# Patient Record
Sex: FEMALE | Race: WHITE | NOT HISPANIC OR LATINO | Employment: OTHER | URBAN - METROPOLITAN AREA
[De-identification: names, ages, dates, MRNs, and addresses within clinical notes are randomized per-mention and may not be internally consistent; named-entity substitution may affect disease eponyms.]

---

## 2017-10-25 ENCOUNTER — APPOINTMENT (OUTPATIENT)
Dept: RADIOLOGY | Facility: CLINIC | Age: 62
End: 2017-10-25
Payer: MEDICARE

## 2017-10-25 ENCOUNTER — OFFICE VISIT (OUTPATIENT)
Dept: URGENT CARE | Facility: CLINIC | Age: 62
End: 2017-10-25
Payer: MEDICARE

## 2017-10-25 DIAGNOSIS — J44.9 CHRONIC OBSTRUCTIVE PULMONARY DISEASE (HCC): ICD-10-CM

## 2017-10-25 PROCEDURE — 71020 HB CHEST X-RAY 2VW FRONTAL&LATL: CPT

## 2017-10-25 PROCEDURE — 99203 OFFICE O/P NEW LOW 30 MIN: CPT

## 2017-10-25 PROCEDURE — G0463 HOSPITAL OUTPT CLINIC VISIT: HCPCS

## 2017-10-25 PROCEDURE — 94640 AIRWAY INHALATION TREATMENT: CPT

## 2017-10-28 NOTE — PROGRESS NOTES
Assessment  1  COPD (chronic obstructive pulmonary disease) (496) (J44 9)   2  Active smoker (305 1) (F17 200)   3  COPD exacerbation (491 21) (J44 1)    Plan  COPD (chronic obstructive pulmonary disease)    · * XR CHEST PA & LATERAL; Status:Active; Requested for:25Oct2017;   COPD exacerbation    · Albuterol Sulfate (2 5 MG/3ML) 0 083% Inhalation Nebulization Solution; INHALE 1  0 083% Every 4 hours PRN cough or wheezing   · Doxycycline Hyclate 100 MG Oral Tablet; TAKE 1 TABLET EVERY 12 HOURS DAILY   · PredniSONE 20 MG Oral Tablet; 3 tabs once daily x 3days; 2 tabs once daily x 2  days; 1 tabs once daily x 2 days  Cough    · Albuterol Sulfate (2 5 MG/3ML) 0 083% Inhalation Nebulization Solution   · Ipratropium Bromide 0 02 % Inhalation Solution   · MINI NEBULIZER- POC; Status:Complete;   Done: [id number] 03:35PM    Discussion/Summary  Discussion Summary:   Follow up with PCP in 2 days if no improvement  If sx worsen go to ER  Medication Side Effects Reviewed: Possible side effects of new medications were reviewed with the patient/guardian today  Understands and agrees with treatment plan: The treatment plan was reviewed with the patient/guardian  The patient/guardian understands and agrees with the treatment plan   Counseling Documentation With Imm: The was counseled regarding instructions for management  Chief Complaint  1  Cough  Chief Complaint Free Text Note Form: C/O harsh cough with thick white expectorant, posterior neck pain which radiates into her posterior head, post nasal drip, SOB and pain in chest with cough x 2 weeks  O2 Sat is 93% and temp is 100  2  now  Pt has h/o COPD and Chronic Bronchitis  Pt has inhalers and nebulizer at home but did not use them  History of Present Illness  HPI: Started with cold sx 2 weeks ago  Now with temp to 101, productive cough with slight green color  Takes Advair and Albuterol  Also has nebulizer treatments, however, has not used them     Hospital Based Practices Required Assessment:   Pain Assessment   the patient states they have pain  The pain is located in the posterior neck and head  The patient describes the pain as aching  Abuse And Domestic Violence Screen    Yes, the patient is safe at home  -- The patient states no one is hurting them  Depression And Suicide Screen  No, the patient has not had thoughts of hurting themself  No, the patient has not felt depressed in the past 7 days  Cough: Lesly Harris presents with complaints of cough  Associated symptoms include dyspnea,-- fever,-- pleuritic chest pain-- and-- mouth breathing, but-- no wheezing,-- no chills,-- no sore throat,-- no myalgias,-- no vomiting,-- no heartburn,-- no postnasal drainage,-- no hoarseness-- and-- no painful swallowing  Review of Systems  Focused-Female:   Constitutional: fever-- and-- feeling poorly  ENT: no hearing loss-- and-- no hoarseness  Cardiovascular: no palpitations  Gastrointestinal: no abdominal pain,-- no nausea-- and-- no vomiting  Musculoskeletal: no arthralgias-- and-- no myalgias  Integumentary: no rashes  Neurological: no headache-- and-- no dizziness  ROS Reviewed:   ROS reviewed  Past Medical History  1  History of hiatal hernia (V12 79) (Z87 19)   2  History of inguinal hernia (V12 79) (Z87 19)   3  History of rotator cuff tear (V13 59) (Z87 39)   4  History of sleep apnea (V13 89) (Z86 69)  Active Problems And Past Medical History Reviewed: The active problems and past medical history were reviewed and updated today  Social History   · Active smoker (305 1) (F17 200)  Social History Reviewed: The social history was reviewed and updated today  Surgical History  1  History of Appendectomy   2  History of Cholecystectomy   3  History of Esophagogastric Fundoplasty Fundoplication   4  History of Inguinal Hernia Repair   5  History of Rotator Cuff Repair   6  History of Tubal Ligation   7  History of Uvulectomy   8  History of Uvuloplasty  Surgical History Reviewed: The surgical history was reviewed and updated today  Current Meds   1  Advair Diskus AEPB; Therapy: (Recorded:25Oct2017) to Recorded   2  Albuterol AERS; Therapy: (Recorded:25Oct2017) to Recorded   3  Bentyl CAPS; Therapy: (XKNKDSBL:10KJO7800) to Recorded   4  Colestid 1 GM Oral Tablet; Therapy: (EDACFIID:03PDJ0703) to Recorded   5  Cymbalta CPEP; Therapy: (HYNLNJRW:50RWQ6401) to Recorded   6  Flexeril TABS; Therapy: (OLHRAPNF:40VAM2891) to Recorded   7  Neurontin CAPS; Therapy: (OSQCQNSL:06YLA6878) to Recorded   8  NexIUM PACK; Therapy: (HNXBCZQZ:88QFQ3049) to Recorded   9  SEROquel TABS; Therapy: (JHQQSCFW:20CKO0086) to Recorded  Medication List Reviewed: The medication list was reviewed and updated today  Allergies  1  Erythromycin TABS   2  Morphine Sulfate TABS    Vitals  Signs   Recorded: [id number] 02:28PM   Temperature: 100 2 F  Heart Rate: 106  Respiration: 20  Systolic: 127  Diastolic: 74  Height: 5 ft   Weight: 150 lb   BMI Calculated: 29 29  BSA Calculated: 1 64  O2 Saturation: 93  Pain Scale: 0    Physical Exam    Constitutional   General appearance: No acute distress, well appearing and well nourished  Eyes   Conjunctiva and lids: No swelling, erythema or discharge  Ears, Nose, Mouth, and Throat   External inspection of ears and nose: Normal     Otoscopic examination: Tympanic membranes translucent with normal light reflex  Canals patent without erythema  Nasal mucosa, septum, and turbinates: Normal without edema or erythema  Oropharynx: Normal with no erythema, edema, exudate or lesions  Pulmonary   Respiratory effort: Abnormal   Assessment of respiratory effort revealed pursed lip breathing  Auscultation of lungs: Abnormal   Auscultation of the lungs revealed decreased breath sounds diffusely  Cardiovascular   Auscultation of heart: Normal rate and rhythm, normal S1 and S2, without murmurs  Lymphatic   Palpation of lymph nodes in neck: No lymphadenopathy  Musculoskeletal   Gait and station: Normal     Skin   Skin and subcutaneous tissue: Normal without rashes or lesions  Psychiatric   Mood and affect: Normal        Results/Data  Diagnostic Studies Reviewed: I personally reviewed the films/images/results in the office today  My interpretation follows  X-ray Review CXR - no infiltrate, fluid in lingula        Signatures   Electronically signed by : Roseann Cranker, PA; Oct 25 2017  3:42PM EST                       (Author)    Electronically signed by : Roseann Cranker, PA; Oct 25 2017  3:56PM EST                       (Author)    Electronically signed by : NICOLETTE Vagras ; Oct 27 2017  2:19PM EST                       (Co-author)

## 2018-01-18 NOTE — RESULT NOTES
Verified Results  * XR CHEST PA & LATERAL [id number] 03:17PM Deirdre Jiménez Order Number: [id number]     Test Name Result Flag Reference   XR CHEST PA & LATERAL (Report)     CHEST      INDICATION: J44 9: Chronic obstructive pulmonary disease, unspecified  History taken directly from the electronic ordering system  Cough with wheezing for 4 days  COMPARISON: None     VIEWS: Frontal and lateral projections     IMAGES: 2     FINDINGS:        Cardiomediastinal silhouette appears unremarkable  No infiltrates  Curvilinear opacities in the right middle lobe with linear atelectasis or scarring  No pneumothorax or pleural effusion  Visualized osseous structures appear within normal limits for the patient's age  IMPRESSION:     No acute pulmonary disease         Workstation performed: [id number]     Signed by:   Tejinder Estrada MD   10/25/17       Plan  COPD (chronic obstructive pulmonary disease)    · * XR CHEST PA & LATERAL; Status:Complete;   Done: [id number] 03:17PM  COPD exacerbation    · Albuterol Sulfate (2 5 MG/3ML) 0 083% Inhalation Nebulization Solution; INHALE 1  0 083% Every 4 hours PRN cough or wheezing   · Doxycycline Hyclate 100 MG Oral Tablet; TAKE 1 TABLET EVERY 12 HOURS  DAILY   · PredniSONE 20 MG Oral Tablet; 3 tabs once daily x 3days; 2 tabs once daily x 2  days; 1 tabs once daily x 2 days  Cough    · Albuterol Sulfate (2 5 MG/3ML) 0 083% Inhalation Nebulization Solution   · Ipratropium Bromide 0 02 % Inhalation Solution   · MINI NEBULIZER- POC; Status:Complete;   Done: [id number] 03:35PM

## 2018-06-06 LAB
ANION GAP SERPL CALCULATED.3IONS-SCNC: 10.3 MM/L
APTT PPP: 28.5 SEC (ref 24.4–37.6)
BASOPHILS # BLD AUTO: 0.1 X3/UL (ref 0–0.3)
BASOPHILS # BLD AUTO: 1 % (ref 0–2)
BUN SERPL-MCNC: 13 MG/DL (ref 7–25)
CALCIUM SERPL-MCNC: 9.5 MG/DL (ref 8.6–10.5)
CHLORIDE SERPL-SCNC: 101 MM/L (ref 98–107)
CO2 SERPL-SCNC: 29 MM/L (ref 21–31)
CREAT SERPL-MCNC: 0.46 MG/DL (ref 0.6–1.2)
DEPRECATED RDW RBC AUTO: 14.2 % (ref 11.5–14.5)
EGFR (HISTORICAL): > 60 GFR
EGFR AFRICAN AMERICAN (HISTORICAL): > 60 GFR
EOSINOPHIL # BLD AUTO: 0.2 X3/UL (ref 0–0.5)
EOSINOPHIL NFR BLD AUTO: 2 % (ref 0–5)
GLUCOSE (HISTORICAL): 86 MG/DL (ref 65–99)
HCT VFR BLD AUTO: 39.7 % (ref 37–47)
HGB BLD-MCNC: 13.7 G/DL (ref 12–16)
INR PPP: 1 (ref 0.9–1.5)
LYMPHOCYTES # BLD AUTO: 2.9 X3/UL (ref 1.2–4.2)
LYMPHOCYTES NFR BLD AUTO: 35.2 % (ref 20.5–51.1)
MCH RBC QN AUTO: 32.3 PG (ref 26–34)
MCHC RBC AUTO-ENTMCNC: 34.5 G/DL (ref 31–36)
MCV RBC AUTO: 93.8 FL (ref 81–99)
MONOCYTES # BLD AUTO: 0.5 X3/UL (ref 0–1)
MONOCYTES NFR BLD AUTO: 6 % (ref 1.7–12)
NEUTROPHILS # BLD AUTO: 4.5 X3/UL (ref 1.4–6.5)
NEUTS SEG NFR BLD AUTO: 55.8 % (ref 42.2–75.2)
OSMOLALITY, SERUM (HISTORICAL): 271 MOSM (ref 262–291)
PLATELET # BLD AUTO: 286 X3/UL (ref 130–400)
PMV BLD AUTO: 9.5 FL (ref 8.6–11.7)
POTASSIUM SERPL-SCNC: 4.3 MM/L (ref 3.5–5.5)
PROTHROMBIN TIME (HISTORICAL): 11.6 SEC (ref 10.1–12.9)
RBC # BLD AUTO: 4.23 X6/UL (ref 3.9–5.2)
SODIUM SERPL-SCNC: 136 MM/L (ref 134–143)
WBC # BLD AUTO: 8.1 X3/UL (ref 4.8–10.8)

## 2018-07-09 ENCOUNTER — HOSPITAL ENCOUNTER (OUTPATIENT)
Dept: RADIOLOGY | Facility: HOSPITAL | Age: 63
Discharge: HOME | End: 2018-07-09

## 2018-07-09 ENCOUNTER — TRANSCRIBE ORDERS (OUTPATIENT)
Dept: ADMINISTRATIVE | Facility: HOSPITAL | Age: 63
End: 2018-07-09

## 2018-07-09 DIAGNOSIS — M54.5 LOW BACK PAIN, UNSPECIFIED BACK PAIN LATERALITY, UNSPECIFIED CHRONICITY, WITH SCIATICA PRESENCE UNSPECIFIED: ICD-10-CM

## 2018-07-09 DIAGNOSIS — M54.50 LUMBAR PAIN WITH RADIATION DOWN LEG: ICD-10-CM

## 2018-07-09 DIAGNOSIS — M79.606 LUMBAR PAIN WITH RADIATION DOWN LEG: ICD-10-CM

## 2018-07-09 DIAGNOSIS — M47.28: ICD-10-CM

## 2018-07-09 DIAGNOSIS — M47.28: Primary | ICD-10-CM

## 2018-08-09 ENCOUNTER — TRANSCRIBE ORDERS (OUTPATIENT)
Dept: URGENT CARE | Facility: CLINIC | Age: 63
End: 2018-08-09

## 2018-08-09 ENCOUNTER — APPOINTMENT (OUTPATIENT)
Dept: RADIOLOGY | Facility: CLINIC | Age: 63
End: 2018-08-09
Payer: MEDICARE

## 2018-08-09 DIAGNOSIS — M47.28: ICD-10-CM

## 2018-08-09 DIAGNOSIS — M54.5 LOW BACK PAIN, UNSPECIFIED BACK PAIN LATERALITY, UNSPECIFIED CHRONICITY, WITH SCIATICA PRESENCE UNSPECIFIED: ICD-10-CM

## 2018-08-09 DIAGNOSIS — M47.28: Primary | ICD-10-CM

## 2018-08-09 PROCEDURE — 72202 X-RAY EXAM SI JOINTS 3/> VWS: CPT

## 2018-08-09 PROCEDURE — 72100 X-RAY EXAM L-S SPINE 2/3 VWS: CPT

## 2018-11-09 ENCOUNTER — HOSPITAL ENCOUNTER (EMERGENCY)
Facility: HOSPITAL | Age: 63
Discharge: HOME | End: 2018-11-09
Attending: FAMILY MEDICINE
Payer: MEDICARE

## 2018-11-09 ENCOUNTER — APPOINTMENT (EMERGENCY)
Dept: RADIOLOGY | Facility: HOSPITAL | Age: 63
End: 2018-11-09
Payer: MEDICARE

## 2018-11-09 VITALS
WEIGHT: 155 LBS | BODY MASS INDEX: 30.43 KG/M2 | HEART RATE: 100 BPM | OXYGEN SATURATION: 95 % | RESPIRATION RATE: 20 BRPM | SYSTOLIC BLOOD PRESSURE: 115 MMHG | HEIGHT: 60 IN | DIASTOLIC BLOOD PRESSURE: 86 MMHG | TEMPERATURE: 98.6 F

## 2018-11-09 DIAGNOSIS — M62.830 BACK MUSCLE SPASM: Primary | ICD-10-CM

## 2018-11-09 PROCEDURE — 72100 X-RAY EXAM L-S SPINE 2/3 VWS: CPT

## 2018-11-09 PROCEDURE — 96372 THER/PROPH/DIAG INJ SC/IM: CPT

## 2018-11-09 PROCEDURE — 99283 EMERGENCY DEPT VISIT LOW MDM: CPT

## 2018-11-09 RX ORDER — ALBUTEROL SULFATE 2.5 MG/3ML
1 SOLUTION RESPIRATORY (INHALATION) 2 TIMES DAILY PRN
COMMUNITY
Start: 2017-10-25 | End: 2020-02-06 | Stop reason: SDUPTHER

## 2018-11-09 RX ORDER — GABAPENTIN 400 MG/1
1 CAPSULE ORAL 2 TIMES DAILY
COMMUNITY
End: 2020-02-06 | Stop reason: SDUPTHER

## 2018-11-09 RX ORDER — METHYLPREDNISOLONE 4 MG/1
TABLET ORAL
Qty: 21 TABLET | Refills: 0 | Status: SHIPPED | OUTPATIENT
Start: 2018-11-09 | End: 2020-02-06 | Stop reason: ALTCHOICE

## 2018-11-09 RX ORDER — QUETIAPINE FUMARATE 50 MG/1
50 TABLET, FILM COATED ORAL
COMMUNITY
End: 2019-10-19 | Stop reason: SDUPTHER

## 2018-11-09 RX ORDER — KETOROLAC TROMETHAMINE 30 MG/ML
60 INJECTION, SOLUTION INTRAMUSCULAR; INTRAVENOUS ONCE
Status: COMPLETED | OUTPATIENT
Start: 2018-11-09 | End: 2018-11-09

## 2018-11-09 RX ORDER — ALBUTEROL SULFATE 90 UG/1
2 AEROSOL, METERED RESPIRATORY (INHALATION) EVERY 6 HOURS PRN
COMMUNITY
End: 2020-02-06 | Stop reason: SDUPTHER

## 2018-11-09 RX ORDER — MEPERIDINE HYDROCHLORIDE 50 MG/ML
50 INJECTION INTRAMUSCULAR; INTRAVENOUS; SUBCUTANEOUS ONCE
Status: COMPLETED | OUTPATIENT
Start: 2018-11-09 | End: 2018-11-09

## 2018-11-09 RX ORDER — MELOXICAM 15 MG/1
15 TABLET ORAL DAILY
Qty: 10 TABLET | Refills: 0 | Status: SHIPPED | OUTPATIENT
Start: 2018-11-09 | End: 2020-02-06 | Stop reason: SDUPTHER

## 2018-11-09 RX ORDER — MONTELUKAST SODIUM 4 MG/1
1 TABLET, CHEWABLE ORAL 2 TIMES DAILY
COMMUNITY
End: 2020-01-02 | Stop reason: SDUPTHER

## 2018-11-09 RX ORDER — CYCLOBENZAPRINE HCL 10 MG
10 TABLET ORAL 3 TIMES DAILY PRN
Qty: 15 TABLET | Refills: 0 | Status: SHIPPED | OUTPATIENT
Start: 2018-11-09 | End: 2020-02-06 | Stop reason: SDUPTHER

## 2018-11-09 RX ORDER — CYCLOBENZAPRINE HCL 10 MG
1 TABLET ORAL DAILY
COMMUNITY
End: 2019-09-10 | Stop reason: SDUPTHER

## 2018-11-09 RX ORDER — DULOXETIN HYDROCHLORIDE 30 MG/1
90 CAPSULE, DELAYED RELEASE ORAL DAILY
COMMUNITY
End: 2019-11-13 | Stop reason: SDUPTHER

## 2018-11-09 RX ADMIN — MEPERIDINE HYDROCHLORIDE 50 MG: 50 INJECTION INTRAMUSCULAR; INTRAVENOUS; SUBCUTANEOUS at 16:22

## 2018-11-09 RX ADMIN — DEXAMETHASONE SODIUM PHOSPHATE 10 MG: 10 INJECTION INTRAMUSCULAR; INTRAVENOUS at 15:31

## 2018-11-09 RX ADMIN — KETOROLAC TROMETHAMINE 60 MG: 30 INJECTION, SOLUTION INTRAMUSCULAR at 15:33

## 2018-11-09 NOTE — DISCHARGE INSTRUCTIONS
Muscle Spasm   Dat T: [place] made simple  Interventions for leg cramps in pregnancy  Pract Midwife 2007; 10(9):40-40  Mena DS: Muscle Spasms  In: Francisco J Sandschild AT, Araceli R, et al, eds  Palliative Medicine, Dasia Grover, [address], [place], 2009  [address] [facility] M: The athlete with muscular cramps: clinical approach  J Am Acad Orthop Surg 2007; 15(7):425-431  Elías BH, Heather GE, Destini P, et al: Diagnosis and treatment of low-back pain because of paraspinous muscle spasm: a physician roundtable  Pain Med 2011; 12 Suppl 4:O207-K990  Catarina Heck PM & Catarina Heck CA: Hyperventilation as a simple cure for severe exercise-associated muscle cramping  Pain Med 2011; 12(6):987-987  Cayden Cisneros et al: Muscular cramps: proposals for a new classification  Acta Neurol [place] 2003; 107(3):176-186  Padmini Bowling Oms: Physical Treatments  In: Jesse's Nursing Procedures, 5th ed  [address] [facility],[address] [place] [address], [address], [place], 2009  Padmini Beck JA: Muscle Spasms (Muscle Cramps)  In: Professional Guide to Signs and Symptoms, [address] [facility],[address] [place] [address], [address], [place], 2007  Freddy ELIZABETH & Dinh A: Muscle Pain and Cramps  In: Hermelinda RB, Washingtonjonathan Robert, Piyush J, et al, eds  Koby's Neurology in [facility], 6th ed  Dasia Grover, [address], [place], 2012 © 2017 [facility] Information is for End User's use only and may not be sold, redistributed or otherwise used for commercial purposes  All illustrations and images included in CareNotes® are the copyrighted property of A D A M , Inc  or Garry Geller  The above information is an  only  It is not intended as medical advice for individual conditions or treatments  Talk to your doctor, nurse or pharmacist before following any medical regimen to see if it is safe and effective for you        Acute Low Back Pain   AMBULATORY CARE:   Acute low back pain  is sudden discomfort in your lower back area that lasts for up to 6 weeks  The discomfort makes it difficult to tolerate activity  Common symptoms include the following:   · Back stiffness or spasms    · Pain down the back or side of one leg    · Holding yourself in an unusual position or posture to decrease your back pain    · Not being able to find a sitting position that is comfortable    · Slow increase in your pain for 24 to 48 hours after you stress your back    · Tenderness on your lower back or severe pain when you move your back  Seek immediate care for the following symptoms:   · Severe pain    · Sudden stiffness and heaviness in both buttocks down to both legs    · Numbness or weakness in one leg, or pain in both legs    · Numbness in your genital area or across your lower back    · Unable to control your urine or bowel movements  Contact your healthcare provider if:   · You have a fever  · You have pain at night or when you rest     · Your pain does not get better with treatment  · You have pain that worsens when you cough or sneeze  · You suddenly feel something pop or snap in your back  · You have questions or concerns about your condition or care  The goal of treatment for acute low back pain  is to relieve your pain and help you tolerate activity  Most people with acute lower back pain get better within 4 to 6 weeks  You may need any of the following:  · Acetaminophen  decreases pain  It is available without a doctor's order  Ask how much to take and how often to take it  Follow directions  Acetaminophen can cause liver damage if not taken correctly  · NSAIDs  help decrease swelling and pain  This medicine is available with or without a doctor's order  NSAIDs can cause stomach bleeding or kidney problems in certain people  If you take blood thinner medicine, always ask your healthcare provider if NSAIDs are safe for you   Always read the medicine label and follow directions  · Prescription pain medicine  may be given  Ask your healthcare provider how to take this medicine safely  · Muscle relaxers  decrease pain by relaxing the muscles in your lower spine  Manage your symptoms:   · Stay active  as much as you can without causing more pain  Bed rest could make your back pain worse  Start with some light exercises such as walking  Avoid heavy lifting until your pain is gone  Ask for more information about the activities or exercises that are right for you  · Ice  helps decrease swelling, pain, and muscle spams  Put crushed ice in a plastic bag  Cover it with a towel  Place it on your lower back for 20 to 30 minutes every 2 hours  Do this for about 2 to 3 days after your pain starts, or as directed  · Heat  helps decrease pain and muscle spasms  Start to use heat after treatment with ice has stopped  Use a small towel dampened with warm water or a heating pad, or sit in a warm bath  Apply heat on the area for 20 to 30 minutes every 2 hours for as many days as directed  Alternate heat and ice  Prevent acute low back pain:   · Use proper body mechanics  ¨ Bend at the hips and knees when you  objects  Do not bend from the waist  Use your leg muscles as you lift the load  Do not use your back  Keep the object close to your chest as you lift it  Try not to twist or lift anything above your waist     ¨ Change your position often when you stand for long periods of time  Rest one foot on a small box or footrest, and then switch to the other foot often  ¨ Try not to sit for long periods of time  When you do, sit in a straight-backed chair with your feet flat on the floor  Never reach, pull, or push while you are sitting  · Do exercises that strengthen your back muscles  Warm up before you exercise  Ask your healthcare provider the best exercises for you  · Maintain a healthy weight  Ask your healthcare provider how much you should weigh   Ask him to help you create a weight loss plan if you are overweight  Follow up with your healthcare provider as directed:  Return for a follow-up visit if you still have pain after 1 to 3 weeks of treatment  You may need to visit an orthopedist if your back pain lasts more than 12 weeks  Write down your questions so you remember to ask them during your visits  © 2017 [address] [employer]  Information is for End User's use only and may not be sold, redistributed or otherwise used for commercial purposes  All illustrations and images included in CareNotes® are the copyrighted property of A D A [employer] , [employer]  or Garry Geller  The above information is an  only  It is not intended as medical advice for individual conditions or treatments  Talk to your doctor, nurse or pharmacist before following any medical regimen to see if it is safe and effective for you

## 2018-11-09 NOTE — ED PROVIDER NOTES
History  Chief Complaint   Patient presents with    Back Pain     lower back around to lower abdomen and down both legs       History provided by:  Patient   used: No    Back Pain   Location:  Lumbar spine  Quality:  Shooting  Radiates to:  R thigh  Pain severity:  Severe  Pain is:  Same all the time  Onset quality:  Gradual  Duration:  2 days  Timing:  Constant  Progression:  Worsening  Chronicity:  New  Context: not falling, not jumping from heights, not recent illness and not recent injury    Relieved by:  Nothing  Worsened by: Movement  Ineffective treatments:  NSAIDs  Associated symptoms: no headaches, no leg pain, no numbness, no paresthesias, no pelvic pain, no perianal numbness, no tingling, no weakness and no weight loss    Risk factors: obesity        Prior to Admission Medications   Prescriptions Last Dose Informant Patient Reported? Taking?    DULoxetine (CYMBALTA) 30 mg delayed release capsule 11/9/2018 at Unknown time  Yes Yes   Sig: Take 90 mg by mouth daily   QUEtiapine (SEROquel) 50 mg tablet 11/8/2018 at Unknown time  Yes Yes   Sig: Take 50 mg by mouth daily with breakfast   QUEtiapine Fumarate (SEROQUEL PO) Unknown at Unknown time  Yes No   Sig: Take 150 mg by mouth daily at bedtime     albuterol (2 5 mg/3 mL) 0 083 % nebulizer solution 11/8/2018 at Unknown time  Yes Yes   Sig: Inhale 1 vial 2 (two) times a day   albuterol (PROVENTIL HFA,VENTOLIN HFA) 90 mcg/act inhaler Past Week at Unknown time  Yes Yes   Sig: Inhale 2 puffs every 6 (six) hours as needed   colestipol (COLESTID) 1 g tablet 11/9/2018 at Unknown time  Yes Yes   Sig: Take 1 tablet by mouth 2 (two) times a day   cyclobenzaprine (FLEXERIL) 10 mg tablet 11/9/2018 at Unknown time  Yes Yes   Sig: Take 1 tablet by mouth daily   fluticasone-salmeterol (ADVAIR DISKUS) 500-50 mcg/dose inhaler 11/9/2018 at Unknown time  Yes Yes   Sig: Inhale 1 puff 2 (two) times a day   gabapentin (NEURONTIN) 400 mg capsule 11/9/2018 at Unknown time  Yes Yes   Sig: Take 1 tablet by mouth 2 (two) times a day      Facility-Administered Medications: None       Past Medical History:   Diagnosis Date    Anxiety     [facility]     Depression     Diverticulitis     GERD (gastroesophageal reflux disease)     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[address] )     Sleep apnea        Past Surgical History:   Procedure Laterality Date    APPENDECTOMY      CHOLECYSTECTOMY      HIATAL HERNIA REPAIR      INGUINAL HERNIA REPAIR Right     LAPAROSCOPIC TUBAL LIGATION Bilateral     ROTATOR CUFF REPAIR Right     UVULOPALATOPHARYNGOPLASTY         History reviewed  No pertinent family history  I have reviewed and agree with the history as documented  Social History   Substance Use Topics    Smoking status: Current Every Day Smoker     Packs/day: 1 00     Types: Cigarettes    Smokeless tobacco: Never Used    Alcohol use Yes      Comment: occasionally        Review of Systems   Constitutional: Negative  Negative for weight loss  HENT: Negative  Respiratory: Negative  Cardiovascular: Negative  Gastrointestinal: Negative  Genitourinary: Negative for pelvic pain  Musculoskeletal: Positive for back pain  Neurological: Negative for tingling, weakness, numbness, headaches and paresthesias  Psychiatric/Behavioral: Negative  Physical Exam  Physical Exam   Constitutional: She appears well-developed and well-nourished  HENT:   Head: Normocephalic and atraumatic  Cardiovascular: Normal rate, regular rhythm and normal heart sounds  Pulmonary/Chest: Effort normal and breath sounds normal    Abdominal: Soft  Bowel sounds are normal  She exhibits no distension  There is no tenderness  Musculoskeletal:   Lower lumbar tenderness on palpation   Nursing note and vitals reviewed        Vital Signs  ED Triage Vitals [11/09/18 1458]   Temperature Pulse Respirations Blood Pressure SpO2   98 6 °F (37 °C) 100 20 115/86 95 %      Temp Source Heart Rate Source Patient Position - Orthostatic VS BP Location FiO2 (%)   Temporal Monitor Sitting Left arm --      Pain Score       5           Vitals:    11/09/18 1458   BP: 115/86   Pulse: 100   Patient Position - Orthostatic VS: Sitting       Visual Acuity      ED Medications  Medications   ketorolac (TORADOL) injection 60 mg (60 mg Intramuscular Given 11/9/18 1533)   dexamethasone (DECADRON) injection 10 mg (10 mg Intramuscular Given 11/9/18 1531)   meperidine (DEMEROL) injection 50 mg (50 mg Intramuscular Given 11/9/18 1622)       Diagnostic Studies  Results Reviewed     None                 XR spine lumbar 2 or 3 views injury   Final Result by Shantel Dodd (11/09 1635)   No acute fracture or subluxation  Stable grade 1 spondylolisthesis of L4   on L5  Diffuse degenerative disc disease and osteoarthritis  Signed by Maik Simmons MD                 Procedures  Procedures       Phone Contacts  ED Phone Contact    ED Course  ED Course as of Nov 09 1658 Fri Nov 09, 2018   1557 Patient states her pain is not better after the pain medication  1658 Patient statesHer pain is much better  Advised to continue with medication may need physical therapy in future if the pain persisted eyes return to the ED if symptoms are worse  MDM  CritCare Time    Disposition  Final diagnoses:   Back muscle spasm     Time reflects when diagnosis was documented in both MDM as applicable and the Disposition within this note     Time User Action Codes Description Comment    11/9/2018  4:40 PM Francine Bronx Add [P54 456] Back muscle spasm       ED Disposition     ED Disposition Condition Comment    Discharge  Jose Manuel Fernandez discharge to home/self care      Condition at discharge: Stable        Follow-up Information     Follow up With Specialties Details Why [address] [place], DO Nephrology In 2 days If symptoms worsen Marcio [address] [place] [id number]  [phone]            Patient's Medications   Discharge Prescriptions    CYCLOBENZAPRINE (FLEXERIL) 10 MG TABLET    Take 1 tablet (10 mg total) by mouth 3 (three) times a day as needed for muscle spasms       Start Date: 11/9/2018 End Date: --       Order Dose: 10 mg       Quantity: 15 tablet    Refills: 0    MELOXICAM (MOBIC) 15 MG TABLET    Take 1 tablet (15 mg total) by mouth daily       Start Date: 11/9/2018 End Date: --       Order Dose: 15 mg       Quantity: 10 tablet    Refills: 0    METHYLPREDNISOLONE 4 MG TBPK    Use as directed on package       Start Date: 11/9/2018 End Date: --       Order Dose: --       Quantity: 21 tablet    Refills: 0     No discharge procedures on file      ED Provider  Electronically Signed by           Gianni Parrish MD  11/09/18 7169

## 2018-11-29 ENCOUNTER — TRANSCRIBE ORDERS (OUTPATIENT)
Dept: ADMINISTRATIVE | Facility: HOSPITAL | Age: 63
End: 2018-11-29

## 2018-11-29 DIAGNOSIS — R10.84 ABDOMINAL PAIN, GENERALIZED: Primary | ICD-10-CM

## 2018-12-03 ENCOUNTER — HOSPITAL ENCOUNTER (OUTPATIENT)
Dept: ULTRASOUND IMAGING | Facility: HOSPITAL | Age: 63
Discharge: HOME | End: 2018-12-03
Attending: INTERNAL MEDICINE
Payer: MEDICARE

## 2018-12-03 DIAGNOSIS — R10.84 ABDOMINAL PAIN, GENERALIZED: ICD-10-CM

## 2018-12-03 PROCEDURE — 76700 US EXAM ABDOM COMPLETE: CPT

## 2019-02-24 ENCOUNTER — APPOINTMENT (EMERGENCY)
Dept: CT IMAGING | Facility: HOSPITAL | Age: 64
End: 2019-02-24
Payer: MEDICARE

## 2019-02-24 ENCOUNTER — HOSPITAL ENCOUNTER (EMERGENCY)
Facility: HOSPITAL | Age: 64
Discharge: HOME | End: 2019-02-24
Attending: EMERGENCY MEDICINE | Admitting: EMERGENCY MEDICINE
Payer: MEDICARE

## 2019-02-24 VITALS
TEMPERATURE: 98.9 F | HEIGHT: 60 IN | WEIGHT: 152 LBS | BODY MASS INDEX: 29.84 KG/M2 | RESPIRATION RATE: 18 BRPM | OXYGEN SATURATION: 95 % | DIASTOLIC BLOOD PRESSURE: 77 MMHG | SYSTOLIC BLOOD PRESSURE: 89 MMHG | HEART RATE: 91 BPM

## 2019-02-24 DIAGNOSIS — R29.6 FREQUENT FALLS: Primary | ICD-10-CM

## 2019-02-24 LAB
ALBUMIN SERPL BCP-MCNC: 4.7 G/DL (ref 3.5–5.7)
ALP SERPL-CCNC: 85 U/L (ref 55–165)
ALT SERPL W P-5'-P-CCNC: 18 U/L (ref 7–52)
ANION GAP SERPL CALCULATED.3IONS-SCNC: 9 MMOL/L (ref 4–13)
APTT PPP: 23 SECONDS (ref 26–38)
AST SERPL W P-5'-P-CCNC: 24 U/L (ref 13–39)
BASOPHILS # BLD AUTO: 0 THOUSANDS/ΜL (ref 0–0.1)
BASOPHILS NFR BLD AUTO: 1 % (ref 0–2)
BILIRUB SERPL-MCNC: 0.3 MG/DL (ref 0.2–1)
BUN SERPL-MCNC: 11 MG/DL (ref 7–25)
CALCIUM SERPL-MCNC: 9.7 MG/DL (ref 8.6–10.5)
CHLORIDE SERPL-SCNC: 97 MMOL/L (ref 98–107)
CO2 SERPL-SCNC: 27 MMOL/L (ref 21–31)
CREAT SERPL-MCNC: 0.51 MG/DL (ref 0.6–1.2)
EOSINOPHIL # BLD AUTO: 0.1 THOUSAND/ΜL (ref 0–0.61)
EOSINOPHIL NFR BLD AUTO: 1 % (ref 0–5)
ERYTHROCYTE [DISTWIDTH] IN BLOOD BY AUTOMATED COUNT: 14.2 % (ref 11.5–14.5)
ETHANOL SERPL-MCNC: 160.6 MG/DL
GFR SERPL CREATININE-BSD FRML MDRD: 103 ML/MIN/1.73SQ M
GLUCOSE SERPL-MCNC: 114 MG/DL (ref 65–99)
HCT VFR BLD AUTO: 41.3 % (ref 42–47)
HGB BLD-MCNC: 13.8 G/DL (ref 12–16)
INR PPP: 0.97 (ref 0.9–1.5)
LYMPHOCYTES # BLD AUTO: 1.6 THOUSANDS/ΜL (ref 0.6–4.47)
LYMPHOCYTES NFR BLD AUTO: 21 % (ref 21–51)
MCH RBC QN AUTO: 31.3 PG (ref 26–34)
MCHC RBC AUTO-ENTMCNC: 33.4 G/DL (ref 31–37)
MCV RBC AUTO: 94 FL (ref 81–99)
MONOCYTES # BLD AUTO: 0.3 THOUSAND/ΜL (ref 0.17–1.22)
MONOCYTES NFR BLD AUTO: 4 % (ref 2–12)
NEUTROPHILS # BLD AUTO: 5.6 THOUSANDS/ΜL (ref 1.4–6.5)
NEUTS SEG NFR BLD AUTO: 74 % (ref 42–75)
NRBC BLD AUTO-RTO: 0 /100 WBCS
PLATELET # BLD AUTO: 312 THOUSANDS/UL (ref 149–390)
PMV BLD AUTO: 8.6 FL (ref 8.6–11.7)
POTASSIUM SERPL-SCNC: 4.3 MMOL/L (ref 3.5–5.5)
PROT SERPL-MCNC: 8 G/DL (ref 6.4–8.9)
PROTHROMBIN TIME: 11.3 SECONDS (ref 10.2–13)
RBC # BLD AUTO: 4.42 MILLION/UL (ref 3.9–5.2)
SODIUM SERPL-SCNC: 133 MMOL/L (ref 134–143)
TROPONIN I SERPL-MCNC: <0.03 NG/ML
WBC # BLD AUTO: 7.6 THOUSAND/UL (ref 4.8–10.8)

## 2019-02-24 PROCEDURE — 36415 COLL VENOUS BLD VENIPUNCTURE: CPT | Performed by: EMERGENCY MEDICINE

## 2019-02-24 PROCEDURE — 93005 ELECTROCARDIOGRAM TRACING: CPT

## 2019-02-24 PROCEDURE — 99285 EMERGENCY DEPT VISIT HI MDM: CPT

## 2019-02-24 PROCEDURE — 85730 THROMBOPLASTIN TIME PARTIAL: CPT | Performed by: EMERGENCY MEDICINE

## 2019-02-24 PROCEDURE — 80053 COMPREHEN METABOLIC PANEL: CPT | Performed by: EMERGENCY MEDICINE

## 2019-02-24 PROCEDURE — 70450 CT HEAD/BRAIN W/O DYE: CPT

## 2019-02-24 PROCEDURE — 84484 ASSAY OF TROPONIN QUANT: CPT | Performed by: EMERGENCY MEDICINE

## 2019-02-24 PROCEDURE — 85610 PROTHROMBIN TIME: CPT | Performed by: EMERGENCY MEDICINE

## 2019-02-24 PROCEDURE — 85025 COMPLETE CBC W/AUTO DIFF WBC: CPT | Performed by: EMERGENCY MEDICINE

## 2019-02-24 PROCEDURE — 80320 DRUG SCREEN QUANTALCOHOLS: CPT | Performed by: EMERGENCY MEDICINE

## 2019-02-25 LAB
ATRIAL RATE: 93 BPM
P AXIS: 66 DEGREES
PR INTERVAL: 120 MS
QRS AXIS: 35 DEGREES
QRSD INTERVAL: 82 MS
QT INTERVAL: 362 MS
QTC INTERVAL: 450 MS
T WAVE AXIS: 53 DEGREES
VENTRICULAR RATE: 93 BPM

## 2019-02-25 PROCEDURE — 93010 ELECTROCARDIOGRAM REPORT: CPT | Performed by: INTERNAL MEDICINE

## 2019-02-25 NOTE — ED PROVIDER NOTES
History  Chief Complaint   Patient presents with   [address] Hypertension     Patient is 59year-old female with a history of alcohol abuse who has fallen 3 times today  Patient admits to drinking  Patient states 1 time she did hit her head  Patient reports a mild headache  She denies any photophobia or enies any neck pain or numbness or weakness  Patient's fall was witnessed by family who called EMS  Patient states she feels fine          Prior to Admission Medications   Prescriptions Last Dose Informant Patient Reported? Taking?    DULoxetine (CYMBALTA) 30 mg delayed release capsule   Yes No   Sig: Take 90 mg by mouth daily   Methylprednisolone 4 MG TBPK   No No   Sig: Use as directed on package   QUEtiapine (SEROquel) 50 mg tablet   Yes No   Sig: Take 50 mg by mouth daily with breakfast   QUEtiapine Fumarate (SEROQUEL PO)   Yes No   Sig: Take 150 mg by mouth daily at bedtime     albuterol (2 5 mg/3 mL) 0 083 % nebulizer solution   Yes No   Sig: Inhale 1 vial 2 (two) times a day   albuterol (PROVENTIL HFA,VENTOLIN HFA) 90 mcg/act inhaler   Yes No   Sig: Inhale 2 puffs every 6 (six) hours as needed   colestipol (COLESTID) 1 g tablet   Yes No   Sig: Take 1 tablet by mouth 2 (two) times a day   cyclobenzaprine (FLEXERIL) 10 mg tablet   Yes No   Sig: Take 1 tablet by mouth daily   cyclobenzaprine (FLEXERIL) 10 mg tablet   No No   Sig: Take 1 tablet (10 mg total) by mouth 3 (three) times a day as needed for muscle spasms   fluticasone-salmeterol (ADVAIR DISKUS) 500-50 mcg/dose inhaler   Yes No   Sig: Inhale 1 puff 2 (two) times a day   gabapentin (NEURONTIN) 400 mg capsule   Yes No   Sig: Take 1 tablet by mouth 2 (two) times a day   meloxicam (MOBIC) 15 mg tablet   No No   Sig: Take 1 tablet (15 mg total) by mouth daily      Facility-Administered Medications: None       Past Medical History:   Diagnosis Date    Anxiety     COPD (chronic obstructive pulmonary disease) (HCC)     Depression     Diverticulitis  GERD (gastroesophageal reflux disease)     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[facility] )     Sleep apnea        Past Surgical History:   Procedure Laterality Date    APPENDECTOMY      CHOLECYSTECTOMY      HIATAL HERNIA REPAIR      INGUINAL HERNIA REPAIR Right     LAPAROSCOPIC TUBAL LIGATION Bilateral     ROTATOR CUFF REPAIR Right     UVULOPALATOPHARYNGOPLASTY         History reviewed  No pertinent family history  I have reviewed and agree with the history as documented  Social History     Tobacco Use    Smoking status: Current Every Day Smoker     Packs/day: 1 00     Types: Cigarettes    Smokeless tobacco: Never Used   Substance Use Topics    Alcohol use: Yes     Comment: occasionally    Drug use: Yes     Types: Marijuana     Comment: daily in e-pen form        Review of Systems   Constitutional: Negative  HENT: Negative  Eyes: Negative  Respiratory: Negative  Cardiovascular: Negative  Gastrointestinal: Negative  Musculoskeletal: Positive for gait problem  Negative for joint swelling and neck pain  Skin: Negative  Neurological: Negative for weakness, numbness and headaches  Gait dysfunction   Psychiatric/Behavioral:        Alcohol abuse   All other systems reviewed and are negative  Physical Exam  Physical Exam   Constitutional: She is oriented to person, place, and time  She appears well-developed and well-nourished  HENT:   Head: Normocephalic and atraumatic  Eyes: Pupils are equal, round, and reactive to light  EOM are normal    Neck: Normal range of motion  Neck supple  Nontender to palpation or with any motion   Cardiovascular: Normal rate and regular rhythm  Pulmonary/Chest: Effort normal    Decreased breath sounds bilaterally   Abdominal: Soft  There is no tenderness  Musculoskeletal: Normal range of motion  She exhibits no tenderness  Neurological: She is alert and oriented to person, place, and time  Skin: Skin is warm and dry   Capillary refill takes less than 2 seconds  Psychiatric: She has a normal mood and affect  Nursing note and vitals reviewed  Vital Signs  ED Triage Vitals [02/24/19 1919]   Temperature Pulse Respirations Blood Pressure SpO2   98 1 °F (36 7 °C) 95 16 145/89 95 %      Temp Source Heart Rate Source Patient Position - Orthostatic VS BP Location FiO2 (%)   Temporal Monitor -- Right arm --      Pain Score       No Pain           Vitals:    02/24/19 1919   BP: 145/89   Pulse: 95       Visual Acuity  Visual Acuity      Most Recent Value   L Pupil Size (mm)  3   R Pupil Size (mm)  3          ED Medications  Medications - No data to display    Diagnostic Studies  Results Reviewed     Procedure Component Value Units Date/Time    Troponin I [[id number]]  (Normal) Collected:  02/24/19 2008    Lab Status:  Final result Specimen:  Blood from Arm, Left Updated:  02/24/19 2039     Troponin I <0 03 ng/mL     Ethanol [[id number]]  (Abnormal) Collected:  02/24/19 2008    Lab Status:  Final result Specimen:  Blood from Arm, Left Updated:  02/24/19 2036     Ethanol Lvl 160 6 mg/dL     Comprehensive metabolic panel [[id number]]  (Abnormal) Collected:  02/24/19 2008    Lab Status:  Final result Specimen:  Blood from Arm, Left Updated:  02/24/19 2035     Sodium 133 mmol/L      Potassium 4 3 mmol/L      Chloride 97 mmol/L      CO2 27 mmol/L      ANION GAP 9 mmol/L      BUN 11 mg/dL      Creatinine 0 51 mg/dL      Glucose 114 mg/dL      Calcium 9 7 mg/dL      AST 24 U/L      ALT 18 U/L      Alkaline Phosphatase 85 U/L      Total Protein 8 0 g/dL      Albumin 4 7 g/dL      Total Bilirubin 0 30 mg/dL      eGFR 103 ml/min/1 73sq m     Narrative:       National Kidney Disease Education Program recommendations are as follows:  GFR calculation is accurate only with a steady state creatinine  Chronic Kidney disease less than 60 ml/min/1 73 sq  meters  Kidney failure less than 15 ml/min/1 73 sq  meters      Protime-INR [[id number]]  (Normal) Collected:  02/24/19 2008 Lab Status:  Final result Specimen:  Blood from Arm, Left Updated:  02/24/19 2032     Protime 11 3 seconds      INR 0 97    APTT [[id number]]  (Abnormal) Collected:  02/24/19 2008    Lab Status:  Final result Specimen:  Blood from Arm, Left Updated:  02/24/19 2032     PTT 23 seconds     CBC and differential [[id number]]  (Abnormal) Collected:  02/24/19 2013    Lab Status:  Final result Specimen:  Blood from Arm, Left Updated:  02/24/19 2021     WBC 7 60 Thousand/uL      RBC 4 42 Million/uL      Hemoglobin 13 8 g/dL      Hematocrit 41 3 %      MCV 94 fL      MCH 31 3 pg      MCHC 33 4 g/dL      RDW 14 2 %      MPV 8 6 fL      Platelets 717 Thousands/uL      nRBC 0 /100 WBCs      Neutrophils Relative 74 %      Lymphocytes Relative 21 %      Monocytes Relative 4 %      Eosinophils Relative 1 %      Basophils Relative 1 %      Neutrophils Absolute 5 60 Thousands/µL      Lymphocytes Absolute 1 60 Thousands/µL      Monocytes Absolute 0 30 Thousand/µL      Eosinophils Absolute 0 10 Thousand/µL      Basophils Absolute 0 00 Thousands/µL                  CT head without contrast   Final Result by Emerson Hallman MD (02/24 2101)      No acute intracranial abnormality  Workstation performed: [id number]                    Procedures  Procedures       Phone Contacts  ED Phone Contact    ED Course                               MDM  Number of Diagnoses or Management Options  Diagnosis management comments: Patient with mildly elevated alcohol  Patient is stable emergency department  Patient has family member willing to take her home    Patient will be discharged       Amount and/or Complexity of Data Reviewed  Clinical lab tests: reviewed  Tests in the radiology section of CPT®: reviewed    Risk of Complications, Morbidity, and/or Mortality  Presenting problems: moderate  Diagnostic procedures: moderate  Management options: low    Patient Progress  Patient progress: stable      Disposition  Final diagnoses:   None ED Disposition     None      Follow-up Information    None         Patient's Medications   Discharge Prescriptions    No medications on file     No discharge procedures on file      ED Provider  Electronically Signed by           Efren Mc MD  02/24/19 2549

## 2019-07-15 ENCOUNTER — APPOINTMENT (OUTPATIENT)
Dept: LAB | Facility: CLINIC | Age: 64
End: 2019-07-15
Payer: COMMERCIAL

## 2019-07-15 ENCOUNTER — TRANSCRIBE ORDERS (OUTPATIENT)
Dept: LAB | Facility: CLINIC | Age: 64
End: 2019-07-15

## 2019-07-15 ENCOUNTER — TRANSCRIBE ORDERS (OUTPATIENT)
Dept: ADMINISTRATIVE | Facility: HOSPITAL | Age: 64
End: 2019-07-15

## 2019-07-15 DIAGNOSIS — I10 ESSENTIAL HYPERTENSION, MALIGNANT: Primary | ICD-10-CM

## 2019-07-15 DIAGNOSIS — R10.30 INGUINAL PAIN, UNSPECIFIED LATERALITY: Primary | ICD-10-CM

## 2019-07-15 DIAGNOSIS — E78.5 HYPERLIPIDEMIA, UNSPECIFIED HYPERLIPIDEMIA TYPE: ICD-10-CM

## 2019-07-15 DIAGNOSIS — K57.30 DIVERTICULOSIS OF LARGE INTESTINE WITHOUT DIVERTICULITIS: ICD-10-CM

## 2019-07-15 DIAGNOSIS — A37.00: ICD-10-CM

## 2019-07-15 LAB
ALBUMIN SERPL BCP-MCNC: 3.8 G/DL (ref 3.5–5)
ALP SERPL-CCNC: 94 U/L (ref 46–116)
ALT SERPL W P-5'-P-CCNC: 19 U/L (ref 12–78)
ANION GAP SERPL CALCULATED.3IONS-SCNC: 5 MMOL/L (ref 4–13)
AST SERPL W P-5'-P-CCNC: 13 U/L (ref 5–45)
BACTERIA UR QL AUTO: NORMAL /HPF
BASOPHILS # BLD AUTO: 0.07 THOUSANDS/ΜL (ref 0–0.1)
BASOPHILS NFR BLD AUTO: 1 % (ref 0–1)
BILIRUB SERPL-MCNC: 0.37 MG/DL (ref 0.2–1)
BILIRUB UR QL STRIP: NEGATIVE
BUN SERPL-MCNC: 13 MG/DL (ref 5–25)
CALCIUM SERPL-MCNC: 9.3 MG/DL (ref 8.3–10.1)
CHLORIDE SERPL-SCNC: 107 MMOL/L (ref 100–108)
CHOLEST SERPL-MCNC: 240 MG/DL (ref 50–200)
CLARITY UR: CLEAR
CO2 SERPL-SCNC: 29 MMOL/L (ref 21–32)
COLOR UR: YELLOW
CREAT SERPL-MCNC: 0.51 MG/DL (ref 0.6–1.3)
CREAT UR-MCNC: 70.5 MG/DL
EOSINOPHIL # BLD AUTO: 0.19 THOUSAND/ΜL (ref 0–0.61)
EOSINOPHIL NFR BLD AUTO: 3 % (ref 0–6)
ERYTHROCYTE [DISTWIDTH] IN BLOOD BY AUTOMATED COUNT: 14.9 % (ref 11.6–15.1)
GFR SERPL CREATININE-BSD FRML MDRD: 103 ML/MIN/1.73SQ M
GLUCOSE P FAST SERPL-MCNC: 93 MG/DL (ref 65–99)
GLUCOSE UR STRIP-MCNC: NEGATIVE MG/DL
HCT VFR BLD AUTO: 43.8 % (ref 34.8–46.1)
HDLC SERPL-MCNC: 62 MG/DL (ref 40–60)
HGB BLD-MCNC: 14 G/DL (ref 11.5–15.4)
HGB UR QL STRIP.AUTO: NEGATIVE
HYALINE CASTS #/AREA URNS LPF: NORMAL /LPF
IMM GRANULOCYTES # BLD AUTO: 0.02 THOUSAND/UL (ref 0–0.2)
IMM GRANULOCYTES NFR BLD AUTO: 0 % (ref 0–2)
KETONES UR STRIP-MCNC: NEGATIVE MG/DL
LDLC SERPL CALC-MCNC: 153 MG/DL (ref 0–100)
LEUKOCYTE ESTERASE UR QL STRIP: NEGATIVE
LYMPHOCYTES # BLD AUTO: 2.1 THOUSANDS/ΜL (ref 0.6–4.47)
LYMPHOCYTES NFR BLD AUTO: 34 % (ref 14–44)
MCH RBC QN AUTO: 31.6 PG (ref 26.8–34.3)
MCHC RBC AUTO-ENTMCNC: 32 G/DL (ref 31.4–37.4)
MCV RBC AUTO: 99 FL (ref 82–98)
MICROALBUMIN UR-MCNC: 7.2 MG/L (ref 0–20)
MICROALBUMIN/CREAT 24H UR: 10 MG/G CREATININE (ref 0–30)
MONOCYTES # BLD AUTO: 0.43 THOUSAND/ΜL (ref 0.17–1.22)
MONOCYTES NFR BLD AUTO: 7 % (ref 4–12)
NEUTROPHILS # BLD AUTO: 3.45 THOUSANDS/ΜL (ref 1.85–7.62)
NEUTS SEG NFR BLD AUTO: 55 % (ref 43–75)
NITRITE UR QL STRIP: NEGATIVE
NON-SQ EPI CELLS URNS QL MICRO: NORMAL /HPF
NONHDLC SERPL-MCNC: 178 MG/DL
NRBC BLD AUTO-RTO: 0 /100 WBCS
PH UR STRIP.AUTO: 7 [PH]
PLATELET # BLD AUTO: 307 THOUSANDS/UL (ref 149–390)
PMV BLD AUTO: 11.5 FL (ref 8.9–12.7)
POTASSIUM SERPL-SCNC: 3.9 MMOL/L (ref 3.5–5.3)
PROT SERPL-MCNC: 7.3 G/DL (ref 6.4–8.2)
PROT UR STRIP-MCNC: NEGATIVE MG/DL
RBC # BLD AUTO: 4.43 MILLION/UL (ref 3.81–5.12)
RBC #/AREA URNS AUTO: NORMAL /HPF
SODIUM SERPL-SCNC: 141 MMOL/L (ref 136–145)
SP GR UR STRIP.AUTO: 1.02 (ref 1–1.03)
T4 FREE SERPL-MCNC: 0.63 NG/DL (ref 0.76–1.46)
TRIGL SERPL-MCNC: 126 MG/DL
TSH SERPL DL<=0.05 MIU/L-ACNC: 1.43 UIU/ML (ref 0.36–3.74)
UROBILINOGEN UR QL STRIP.AUTO: 1 E.U./DL
WBC # BLD AUTO: 6.26 THOUSAND/UL (ref 4.31–10.16)
WBC #/AREA URNS AUTO: NORMAL /HPF

## 2019-07-15 PROCEDURE — 81001 URINALYSIS AUTO W/SCOPE: CPT

## 2019-07-15 PROCEDURE — 84443 ASSAY THYROID STIM HORMONE: CPT

## 2019-07-15 PROCEDURE — 80053 COMPREHEN METABOLIC PANEL: CPT

## 2019-07-15 PROCEDURE — 87801 DETECT AGNT MULT DNA AMPLI: CPT

## 2019-07-15 PROCEDURE — 85025 COMPLETE CBC W/AUTO DIFF WBC: CPT

## 2019-07-15 PROCEDURE — 82570 ASSAY OF URINE CREATININE: CPT

## 2019-07-15 PROCEDURE — 36415 COLL VENOUS BLD VENIPUNCTURE: CPT

## 2019-07-15 PROCEDURE — 80061 LIPID PANEL: CPT

## 2019-07-15 PROCEDURE — 86615 BORDETELLA ANTIBODY: CPT

## 2019-07-15 PROCEDURE — 84439 ASSAY OF FREE THYROXINE: CPT

## 2019-07-15 PROCEDURE — 82043 UR ALBUMIN QUANTITATIVE: CPT

## 2019-07-17 LAB
B PARAPERT DNA SPEC QL NAA+PROBE: NOT DETECTED
B PERT DNA SPEC QL NAA+PROBE: NOT DETECTED
B PERT IGA SER QL IA: <1 INDEX (ref 0–0.9)
B PERT IGG SER-ACNC: 1.12 INDEX (ref 0–0.94)
B PERT IGM SER QL IA: <1 INDEX (ref 0–0.9)

## 2019-07-19 ENCOUNTER — HOSPITAL ENCOUNTER (OUTPATIENT)
Dept: CT IMAGING | Facility: HOSPITAL | Age: 64
Discharge: HOME | End: 2019-07-19
Attending: INTERNAL MEDICINE
Payer: COMMERCIAL

## 2019-07-19 DIAGNOSIS — K57.30 DIVERTICULOSIS OF LARGE INTESTINE WITHOUT DIVERTICULITIS: ICD-10-CM

## 2019-07-19 DIAGNOSIS — R10.30 INGUINAL PAIN, UNSPECIFIED LATERALITY: ICD-10-CM

## 2019-07-19 PROCEDURE — 74176 CT ABD & PELVIS W/O CONTRAST: CPT

## 2019-09-10 DIAGNOSIS — M62.838 MUSCLE SPASM: Primary | ICD-10-CM

## 2019-09-10 RX ORDER — CYCLOBENZAPRINE HCL 10 MG
10 TABLET ORAL DAILY
Qty: 60 TABLET | Refills: 2 | Status: SHIPPED | OUTPATIENT
Start: 2019-09-10 | End: 2020-02-06 | Stop reason: SDUPTHER

## 2019-09-12 ENCOUNTER — TRANSCRIBE ORDERS (OUTPATIENT)
Dept: LAB | Facility: CLINIC | Age: 64
End: 2019-09-12

## 2019-09-12 ENCOUNTER — APPOINTMENT (OUTPATIENT)
Dept: LAB | Facility: CLINIC | Age: 64
End: 2019-09-12
Payer: COMMERCIAL

## 2019-09-12 DIAGNOSIS — E03.9 HYPOTHYROIDISM, UNSPECIFIED TYPE: Primary | ICD-10-CM

## 2019-09-12 DIAGNOSIS — E78.5 HYPERLIPIDEMIA, UNSPECIFIED HYPERLIPIDEMIA TYPE: ICD-10-CM

## 2019-09-12 DIAGNOSIS — E07.9 THYROID DISORDER: Primary | ICD-10-CM

## 2019-09-12 LAB
ALBUMIN SERPL BCP-MCNC: 4.2 G/DL (ref 3.5–5)
ALP SERPL-CCNC: 114 U/L (ref 46–116)
ALT SERPL W P-5'-P-CCNC: 28 U/L (ref 12–78)
AST SERPL W P-5'-P-CCNC: 19 U/L (ref 5–45)
BILIRUB DIRECT SERPL-MCNC: 0.1 MG/DL (ref 0–0.2)
BILIRUB SERPL-MCNC: 0.37 MG/DL (ref 0.2–1)
PROT SERPL-MCNC: 7.7 G/DL (ref 6.4–8.2)
T4 FREE SERPL-MCNC: 0.66 NG/DL (ref 0.76–1.46)
TSH SERPL DL<=0.05 MIU/L-ACNC: 3.02 UIU/ML (ref 0.36–3.74)

## 2019-09-12 PROCEDURE — 84439 ASSAY OF FREE THYROXINE: CPT

## 2019-09-12 PROCEDURE — 36415 COLL VENOUS BLD VENIPUNCTURE: CPT

## 2019-09-12 PROCEDURE — 84443 ASSAY THYROID STIM HORMONE: CPT

## 2019-09-12 PROCEDURE — 80076 HEPATIC FUNCTION PANEL: CPT

## 2019-10-19 DIAGNOSIS — G47.00 INSOMNIA, UNSPECIFIED TYPE: Primary | ICD-10-CM

## 2019-10-21 RX ORDER — QUETIAPINE FUMARATE 50 MG/1
50 TABLET, FILM COATED ORAL
Qty: 30 TABLET | Refills: 5 | Status: SHIPPED | OUTPATIENT
Start: 2019-10-21 | End: 2019-10-31 | Stop reason: SDUPTHER

## 2019-10-31 DIAGNOSIS — G47.00 INSOMNIA, UNSPECIFIED TYPE: ICD-10-CM

## 2019-10-31 RX ORDER — QUETIAPINE FUMARATE 50 MG/1
TABLET, FILM COATED ORAL
Qty: 90 TABLET | Refills: 5 | Status: ON HOLD | OUTPATIENT
Start: 2019-10-31 | End: 2020-03-03 | Stop reason: SDUPTHER

## 2019-10-31 NOTE — TELEPHONE ENCOUNTER
Patient called and left voicemail that she is taking 1 pill in the morning and 2 pills in the evening

## 2019-11-13 DIAGNOSIS — G89.29 OTHER CHRONIC PAIN: Primary | ICD-10-CM

## 2019-11-13 RX ORDER — DULOXETIN HYDROCHLORIDE 30 MG/1
90 CAPSULE, DELAYED RELEASE ORAL DAILY
Qty: 120 CAPSULE | Refills: 5 | Status: SHIPPED | OUTPATIENT
Start: 2019-11-13 | End: 2019-12-11 | Stop reason: SDUPTHER

## 2019-12-11 DIAGNOSIS — E78.2 MIXED HYPERLIPIDEMIA: Primary | ICD-10-CM

## 2019-12-11 DIAGNOSIS — R10.9 ABDOMINAL CRAMPING: ICD-10-CM

## 2019-12-11 DIAGNOSIS — G89.29 OTHER CHRONIC PAIN: ICD-10-CM

## 2019-12-11 RX ORDER — DICYCLOMINE HCL 20 MG
20 TABLET ORAL 3 TIMES DAILY PRN
COMMUNITY
Start: 2019-09-10 | End: 2019-12-11 | Stop reason: SDUPTHER

## 2019-12-11 RX ORDER — ATORVASTATIN CALCIUM 20 MG/1
20 TABLET, FILM COATED ORAL DAILY
Qty: 30 TABLET | Refills: 5 | Status: SHIPPED | OUTPATIENT
Start: 2019-12-11 | End: 2020-02-06 | Stop reason: SDUPTHER

## 2019-12-11 RX ORDER — DICYCLOMINE HCL 20 MG
20 TABLET ORAL 3 TIMES DAILY PRN
Qty: 90 TABLET | Refills: 3 | Status: SHIPPED | OUTPATIENT
Start: 2019-12-11 | End: 2020-02-06 | Stop reason: SDUPTHER

## 2019-12-11 RX ORDER — ATORVASTATIN CALCIUM 20 MG/1
20 TABLET, FILM COATED ORAL DAILY
COMMUNITY
Start: 2019-09-10 | End: 2019-12-11 | Stop reason: SDUPTHER

## 2019-12-11 RX ORDER — DULOXETIN HYDROCHLORIDE 30 MG/1
30 CAPSULE, DELAYED RELEASE ORAL DAILY
Qty: 30 CAPSULE | Refills: 3 | Status: SHIPPED | OUTPATIENT
Start: 2019-12-11 | End: 2020-02-06 | Stop reason: SDUPTHER

## 2020-01-02 DIAGNOSIS — R10.9 ABDOMINAL CRAMPING: Primary | ICD-10-CM

## 2020-01-02 RX ORDER — MONTELUKAST SODIUM 4 MG/1
1 TABLET, CHEWABLE ORAL 2 TIMES DAILY
Qty: 180 TABLET | Refills: 3 | Status: SHIPPED | OUTPATIENT
Start: 2020-01-02 | End: 2020-05-11 | Stop reason: SDUPTHER

## 2020-02-06 ENCOUNTER — OFFICE VISIT (OUTPATIENT)
Dept: FAMILY MEDICINE CLINIC | Facility: HOME HEALTHCARE | Age: 65
End: 2020-02-06

## 2020-02-06 VITALS
SYSTOLIC BLOOD PRESSURE: 130 MMHG | BODY MASS INDEX: 34.47 KG/M2 | TEMPERATURE: 98.4 F | OXYGEN SATURATION: 94 % | HEART RATE: 88 BPM | DIASTOLIC BLOOD PRESSURE: 90 MMHG | WEIGHT: 175.6 LBS | RESPIRATION RATE: 18 BRPM | HEIGHT: 60 IN

## 2020-02-06 DIAGNOSIS — R10.9 ABDOMINAL CRAMPING: ICD-10-CM

## 2020-02-06 DIAGNOSIS — Z12.11 SCREENING FOR COLORECTAL CANCER: ICD-10-CM

## 2020-02-06 DIAGNOSIS — G47.30 SLEEP APNEA, UNSPECIFIED TYPE: ICD-10-CM

## 2020-02-06 DIAGNOSIS — Z12.31 ENCOUNTER FOR SCREENING MAMMOGRAM FOR MALIGNANT NEOPLASM OF BREAST: ICD-10-CM

## 2020-02-06 DIAGNOSIS — Z13.29 SCREENING FOR ENDOCRINE/METABOLIC/IMMUNITY DISORDERS: ICD-10-CM

## 2020-02-06 DIAGNOSIS — Z11.4 SCREENING FOR HIV WITHOUT PRESENCE OF RISK FACTORS: ICD-10-CM

## 2020-02-06 DIAGNOSIS — Z13.228 SCREENING FOR ENDOCRINE/METABOLIC/IMMUNITY DISORDERS: ICD-10-CM

## 2020-02-06 DIAGNOSIS — E78.2 MIXED HYPERLIPIDEMIA: ICD-10-CM

## 2020-02-06 DIAGNOSIS — F32.A DEPRESSION, UNSPECIFIED DEPRESSION TYPE: ICD-10-CM

## 2020-02-06 DIAGNOSIS — Z13.0 SCREENING FOR ENDOCRINE/METABOLIC/IMMUNITY DISORDERS: ICD-10-CM

## 2020-02-06 DIAGNOSIS — G89.29 OTHER CHRONIC PAIN: ICD-10-CM

## 2020-02-06 DIAGNOSIS — Z87.19 HISTORY OF DIVERTICULITIS: ICD-10-CM

## 2020-02-06 DIAGNOSIS — E87.6 DIURETIC-INDUCED HYPOKALEMIA: ICD-10-CM

## 2020-02-06 DIAGNOSIS — Z11.59 ENCOUNTER FOR HCV SCREENING TEST FOR LOW RISK PATIENT: Primary | ICD-10-CM

## 2020-02-06 DIAGNOSIS — K21.9 GASTROESOPHAGEAL REFLUX DISEASE, ESOPHAGITIS PRESENCE NOT SPECIFIED: ICD-10-CM

## 2020-02-06 DIAGNOSIS — M62.838 MUSCLE SPASM: ICD-10-CM

## 2020-02-06 DIAGNOSIS — J44.9 CHRONIC OBSTRUCTIVE PULMONARY DISEASE, UNSPECIFIED COPD TYPE (HCC): ICD-10-CM

## 2020-02-06 DIAGNOSIS — R60.9 EDEMA, UNSPECIFIED TYPE: ICD-10-CM

## 2020-02-06 DIAGNOSIS — Z12.12 SCREENING FOR COLORECTAL CANCER: ICD-10-CM

## 2020-02-06 DIAGNOSIS — T50.2X5A DIURETIC-INDUCED HYPOKALEMIA: ICD-10-CM

## 2020-02-06 RX ORDER — DICYCLOMINE HCL 20 MG
20 TABLET ORAL 3 TIMES DAILY PRN
Qty: 270 TABLET | Refills: 0 | Status: SHIPPED | OUTPATIENT
Start: 2020-02-06 | End: 2020-07-28 | Stop reason: SDUPTHER

## 2020-02-06 RX ORDER — DULOXETIN HYDROCHLORIDE 60 MG/1
60 CAPSULE, DELAYED RELEASE ORAL DAILY
COMMUNITY
Start: 2019-11-13 | End: 2020-02-06 | Stop reason: SDUPTHER

## 2020-02-06 RX ORDER — FUROSEMIDE 40 MG/1
40 TABLET ORAL DAILY PRN
COMMUNITY
Start: 2020-01-13 | End: 2020-02-06 | Stop reason: SDUPTHER

## 2020-02-06 RX ORDER — CYCLOBENZAPRINE HCL 10 MG
10 TABLET ORAL DAILY
Qty: 90 TABLET | Refills: 0 | Status: SHIPPED | OUTPATIENT
Start: 2020-02-06 | End: 2020-05-11 | Stop reason: SDUPTHER

## 2020-02-06 RX ORDER — DULOXETIN HYDROCHLORIDE 60 MG/1
60 CAPSULE, DELAYED RELEASE ORAL DAILY
Qty: 90 CAPSULE | Refills: 0 | Status: SHIPPED | OUTPATIENT
Start: 2020-02-06 | End: 2020-04-08

## 2020-02-06 RX ORDER — ALBUTEROL SULFATE 2.5 MG/3ML
2.5 SOLUTION RESPIRATORY (INHALATION) EVERY 6 HOURS PRN
Qty: 120 VIAL | Refills: 1 | Status: SHIPPED | OUTPATIENT
Start: 2020-02-06 | End: 2020-05-06

## 2020-02-06 RX ORDER — NAPROXEN SODIUM 220 MG
220 TABLET ORAL EVERY 12 HOURS PRN
Qty: 120 TABLET | Refills: 0 | Status: SHIPPED | OUTPATIENT
Start: 2020-02-06 | End: 2021-02-17

## 2020-02-06 RX ORDER — POTASSIUM CHLORIDE 750 MG/1
10 TABLET, FILM COATED, EXTENDED RELEASE ORAL DAILY PRN
Qty: 30 TABLET | Refills: 0 | Status: SHIPPED | OUTPATIENT
Start: 2020-02-06 | End: 2020-04-20 | Stop reason: SDUPTHER

## 2020-02-06 RX ORDER — ESOMEPRAZOLE MAGNESIUM 40 MG/1
40 CAPSULE, DELAYED RELEASE ORAL DAILY
COMMUNITY
Start: 2019-12-11 | End: 2020-02-06 | Stop reason: SDUPTHER

## 2020-02-06 RX ORDER — ESOMEPRAZOLE MAGNESIUM 40 MG/1
40 CAPSULE, DELAYED RELEASE ORAL DAILY
Qty: 90 CAPSULE | Refills: 0 | Status: SHIPPED | OUTPATIENT
Start: 2020-02-06 | End: 2020-04-08

## 2020-02-06 RX ORDER — DULOXETIN HYDROCHLORIDE 30 MG/1
30 CAPSULE, DELAYED RELEASE ORAL DAILY
Qty: 90 CAPSULE | Refills: 0 | Status: SHIPPED | OUTPATIENT
Start: 2020-02-06 | End: 2020-03-03 | Stop reason: HOSPADM

## 2020-02-06 RX ORDER — NAPROXEN SODIUM 220 MG
220 TABLET ORAL EVERY 12 HOURS PRN
COMMUNITY
End: 2020-02-06 | Stop reason: SDUPTHER

## 2020-02-06 RX ORDER — FUROSEMIDE 40 MG/1
40 TABLET ORAL DAILY
Qty: 90 TABLET | Refills: 0 | Status: SHIPPED | OUTPATIENT
Start: 2020-02-06 | End: 2020-04-08

## 2020-02-06 RX ORDER — ALBUTEROL SULFATE 90 UG/1
2 AEROSOL, METERED RESPIRATORY (INHALATION) EVERY 6 HOURS PRN
Qty: 1 INHALER | Refills: 1 | Status: SHIPPED | OUTPATIENT
Start: 2020-02-06 | End: 2020-03-07

## 2020-02-06 RX ORDER — POTASSIUM CHLORIDE 750 MG/1
10 TABLET, FILM COATED, EXTENDED RELEASE ORAL DAILY PRN
COMMUNITY
Start: 2019-12-03 | End: 2020-02-06 | Stop reason: SDUPTHER

## 2020-02-06 RX ORDER — GABAPENTIN 600 MG/1
600 TABLET ORAL 2 TIMES DAILY
COMMUNITY
Start: 2019-12-11 | End: 2020-02-06 | Stop reason: SDUPTHER

## 2020-02-06 RX ORDER — GABAPENTIN 600 MG/1
600 TABLET ORAL 2 TIMES DAILY
Qty: 180 TABLET | Refills: 0 | Status: SHIPPED | OUTPATIENT
Start: 2020-02-06 | End: 2020-03-03 | Stop reason: HOSPADM

## 2020-02-06 RX ORDER — ATORVASTATIN CALCIUM 20 MG/1
20 TABLET, FILM COATED ORAL DAILY
Qty: 90 TABLET | Refills: 0 | Status: SHIPPED | OUTPATIENT
Start: 2020-02-06 | End: 2020-04-08

## 2020-02-06 NOTE — PROGRESS NOTES
[address]       NAME: Cassandra Fox is a 62 y o  female  : 1955    MRN: [id number]  DATE: 2020  TIME: 11:37 AM    Assessment and Plan   Diagnoses and all orders for this visit:    Encounter for HCV screening test for low risk patient  -     Hepatitis C antibody; Future    Encounter for screening mammogram for malignant neoplasm of breast  -     Mammo screening bilateral w 3d & cad; Future    Screening for HIV without presence of risk factors  -     HIV 1/2 AG-AB combo; Future    Screening for colorectal cancer  -     Ambulatory referral to Gastroenterology; Future    Screening for endocrine/metabolic/immunity disorders  -     CBC and differential; Future  -     Comprehensive metabolic panel; Future  -     TSH, 3rd generation with Free T4 reflex; Future  -     HEMOGLOBIN A1C W/ EAG ESTIMATION; Future  -     Vitamin D 25 hydroxy; Future  -     Lipid Panel with Direct LDL reflex; Future    History of diverticulitis  -     Ambulatory referral to Gastroenterology; Future    Diuretic-induced hypokalemia  -     potassium chloride (K-DUR) 10 mEq tablet; Take 1 tablet (10 mEq total) by mouth daily as needed (when pt takes lasix)    Sleep apnea, unspecified type  -     Diagnostic Sleep Study; Future    Chronic obstructive pulmonary disease, unspecified [facility] ([facility] )  -     Ambulatory referral to Pulmonology; Future  -     Home Oxygen Portability Evaluation Only  -     albuterol (2 5 mg/3 mL) 0 083 % nebulizer solution; Take 1 vial (2 5 mg total) by nebulization every 6 (six) hours as needed for wheezing or shortness of breath  -     albuterol (PROVENTIL HFA,VENTOLIN HFA) 90 mcg/act inhaler; Inhale 2 puffs every 6 (six) hours as needed for wheezing  -     fluticasone-salmeterol (ADVAIR DISKUS) 500-50 mcg/dose inhaler; Inhale 1 puff 2 (two) times a day    Mixed hyperlipidemia  -     atorvastatin (LIPITOR) 20 mg tablet;  Take 1 tablet (20 mg total) by mouth daily    Muscle spasm  - cyclobenzaprine (FLEXERIL) 10 mg tablet; Take 1 tablet (10 mg total) by mouth daily    Abdominal cramping  -     dicyclomine (BENTYL) 20 mg tablet; Take 1 tablet (20 mg total) by mouth 3 (three) times a day as needed (as needed)    Other chronic pain  -     DULoxetine (CYMBALTA) 30 mg delayed release capsule; Take 1 capsule (30 mg total) by mouth daily  -     gabapentin (NEURONTIN) 600 MG tablet; Take 1 tablet (600 mg total) by mouth 2 (two) times a day  -     naproxen sodium (ALEVE) 220 MG tablet; Take 1 tablet (220 mg total) by mouth every 12 (twelve) hours as needed for mild pain    Depression, unspecified depression type  -     DULoxetine (CYMBALTA) 60 mg delayed release capsule; Take 1 capsule (60 mg total) by mouth daily    Gastroesophageal reflux disease, esophagitis presence not specified  -     esomeprazole (NexIUM) 40 MG capsule; Take 1 capsule (40 mg total) by mouth daily    Edema, unspecified type  -     furosemide (LASIX) 40 mg tablet; Take 1 tablet (40 mg total) by mouth daily    Other orders  -     Discontinue: DULoxetine (CYMBALTA) 60 mg delayed release capsule; Take 60 mg by mouth daily   -     Discontinue: esomeprazole (NexIUM) 40 MG capsule; Take 40 mg by mouth daily   -     Discontinue: potassium chloride (K-DUR) 10 mEq tablet; Take 10 mEq by mouth daily as needed (when pt takes lasix)   -     Discontinue: gabapentin (NEURONTIN) 600 MG tablet; Take 600 mg by mouth 2 (two) times a day   -     Discontinue: furosemide (LASIX) 40 mg tablet; Take 40 mg by mouth daily as needed (swelling)   -     Discontinue: naproxen sodium (ALEVE) 220 MG tablet; Take 220 mg by mouth every 12 (twelve) hours as needed for mild pain        No problem-specific Assessment & Plan notes found for this encounter  Patient Instructions           Chief Complaint     Chief Complaint   Patient presents with   [address] [place] [address] seen any doctor since august  Supposed to use oxygen at night   Pt d/c use because she states it was not helping her   GI Problem     "intestines are grabbing" per pt  Has had diverticulitis and stomach operations in the past per pt  Pain for about a month now, sometimes to the point of being unable to stand for long periods of time   Edema     b/l leg swelling  Has been taking lasix but was out of potassium    Thyroid Problem     pt states got letter from [address] [place] [address] stating thyroid was abnormal but never got call from any doctor about this  History of Present Illness       Omar is here to establish Care (hasnt seen any doctor since august)  Medical history is a bit convoluted  She reports that she is supposed to wear oxygen throughout the night, but admits that she has not been compliant with this because she does not feel it helps  She reports a history of Chronic Obstructive Pulmonary Disease and diverticulitis  Has had multiple stomach surgeries in the past   Has not seen GI for many years  Does report intermittent abdominal pain over the last month which comes and goes  At sometimes it radiates down her legs and makes it difficult to stand  Denies any current abdominal pain  No nausea vomiting or diarrhea  No fevers  No urinary symptoms  Patient does report history of chronic pain, reports that approximately 10 years ago she was dependent on opioids, went to rehab, no longer taking any of these meds  Reports that she has an unknown thyroid problem for which she received a letter stating that the thyroid was abnormal but never had any kind of follow-up  Does have history of chronic lower extremity edema which is managed with p r n  Lasix, has not taken for a few days because she ran out of potassium  Denies any shortness of breath  Also reports a history of sleep apnea, has not worn CPAP for many years because it was uncomfortable  Is agreeable to establishing for another sleep study  Review of Systems   Review of Systems   Constitutional: Negative  HENT: Negative  Respiratory: Positive for cough  Cardiovascular: Positive for leg swelling  Negative for chest pain and palpitations  Mild ankle swelling due to not taking Lasix for a few days  Gastrointestinal: Positive for abdominal distention  Has had intermittent abdominal pain over the last month  No change in bowel movements  No current pain  Genitourinary: Negative  Musculoskeletal: Positive for arthralgias and back pain  Neurological: Negative  Psychiatric/Behavioral: Negative            Current Medications       Current Outpatient Medications:     albuterol (2 5 mg/3 mL) 0 083 % nebulizer solution, Take 1 vial (2 5 mg total) by nebulization every 6 (six) hours as needed for wheezing or shortness of breath, Disp: 120 vial, Rfl: 1    albuterol (PROVENTIL HFA,VENTOLIN HFA) 90 mcg/act inhaler, Inhale 2 puffs every 6 (six) hours as needed for wheezing, Disp: 1 Inhaler, Rfl: 1    atorvastatin (LIPITOR) 20 mg tablet, Take 1 tablet (20 mg total) by mouth daily, Disp: 90 tablet, Rfl: 0    colestipol (COLESTID) 1 g tablet, Take 1 tablet (1 g total) by mouth 2 (two) times a day, Disp: 180 tablet, Rfl: 3    cyclobenzaprine (FLEXERIL) 10 mg tablet, Take 1 tablet (10 mg total) by mouth daily, Disp: 90 tablet, Rfl: 0    dicyclomine (BENTYL) 20 mg tablet, Take 1 tablet (20 mg total) by mouth 3 (three) times a day as needed (as needed), Disp: 270 tablet, Rfl: 0    DULoxetine (CYMBALTA) 30 mg delayed release capsule, Take 1 capsule (30 mg total) by mouth daily, Disp: 90 capsule, Rfl: 0    DULoxetine (CYMBALTA) 60 mg delayed release capsule, Take 1 capsule (60 mg total) by mouth daily, Disp: 90 capsule, Rfl: 0    esomeprazole (NexIUM) 40 MG capsule, Take 1 capsule (40 mg total) by mouth daily, Disp: 90 capsule, Rfl: 0    fluticasone-salmeterol (ADVAIR DISKUS) 500-50 mcg/dose inhaler, Inhale 1 puff 2 (two) times a day, Disp: 1 Inhaler, Rfl: 2    furosemide (LASIX) 40 mg tablet, Take 1 tablet (40 mg total) by mouth daily, Disp: 90 tablet, Rfl: 0    gabapentin (NEURONTIN) 600 MG tablet, Take 1 tablet (600 mg total) by mouth 2 (two) times a day, Disp: 180 tablet, Rfl: 0    naproxen sodium (ALEVE) 220 MG tablet, Take 1 tablet (220 mg total) by mouth every 12 (twelve) hours as needed for mild pain, Disp: 120 tablet, Rfl: 0    potassium chloride (K-DUR) 10 mEq tablet, Take 1 tablet (10 mEq total) by mouth daily as needed (when pt takes lasix), Disp: 30 tablet, Rfl: 0    QUEtiapine (SEROquel) 50 mg tablet, 1 tab PO daily 2 tabs PO qHS, Disp: 90 tablet, Rfl: 5    Current Allergies     Allergies as of 02/06/2020 - Reviewed 02/06/2020   Allergen Reaction Noted    Erythromycin Throat Swelling 10/25/2017    Morphine Delirium             The following portions of the patient's history were reviewed and updated as appropriate: allergies, current medications, past family history, past medical history, past social history, past surgical history and problem list      Past Medical History:   Diagnosis Date    Anxiety     COPD [facility]chronic obstructive pulmonary disease) ([facility] [address] )     Depression     Diverticulitis     GERD (gastroesophageal reflux disease)     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[facility] )     Sleep apnea        Past Surgical History:   Procedure Laterality Date    APPENDECTOMY      CHOLECYSTECTOMY      partial x2 per pt    HIATAL HERNIA REPAIR      INGUINAL HERNIA REPAIR Right     LAPAROSCOPIC TUBAL LIGATION Bilateral     ROTATOR CUFF REPAIR Right     UVULOPALATOPHARYNGOPLASTY         History reviewed  No pertinent family history  Medications have been verified          Objective   /90 (BP Location: Right arm, Patient Position: Sitting, Cuff Size: Adult)   Pulse 88   Temp 98 4 °F (36 9 °C) (Temporal)   Resp 18   Ht 5' (1 524 m)   Wt 79 7 kg (175 lb 9 6 oz)   SpO2 94% Comment: room air at rest  BMI 34 29 kg/m²        Physical Exam     Physical Exam   Constitutional: She is oriented to person, place, and time  She appears well-developed and well-nourished  HENT:   Mouth/Throat: Oropharynx is clear and moist    Eyes: Pupils are equal, round, and reactive to light  Conjunctivae and EOM are normal    Neck: Normal range of motion  Neck supple  Cardiovascular: Normal rate, regular rhythm, normal heart sounds and intact distal pulses  Pulmonary/Chest: Effort normal and breath sounds normal    Abdominal: Soft  There is no tenderness  There is no rebound and no guarding  Musculoskeletal: Normal range of motion  Neurological: She is alert and oriented to person, place, and time  Skin: Skin is warm and dry  Capillary refill takes less than 2 seconds  Psychiatric: She has a normal mood and affect  Her behavior is normal  Judgment and thought content normal    Nursing note and vitals reviewed

## 2020-02-10 DIAGNOSIS — J45.909 ASTHMA, UNSPECIFIED ASTHMA SEVERITY, UNSPECIFIED WHETHER COMPLICATED, UNSPECIFIED WHETHER PERSISTENT: Primary | ICD-10-CM

## 2020-02-10 RX ORDER — ALBUTEROL SULFATE 90 UG/1
2 AEROSOL, METERED RESPIRATORY (INHALATION) EVERY 6 HOURS PRN
Qty: 3 INHALER | Refills: 0 | Status: SHIPPED | OUTPATIENT
Start: 2020-02-10 | End: 2020-03-05

## 2020-02-10 NOTE — TELEPHONE ENCOUNTER
Fax from pharmacy stating ventolin HFA inhaler not covered by insurance  Covered alternative is proair HFA

## 2020-02-11 ENCOUNTER — APPOINTMENT (OUTPATIENT)
Dept: LAB | Facility: HOSPITAL | Age: 65
End: 2020-02-11
Payer: COMMERCIAL

## 2020-02-11 ENCOUNTER — HOSPITAL ENCOUNTER (OUTPATIENT)
Dept: MAMMOGRAPHY | Facility: HOSPITAL | Age: 65
Discharge: HOME | End: 2020-02-11
Payer: COMMERCIAL

## 2020-02-11 VITALS — HEIGHT: 60 IN | WEIGHT: 175 LBS | BODY MASS INDEX: 34.36 KG/M2

## 2020-02-11 DIAGNOSIS — Z11.4 SCREENING FOR HIV WITHOUT PRESENCE OF RISK FACTORS: ICD-10-CM

## 2020-02-11 DIAGNOSIS — Z13.0 SCREENING FOR ENDOCRINE/METABOLIC/IMMUNITY DISORDERS: ICD-10-CM

## 2020-02-11 DIAGNOSIS — Z11.59 ENCOUNTER FOR HCV SCREENING TEST FOR LOW RISK PATIENT: ICD-10-CM

## 2020-02-11 DIAGNOSIS — Z12.31 ENCOUNTER FOR SCREENING MAMMOGRAM FOR MALIGNANT NEOPLASM OF BREAST: ICD-10-CM

## 2020-02-11 DIAGNOSIS — Z13.228 SCREENING FOR ENDOCRINE/METABOLIC/IMMUNITY DISORDERS: ICD-10-CM

## 2020-02-11 DIAGNOSIS — Z13.29 SCREENING FOR ENDOCRINE/METABOLIC/IMMUNITY DISORDERS: ICD-10-CM

## 2020-02-11 LAB
25(OH)D3 SERPL-MCNC: 12.6 NG/ML (ref 30–100)
ALBUMIN SERPL BCP-MCNC: 4.8 G/DL (ref 3.5–5.7)
ALP SERPL-CCNC: 78 U/L (ref 55–165)
ALT SERPL W P-5'-P-CCNC: 24 U/L (ref 7–52)
ANION GAP SERPL CALCULATED.3IONS-SCNC: 9 MMOL/L (ref 4–13)
AST SERPL W P-5'-P-CCNC: 24 U/L (ref 13–39)
BASOPHILS # BLD AUTO: 0.1 THOUSANDS/ΜL (ref 0–0.1)
BASOPHILS NFR BLD AUTO: 1 % (ref 0–2)
BILIRUB SERPL-MCNC: 0.8 MG/DL (ref 0.2–1)
BUN SERPL-MCNC: 14 MG/DL (ref 7–25)
CALCIUM ALBUM COR SERPL-MCNC: 9.6 MG/DL (ref 8.3–10.1)
CALCIUM SERPL-MCNC: 10.2 MG/DL (ref 8.6–10.5)
CHLORIDE SERPL-SCNC: 101 MMOL/L (ref 98–107)
CHOLEST SERPL-MCNC: 200 MG/DL (ref 0–200)
CO2 SERPL-SCNC: 29 MMOL/L (ref 21–31)
CREAT SERPL-MCNC: 0.63 MG/DL (ref 0.6–1.2)
EOSINOPHIL # BLD AUTO: 0.2 THOUSAND/ΜL (ref 0–0.61)
EOSINOPHIL NFR BLD AUTO: 2 % (ref 0–5)
ERYTHROCYTE [DISTWIDTH] IN BLOOD BY AUTOMATED COUNT: 14.4 % (ref 11.5–14.5)
EST. AVERAGE GLUCOSE BLD GHB EST-MCNC: 108 MG/DL
GFR SERPL CREATININE-BSD FRML MDRD: 96 ML/MIN/1.73SQ M
GLUCOSE P FAST SERPL-MCNC: 102 MG/DL (ref 65–99)
HBA1C MFR BLD: 5.4 %
HCT VFR BLD AUTO: 43.9 % (ref 42–47)
HCV AB SER QL: NORMAL
HDLC SERPL-MCNC: 63 MG/DL
HGB BLD-MCNC: 14.3 G/DL (ref 12–16)
LDLC SERPL CALC-MCNC: 112 MG/DL (ref 0–100)
LYMPHOCYTES # BLD AUTO: 2 THOUSANDS/ΜL (ref 0.6–4.47)
LYMPHOCYTES NFR BLD AUTO: 29 % (ref 21–51)
MCH RBC QN AUTO: 31 PG (ref 26–34)
MCHC RBC AUTO-ENTMCNC: 32.6 G/DL (ref 31–37)
MCV RBC AUTO: 95 FL (ref 81–99)
MONOCYTES # BLD AUTO: 0.5 THOUSAND/ΜL (ref 0.17–1.22)
MONOCYTES NFR BLD AUTO: 8 % (ref 2–12)
NEUTROPHILS # BLD AUTO: 4.1 THOUSANDS/ΜL (ref 1.4–6.5)
NEUTS SEG NFR BLD AUTO: 60 % (ref 42–75)
PLATELET # BLD AUTO: 321 THOUSANDS/UL (ref 149–390)
PMV BLD AUTO: 8.6 FL (ref 8.6–11.7)
POTASSIUM SERPL-SCNC: 4 MMOL/L (ref 3.5–5.5)
PROT SERPL-MCNC: 7.8 G/DL (ref 6.4–8.9)
RBC # BLD AUTO: 4.62 MILLION/UL (ref 3.9–5.2)
SODIUM SERPL-SCNC: 139 MMOL/L (ref 134–143)
TRIGL SERPL-MCNC: 127 MG/DL (ref 44–166)
TSH SERPL DL<=0.05 MIU/L-ACNC: 2.68 UIU/ML (ref 0.45–5.33)
WBC # BLD AUTO: 6.9 THOUSAND/UL (ref 4.8–10.8)

## 2020-02-11 PROCEDURE — 84443 ASSAY THYROID STIM HORMONE: CPT

## 2020-02-11 PROCEDURE — 86803 HEPATITIS C AB TEST: CPT

## 2020-02-11 PROCEDURE — 77063 BREAST TOMOSYNTHESIS BI: CPT

## 2020-02-11 PROCEDURE — 80053 COMPREHEN METABOLIC PANEL: CPT

## 2020-02-11 PROCEDURE — 82306 VITAMIN D 25 HYDROXY: CPT

## 2020-02-11 PROCEDURE — 83036 HEMOGLOBIN GLYCOSYLATED A1C: CPT

## 2020-02-11 PROCEDURE — 80061 LIPID PANEL: CPT

## 2020-02-11 PROCEDURE — 36415 COLL VENOUS BLD VENIPUNCTURE: CPT

## 2020-02-11 PROCEDURE — 85025 COMPLETE CBC W/AUTO DIFF WBC: CPT

## 2020-02-11 PROCEDURE — 77067 SCR MAMMO BI INCL CAD: CPT

## 2020-02-11 PROCEDURE — 87389 HIV-1 AG W/HIV-1&-2 AB AG IA: CPT

## 2020-02-12 ENCOUNTER — TELEPHONE (OUTPATIENT)
Dept: FAMILY MEDICINE CLINIC | Facility: HOME HEALTHCARE | Age: 65
End: 2020-02-12

## 2020-02-12 DIAGNOSIS — E55.9 VITAMIN D DEFICIENCY: Primary | ICD-10-CM

## 2020-02-12 LAB — HIV 1+2 AB+HIV1 P24 AG SERPL QL IA: NORMAL

## 2020-02-12 RX ORDER — ERGOCALCIFEROL 1.25 MG/1
50000 CAPSULE ORAL WEEKLY
Qty: 4 CAPSULE | Refills: 2 | Status: SHIPPED | OUTPATIENT
Start: 2020-02-12 | End: 2020-07-28 | Stop reason: SDUPTHER

## 2020-02-19 DIAGNOSIS — Z12.39 BREAST CANCER SCREENING: Primary | ICD-10-CM

## 2020-02-28 ENCOUNTER — APPOINTMENT (EMERGENCY)
Dept: RADIOLOGY | Facility: HOSPITAL | Age: 65
DRG: 871 | End: 2020-02-28
Payer: COMMERCIAL

## 2020-02-28 ENCOUNTER — APPOINTMENT (EMERGENCY)
Dept: CT IMAGING | Facility: HOSPITAL | Age: 65
DRG: 871 | End: 2020-02-28
Payer: COMMERCIAL

## 2020-02-28 ENCOUNTER — HOSPITAL ENCOUNTER (INPATIENT)
Facility: HOSPITAL | Age: 65
LOS: 4 days | Discharge: HOME | DRG: 871 | End: 2020-03-03
Attending: EMERGENCY MEDICINE | Admitting: INTERNAL MEDICINE
Payer: COMMERCIAL

## 2020-02-28 DIAGNOSIS — J96.21 ACUTE ON CHRONIC RESPIRATORY FAILURE WITH HYPOXIA (HCC): ICD-10-CM

## 2020-02-28 DIAGNOSIS — J18.9 PNEUMONIA: Primary | ICD-10-CM

## 2020-02-28 DIAGNOSIS — G47.00 INSOMNIA, UNSPECIFIED TYPE: ICD-10-CM

## 2020-02-28 DIAGNOSIS — J10.1 INFLUENZA A: ICD-10-CM

## 2020-02-28 DIAGNOSIS — J11.00 PNEUMONIA OF RIGHT LOWER LOBE DUE TO INFLUENZA A VIRUS: ICD-10-CM

## 2020-02-28 DIAGNOSIS — G89.29 OTHER CHRONIC PAIN: ICD-10-CM

## 2020-02-28 PROBLEM — M51.369 DDD (DEGENERATIVE DISC DISEASE), LUMBAR: Status: ACTIVE | Noted: 2017-10-25

## 2020-02-28 PROBLEM — K21.9 GERD (GASTROESOPHAGEAL REFLUX DISEASE): Status: ACTIVE | Noted: 2017-10-25

## 2020-02-28 PROBLEM — F41.9 ANXIETY: Status: ACTIVE | Noted: 2017-10-25

## 2020-02-28 PROBLEM — J44.1 COPD EXACERBATION (HCC): Status: ACTIVE | Noted: 2017-10-25

## 2020-02-28 PROBLEM — K52.9 CHRONIC DIARRHEA: Status: ACTIVE | Noted: 2017-10-25

## 2020-02-28 PROBLEM — J42 CHRONIC BRONCHITIS (HCC): Status: ACTIVE | Noted: 2017-10-25

## 2020-02-28 PROBLEM — M54.9 CHRONIC BACK PAIN: Status: ACTIVE | Noted: 2017-10-25

## 2020-02-28 PROBLEM — K25.9 GASTRIC ULCER: Status: ACTIVE | Noted: 2017-10-25

## 2020-02-28 PROBLEM — M51.36 DDD (DEGENERATIVE DISC DISEASE), LUMBAR: Status: ACTIVE | Noted: 2017-10-25

## 2020-02-28 PROBLEM — F32.A DEPRESSION: Status: ACTIVE | Noted: 2017-10-25

## 2020-02-28 PROBLEM — J44.9 COPD (CHRONIC OBSTRUCTIVE PULMONARY DISEASE) (HCC): Status: ACTIVE | Noted: 2017-10-25

## 2020-02-28 PROBLEM — M51.26 HERNIATION OF LUMBAR INTERVERTEBRAL DISC: Status: ACTIVE | Noted: 2017-10-25

## 2020-02-28 LAB
ALBUMIN SERPL BCP-MCNC: 3.8 G/DL (ref 3.5–5.7)
ALP SERPL-CCNC: 59 U/L (ref 55–165)
ALT SERPL W P-5'-P-CCNC: 16 U/L (ref 7–52)
ANION GAP SERPL CALCULATED.3IONS-SCNC: 10 MMOL/L (ref 4–13)
APTT PPP: 30 SECONDS (ref 23–37)
AST SERPL W P-5'-P-CCNC: 39 U/L (ref 13–39)
BASOPHILS # BLD AUTO: 0 THOUSANDS/ΜL (ref 0–0.1)
BASOPHILS NFR BLD AUTO: 0 % (ref 0–2)
BILIRUB SERPL-MCNC: 0.4 MG/DL (ref 0.2–1)
BUN SERPL-MCNC: 13 MG/DL (ref 7–25)
CALCIUM SERPL-MCNC: 8.1 MG/DL (ref 8.6–10.5)
CHLORIDE SERPL-SCNC: 97 MMOL/L (ref 98–107)
CO2 SERPL-SCNC: 27 MMOL/L (ref 21–31)
CREAT SERPL-MCNC: 0.55 MG/DL (ref 0.6–1.2)
EOSINOPHIL # BLD AUTO: 0 THOUSAND/ΜL (ref 0–0.61)
EOSINOPHIL NFR BLD AUTO: 0 % (ref 0–5)
ERYTHROCYTE [DISTWIDTH] IN BLOOD BY AUTOMATED COUNT: 13.7 % (ref 11.5–14.5)
FLUAV RNA NPH QL NAA+PROBE: DETECTED
FLUBV RNA NPH QL NAA+PROBE: ABNORMAL
GFR SERPL CREATININE-BSD FRML MDRD: 100 ML/MIN/1.73SQ M
GLUCOSE SERPL-MCNC: 114 MG/DL (ref 65–99)
HCT VFR BLD AUTO: 39.4 % (ref 42–47)
HGB BLD-MCNC: 13.4 G/DL (ref 12–16)
INR PPP: 1.16 (ref 0.9–1.5)
LACTATE SERPL-SCNC: 1.5 MMOL/L (ref 0.5–2)
LYMPHOCYTES # BLD AUTO: 0.9 THOUSANDS/ΜL (ref 0.6–4.47)
LYMPHOCYTES NFR BLD AUTO: 17 % (ref 21–51)
MAGNESIUM SERPL-MCNC: 2 MG/DL (ref 1.9–2.7)
MCH RBC QN AUTO: 31.1 PG (ref 26–34)
MCHC RBC AUTO-ENTMCNC: 34.1 G/DL (ref 31–37)
MCV RBC AUTO: 91 FL (ref 81–99)
MONOCYTES # BLD AUTO: 0.3 THOUSAND/ΜL (ref 0.17–1.22)
MONOCYTES NFR BLD AUTO: 6 % (ref 2–12)
NEUTROPHILS # BLD AUTO: 4 THOUSANDS/ΜL (ref 1.4–6.5)
NEUTS SEG NFR BLD AUTO: 77 % (ref 42–75)
PLATELET # BLD AUTO: 153 THOUSANDS/UL (ref 149–390)
PMV BLD AUTO: 9.4 FL (ref 8.6–11.7)
POTASSIUM SERPL-SCNC: 3.1 MMOL/L (ref 3.5–5.5)
PROT SERPL-MCNC: 6.4 G/DL (ref 6.4–8.9)
PROTHROMBIN TIME: 13.5 SECONDS (ref 10.2–13)
RBC # BLD AUTO: 4.31 MILLION/UL (ref 3.9–5.2)
RSV RNA NPH QL NAA+PROBE: ABNORMAL
SODIUM SERPL-SCNC: 134 MMOL/L (ref 134–143)
TROPONIN I SERPL-MCNC: <0.03 NG/ML
WBC # BLD AUTO: 5.2 THOUSAND/UL (ref 4.8–10.8)

## 2020-02-28 PROCEDURE — 85025 COMPLETE CBC W/AUTO DIFF WBC: CPT | Performed by: PHYSICIAN ASSISTANT

## 2020-02-28 PROCEDURE — 99285 EMERGENCY DEPT VISIT HI MDM: CPT | Performed by: PHYSICIAN ASSISTANT

## 2020-02-28 PROCEDURE — 96375 TX/PRO/DX INJ NEW DRUG ADDON: CPT

## 2020-02-28 PROCEDURE — 71046 X-RAY EXAM CHEST 2 VIEWS: CPT

## 2020-02-28 PROCEDURE — 74177 CT ABD & PELVIS W/CONTRAST: CPT

## 2020-02-28 PROCEDURE — 99285 EMERGENCY DEPT VISIT HI MDM: CPT

## 2020-02-28 PROCEDURE — 94640 AIRWAY INHALATION TREATMENT: CPT

## 2020-02-28 PROCEDURE — 85730 THROMBOPLASTIN TIME PARTIAL: CPT | Performed by: PHYSICIAN ASSISTANT

## 2020-02-28 PROCEDURE — 83735 ASSAY OF MAGNESIUM: CPT | Performed by: PHYSICIAN ASSISTANT

## 2020-02-28 PROCEDURE — 84145 PROCALCITONIN (PCT): CPT | Performed by: PHYSICIAN ASSISTANT

## 2020-02-28 PROCEDURE — 36415 COLL VENOUS BLD VENIPUNCTURE: CPT | Performed by: PHYSICIAN ASSISTANT

## 2020-02-28 PROCEDURE — 83605 ASSAY OF LACTIC ACID: CPT | Performed by: PHYSICIAN ASSISTANT

## 2020-02-28 PROCEDURE — 87040 BLOOD CULTURE FOR BACTERIA: CPT | Performed by: PHYSICIAN ASSISTANT

## 2020-02-28 PROCEDURE — 94640 AIRWAY INHALATION TREATMENT: CPT | Performed by: PHYSICIAN ASSISTANT

## 2020-02-28 PROCEDURE — 80053 COMPREHEN METABOLIC PANEL: CPT | Performed by: PHYSICIAN ASSISTANT

## 2020-02-28 PROCEDURE — 87631 RESP VIRUS 3-5 TARGETS: CPT | Performed by: PHYSICIAN ASSISTANT

## 2020-02-28 PROCEDURE — 85610 PROTHROMBIN TIME: CPT | Performed by: PHYSICIAN ASSISTANT

## 2020-02-28 PROCEDURE — 84484 ASSAY OF TROPONIN QUANT: CPT | Performed by: PHYSICIAN ASSISTANT

## 2020-02-28 PROCEDURE — 96365 THER/PROPH/DIAG IV INF INIT: CPT

## 2020-02-28 PROCEDURE — 93005 ELECTROCARDIOGRAM TRACING: CPT

## 2020-02-28 RX ORDER — ONDANSETRON 2 MG/ML
4 INJECTION INTRAMUSCULAR; INTRAVENOUS ONCE
Status: COMPLETED | OUTPATIENT
Start: 2020-02-28 | End: 2020-02-28

## 2020-02-28 RX ORDER — CEFTRIAXONE 1 G/50ML
1000 INJECTION, SOLUTION INTRAVENOUS EVERY 24 HOURS
Status: DISCONTINUED | OUTPATIENT
Start: 2020-02-28 | End: 2020-02-29

## 2020-02-28 RX ORDER — ONDANSETRON 2 MG/ML
2 INJECTION INTRAMUSCULAR; INTRAVENOUS ONCE
Status: COMPLETED | OUTPATIENT
Start: 2020-02-28 | End: 2020-02-28

## 2020-02-28 RX ORDER — IPRATROPIUM BROMIDE AND ALBUTEROL SULFATE 2.5; .5 MG/3ML; MG/3ML
3 SOLUTION RESPIRATORY (INHALATION)
Status: DISCONTINUED | OUTPATIENT
Start: 2020-02-29 | End: 2020-03-01

## 2020-02-28 RX ORDER — GABAPENTIN 600 MG/1
600 TABLET ORAL 2 TIMES DAILY
Status: DISCONTINUED | OUTPATIENT
Start: 2020-02-29 | End: 2020-02-29

## 2020-02-28 RX ORDER — QUETIAPINE FUMARATE 50 MG/1
50 TABLET, FILM COATED ORAL 3 TIMES DAILY
Status: DISCONTINUED | OUTPATIENT
Start: 2020-02-28 | End: 2020-03-03 | Stop reason: HOSPADM

## 2020-02-28 RX ORDER — CYCLOBENZAPRINE HCL 10 MG
10 TABLET ORAL DAILY
Status: DISCONTINUED | OUTPATIENT
Start: 2020-02-29 | End: 2020-03-03 | Stop reason: HOSPADM

## 2020-02-28 RX ORDER — POTASSIUM CHLORIDE 20 MEQ/1
40 TABLET, EXTENDED RELEASE ORAL ONCE
Status: COMPLETED | OUTPATIENT
Start: 2020-02-28 | End: 2020-02-28

## 2020-02-28 RX ORDER — ERGOCALCIFEROL 1.25 MG/1
50000 CAPSULE ORAL WEEKLY
Status: DISCONTINUED | OUTPATIENT
Start: 2020-02-29 | End: 2020-03-03 | Stop reason: HOSPADM

## 2020-02-28 RX ORDER — PANTOPRAZOLE SODIUM 40 MG/1
40 TABLET, DELAYED RELEASE ORAL
Status: DISCONTINUED | OUTPATIENT
Start: 2020-02-29 | End: 2020-03-03 | Stop reason: HOSPADM

## 2020-02-28 RX ORDER — POTASSIUM CHLORIDE 750 MG/1
10 TABLET, EXTENDED RELEASE ORAL DAILY PRN
Status: DISCONTINUED | OUTPATIENT
Start: 2020-02-28 | End: 2020-03-03 | Stop reason: HOSPADM

## 2020-02-28 RX ORDER — DULOXETIN HYDROCHLORIDE 30 MG/1
60 CAPSULE, DELAYED RELEASE ORAL DAILY
Status: DISCONTINUED | OUTPATIENT
Start: 2020-02-29 | End: 2020-03-03 | Stop reason: HOSPADM

## 2020-02-28 RX ORDER — OSELTAMIVIR PHOSPHATE 75 MG/1
75 CAPSULE ORAL ONCE
Status: COMPLETED | OUTPATIENT
Start: 2020-02-28 | End: 2020-02-28

## 2020-02-28 RX ORDER — CEFEPIME HYDROCHLORIDE 2 G/50ML
2000 INJECTION, SOLUTION INTRAVENOUS ONCE
Status: COMPLETED | OUTPATIENT
Start: 2020-02-28 | End: 2020-02-28

## 2020-02-28 RX ORDER — HEPARIN SODIUM 5000 [USP'U]/ML
5000 INJECTION, SOLUTION INTRAVENOUS; SUBCUTANEOUS EVERY 8 HOURS SCHEDULED
Status: DISCONTINUED | OUTPATIENT
Start: 2020-02-28 | End: 2020-03-03 | Stop reason: HOSPADM

## 2020-02-28 RX ORDER — SODIUM CHLORIDE 9 MG/ML
125 INJECTION, SOLUTION INTRAVENOUS CONTINUOUS
Status: DISCONTINUED | OUTPATIENT
Start: 2020-02-28 | End: 2020-03-01

## 2020-02-28 RX ORDER — DICYCLOMINE HCL 20 MG
20 TABLET ORAL 3 TIMES DAILY PRN
Status: DISCONTINUED | OUTPATIENT
Start: 2020-02-28 | End: 2020-03-03 | Stop reason: HOSPADM

## 2020-02-28 RX ORDER — LEVOFLOXACIN 5 MG/ML
750 INJECTION, SOLUTION INTRAVENOUS ONCE
Status: COMPLETED | OUTPATIENT
Start: 2020-02-28 | End: 2020-02-28

## 2020-02-28 RX ORDER — ALBUTEROL SULFATE 2.5 MG/3ML
2.5 SOLUTION RESPIRATORY (INHALATION) EVERY 2 HOUR PRN
Status: DISCONTINUED | OUTPATIENT
Start: 2020-02-28 | End: 2020-03-01

## 2020-02-28 RX ORDER — ATORVASTATIN CALCIUM 20 MG/1
20 TABLET, FILM COATED ORAL DAILY
Status: DISCONTINUED | OUTPATIENT
Start: 2020-02-29 | End: 2020-03-03 | Stop reason: HOSPADM

## 2020-02-28 RX ORDER — ONDANSETRON 2 MG/ML
4 INJECTION INTRAMUSCULAR; INTRAVENOUS EVERY 6 HOURS PRN
Status: DISCONTINUED | OUTPATIENT
Start: 2020-02-28 | End: 2020-03-03 | Stop reason: HOSPADM

## 2020-02-28 RX ORDER — GUAIFENESIN 600 MG/1
600 TABLET, EXTENDED RELEASE ORAL 2 TIMES DAILY
Status: DISCONTINUED | OUTPATIENT
Start: 2020-02-29 | End: 2020-03-03 | Stop reason: HOSPADM

## 2020-02-28 RX ORDER — FUROSEMIDE 40 MG/1
40 TABLET ORAL DAILY
Status: DISCONTINUED | OUTPATIENT
Start: 2020-02-29 | End: 2020-03-03 | Stop reason: HOSPADM

## 2020-02-28 RX ORDER — METHYLPREDNISOLONE SODIUM SUCCINATE 125 MG/2ML
80 INJECTION, POWDER, LYOPHILIZED, FOR SOLUTION INTRAMUSCULAR; INTRAVENOUS ONCE
Status: COMPLETED | OUTPATIENT
Start: 2020-02-28 | End: 2020-02-28

## 2020-02-28 RX ORDER — METHYLPREDNISOLONE SODIUM SUCCINATE 40 MG/ML
40 INJECTION, POWDER, LYOPHILIZED, FOR SOLUTION INTRAMUSCULAR; INTRAVENOUS EVERY 8 HOURS SCHEDULED
Status: DISCONTINUED | OUTPATIENT
Start: 2020-02-29 | End: 2020-03-01

## 2020-02-28 RX ORDER — ACETAMINOPHEN 325 MG/1
650 TABLET ORAL EVERY 6 HOURS PRN
Status: DISCONTINUED | OUTPATIENT
Start: 2020-02-28 | End: 2020-03-03 | Stop reason: HOSPADM

## 2020-02-28 RX ORDER — MONTELUKAST SODIUM 4 MG/1
1 TABLET, CHEWABLE ORAL 2 TIMES DAILY
Status: DISCONTINUED | OUTPATIENT
Start: 2020-02-29 | End: 2020-03-03 | Stop reason: HOSPADM

## 2020-02-28 RX ORDER — ACETAMINOPHEN 325 MG/1
975 TABLET ORAL ONCE
Status: COMPLETED | OUTPATIENT
Start: 2020-02-28 | End: 2020-02-28

## 2020-02-28 RX ORDER — OSELTAMIVIR PHOSPHATE 75 MG/1
75 CAPSULE ORAL EVERY 12 HOURS SCHEDULED
Status: DISCONTINUED | OUTPATIENT
Start: 2020-02-28 | End: 2020-03-03 | Stop reason: HOSPADM

## 2020-02-28 RX ADMIN — IPRATROPIUM BROMIDE 0.5 MG: 0.5 SOLUTION RESPIRATORY (INHALATION) at 18:54

## 2020-02-28 RX ADMIN — ALBUTEROL SULFATE 5 MG: 2.5 SOLUTION RESPIRATORY (INHALATION) at 18:52

## 2020-02-28 RX ADMIN — IPRATROPIUM BROMIDE 0.5 MG: 0.5 SOLUTION RESPIRATORY (INHALATION) at 21:06

## 2020-02-28 RX ADMIN — POTASSIUM CHLORIDE 40 MEQ: 1500 TABLET, EXTENDED RELEASE ORAL at 19:49

## 2020-02-28 RX ADMIN — ACETAMINOPHEN 975 MG: 325 TABLET ORAL at 19:48

## 2020-02-28 RX ADMIN — OSELTAMIVIR PHOSPHATE 75 MG: 75 CAPSULE ORAL at 20:49

## 2020-02-28 RX ADMIN — SODIUM CHLORIDE 1000 ML: 0.9 INJECTION, SOLUTION INTRAVENOUS at 21:03

## 2020-02-28 RX ADMIN — LEVOFLOXACIN 750 MG: 5 INJECTION, SOLUTION INTRAVENOUS at 21:24

## 2020-02-28 RX ADMIN — METHYLPREDNISOLONE SODIUM SUCCINATE 80 MG: 125 INJECTION, POWDER, FOR SOLUTION INTRAMUSCULAR; INTRAVENOUS at 20:47

## 2020-02-28 RX ADMIN — ALBUTEROL SULFATE 5 MG: 2.5 SOLUTION RESPIRATORY (INHALATION) at 21:06

## 2020-02-28 RX ADMIN — ONDANSETRON 4 MG: 2 INJECTION INTRAMUSCULAR; INTRAVENOUS at 19:43

## 2020-02-28 RX ADMIN — CEFEPIME HYDROCHLORIDE 2000 MG: 2 INJECTION, SOLUTION INTRAVENOUS at 20:41

## 2020-02-28 RX ADMIN — IOHEXOL 100 ML: 350 INJECTION, SOLUTION INTRAVENOUS at 20:11

## 2020-02-29 PROBLEM — J10.1 INFLUENZA A: Status: ACTIVE | Noted: 2020-02-29

## 2020-02-29 PROBLEM — J18.9 PNEUMONIA DUE TO INFECTIOUS ORGANISM: Status: ACTIVE | Noted: 2020-02-29

## 2020-02-29 PROBLEM — A41.9 SEPSIS (HCC): Status: ACTIVE | Noted: 2020-02-29

## 2020-02-29 LAB
ANION GAP SERPL CALCULATED.3IONS-SCNC: 9 MMOL/L (ref 4–13)
BACTERIA UR QL AUTO: ABNORMAL /HPF
BASE EXCESS BLDA CALC-SCNC: -0.5 MMOL/L (ref -2–3)
BASOPHILS # BLD AUTO: 0 THOUSANDS/ΜL (ref 0–0.1)
BASOPHILS NFR BLD AUTO: 0 % (ref 0–2)
BILIRUB UR QL STRIP: NEGATIVE
BUN SERPL-MCNC: 10 MG/DL (ref 7–25)
CALCIUM SERPL-MCNC: 7.9 MG/DL (ref 8.6–10.5)
CHLORIDE SERPL-SCNC: 105 MMOL/L (ref 98–107)
CLARITY UR: CLEAR
CO2 SERPL-SCNC: 25 MMOL/L (ref 21–31)
COLOR UR: YELLOW
CREAT SERPL-MCNC: 0.57 MG/DL (ref 0.6–1.2)
EOSINOPHIL # BLD AUTO: 0 THOUSAND/ΜL (ref 0–0.61)
EOSINOPHIL NFR BLD AUTO: 0 % (ref 0–5)
ERYTHROCYTE [DISTWIDTH] IN BLOOD BY AUTOMATED COUNT: 13.9 % (ref 11.5–14.5)
GFR SERPL CREATININE-BSD FRML MDRD: 99 ML/MIN/1.73SQ M
GLUCOSE SERPL-MCNC: 192 MG/DL (ref 65–99)
GLUCOSE UR STRIP-MCNC: NEGATIVE MG/DL
HCO3 BLDA-SCNC: 23.3 MMOL/L (ref 22–28)
HCT VFR BLD AUTO: 36.8 % (ref 42–47)
HGB BLD-MCNC: 12.3 G/DL (ref 12–16)
HGB UR QL STRIP.AUTO: NEGATIVE
KETONES UR STRIP-MCNC: NEGATIVE MG/DL
L PNEUMO1 AG UR QL IA.RAPID: NEGATIVE
LEUKOCYTE ESTERASE UR QL STRIP: NEGATIVE
LYMPHOCYTES # BLD AUTO: 0.4 THOUSANDS/ΜL (ref 0.6–4.47)
LYMPHOCYTES NFR BLD AUTO: 8 % (ref 21–51)
MCH RBC QN AUTO: 31.4 PG (ref 26–34)
MCHC RBC AUTO-ENTMCNC: 33.3 G/DL (ref 31–37)
MCV RBC AUTO: 94 FL (ref 81–99)
MONOCYTES # BLD AUTO: 0.2 THOUSAND/ΜL (ref 0.17–1.22)
MONOCYTES NFR BLD AUTO: 4 % (ref 2–12)
NASAL CANNULA: 6
NEUTROPHILS # BLD AUTO: 4.5 THOUSANDS/ΜL (ref 1.4–6.5)
NEUTS SEG NFR BLD AUTO: 89 % (ref 42–75)
NITRITE UR QL STRIP: POSITIVE
NON-SQ EPI CELLS URNS QL MICRO: ABNORMAL /HPF
O2 CT BLDA-SCNC: 15.4 ML/DL
OXYHGB MFR BLDA: 93.5 % (ref 94–100)
PCO2 BLDA: 36.3 MM HG (ref 35–45)
PH BLDA: 7.42 [PH] (ref 7.35–7.45)
PH UR STRIP.AUTO: 6 [PH]
PLATELET # BLD AUTO: 144 THOUSANDS/UL (ref 149–390)
PMV BLD AUTO: 9.8 FL (ref 8.6–11.7)
PO2 BLDA: 72 MM HG (ref 80–100)
POTASSIUM SERPL-SCNC: 3.4 MMOL/L (ref 3.5–5.5)
PROCALCITONIN SERPL-MCNC: 0.06 NG/ML
PROCALCITONIN SERPL-MCNC: <0.05 NG/ML
PROCALCITONIN SERPL-MCNC: <0.05 NG/ML
PROT UR STRIP-MCNC: NEGATIVE MG/DL
RBC # BLD AUTO: 3.91 MILLION/UL (ref 3.9–5.2)
RBC #/AREA URNS AUTO: ABNORMAL /HPF
S PNEUM AG UR QL: NEGATIVE
SODIUM SERPL-SCNC: 139 MMOL/L (ref 134–143)
SP GR UR STRIP.AUTO: 1.01 (ref 1–1.03)
SPECIMEN SOURCE: ABNORMAL
UROBILINOGEN UR QL STRIP.AUTO: 0.2 E.U./DL
WBC # BLD AUTO: 5.1 THOUSAND/UL (ref 4.8–10.8)
WBC #/AREA URNS AUTO: ABNORMAL /HPF

## 2020-02-29 PROCEDURE — 81001 URINALYSIS AUTO W/SCOPE: CPT | Performed by: PHYSICIAN ASSISTANT

## 2020-02-29 PROCEDURE — 84145 PROCALCITONIN (PCT): CPT | Performed by: PHYSICIAN ASSISTANT

## 2020-02-29 PROCEDURE — 80048 BASIC METABOLIC PNL TOTAL CA: CPT | Performed by: PHYSICIAN ASSISTANT

## 2020-02-29 PROCEDURE — 94664 DEMO&/EVAL PT USE INHALER: CPT

## 2020-02-29 PROCEDURE — 82805 BLOOD GASES W/O2 SATURATION: CPT | Performed by: PHYSICIAN ASSISTANT

## 2020-02-29 PROCEDURE — 94760 N-INVAS EAR/PLS OXIMETRY 1: CPT

## 2020-02-29 PROCEDURE — 99223 1ST HOSP IP/OBS HIGH 75: CPT | Performed by: INTERNAL MEDICINE

## 2020-02-29 PROCEDURE — 87449 NOS EACH ORGANISM AG IA: CPT | Performed by: PHYSICIAN ASSISTANT

## 2020-02-29 PROCEDURE — 94640 AIRWAY INHALATION TREATMENT: CPT

## 2020-02-29 PROCEDURE — 85025 COMPLETE CBC W/AUTO DIFF WBC: CPT | Performed by: PHYSICIAN ASSISTANT

## 2020-02-29 RX ORDER — GABAPENTIN 300 MG/1
300 CAPSULE ORAL 2 TIMES DAILY
Status: DISCONTINUED | OUTPATIENT
Start: 2020-02-29 | End: 2020-03-03 | Stop reason: HOSPADM

## 2020-02-29 RX ADMIN — OSELTAMIVIR PHOSPHATE 75 MG: 75 CAPSULE ORAL at 09:53

## 2020-02-29 RX ADMIN — CEFTRIAXONE 1000 MG: 1 INJECTION, SOLUTION INTRAVENOUS at 00:58

## 2020-02-29 RX ADMIN — IPRATROPIUM BROMIDE AND ALBUTEROL SULFATE 3 ML: 2.5; .5 SOLUTION RESPIRATORY (INHALATION) at 07:45

## 2020-02-29 RX ADMIN — HEPARIN SODIUM 5000 UNITS: 5000 INJECTION INTRAVENOUS; SUBCUTANEOUS at 21:24

## 2020-02-29 RX ADMIN — IPRATROPIUM BROMIDE AND ALBUTEROL SULFATE 3 ML: 2.5; .5 SOLUTION RESPIRATORY (INHALATION) at 20:58

## 2020-02-29 RX ADMIN — QUETIAPINE FUMARATE 50 MG: 50 TABLET ORAL at 00:59

## 2020-02-29 RX ADMIN — IPRATROPIUM BROMIDE AND ALBUTEROL SULFATE 3 ML: 2.5; .5 SOLUTION RESPIRATORY (INHALATION) at 03:44

## 2020-02-29 RX ADMIN — FLUTICASONE FUROATE AND VILANTEROL TRIFENATATE 1 PUFF: 200; 25 POWDER RESPIRATORY (INHALATION) at 09:50

## 2020-02-29 RX ADMIN — ATORVASTATIN CALCIUM 20 MG: 20 TABLET, FILM COATED ORAL at 09:52

## 2020-02-29 RX ADMIN — CYCLOBENZAPRINE HYDROCHLORIDE 10 MG: 10 TABLET, FILM COATED ORAL at 09:52

## 2020-02-29 RX ADMIN — Medication 100 MG: at 02:07

## 2020-02-29 RX ADMIN — HEPARIN SODIUM 5000 UNITS: 5000 INJECTION INTRAVENOUS; SUBCUTANEOUS at 05:17

## 2020-02-29 RX ADMIN — GUAIFENESIN 600 MG: 600 TABLET, EXTENDED RELEASE ORAL at 17:28

## 2020-02-29 RX ADMIN — GABAPENTIN 300 MG: 300 CAPSULE ORAL at 17:28

## 2020-02-29 RX ADMIN — QUETIAPINE FUMARATE 50 MG: 50 TABLET ORAL at 21:23

## 2020-02-29 RX ADMIN — SODIUM CHLORIDE 125 ML/HR: 0.9 INJECTION, SOLUTION INTRAVENOUS at 17:53

## 2020-02-29 RX ADMIN — SODIUM CHLORIDE 125 ML/HR: 0.9 INJECTION, SOLUTION INTRAVENOUS at 00:58

## 2020-02-29 RX ADMIN — METHYLPREDNISOLONE SODIUM SUCCINATE 40 MG: 40 INJECTION, POWDER, FOR SOLUTION INTRAMUSCULAR; INTRAVENOUS at 14:45

## 2020-02-29 RX ADMIN — SODIUM CHLORIDE 125 ML/HR: 0.9 INJECTION, SOLUTION INTRAVENOUS at 10:12

## 2020-02-29 RX ADMIN — GUAIFENESIN 600 MG: 600 TABLET, EXTENDED RELEASE ORAL at 09:52

## 2020-02-29 RX ADMIN — HEPARIN SODIUM 5000 UNITS: 5000 INJECTION INTRAVENOUS; SUBCUTANEOUS at 00:58

## 2020-02-29 RX ADMIN — DULOXETINE HYDROCHLORIDE 60 MG: 30 CAPSULE, DELAYED RELEASE ORAL at 09:51

## 2020-02-29 RX ADMIN — QUETIAPINE FUMARATE 50 MG: 50 TABLET ORAL at 09:52

## 2020-02-29 RX ADMIN — FUROSEMIDE 40 MG: 40 TABLET ORAL at 09:52

## 2020-02-29 RX ADMIN — PANTOPRAZOLE SODIUM 40 MG: 40 TABLET, DELAYED RELEASE ORAL at 05:17

## 2020-02-29 RX ADMIN — IPRATROPIUM BROMIDE AND ALBUTEROL SULFATE 3 ML: 2.5; .5 SOLUTION RESPIRATORY (INHALATION) at 13:47

## 2020-02-29 RX ADMIN — ALBUTEROL SULFATE 2.5 MG: 2.5 SOLUTION RESPIRATORY (INHALATION) at 00:32

## 2020-02-29 RX ADMIN — METHYLPREDNISOLONE SODIUM SUCCINATE 40 MG: 40 INJECTION, POWDER, FOR SOLUTION INTRAMUSCULAR; INTRAVENOUS at 05:17

## 2020-02-29 RX ADMIN — METHYLPREDNISOLONE SODIUM SUCCINATE 40 MG: 40 INJECTION, POWDER, FOR SOLUTION INTRAMUSCULAR; INTRAVENOUS at 21:24

## 2020-02-29 RX ADMIN — OSELTAMIVIR PHOSPHATE 75 MG: 75 CAPSULE ORAL at 21:24

## 2020-02-29 RX ADMIN — HEPARIN SODIUM 5000 UNITS: 5000 INJECTION INTRAVENOUS; SUBCUTANEOUS at 14:45

## 2020-02-29 RX ADMIN — QUETIAPINE FUMARATE 50 MG: 50 TABLET ORAL at 17:28

## 2020-02-29 RX ADMIN — GABAPENTIN 300 MG: 300 CAPSULE ORAL at 09:51

## 2020-02-29 NOTE — NURSING NOTE
Dyspneic on slight exertion, o2 @ 6lpm, chest diminished breath sounds with rales in l base  Po 94% on o2 @ 6lpm  IV out and restarted in l ac with #22, IV infusing as ordered

## 2020-02-29 NOTE — UTILIZATION REVIEW
Notification of Inpatient Admission/Inpatient Authorization Request   This is a Notification of Inpatient Admission for [facility]  Be advised that this patient was admitted to our facility under Inpatient Status  Contact Cassie Fagan at [phone] for additional admission information  [address] [place] [address] DEPT  DEDICATED -[phone]  Patient Name:   Mariann Mar   YOB: 1955       [address]   [address]:   [place] [address]  Tax ID: [id number]  NPI: [id number] Attending Provider/NPI: Bernard Stephenson Md [[id number]]      Place of Service Code: 24     Place of Service Name:  [address] [place] [address]   Start Date: 2/28/20 2059     Discharge Date & Time: No discharge date for patient encounter  Type of Admission: Inpatient Status Discharge Disposition (if discharged): Home/Self Care   Patient Diagnoses: Pneumonia [J18 9]  Influenza A [J10 1]  Flu-like symptoms [R68 89]     Orders: Admission Orders (From admission, onward)     Ordered        02/28/20 2059  Inpatient Admission  Once                    Assigned Utilization Review Contact: Cassie Fagan  Utilization   Network Utilization Review Department  Phone: [phone]; Fax [phone]  Email: [email or web address]   ATTENTION PAYERS: Please call the assigned Utilization  directly with any questions or concerns ALL voicemails in the department are confidential  Send all requests for admission clinical reviews, approved or denied determinations and any other requests to dedicated fax number belonging to the campus where the patient is receiving treatment

## 2020-02-29 NOTE — UTILIZATION REVIEW
Initial Clinical Review    Admission: Date/Time/Statement: Admission Orders (From admission, onward)     Ordered        02/28/20 2059  Inpatient Admission  Once                   Orders Placed This Encounter   Procedures    Inpatient Admission     Standing Status:   Standing     Number of Occurrences:   1     Order Specific Question:   Admitting Physician     Answer:   Pavel Ortiz [[id number]]     Order Specific Question:   Level of Care     Answer:   Med Surg [16]     Order Specific Question:   Estimated length of stay     Answer:   More than 2 Midnights     Order Specific Question:   Certification     Answer:   I certify that inpatient services are medically necessary for this patient for a duration of greater than two midnights  See H&P and MD Progress Notes for additional information about the patient's course of treatment  ED Arrival Information     Expected Arrival Acuity Means of Arrival Escorted By Service Admission Type    - 2/28/2020 18:10 Urgent Ambulance [address] [facility] General Medicine Urgent    Arrival Complaint    flu symptoms        Chief Complaint   Patient presents with    Cough     productive green sputum x6 days    Weakness - Generalized     with vomiting, diarrhea, and chills x6 days     Assessment/Plan:   Mrs Jerry Wright is a 63 yo female who presents to the ED via EMS from home with progressive SOB and dyspnea, wheezing, subjective fever, productive cough for greenish sputum and generalized weakness with vomiting, diarrhea and chills x 6 days  Diarrhea has subsided  + multiple sick contacts with the flu  Pt did not have flu shot  PMH: COPD, home oxygen use at night  Pt is admitted to INPATIENT status with Sepsis and Pneumonia - blood and urine cultures, IV antibiotics, Oxygen, duonebs q 6 hr and PRN  Influenza A - Tamiflu x 5 days  COPD - IV steroids, Breo  GERD - Protonix PO  Depression - home meds  2/29 pt is dyspneic on slight exertion    Is on Oxygen at 6L with diminshed breath sounds  Pulse ox is 94% on 6L  ED Triage Vitals   Temperature Pulse Respirations Blood Pressure SpO2   02/28/20 1816 02/28/20 1816 02/28/20 1816 02/28/20 1816 02/28/20 1816   (!) 100 8 °F (38 2 °C) 93 18 105/70 (!) 88 %      Temp Source Heart Rate Source Patient Position - Orthostatic VS BP Location FiO2 (%)   02/28/20 1816 02/28/20 1845 02/29/20 0656 02/28/20 2146 --   Temporal Monitor Lying Left arm       Pain Score       02/28/20 1816       7        Wt Readings from Last 1 Encounters:   02/28/20 78 2 kg (172 lb 6 4 oz)     Additional Vital Signs:   02/29/20 1150  99 °F (37 2 °C)  87  20  107/57    92 %  Nasal cannula   02/29/20 0745            93 %  Nasal cannula   02/29/20 0656  96 9 °F (36 1 °C)Abnormal   84  21  109/60    94 %  Nasal cannula   02/29/20 0346            94 %     02/29/20 0045    78  22      95 %     02/29/20 0032            94 %     02/28/20 2146  99 1 °F (37 3 °C)  95  22  122/61    92 %  Nasal cannula   02/28/20 2129    90  20      97 %  Nasal cannula   02/28/20 2115    86  22      97 %  Nasal cannula   02/28/20 2100    86  17  109/64  80  95 %  Nasal cannula   02/28/20 2052    89  18      97 %  Nasal cannula   02/28/20 2045    90  22      94 %  Nasal cannula   02/28/20 2030    93  20  103/63  78  96 %  Nasal cannula   02/28/20 1945    95    105/53  76  92 %  Nasal cannula   02/28/20 1845    89        95 %  Nasal cannula   02/28/20 1820            94 %  Nasal cannula     Pertinent Labs/Diagnostic Test Results:     2/28 CT abd, pelvis -  Peribronchial and subpleural groundglass and reticular interstitial opacities predominantly affecting the right lung although small focal areas also seen within the left lower lobe  Additionally, a focal consolidation is seen within the dependent portion of the right lower lobe    This is new since 7/19/2019 and most consistent with pneumonia (favor atypical), although pneumonitis should be considered in the appropriate clinical setting  No acute abnormality within the abdomen or pelvis  2/28 ECG - NSR with no evidence of cardiac ischemia       Results from last 7 days   Lab Units 02/29/20  0514 02/28/20  1836   WBC Thousand/uL 5 10 5 20   HEMOGLOBIN g/dL 12 3 13 4   HEMATOCRIT % 36 8* 39 4*   PLATELETS Thousands/uL 144* 153   NEUTROS ABS Thousands/µL 4 50 4 00     Results from last 7 days   Lab Units 02/29/20  0514 02/28/20  1836   SODIUM mmol/L 139 134   POTASSIUM mmol/L 3 4* 3 1*   CHLORIDE mmol/L 105 97*   CO2 mmol/L 25 27   ANION GAP mmol/L 9 10   BUN mg/dL 10 13   CREATININE mg/dL 0 57* 0 55*   EGFR ml/min/1 73sq m 99 100   CALCIUM mg/dL 7 9* 8 1*   MAGNESIUM mg/dL  --  2 0     Results from last 7 days   Lab Units 02/28/20  1836   AST U/L 39   ALT U/L 16   ALK PHOS U/L 59   TOTAL PROTEIN g/dL 6 4   ALBUMIN g/dL 3 8   TOTAL BILIRUBIN mg/dL 0 40     Results from last 7 days   Lab Units 02/29/20  0514 02/28/20  1836   GLUCOSE RANDOM mg/dL 192* 114*     Results from last 7 days   Lab Units 02/29/20  0029   PH ART  7 420   PCO2 ART mm Hg 36 3   PO2 ART mm Hg 72 0*   HCO3 ART mmol/L 23 3   BASE EXC ART mmol/L -0 5   O2 CONTENT ART mL/dL 15 4   O2 HGB, ARTERIAL % 93 5*   ABG SOURCE  Radial, Left     Results from last 7 days   Lab Units 02/28/20  1836   TROPONIN I ng/mL <0 03     Results from last 7 days   Lab Units 02/28/20  1836   PROTIME seconds 13 5*   INR  1 16   PTT seconds 30     Results from last 7 days   Lab Units 02/28/20  1836   PROCALCITONIN ng/ml <0 05     Results from last 7 days   Lab Units 02/28/20  1836   LACTIC ACID mmol/L 1 5     Results from last 7 days   Lab Units 02/29/20  0821   CLARITY UA  Clear   COLOR UA  Yellow   SPEC GRAV UA  1 010   PH UA  6 0   GLUCOSE UA mg/dl Negative   KETONES UA mg/dl Negative   BLOOD UA  Negative   PROTEIN UA mg/dl Negative   NITRITE UA  Positive*   BILIRUBIN UA  Negative   UROBILINOGEN UA E U /dl 0 2   LEUKOCYTES UA  Negative   WBC UA /hpf 2-4* RBC UA /hpf None Seen   BACTERIA UA /hpf Occasional   EPITHELIAL CELLS WET PREP /hpf Occasional     Results from last 7 days   Lab Units 02/28/20  1844   INFLUENZA A PCR  Detected*   INFLUENZA B PCR  None Detected   RSV PCR  None Detected     Results from last 7 days   Lab Units 02/28/20  1836   BLOOD CULTURE  Received in Microbiology Lab  Culture in Progress       ED Treatment:   Medication Administration from 02/28/2020 1810 to 02/28/2020 2143    Date/Time Order Dose Route Action   02/28/2020 1852 albuterol inhalation solution 5 mg 5 mg Nebulization Given   02/28/2020 1854 ipratropium (ATROVENT) 0 02 % inhalation solution 0 5 mg 0 5 mg Nebulization Given   02/28/2020 2041 cefepime (MAXIPIME) IVPB (premix) 2,000 mg 2,000 mg Intravenous New Bag   02/28/2020 1949 potassium chloride (K-DUR,KLOR-CON) CR tablet 40 mEq 40 mEq Oral Given   02/28/2020 1948 acetaminophen (TYLENOL) tablet 975 mg 975 mg Oral Given   02/28/2020 1943 ondansetron (ZOFRAN) injection 4 mg 4 mg Intravenous Given   02/28/2020 2047 methylPREDNISolone sodium succinate (Solu-MEDROL) injection 80 mg 80 mg Intravenous Given   02/28/2020 2011 iohexol (OMNIPAQUE) 350 MG/ML injection (MULTI-DOSE) 100 mL 100 mL Intravenous Given   02/28/2020 2049 oseltamivir (TAMIFLU) capsule 75 mg 75 mg Oral Given   02/28/2020 2106 albuterol inhalation solution 5 mg 5 mg Nebulization Given   02/28/2020 2106 ipratropium (ATROVENT) 0 02 % inhalation solution 0 5 mg 0 5 mg Nebulization Given   02/28/2020 2103 sodium chloride 0 9 % bolus 1,000 mL 1,000 mL Intravenous New Bag   02/28/2020 2124 levofloxacin (LEVAQUIN) IVPB (premix) 750 mg 750 mg Intravenous New Bag        Past Medical History:   Diagnosis Date    Anxiety     COPD (chronic obstructive pulmonary disease) (HCC)     Depression     Diverticulitis     GERD (gastroesophageal reflux disease)     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[address] )     Psychiatric disorder     Sleep apnea      Present on Admission:   Sepsis ([facility] [address] )   Pneumonia due to infectious organism   Influenza A   COPD (chronic obstructive pulmonary disease) (HCC)   Depression   GERD (gastroesophageal reflux disease)    Admitting Diagnosis: Pneumonia [J18 9]  Influenza A [J10 1]  Flu-like symptoms [R68 89]     Age/Sex: 62 y o  female     Admission Orders:  Scheduled Medications:    Medications:  atorvastatin 20 mg Oral Daily   colestipol 1 g Oral BID   cyclobenzaprine 10 mg Oral Daily   DULoxetine 60 mg Oral Daily   ergocalciferol 50,000 Units Oral Weekly   fluticasone-vilanterol 1 puff Inhalation Daily   furosemide 40 mg Oral Daily   gabapentin 300 mg Oral BID   guaiFENesin 600 mg Oral BID   heparin (porcine) 5,000 Units Subcutaneous Q8H [address]   influenza vaccine 0 5 mL Intramuscular Once   ipratropium-albuterol 3 mL Nebulization Q6H   methylPREDNISolone sodium succinate 40 mg Intravenous Q8H [address]   oseltamivir 75 mg Oral Q12H LLOYD   pantoprazole 40 mg Oral Early Morning   QUEtiapine 50 mg Oral TID     Continuous IV Infusions:    sodium chloride 125 mL/hr Intravenous Continuous     PRN Meds:    acetaminophen 650 mg Oral Q6H PRN    albuterol 2 5 mg Nebulization Q2H PRN x1 2/29   dicyclomine 20 mg Oral TID PRN    ondansetron 4 mg Intravenous Q6H PRN    potassium chloride 10 mEq Oral Daily PRN      SCDs  OOB as angela   Oral care  Aspiration prec  HOURLY INCENTIVE SPIROMETRY   procalcitonin  Sputum culture  Urine strep and legionella  Blood cultures  Regular diet   IP CONSULT TO CASE MANAGEMENT    Network Utilization Review Department  [email or web address]  ATTENTION: Please call with any questions or concerns to [phone] and carefully listen to the prompts so that you are directed to the right person  All voicemails are confidential   [facility] all requests for admission clinical reviews, approved or denied determinations and any other requests to dedicated fax number below belonging to the campus where the patient is receiving treatment   List of dedicated fax numbers for the Facilities:  [address] DENIALS (Administrative/Medical Necessity) [phone]   [address] (Maternity/NICU/Pediatrics) [phone]   [facility] [phone]   [address] [phone]   [address] [place] [address] [phone]   [facility] [address] [facility] [phone]   [facility] [phone]   [address] [facility]  [address] [facility] [address] [facility] [phone]

## 2020-02-29 NOTE — PLAN OF CARE
Problem: Potential for Falls  Goal: Patient will remain free of falls  Description  INTERVENTIONS:  - Assess patient frequently for physical needs  -  Identify cognitive and physical deficits and behaviors that affect risk of falls  -  [place] fall precautions as indicated by assessment   - Educate patient/family on patient safety including physical limitations  - Instruct patient to call for assistance with activity based on assessment  - Modify environment to reduce risk of injury  - Consider OT/PT consult to assist with strengthening/mobility  Outcome: Progressing     Problem: Nutrition/Hydration-ADULT  Goal: Nutrient/Hydration intake appropriate for improving, restoring or maintaining nutritional needs  Description  Monitor and assess patient's nutrition/hydration status for malnutrition  Collaborate with interdisciplinary team and initiate plan and interventions as ordered  Monitor patient's weight and dietary intake as ordered or per policy  Utilize nutrition screening tool and intervene as necessary  Determine patient's food preferences and provide high-protein, high-caloric foods as appropriate       INTERVENTIONS:  - Monitor oral intake, urinary output, labs, and treatment plans  - Assess nutrition and hydration status and recommend course of action  - Evaluate amount of meals eaten  - Assist patient with eating if necessary   - Allow adequate time for meals  - Recommend/ encourage appropriate diets, oral nutritional supplements, and vitamin/mineral supplements  - Order, calculate, and assess calorie counts as needed  - Recommend, monitor, and adjust tube feedings and TPN/PPN based on assessed needs  - Assess need for intravenous fluids  - Provide specific nutrition/hydration education as appropriate  - Include patient/family/caregiver in decisions related to nutrition  Outcome: Progressing     Problem: PAIN - ADULT  Goal: Verbalizes/displays adequate comfort level or baseline comfort level  Description  Interventions:  - Encourage patient to monitor pain and request assistance  - Assess pain using appropriate pain scale  - Administer analgesics based on type and severity of pain and evaluate response  - Implement non-pharmacological measures as appropriate and evaluate response  - Consider cultural and social influences on pain and pain management  - Notify physician/advanced practitioner if interventions unsuccessful or patient reports new pain  Outcome: Progressing     Problem: INFECTION - ADULT  Goal: Absence or prevention of progression during hospitalization  Description  INTERVENTIONS:  - Assess and monitor for signs and symptoms of infection  - Monitor lab/diagnostic results  - Monitor all insertion sites, i e  indwelling lines, tubes, and drains  - Monitor endotracheal if appropriate and nasal secretions for changes in amount and color  - [place] appropriate cooling/warming therapies per order  - Administer medications as ordered  - Instruct and encourage patient and family to use good hand hygiene technique  - Identify and instruct in appropriate isolation precautions for identified infection/condition  Outcome: Progressing  Goal: Absence of fever/infection during neutropenic period  Description  INTERVENTIONS:  - Monitor WBC    Outcome: Progressing     Problem: SAFETY ADULT  Goal: Patient will remain free of falls  Description  INTERVENTIONS:  - Assess patient frequently for physical needs  -  Identify cognitive and physical deficits and behaviors that affect risk of falls    -  [place] fall precautions as indicated by assessment   - Educate patient/family on patient safety including physical limitations  - Instruct patient to call for assistance with activity based on assessment  - Modify environment to reduce risk of injury  - Consider OT/PT consult to assist with strengthening/mobility  Outcome: Progressing  Goal: Maintain or return to baseline ADL function  Description  INTERVENTIONS:  -  Assess patient's ability to carry out ADLs; assess patient's baseline for ADL function and identify physical deficits which impact ability to perform ADLs (bathing, care of mouth/teeth, toileting, grooming, dressing, etc )  - Assess/evaluate cause of self-care deficits   - Assess range of motion  - Assess patient's mobility; develop plan if impaired  - Assess patient's need for assistive devices and provide as appropriate  - Encourage maximum independence but intervene and supervise when necessary  - Involve family in performance of ADLs  - Assess for home care needs following discharge   - Consider OT consult to assist with ADL evaluation and planning for discharge  - Provide patient education as appropriate  Outcome: Progressing  Goal: Maintain or return mobility status to optimal level  Description  INTERVENTIONS:  - Assess patient's baseline mobility status (ambulation, transfers, stairs, etc )    - Identify cognitive and physical deficits and behaviors that affect mobility  - Identify mobility aids required to assist with transfers and/or ambulation (gait belt, sit-to-stand, lift, walker, cane, etc )  - [place] fall precautions as indicated by assessment  - Record patient progress and toleration of activity level on Mobility SBAR; progress patient to next Phase/Stage  - Instruct patient to call for assistance with activity based on assessment  - Consider rehabilitation consult to assist with strengthening/weightbearing, etc   Outcome: Progressing     Problem: DISCHARGE PLANNING  Goal: Discharge to home or other facility with appropriate resources  Description  INTERVENTIONS:  - Identify barriers to discharge w/patient and caregiver  - Arrange for needed discharge resources and transportation as appropriate  - Identify discharge learning needs (meds, wound care, etc )  - Arrange for interpretive services to assist at discharge as needed  - Refer to Case Management Department for coordinating discharge planning if the patient needs post-hospital services based on physician/advanced practitioner order or complex needs related to functional status, cognitive ability, or social support system  Outcome: Progressing     Problem: Knowledge Deficit  Goal: Patient/family/caregiver demonstrates understanding of disease process, treatment plan, medications, and discharge instructions  Description  Complete learning assessment and assess knowledge base    Interventions:  - Provide teaching at level of understanding  - Provide teaching via preferred learning methods  Outcome: Progressing

## 2020-02-29 NOTE — SOCIAL WORK
CM met with pt at bedside and explained role  Pt reports she lives alone in a 3rd floor apartment; elevator inside; 0 [address]  Pt guerra snot use an assistive device to ambulate  She is on 2L cont  Home oxygen through [address]  She has portable tanks for transport  Pt reports she is completely independent with ADLs in the home  Her Bother Froilan Lopez provides transportation  Pt PCP is Elisabet Pruett  She uses [employer] for medications and denies any barriers to obtaining them  Pt denies any history of HHC, STR, MH and D&A treatment  Pt indicates her brother will transport her home on discharge  She denies any current discharge needs  CM will continue to follow to assess for same  Patient/caregiver received discharge checklist   Content reviewed  Patient/caregiver encouraged to participate in discharge plan of care prior to discharge home  CM reviewed d/c planning process including the following: identifying help at home, patient preference for d/c planning needs, availability of treatment team to discuss questions or concerns patient and/or family may have regarding understanding medications and recognizing signs and symptoms once discharged  CM also encouraged patient to follow up with all recommended appointments after discharge  Patient advised of importance for patient and family to participate in managing patients medical well being

## 2020-02-29 NOTE — SEPSIS NOTE
Sepsis Note   Sanket López 62 y o  female MRN: [id number]  Unit/Bed#: -01 Encounter: [id number]      qSOFA     Row Name 02/29/20 0045 02/28/20 2257 02/28/20 2146 02/28/20 2129 02/28/20 2115    Altered mental status GCS < 15    0    0      Respiratory Rate > / =22  1    1  0  1    Systolic BP < / =403      0        Q Sofa Score  1  1  1  0  1    Row Name 02/28/20 2100 02/28/20 2052 02/28/20 2045 02/28/20 2030 02/28/20 1945    Altered mental status GCS < 15              Respiratory Rate > / =22  0  0  1  0      Systolic BP < / =853  0      0  0    Q Sofa Score  0  0  1  0  0    Row Name 02/28/20 1816                Altered mental status GCS < 15          Respiratory Rate > / =80  0        Systolic BP < / =997  0        Q Sofa Score  0                     Default Flowsheet Data (last 720 hours)      Sepsis Reassess     Row Name 02/29/20 0310                   Repeat Volume Status and Tissue Perfusion Assessment Performed    Repeat Volume Status and Tissue Perfusion Assessment Performed             Volume Status and Tissue Perfusion Post Fluid Resuscitation * Must Document All *    Vital Signs Reviewed (HR, RR, BP, T)  Yes  -ES        Shock Index Reviewed  Yes  -ES        Arterial Oxygen Saturation Reviewed (POx, SaO2 or SpO2)  Yes (comment %) 95  -ES        Cardio  Normal S1/S2; Regular rate and rhythm  -ES        Pulmonary  (!) Normal effort;Rhonchi  -ES        Capillary Refill  Brisk  -ES        Peripheral Pulses  Radial;Dorsalis Pedis  -ES        Peripheral Pulse  +2  -ES        Dorsalis Pedis  +2  -ES        Skin  Warm;Dry  -ES        Urine output assessed  Adequate  -ES           *OR*   Intensive Monitoring- Must Document One of the Following Four *:    Vital Signs Reviewed          * Central Venous Pressure (CVP or RAP)          * Central Venous Oxygen (SVO2, ScvO2 or Oxygen saturation via central catheter)          * Bedside Cardiovascular US in IVC diameter and % collapse   * Passive Leg Raise OR Crystalloid Challenge            User Key  (r) = Recorded By, (t) = Taken By, (c) = Cosigned By    [address] Name Provider Type    MELYSSA Carbajal PA-C Physician

## 2020-02-29 NOTE — UTILIZATION REVIEW
Notification of Inpatient Admission/Inpatient Authorization Request   This is a Notification of Inpatient Admission for [address]  Be advised that this patient was admitted to our facility under Inpatient Status  Contact Nela Matthew at [phone] for additional admission information  [facility] DEPT  DEDICATED [phone]  Patient Name:   Tor Sutton   YOB: 1955       [address]   [address]:   [address]  Tax ID: [id number]  NPI: [id number] Attending Provider/NPI: Wayne Courtney Md [[id number]]   Place of Service Code: 24     Place of Service Name:  [address] [place] [address]   Start Date: 2/28/20 2059     Discharge Date & Time: No discharge date for patient encounter  Type of Admission: Inpatient Status Discharge Disposition   (if discharged): Home/Self Care   Patient Diagnoses: Pneumonia [J18 9]  Influenza A [J10 1]  Flu-like symptoms [R68 89]     Orders: Admission Orders (From admission, onward)     Ordered        02/28/20 2059  Inpatient Admission  Once                    Assigned Utilization Review Contact: Javier Blair  Utilization   Network Utilization Review Department  Phone: [phone]; Fax [phone]  Email: [email or web address]   ATTENTION PAYERS: Please call the assigned Utilization  directly with any questions or concerns ALL voicemails in the department are confidential  Send all requests for admission clinical reviews, approved or denied determinations and any other requests to dedicated fax number belonging to the campus where the patient is receiving treatment

## 2020-02-29 NOTE — ASSESSMENT & PLAN NOTE
· Admit to medicine  · Blood culture and urine culture currently pending  · Will give Vibramycin 100 mg IV q 12 hours and Rocephin 1 g IV Q 24 hours  · Place on respiratory and O2 protocol  · Give DuoNebs q 6 hours while awake and albuterol nebs q 2 hours p r n  For shortness of breath or wheeze  · Patient met sepsis criteria that she was febrile with T-max of 100 8, tachycardic a size 95, tachypnea as high as 22, and known infection being multilobar pneumonia

## 2020-02-29 NOTE — ASSESSMENT & PLAN NOTE
Be treat as per above, will give Tamiflu 75 mg p o  B i d , place on droplet precautions and supportive care

## 2020-02-29 NOTE — ASSESSMENT & PLAN NOTE
Being treated as per above, will additionally add Solu-Medrol 40 mg IV q 8 hours and substitute Breo for pre-hospital Advair

## 2020-02-29 NOTE — ED NOTES
Delay in IV abx due to delay in obtaining 2nd set of blood cultures and pt being off unit for radiology procedures using [address], RN  02/28/20 3821

## 2020-02-29 NOTE — H&P
H&P- Keren Mcdermott 1955, 62 y o  female MRN: [id number]    Unit/Bed#: -01 Encounter: [id number]    Primary Care Provider: KATIE Espino   Date and time admitted to hospital: 2/28/2020  6:12 PM        * Sepsis [facility])  Assessment & Plan  · Admit to medicine  · Blood culture and urine culture currently pending  · Will give Vibramycin 100 mg IV q 12 hours and Rocephin 1 g IV Q 24 hours  · Place on respiratory and O2 protocol  · Give DuoNebs q 6 hours while awake and albuterol nebs q 2 hours p r n  For shortness of breath or wheeze  · Patient met sepsis criteria that she was febrile with T-max of 100 8, tachycardic a size 95, tachypnea as high as 22, and known infection being multilobar pneumonia  Pneumonia due to infectious organism  Assessment & Plan  Being treated as per above    Influenza A  Assessment & Plan  Be treat as per above, will give Tamiflu 75 mg p o  B i d , place on droplet precautions and supportive care  COPD (chronic obstructive pulmonary disease) (HCC)  Assessment & Plan  Being treated as per above, will additionally add Solu-Medrol 40 mg IV q 8 hours and substitute Breo for pre-hospital Advair  GERD (gastroesophageal reflux disease)  Assessment & Plan  Substitute Protonix 40 mg p o  Daily for pre-hospital Nexium and continue pre-hospital Bentyl 20 mg p o  T i d  P r n  Depression  Assessment & Plan  Continue pre-hospital Seroquel 50 mg p o  T i d  And Cymbalta 60 mg p o  Daily      VTE Prophylaxis: Heparin  Code Status:  Level 1  POLST: There is no POLST form on file for this patient (pre-hospital)  Discussion with family:  None present at bedside at time of exam    Anticipated Length of Stay:  Patient will be admitted on an Inpatient basis with an anticipated length of stay of  > 2 midnights     Justification for Hospital Stay:  Sepsis secondary to multilobar pneumonia and influenza a requiring IV fluid resuscitation, IV antibiotics and supportive care    Total Time for Visit, including Counseling / Coordination of Care: 1 hour  Greater than 50% of this total time spent on direct patient counseling and coordination of care  Chief Complaint:   Cough and generalized weakness x6 days    History of Present Illness:    Claudio Turcios is a 62 y o  female who presents with cough and generalized weakness x6 days  Patient presents ER for further evaluation treatment of her 6 day history of cough productive of greenish sputum accompanied with some vomiting diarrhea and chills  Patient states that her diarrhea has subsided a couple days ago but she does persist with some nausea vomiting with 1 episode of vomiting today  Patient reports progressive shortness of breath to the point of dyspnea at rest and she does have a history of COPD and is on O2 2 L nasal cannula at night though she does not currently follow with Pulmonary she is on multiple respiratory medications  Patient does complain of some wheezing and subjective fever with multiple sick contacts being multiple family members with the flu  Patient reports a fever of 102 at home with a documented T-max 100 8° here  Patient did not receive her flu shot this year  Review of Systems:  Review of Systems   Constitutional: Positive for chills and fever  Respiratory: Positive for cough, shortness of breath and wheezing  Cardiovascular: Negative for chest pain and palpitations  Gastrointestinal: Positive for diarrhea, nausea and vomiting  Negative for abdominal pain  Genitourinary: Negative for enuresis, frequency, hematuria and urgency  Neurological: Positive for weakness  Negative for light-headedness and headaches  All other systems reviewed and are negative        Past Medical and Surgical History:   Past Medical History:   Diagnosis Date    Anxiety     COPD [facility]) [facility] [address] [facility]     Depression     Diverticulitis     GERD (gastroesophageal reflux disease)     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 [address] [facility]     Psychiatric disorder     Sleep apnea        Past Surgical History:   Procedure Laterality Date    APPENDECTOMY      CHOLECYSTECTOMY      partial x2 per pt    HIATAL HERNIA REPAIR      INGUINAL HERNIA REPAIR Right     LAPAROSCOPIC TUBAL LIGATION Bilateral     ROTATOR CUFF REPAIR Right     UVULOPALATOPHARYNGOPLASTY         Meds/Allergies:  Prior to Admission medications    Medication Sig Start Date End Date Taking?  Authorizing Provider   albuterol [facility] HFA) 90 mcg/act inhaler Inhale 2 puffs every 6 (six) hours as needed for wheezing PROAIR HFA 2/10/20  Yes KATIE Greenwood   albuterol (PROVENTIL HFA,VENTOLIN HFA) 90 mcg/act inhaler Inhale 2 puffs every 6 (six) hours as needed for wheezing 2/6/20 3/7/20 Yes KATIE Greenwood   atorvastatin (LIPITOR) 20 mg tablet Take 1 tablet (20 mg total) by mouth daily 2/6/20 5/6/20 Yes KATIE Greenwood   colestipol (COLESTID) 1 g tablet Take 1 tablet (1 g total) by mouth 2 (two) times a day 1/2/20  Yes Juan Manuel Calamity, DO   cyclobenzaprine (FLEXERIL) 10 mg tablet Take 1 tablet (10 mg total) by mouth daily 2/6/20 5/6/20 Yes KATIE Greenwood   dicyclomine (BENTYL) 20 mg tablet Take 1 tablet (20 mg total) by mouth 3 (three) times a day as needed (as needed) 2/6/20 5/6/20 Yes KATIE Greenwood   DULoxetine (CYMBALTA) 60 mg delayed release capsule Take 1 capsule (60 mg total) by mouth daily 2/6/20 5/6/20 Yes KATIE Greenwood   ergocalciferol (VITAMIN D2) 50,000 units Take 1 capsule (50,000 Units total) by mouth once a week 2/12/20 5/12/20 Yes KATIE Greenwood   esomeprazole (NexIUM) 40 MG capsule Take 1 capsule (40 mg total) by mouth daily 2/6/20 5/6/20 Yes KATIE Greenwood   fluticasone-salmeterol (ADVAIR DISKUS) 500-50 mcg/dose inhaler Inhale 1 puff 2 (two) times a day 2/6/20 5/6/20 Yes KATIE Greenwood   furosemide (LASIX) 40 mg tablet Take 1 tablet (40 mg total) by mouth daily 2/6/20 20 Yes KATIE Espino   gabapentin (NEURONTIN) 600 MG tablet Take 1 tablet (600 mg total) by mouth 2 (two) times a day 20 Yes KATIE Espino   naproxen sodium (ALEVE) 220 MG tablet Take 1 tablet (220 mg total) by mouth every 12 (twelve) hours as needed for mild pain 2/6/20 3/7/20 Yes KATIE Espino   potassium chloride (K-DUR) 10 mEq tablet Take 1 tablet (10 mEq total) by mouth daily as needed (when pt takes lasix) 2/6/20 3/7/20 Yes KATIE Espino   QUEtiapine (SEROquel) 50 mg tablet 1 tab PO daily 2 tabs PO qHS  Patient taking differently: Take 50 mg by mouth 3 (three) times a day 1 tab PO daily 2 tabs PO qHS 10/31/19  Yes Orion Mirza,    albuterol (2 5 mg/3 mL) 0 083 % nebulizer solution Take 1 vial (2 5 mg total) by nebulization every 6 (six) hours as needed for wheezing or shortness of breath 20  KATIE Espino   DULoxetine (CYMBALTA) 30 mg delayed release capsule Take 1 capsule (30 mg total) by mouth daily  Patient not taking: Reported on 2020  KATIE Espino     I have reviewed home medications with patient personally  Allergies:    Allergies   Allergen Reactions    Erythromycin Throat Swelling    Morphine Delirium       Social History:  Marital Status:    Occupation:  Retired from [employer]  Patient Pre-hospital Living Situation:  Resides at home alone  Patient Pre-hospital Level of Mobility:  Full without assist  Patient Pre-hospital Diet Restrictions:  None  Substance Use History:     Social History     Substance and Sexual Activity   Alcohol Use Yes    Frequency: Never    Comment: occasionally     Social History     Tobacco Use   Smoking Status Former Smoker    Packs/day: 1 00    Types: Cigarettes    Last attempt to quit: 12/10/2019    Years since quittin 2   Smokeless Tobacco Never Used   Tobacco Comment    using patches     Social History     Substance and Sexual Activity   Drug Use Yes    Types: Marijuana    Comment: daily in e-pen form       Family History:  I have reviewed the patients family history    Physical Exam:   Vitals:   Blood Pressure: 122/61 (02/28/20 2146)  Pulse: 95 (02/28/20 2146)  Temperature: 99 1 °F (37 3 °C) (02/28/20 2146)  Temp Source: Tympanic (02/28/20 2146)  Respirations: 22 (02/28/20 2146)  Height: 5' (152 4 cm) (02/28/20 2146)  Weight - Scale: 78 2 kg (172 lb 6 4 oz) (02/28/20 2146)  SpO2: 94 % (02/29/20 0032)    Physical Exam   Constitutional: She is oriented to person, place, and time  She appears well-developed and well-nourished  HENT:   Head: Normocephalic and atraumatic  Mouth/Throat: No oropharyngeal exudate  Eyes: Pupils are equal, round, and reactive to light  EOM are normal  No scleral icterus  Neck: Normal range of motion  Neck supple  No JVD present  Cardiovascular: Normal rate, regular rhythm and normal heart sounds  No murmur heard  Pulmonary/Chest: Effort normal  No respiratory distress  She has wheezes  She has rhonchi  She has no rales  Abdominal: Soft  Bowel sounds are normal  There is no tenderness  There is no rebound and no guarding  Musculoskeletal: Normal range of motion  She exhibits no edema  Lymphadenopathy:     She has no cervical adenopathy  Neurological: She is alert and oriented to person, place, and time  Skin: Skin is warm and dry  No rash noted  No erythema  Psychiatric: She has a normal mood and affect  Her behavior is normal    Nursing note and vitals reviewed  Additional Data:   Lab Results: I have personally reviewed pertinent reports        Results from last 7 days   Lab Units 02/28/20 1836   WBC Thousand/uL 5 20   HEMOGLOBIN g/dL 13 4   HEMATOCRIT % 39 4*   PLATELETS Thousands/uL 153   NEUTROS PCT % 77*   LYMPHS PCT % 17*   MONOS PCT % 6   EOS PCT % 0     Results from last 7 days   Lab Units 02/28/20  1836   POTASSIUM mmol/L 3 1*   CHLORIDE mmol/L 97*   CO2 mmol/L 27   BUN mg/dL 13   CREATININE mg/dL 0 55*   CALCIUM mg/dL 8 1*   ALK PHOS U/L 59   ALT U/L 16   AST U/L 39     Results from last 7 days   Lab Units 02/28/20  1836   INR  1 16               Imaging: I have personally reviewed pertinent reports  CT abdomen pelvis with contrast   Final Result by Cristel Rodriguez MD (02/28 2046)      Peribronchial and subpleural groundglass and reticular interstitial opacities predominantly affecting the right lung although small focal areas also seen within the left lower lobe  Additionally, a focal consolidation is seen within the dependent    portion of the right lower lobe  This is new since 7/19/2019 and most consistent with pneumonia (favor atypical), although pneumonitis should be considered in the appropriate clinical setting  No acute abnormality within the abdomen or pelvis  The study was marked in [facility] for immediate notification  Workstation performed: [id number]         XR chest 2 views    (Results Pending)       EKG, Pathology, and Other Studies Reviewed on Admission:   · EKG:  Normal sinus rhythm rate of 95    [facility] Records Reviewed: Yes     ** Please Note: This note has been constructed using a voice recognition system   **

## 2020-02-29 NOTE — ED PROVIDER NOTES
History  Chief Complaint   Patient presents with    Cough     productive green sputum x6 days    Weakness - Generalized     with vomiting, diarrhea, and chills x6 days     25-year-old female history COPD presents complaining of cough and weakness  Patient states that this is been going on for approximately 4-5 days  She states it has gotten progressively worse with the productive cough with yellow phlegm  She has not taken anything to help her symptoms and states that she has not been able to eat or drink  She presents accompanied by her daughter that states that she made the patient come  Patient admits to wheezing and breathing being worse than usual   She denies chest pain, shortness of breath but admits to abdominal pain  She states that the abdominal pain is constant and she has not taken anything for it  She denies urinary burning or frequency, melena or hematochezia  She denies recent hospitalizations, surgeries, travel, or history  She denies lower leg pain or swelling  Prior to Admission Medications   Prescriptions Last Dose Informant Patient Reported? Taking?    DULoxetine (CYMBALTA) 30 mg delayed release capsule   No No   Sig: Take 1 capsule (30 mg total) by mouth daily   DULoxetine (CYMBALTA) 60 mg delayed release capsule   No No   Sig: Take 1 capsule (60 mg total) by mouth daily   QUEtiapine (SEROquel) 50 mg tablet   No No   Si tab PO daily 2 tabs PO qHS   albuterol (2 5 mg/3 mL) 0 083 % nebulizer solution   No No   Sig: Take 1 vial (2 5 mg total) by nebulization every 6 (six) hours as needed for wheezing or shortness of breath   albuterol (PROAIR HFA) 90 mcg/act inhaler   No No   Sig: Inhale 2 puffs every 6 (six) hours as needed for wheezing PROAIR HFA   albuterol (PROVENTIL HFA,VENTOLIN HFA) 90 mcg/act inhaler   No No   Sig: Inhale 2 puffs every 6 (six) hours as needed for wheezing   atorvastatin (LIPITOR) 20 mg tablet   No No   Sig: Take 1 tablet (20 mg total) by mouth daily colestipol (COLESTID) 1 g tablet   No No   Sig: Take 1 tablet (1 g total) by mouth 2 (two) times a day   cyclobenzaprine (FLEXERIL) 10 mg tablet   No No   Sig: Take 1 tablet (10 mg total) by mouth daily   dicyclomine (BENTYL) 20 mg tablet   No No   Sig: Take 1 tablet (20 mg total) by mouth 3 (three) times a day as needed (as needed)   ergocalciferol (VITAMIN D2) 50,000 units   No No   Sig: Take 1 capsule (50,000 Units total) by mouth once a week   esomeprazole (NexIUM) 40 MG capsule   No No   Sig: Take 1 capsule (40 mg total) by mouth daily   fluticasone-salmeterol (ADVAIR DISKUS) 500-50 mcg/dose inhaler   No No   Sig: Inhale 1 puff 2 (two) times a day   furosemide (LASIX) 40 mg tablet   No No   Sig: Take 1 tablet (40 mg total) by mouth daily   gabapentin (NEURONTIN) 600 MG tablet   No No   Sig: Take 1 tablet (600 mg total) by mouth 2 (two) times a day   naproxen sodium (ALEVE) 220 MG tablet   No No   Sig: Take 1 tablet (220 mg total) by mouth every 12 (twelve) hours as needed for mild pain   potassium chloride (K-DUR) 10 mEq tablet   No No   Sig: Take 1 tablet (10 mEq total) by mouth daily as needed (when pt takes lasix)      Facility-Administered Medications: None       Past Medical History:   Diagnosis Date    Anxiety     [facility])     Depression     Diverticulitis     GERD (gastroesophageal reflux disease)     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[facility] [address] [facility]     Sleep apnea        Past Surgical History:   Procedure Laterality Date    APPENDECTOMY      CHOLECYSTECTOMY      partial x2 per pt    HIATAL HERNIA REPAIR      INGUINAL HERNIA REPAIR Right     LAPAROSCOPIC TUBAL LIGATION Bilateral     ROTATOR CUFF REPAIR Right     UVULOPALATOPHARYNGOPLASTY         Family History   Problem Relation Age of Onset    No Known Problems Mother     No Known Problems Father     No Known Problems Sister     No Known Problems Daughter     Breast cancer Maternal Grandmother 28    No Known Problems Paternal Grandmother     No Known Problems Maternal Aunt      I have reviewed and agree with the history as documented  E-Cigarette/Vaping     E-Cigarette/Vaping Substances    Nicotine No      Social History     Tobacco Use    Smoking status: Former Smoker     Packs/day: 1 00     Types: Cigarettes     Last attempt to quit: 12/10/2019     Years since quittin 2    Smokeless tobacco: Never Used    Tobacco comment: using patches   Substance Use Topics    Alcohol use: Yes     Comment: occasionally    Drug use: Yes     Types: Marijuana     Comment: daily in e-pen form       Review of Systems   Constitutional: Negative for chills, fatigue and fever  HENT: Negative for ear pain, postnasal drip, rhinorrhea, sinus pressure, sinus pain and sore throat  Eyes: Negative for pain  Respiratory: Positive for cough and wheezing  Negative for shortness of breath  Cardiovascular: Negative for chest pain, palpitations and leg swelling  Gastrointestinal: Negative for abdominal pain, constipation, diarrhea, nausea and vomiting  Endocrine: Negative for polyuria  Genitourinary: Negative for dysuria and pelvic pain  Musculoskeletal: Negative for arthralgias, myalgias, neck pain and neck stiffness  Neurological: Positive for weakness  Negative for dizziness, syncope, light-headedness and headaches  All other systems reviewed and are negative  Physical Exam  Physical Exam   Constitutional: She is oriented to person, place, and time  She appears well-developed and well-nourished  HENT:   Head: Normocephalic and atraumatic  Mouth/Throat: No oropharyngeal exudate  Eyes: EOM are normal    Neck: Normal range of motion  Cardiovascular: Normal rate, regular rhythm and normal heart sounds  Pulmonary/Chest:   Mild increased of breathing  Scattered rhonchi and wheeze bilaterally  Abdominal: Soft  Bowel sounds are normal  There is no tenderness  Musculoskeletal: Normal range of motion  Neurological: She is alert and oriented to person, place, and time  Skin: Skin is warm  Capillary refill takes less than 2 seconds  Psychiatric: She has a normal mood and affect         Vital Signs  ED Triage Vitals   Temperature Pulse Respirations Blood Pressure SpO2   02/28/20 1816 02/28/20 1816 02/28/20 1816 02/28/20 1816 02/28/20 1816   (!) 100 8 °F (38 2 °C) 93 18 105/70 (!) 88 %      Temp Source Heart Rate Source Patient Position - Orthostatic VS BP Location FiO2 (%)   02/28/20 1816 02/28/20 1845 -- -- --   Temporal Monitor         Pain Score       02/28/20 1816       7           Vitals:    02/28/20 1945 02/28/20 2030 02/28/20 2052 02/28/20 2100   BP: 105/53 103/63  109/64   Pulse: 95 93 89 86         Visual Acuity      ED Medications  Medications   sodium chloride 0 9 % bolus 1,000 mL (1,000 mL Intravenous New Bag 2/28/20 2103)   levofloxacin (LEVAQUIN) IVPB (premix) 750 mg (has no administration in time range)   ondansetron (FOR EMS ONLY) (ZOFRAN) 4 mg/2 mL injection 8 mg (0 mg Does not apply Given to EMS 2/28/20 1832)   albuterol inhalation solution 5 mg (5 mg Nebulization Given 2/28/20 1852)   ipratropium (ATROVENT) 0 02 % inhalation solution 0 5 mg (0 5 mg Nebulization Given 2/28/20 1854)   cefepime (MAXIPIME) IVPB (premix) 2,000 mg (2,000 mg Intravenous New Bag 2/28/20 2041)   potassium chloride (K-DUR,KLOR-CON) CR tablet 40 mEq (40 mEq Oral Given 2/28/20 1949)   acetaminophen (TYLENOL) tablet 975 mg (975 mg Oral Given 2/28/20 1948)   ondansetron (ZOFRAN) injection 4 mg (4 mg Intravenous Given 2/28/20 1943)   methylPREDNISolone sodium succinate (Solu-MEDROL) injection 80 mg (80 mg Intravenous Given 2/28/20 2047)   iohexol (OMNIPAQUE) 350 MG/ML injection (MULTI-DOSE) 100 mL (100 mL Intravenous Given 2/28/20 2011)   oseltamivir (TAMIFLU) capsule 75 mg (75 mg Oral Given 2/28/20 2049)   albuterol inhalation solution 5 mg (5 mg Nebulization Given 2/28/20 2106)   ipratropium (ATROVENT) 0 02 % inhalation solution 0 5 mg (0 5 mg Nebulization Given 2/28/20 2106)       Diagnostic Studies  Results Reviewed     Procedure Component Value Units Date/Time    Troponin I [[id number]]  (Normal) Collected:  02/28/20 1836    Lab Status:  Final result Specimen:  Blood Updated:  02/28/20 2058     Troponin I <0 03 ng/mL     Blood culture #1 [[id number]] Collected:  02/28/20 1946    Lab Status: In process Specimen:  Blood from Arm, Left Updated:  02/28/20 2038    Influenza A/B and RSV PCR [[id number]]  (Abnormal) Collected:  02/28/20 1844    Lab Status:  Final result Specimen:  Nasopharyngeal Swab Updated:  02/28/20 1926     INFLUENZA A PCR Detected     INFLUENZA B PCR None Detected     RSV PCR None Detected    Magnesium [[id number]]  (Normal) Collected:  02/28/20 1836    Lab Status:  Final result Specimen:  Blood from Hand, Right Updated:  02/28/20 1909     Magnesium 2 0 mg/dL     Lactic acid x2 [[id number]]  (Normal) Collected:  02/28/20 1836    Lab Status:  Final result Specimen:  Blood from Hand, Right Updated:  02/28/20 1903     LACTIC ACID 1 5 mmol/L     Narrative:       Result may be elevated if tourniquet was used during collection      Comprehensive metabolic panel [[id number]]  (Abnormal) Collected:  02/28/20 1836    Lab Status:  Final result Specimen:  Blood from Hand, Right Updated:  02/28/20 1903     Sodium 134 mmol/L      Potassium 3 1 mmol/L      Chloride 97 mmol/L      CO2 27 mmol/L      ANION GAP 10 mmol/L      BUN 13 mg/dL      Creatinine 0 55 mg/dL      Glucose 114 mg/dL      Calcium 8 1 mg/dL      AST 39 U/L      ALT 16 U/L      Alkaline Phosphatase 59 U/L      Total Protein 6 4 g/dL      Albumin 3 8 g/dL      Total Bilirubin 0 40 mg/dL      eGFR 100 ml/min/1 73sq m     Narrative:       Mary guidelines for Chronic Kidney Disease (CKD):     Stage 1 with normal or high GFR (GFR > 90 mL/min/1 73 square meters)    Stage 2 Mild CKD (GFR = 60-89 mL/min/1 73 square meters)    Stage 3A Moderate CKD (GFR = 45-59 mL/min/1 73 square meters)    Stage 3B Moderate CKD (GFR = 30-44 mL/min/1 73 square meters)    Stage 4 Severe CKD (GFR = 15-29 mL/min/1 73 square meters)    Stage 5 End Stage CKD (GFR <15 mL/min/1 73 square meters)  Note: GFR calculation is accurate only with a steady state creatinine    Protime-INR [[id number]]  (Abnormal) Collected:  02/28/20 1836    Lab Status:  Final result Specimen:  Blood from Hand, Right Updated:  02/28/20 1857     Protime 13 5 seconds      INR 1 16    APTT [[id number]]  (Normal) Collected:  02/28/20 1836    Lab Status:  Final result Specimen:  Blood from Hand, Right Updated:  02/28/20 1857     PTT 30 seconds     CBC and differential [[id number]]  (Abnormal) Collected:  02/28/20 1836    Lab Status:  Final result Specimen:  Blood from Hand, Right Updated:  02/28/20 1843     WBC 5 20 Thousand/uL      RBC 4 31 Million/uL      Hemoglobin 13 4 g/dL      Hematocrit 39 4 %      MCV 91 fL      MCH 31 1 pg      MCHC 34 1 g/dL      RDW 13 7 %      MPV 9 4 fL      Platelets 423 Thousands/uL      Neutrophils Relative 77 %      Lymphocytes Relative 17 %      Monocytes Relative 6 %      Eosinophils Relative 0 %      Basophils Relative 0 %      Neutrophils Absolute 4 00 Thousands/µL      Lymphocytes Absolute 0 90 Thousands/µL      Monocytes Absolute 0 30 Thousand/µL      Eosinophils Absolute 0 00 Thousand/µL      Basophils Absolute 0 00 Thousands/µL     Procalcitonin [[id number]] Collected:  02/28/20 1836    Lab Status: In process Specimen:  Blood from Hand, Right Updated:  02/28/20 1840    Blood culture #2 [[id number]] Collected:  02/28/20 1836    Lab Status:   In process Specimen:  Blood from Hand, Right Updated:  02/28/20 1840    Lactic acid x2 [[id number]]     Lab Status:  No result Specimen:  Blood     UA w Reflex to Microscopic w Reflex to Culture [[id number]]     Lab Status:  No result Specimen:  Urine                  CT abdomen pelvis with contrast   Final Result by Valentino Jernigan, MD (02/28 2046)      Peribronchial and subpleural groundglass and reticular interstitial opacities predominantly affecting the right lung although small focal areas also seen within the left lower lobe  Additionally, a focal consolidation is seen within the dependent    portion of the right lower lobe  This is new since 7/19/2019 and most consistent with pneumonia (favor atypical), although pneumonitis should be considered in the appropriate clinical setting  No acute abnormality within the abdomen or pelvis  The study was marked in [facility] for immediate notification  Workstation performed: [id number]         XR chest 2 views    (Results Pending)              Procedures  ECG 12 Lead Documentation Only  Date/Time: 2/28/2020 8:45 PM  Performed by: Chantal Olsen PA-C  Authorized by: Chantal Olsen PA-C     ECG reviewed by me, the ED Provider: yes    Patient location:  ED  Previous ECG:     Previous ECG:  Compared to current    Similarity:  No change    Comparison to cardiac monitor: Yes    Interpretation:     Interpretation: normal    Rate:     ECG rate:  95    ECG rate assessment: normal    Rhythm:     Rhythm: sinus rhythm    Ectopy:     Ectopy: none    QRS:     QRS axis:  Normal  Conduction:     Conduction: normal    ST segments:     ST segments:  Normal  T waves:     T waves: normal    Comments:      No evidence of acute cardiac ischemia             ED Course                               MDM  Number of Diagnoses or Management Options  Influenza A:   Pneumonia:   Diagnosis management comments: Patient positive for influenza a but also found to have a pneumonia  Started on cefepime and Levaquin to cover for atypicals per her CT findings  Will admit to med surge  BiPAP was discussed however patient was saturating well on 5 L nasal cannula at 94%  Patient was agreeable to admission          Disposition  Final diagnoses:   Pneumonia   Influenza A     Time reflects when diagnosis was documented in both MDM as applicable and the Disposition within this note     Time User Action Codes Description Comment    2/28/2020  8:57 PM Renell Reagin Add [J18 9] Pneumonia     2/28/2020  8:58 PM Renell Reagin Add [J10 1] Influenza A       ED Disposition     ED Disposition Condition Date/Time Comment    Admit Stable Fri Feb 28, 2020  8:57 PM Case was discussed with Sloane Juarez and the patient's admission status was agreed to be Admission Status: inpatient status to the service of Dr Deirdre Storm   Follow-up Information    None         Patient's Medications   Discharge Prescriptions    No medications on file     No discharge procedures on file      PDMP Review     None          ED Provider  Electronically Signed by           Kathleen Recio PA-C  02/28/20 7953

## 2020-02-29 NOTE — ASSESSMENT & PLAN NOTE
Substitute Protonix 40 mg p o  Daily for pre-hospital Nexium and continue pre-hospital Bentyl 20 mg p o  T i d  P r n

## 2020-02-29 NOTE — ED ATTENDING ATTESTATION
2/28/2020  I, Jim Gonsalez MD, saw and evaluated the patient  I have discussed the patient with the resident/non-physician practitioner and agree with the resident's/non-physician practitioner's findings, Plan of Care, and MDM as documented in the resident's/non-physician practitioner's note, except where noted  All available labs and Radiology studies were reviewed  I was present for key portions of any procedure(s) performed by the resident/non-physician practitioner and I was immediately available to provide assistance  At this point I agree with the current assessment done in the Emergency Department  I have conducted an independent evaluation of this patient a history and physical is as follows:    ED Course         Critical Care Time  Procedures    Patient is a 62year old male who presents with fatigue and shortness of breath  Flu positive  No vomiting  Appears sob      + history of copd  MDM 62 yof, ill appearing, will treat for flu, pneumonia and copd  Admit

## 2020-02-29 NOTE — RESPIRATORY THERAPY NOTE
RT Protocol Note  Sanket López 62 y o  female MRN: [id number]  Unit/Bed#: -01 Encounter: [id number]    Assessment    Principal Problem:    Sepsis ([place] [address] )  Active Problems:    COPD (chronic obstructive pulmonary disease) ([place] [address] )    Depression    GERD (gastroesophageal reflux disease)    Pneumonia due to infectious organism    Influenza A      Home Pulmonary Medications:  Albuterol 2 5mg/nss Q6prn wheezing/SOB  Advair 500/50 1 puff BID  Pro-Air 90mcg/act inhaler 2 puffs Q6PRN wheezing/SOB    Home Devices/Therapy: Home O2    Past Medical History:   Diagnosis Date    Anxiety     [facility]     Depression     Diverticulitis     GERD (gastroesophageal reflux disease)     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[place] [address] )     Psychiatric disorder     Sleep apnea      Social History     Socioeconomic History    Marital status:      Spouse name: None    Number of children: None    Years of education: None    Highest education level: None   Occupational History    None   Social Needs    Financial resource strain: None    Food insecurity:     Worry: None     Inability: None    Transportation needs:     Medical: None     Non-medical: None   Tobacco Use    Smoking status: Former Smoker     Packs/day: 1 00     Types: Cigarettes     Last attempt to quit: 12/10/2019     Years since quittin 2    Smokeless tobacco: Never Used    Tobacco comment: using patches   Substance and Sexual Activity    Alcohol use: Yes     Frequency: Never     Comment: occasionally    Drug use: Yes     Types: Marijuana     Comment: daily in e-pen form    Sexual activity: None   Lifestyle    Physical activity:     Days per week: None     Minutes per session: None    Stress: None   Relationships    Social connections:     Talks on phone: None     Gets together: None     Attends Christianity service: None     Active member of club or organization: None     Attends meetings of clubs or organizations: None     Relationship status: None    Intimate partner violence:     Fear of current or ex partner: None     Emotionally abused: None     Physically abused: None     Forced sexual activity: None   Other Topics Concern    None   Social History Narrative    None       Subjective         Objective    Physical Exam:   Assessment Type: Post-treatment  General Appearance: Alert, Awake  Respiratory Pattern: Dyspnea at rest  Chest Assessment: Chest expansion symmetrical, Trachea midline  Bilateral Breath Sounds: Diminished  R Breath Sounds: Diminished  L Breath Sounds: Diminished  Cough: Strong, Non-productive  O2 Device: 6 lpm NC    Vitals:  Blood pressure 122/61, pulse 78, temperature 99 1 °F (37 3 °C), temperature source Tympanic, resp  rate 22, height 5' (1 524 m), weight 78 2 kg (172 lb 6 4 oz), SpO2 95 %, not currently breastfeeding  Results from last 7 days   Lab Units 02/29/20  0029   PH ART  7 420   PCO2 ART mm Hg 36 3   PO2 ART mm Hg 72 0*   HCO3 ART mmol/L 23 3   BASE EXC ART mmol/L -0 5   O2 CONTENT ART mL/dL 15 4   O2 HGB, ARTERIAL % 93 5*   ABG SOURCE  Radial, Left       Imaging and other studies: I have personally reviewed pertinent reports  O2 Device: 6 lpm NC     Plan    Respiratory Plan: Moderate/Severe Distress pathway, Home Bronchodilator Patient pathway        Resp Comments: (P) Pt assessed as per protocol; pt observed resting in bed, very SOB  BBS decreased with a strong NPC  Pt has an extensive pulmonary hx with home O2 and pulmonary medications for symptom relief  Admitting dx is for Influenza A and pneumonia  PA-C has already ordered Duo-Neb udn Q6 and Albuterol Q2PRN  Will conitnue to monitor status and titrate care according to protocol

## 2020-02-29 NOTE — SEPSIS NOTE
Sepsis Note   Sandy aMrino 62 y o  female MRN: [id number]  Unit/Bed#: -01 Encounter: [id number]      qSOFA     Row Name 02/28/20 2257 02/28/20 2146 02/28/20 2129 02/28/20 2115 02/28/20 2100    Altered mental status GCS < 15  0    0        Respiratory Rate > / =22    1  0  1  0    Systolic BP < / =118    0      0    Q Sofa Score  1  1  0  1  0    Row Name 02/28/20 2052 02/28/20 2045 02/28/20 2030 02/28/20 1945 02/28/20 1816    Altered mental status GCS < 15              Respiratory Rate > / =22  0  1  0    0    Systolic BP < / =315      0  0  0    Q Sofa Score  0  1  0  0  0

## 2020-03-01 ENCOUNTER — APPOINTMENT (INPATIENT)
Dept: CT IMAGING | Facility: HOSPITAL | Age: 65
DRG: 871 | End: 2020-03-01
Payer: COMMERCIAL

## 2020-03-01 PROBLEM — J96.21 ACUTE ON CHRONIC RESPIRATORY FAILURE WITH HYPOXIA (HCC): Status: ACTIVE | Noted: 2020-03-01

## 2020-03-01 LAB
ANION GAP SERPL CALCULATED.3IONS-SCNC: 7 MMOL/L (ref 4–13)
ARTERIAL PATENCY WRIST A: YES
ATRIAL RATE: 95 BPM
BASE EXCESS BLDA CALC-SCNC: -0.7 MMOL/L (ref -2–3)
BUN SERPL-MCNC: 11 MG/DL (ref 7–25)
CALCIUM SERPL-MCNC: 8.2 MG/DL (ref 8.6–10.5)
CHLORIDE SERPL-SCNC: 109 MMOL/L (ref 98–107)
CO2 SERPL-SCNC: 28 MMOL/L (ref 21–31)
CREAT SERPL-MCNC: 0.43 MG/DL (ref 0.6–1.2)
ERYTHROCYTE [DISTWIDTH] IN BLOOD BY AUTOMATED COUNT: 14.1 % (ref 11.5–14.5)
GFR SERPL CREATININE-BSD FRML MDRD: 109 ML/MIN/1.73SQ M
GLUCOSE SERPL-MCNC: 152 MG/DL (ref 65–99)
HCO3 BLDA-SCNC: 23.1 MMOL/L (ref 22–28)
HCT VFR BLD AUTO: 35.6 % (ref 42–47)
HGB BLD-MCNC: 11.9 G/DL (ref 12–16)
MCH RBC QN AUTO: 30.9 PG (ref 26–34)
MCHC RBC AUTO-ENTMCNC: 33.4 G/DL (ref 31–37)
MCV RBC AUTO: 93 FL (ref 81–99)
NASAL CANNULA: ABNORMAL
O2 CT BLDA-SCNC: 12.9 ML/DL
OXYHGB MFR BLDA: 91 % (ref 94–100)
P AXIS: 65 DEGREES
PCO2 BLDA: 36.6 MM HG (ref 35–45)
PH BLDA: 7.42 [PH] (ref 7.35–7.45)
PLATELET # BLD AUTO: 174 THOUSANDS/UL (ref 149–390)
PMV BLD AUTO: 10 FL (ref 8.6–11.7)
PO2 BLDA: 63 MM HG (ref 80–100)
POTASSIUM SERPL-SCNC: 3.3 MMOL/L (ref 3.5–5.5)
PR INTERVAL: 110 MS
PROCALCITONIN SERPL-MCNC: <0.05 NG/ML
QRS AXIS: 43 DEGREES
QRSD INTERVAL: 76 MS
QT INTERVAL: 318 MS
QTC INTERVAL: 399 MS
RBC # BLD AUTO: 3.85 MILLION/UL (ref 3.9–5.2)
SODIUM SERPL-SCNC: 144 MMOL/L (ref 134–143)
SPECIMEN SOURCE: ABNORMAL
T WAVE AXIS: 243 DEGREES
VENTRICULAR RATE: 95 BPM
WBC # BLD AUTO: 10 THOUSAND/UL (ref 4.8–10.8)

## 2020-03-01 PROCEDURE — 94640 AIRWAY INHALATION TREATMENT: CPT

## 2020-03-01 PROCEDURE — 71275 CT ANGIOGRAPHY CHEST: CPT

## 2020-03-01 PROCEDURE — 84145 PROCALCITONIN (PCT): CPT | Performed by: INTERNAL MEDICINE

## 2020-03-01 PROCEDURE — 85027 COMPLETE CBC AUTOMATED: CPT | Performed by: INTERNAL MEDICINE

## 2020-03-01 PROCEDURE — 80048 BASIC METABOLIC PNL TOTAL CA: CPT | Performed by: INTERNAL MEDICINE

## 2020-03-01 PROCEDURE — 94760 N-INVAS EAR/PLS OXIMETRY 1: CPT

## 2020-03-01 PROCEDURE — 36600 WITHDRAWAL OF ARTERIAL BLOOD: CPT

## 2020-03-01 PROCEDURE — 82805 BLOOD GASES W/O2 SATURATION: CPT | Performed by: PHYSICIAN ASSISTANT

## 2020-03-01 PROCEDURE — 99232 SBSQ HOSP IP/OBS MODERATE 35: CPT | Performed by: INTERNAL MEDICINE

## 2020-03-01 PROCEDURE — 93010 ELECTROCARDIOGRAM REPORT: CPT | Performed by: INTERNAL MEDICINE

## 2020-03-01 RX ORDER — IPRATROPIUM BROMIDE AND ALBUTEROL SULFATE 2.5; .5 MG/3ML; MG/3ML
3 SOLUTION RESPIRATORY (INHALATION)
Status: DISCONTINUED | OUTPATIENT
Start: 2020-03-01 | End: 2020-03-02

## 2020-03-01 RX ORDER — ALBUTEROL SULFATE 2.5 MG/3ML
2.5 SOLUTION RESPIRATORY (INHALATION) EVERY 4 HOURS PRN
Status: DISCONTINUED | OUTPATIENT
Start: 2020-03-01 | End: 2020-03-03 | Stop reason: HOSPADM

## 2020-03-01 RX ORDER — POTASSIUM CHLORIDE 20 MEQ/1
40 TABLET, EXTENDED RELEASE ORAL ONCE
Status: COMPLETED | OUTPATIENT
Start: 2020-03-01 | End: 2020-03-01

## 2020-03-01 RX ORDER — METHYLPREDNISOLONE SODIUM SUCCINATE 40 MG/ML
40 INJECTION, POWDER, LYOPHILIZED, FOR SOLUTION INTRAMUSCULAR; INTRAVENOUS EVERY 12 HOURS SCHEDULED
Status: DISCONTINUED | OUTPATIENT
Start: 2020-03-01 | End: 2020-03-03 | Stop reason: HOSPADM

## 2020-03-01 RX ADMIN — SODIUM CHLORIDE 125 ML/HR: 0.9 INJECTION, SOLUTION INTRAVENOUS at 02:21

## 2020-03-01 RX ADMIN — PANTOPRAZOLE SODIUM 40 MG: 40 TABLET, DELAYED RELEASE ORAL at 05:57

## 2020-03-01 RX ADMIN — GABAPENTIN 300 MG: 300 CAPSULE ORAL at 09:52

## 2020-03-01 RX ADMIN — IPRATROPIUM BROMIDE AND ALBUTEROL SULFATE 3 ML: 2.5; .5 SOLUTION RESPIRATORY (INHALATION) at 14:06

## 2020-03-01 RX ADMIN — FLUTICASONE FUROATE AND VILANTEROL TRIFENATATE 1 PUFF: 200; 25 POWDER RESPIRATORY (INHALATION) at 09:52

## 2020-03-01 RX ADMIN — METHYLPREDNISOLONE SODIUM SUCCINATE 40 MG: 40 INJECTION, POWDER, FOR SOLUTION INTRAMUSCULAR; INTRAVENOUS at 05:57

## 2020-03-01 RX ADMIN — GUAIFENESIN 600 MG: 600 TABLET, EXTENDED RELEASE ORAL at 17:09

## 2020-03-01 RX ADMIN — OSELTAMIVIR PHOSPHATE 75 MG: 75 CAPSULE ORAL at 21:43

## 2020-03-01 RX ADMIN — METHYLPREDNISOLONE SODIUM SUCCINATE 40 MG: 40 INJECTION, POWDER, FOR SOLUTION INTRAMUSCULAR; INTRAVENOUS at 21:43

## 2020-03-01 RX ADMIN — HEPARIN SODIUM 5000 UNITS: 5000 INJECTION INTRAVENOUS; SUBCUTANEOUS at 21:43

## 2020-03-01 RX ADMIN — ATORVASTATIN CALCIUM 20 MG: 20 TABLET, FILM COATED ORAL at 09:52

## 2020-03-01 RX ADMIN — CYCLOBENZAPRINE HYDROCHLORIDE 10 MG: 10 TABLET, FILM COATED ORAL at 09:52

## 2020-03-01 RX ADMIN — IPRATROPIUM BROMIDE AND ALBUTEROL SULFATE 3 ML: 2.5; .5 SOLUTION RESPIRATORY (INHALATION) at 02:38

## 2020-03-01 RX ADMIN — HEPARIN SODIUM 5000 UNITS: 5000 INJECTION INTRAVENOUS; SUBCUTANEOUS at 05:57

## 2020-03-01 RX ADMIN — HEPARIN SODIUM 5000 UNITS: 5000 INJECTION INTRAVENOUS; SUBCUTANEOUS at 13:32

## 2020-03-01 RX ADMIN — GUAIFENESIN 600 MG: 600 TABLET, EXTENDED RELEASE ORAL at 09:52

## 2020-03-01 RX ADMIN — QUETIAPINE FUMARATE 50 MG: 50 TABLET ORAL at 15:39

## 2020-03-01 RX ADMIN — GABAPENTIN 300 MG: 300 CAPSULE ORAL at 17:09

## 2020-03-01 RX ADMIN — POTASSIUM CHLORIDE 10 MEQ: 750 TABLET, EXTENDED RELEASE ORAL at 10:34

## 2020-03-01 RX ADMIN — POTASSIUM CHLORIDE 40 MEQ: 1500 TABLET, EXTENDED RELEASE ORAL at 10:38

## 2020-03-01 RX ADMIN — QUETIAPINE FUMARATE 50 MG: 50 TABLET ORAL at 21:43

## 2020-03-01 RX ADMIN — IOHEXOL 75 ML: 350 INJECTION, SOLUTION INTRAVENOUS at 11:37

## 2020-03-01 RX ADMIN — OSELTAMIVIR PHOSPHATE 75 MG: 75 CAPSULE ORAL at 09:53

## 2020-03-01 RX ADMIN — IPRATROPIUM BROMIDE AND ALBUTEROL SULFATE 3 ML: 2.5; .5 SOLUTION RESPIRATORY (INHALATION) at 07:49

## 2020-03-01 RX ADMIN — IPRATROPIUM BROMIDE AND ALBUTEROL SULFATE 3 ML: 2.5; .5 SOLUTION RESPIRATORY (INHALATION) at 20:28

## 2020-03-01 RX ADMIN — DULOXETINE HYDROCHLORIDE 60 MG: 30 CAPSULE, DELAYED RELEASE ORAL at 09:52

## 2020-03-01 RX ADMIN — QUETIAPINE FUMARATE 50 MG: 50 TABLET ORAL at 09:53

## 2020-03-01 RX ADMIN — FUROSEMIDE 40 MG: 40 TABLET ORAL at 09:53

## 2020-03-01 NOTE — PLAN OF CARE
Problem: Potential for Falls  Goal: Patient will remain free of falls  Description  INTERVENTIONS:  - Assess patient frequently for physical needs  -  Identify cognitive and physical deficits and behaviors that affect risk of falls  -  [place] fall precautions as indicated by assessment   - Educate patient/family on patient safety including physical limitations  - Instruct patient to call for assistance with activity based on assessment  - Modify environment to reduce risk of injury  - Consider OT/PT consult to assist with strengthening/mobility  Outcome: Progressing     Problem: Nutrition/Hydration-ADULT  Goal: Nutrient/Hydration intake appropriate for improving, restoring or maintaining nutritional needs  Description  Monitor and assess patient's nutrition/hydration status for malnutrition  Collaborate with interdisciplinary team and initiate plan and interventions as ordered  Monitor patient's weight and dietary intake as ordered or per policy  Utilize nutrition screening tool and intervene as necessary  Determine patient's food preferences and provide high-protein, high-caloric foods as appropriate       INTERVENTIONS:  - Monitor oral intake, urinary output, labs, and treatment plans  - Assess nutrition and hydration status and recommend course of action  - Evaluate amount of meals eaten  - Assist patient with eating if necessary   - Allow adequate time for meals  - Recommend/ encourage appropriate diets, oral nutritional supplements, and vitamin/mineral supplements  - Order, calculate, and assess calorie counts as needed  - Recommend, monitor, and adjust tube feedings and TPN/PPN based on assessed needs  - Assess need for intravenous fluids  - Provide specific nutrition/hydration education as appropriate  - Include patient/family/caregiver in decisions related to nutrition  Outcome: Progressing     Problem: PAIN - ADULT  Goal: Verbalizes/displays adequate comfort level or baseline comfort level  Description  Interventions:  - Encourage patient to monitor pain and request assistance  - Assess pain using appropriate pain scale  - Administer analgesics based on type and severity of pain and evaluate response  - Implement non-pharmacological measures as appropriate and evaluate response  - Consider cultural and social influences on pain and pain management  - Notify physician/advanced practitioner if interventions unsuccessful or patient reports new pain  Outcome: Progressing     Problem: INFECTION - ADULT  Goal: Absence or prevention of progression during hospitalization  Description  INTERVENTIONS:  - Assess and monitor for signs and symptoms of infection  - Monitor lab/diagnostic results  - Monitor all insertion sites, i e  indwelling lines, tubes, and drains  - Monitor endotracheal if appropriate and nasal secretions for changes in amount and color  - [place] appropriate cooling/warming therapies per order  - Administer medications as ordered  - Instruct and encourage patient and family to use good hand hygiene technique  - Identify and instruct in appropriate isolation precautions for identified infection/condition  Outcome: Progressing  Goal: Absence of fever/infection during neutropenic period  Description  INTERVENTIONS:  - Monitor WBC    Outcome: Progressing     Problem: SAFETY ADULT  Goal: Patient will remain free of falls  Description  INTERVENTIONS:  - Assess patient frequently for physical needs  -  Identify cognitive and physical deficits and behaviors that affect risk of falls    -  [place] fall precautions as indicated by assessment   - Educate patient/family on patient safety including physical limitations  - Instruct patient to call for assistance with activity based on assessment  - Modify environment to reduce risk of injury  - Consider OT/PT consult to assist with strengthening/mobility  Outcome: Progressing  Goal: Maintain or return to baseline ADL function  Description  INTERVENTIONS:  -  Assess patient's ability to carry out ADLs; assess patient's baseline for ADL function and identify physical deficits which impact ability to perform ADLs (bathing, care of mouth/teeth, toileting, grooming, dressing, etc )  - Assess/evaluate cause of self-care deficits   - Assess range of motion  - Assess patient's mobility; develop plan if impaired  - Assess patient's need for assistive devices and provide as appropriate  - Encourage maximum independence but intervene and supervise when necessary  - Involve family in performance of ADLs  - Assess for home care needs following discharge   - Consider OT consult to assist with ADL evaluation and planning for discharge  - Provide patient education as appropriate  Outcome: Progressing  Goal: Maintain or return mobility status to optimal level  Description  INTERVENTIONS:  - Assess patient's baseline mobility status (ambulation, transfers, stairs, etc )    - Identify cognitive and physical deficits and behaviors that affect mobility  - Identify mobility aids required to assist with transfers and/or ambulation (gait belt, sit-to-stand, lift, walker, cane, etc )  - [place] fall precautions as indicated by assessment  - Record patient progress and toleration of activity level on Mobility SBAR; progress patient to next Phase/Stage  - Instruct patient to call for assistance with activity based on assessment  - Consider rehabilitation consult to assist with strengthening/weightbearing, etc   Outcome: Progressing     Problem: DISCHARGE PLANNING  Goal: Discharge to home or other facility with appropriate resources  Description  INTERVENTIONS:  - Identify barriers to discharge w/patient and caregiver  - Arrange for needed discharge resources and transportation as appropriate  - Identify discharge learning needs (meds, wound care, etc )  - Arrange for interpretive services to assist at discharge as needed  - Refer to Case Management Department for coordinating discharge planning if the patient needs post-hospital services based on physician/advanced practitioner order or complex needs related to functional status, cognitive ability, or social support system  Outcome: Progressing     Problem: Knowledge Deficit  Goal: Patient/family/caregiver demonstrates understanding of disease process, treatment plan, medications, and discharge instructions  Description  Complete learning assessment and assess knowledge base    Interventions:  - Provide teaching at level of understanding  - Provide teaching via preferred learning methods  Outcome: Progressing     Problem: Prexisting or High Potential for Compromised Skin Integrity  Goal: Skin integrity is maintained or improved  Description  INTERVENTIONS:  - Identify patients at risk for skin breakdown  - Assess and monitor skin integrity  - Assess and monitor nutrition and hydration status  - Monitor labs   - Assess for incontinence   - Turn and reposition patient  - Assist with mobility/ambulation  - Relieve pressure over bony prominences  - Avoid friction and shearing  - Provide appropriate hygiene as needed including keeping skin clean and dry  - Evaluate need for skin moisturizer/barrier cream  - Collaborate with interdisciplinary team   - Patient/family teaching  - Consider wound care consult   Outcome: Progressing

## 2020-03-01 NOTE — PROGRESS NOTES
Progress Note - Jessica Bones 1955, 62 y o  female MRN: [id number]    Unit/Bed#: -01 Encounter: [id number]    Primary Care Provider: KATIE Flores   Date and time admitted to hospital: 2/28/2020  6:12 PM        Acute on chronic respiratory failure with hypoxia ([address] )  Assessment & Plan  · Likely secondary to viral pneumonia  · Procalcitonin negative x2, antibiotics discontinued  · In light of continued high oxygen requirements, will check a CTA to rule out PE and further evaluate patient's lungs    Influenza A  Assessment & Plan  · Continue Tamiflu    Pneumonia due to infectious organism  Assessment & Plan  · Likely viral pneumonia  · Procalcitonin negative x2  · Antibiotics discontinued  · Check CTA    COPD (chronic obstructive pulmonary disease) (HCC)  Assessment & Plan  · With acute exacerbation  · Titrate down on steroids  · Continue nebulizers    * Sepsis (HCC)  Assessment & Plan  · Secondary to influenza  · Continue Tamiflu and supportive care  · Give DuoNebs q 6 hours while awake and albuterol nebs q 2 hours p r n  For shortness of breath or wheeze  · Patient met sepsis criteria that she was febrile with T-max of 100 8, tachycardic at 95, tachypnea as high as 22, and known infection being multilobar pneumonia  VTE Pharmacologic Prophylaxis: Pharmacologic: Heparin    Patient Centered Rounds: I have performed bedside rounds with nursing staff today  Discussions with Specialists or Other Care Team Provider: n/a  Education and Discussions with Family / Patient: patient    Current Length of Stay: 2 day(s)    Current Patient Status: Inpatient   Certification Statement: The patient will continue to require additional inpatient hospital stay due to Respiratory failure    Discharge Plan:  Pending hospital course    Code Status: Level 1 - Full Code    Subjective:   Patient seen examined  No acute events overnight    Feels somewhat improved but still short of breath    Objective: Vitals:   Temp (24hrs), Av 3 °F (36 8 °C), Min:97 °F (36 1 °C), Max:99 1 °F (37 3 °C)    Temp:  [97 °F (36 1 °C)-99 1 °F (37 3 °C)] 97 °F (36 1 °C)  HR:  [80-92] 87  Resp:  [20-22] 20  BP: ()/(55-60) 91/55  SpO2:  [90 %-95 %] 91 %  Body mass index is 33 67 kg/m²  Input and Output Summary (last 24 hours): Intake/Output Summary (Last 24 hours) at 3/1/2020 1024  Last data filed at 3/1/2020 0221  Gross per 24 hour   Intake 1960 41 ml   Output 600 ml   Net 1360 41 ml       Physical Exam:     Physical Exam   Constitutional: She is oriented to person, place, and time  She appears well-developed and well-nourished  HENT:   Head: Normocephalic and atraumatic  Eyes: Pupils are equal, round, and reactive to light  EOM are normal    Neck: Normal range of motion  Neck supple  Cardiovascular: Normal rate and regular rhythm  Pulmonary/Chest: Effort normal  She has rales  Abdominal: Soft  Bowel sounds are normal    Musculoskeletal: Normal range of motion  She exhibits no edema  Neurological: She is alert and oriented to person, place, and time  Skin: Skin is warm  Capillary refill takes less than 2 seconds  Psychiatric: She has a normal mood and affect  Her behavior is normal  Thought content normal    Nursing note and vitals reviewed        Additional Data:     Labs:    Results from last 7 days   Lab Units 20  0650 20  0514   WBC Thousand/uL 10 00 5 10   HEMOGLOBIN g/dL 11 9* 12 3   HEMATOCRIT % 35 6* 36 8*   PLATELETS Thousands/uL 174 144*   NEUTROS PCT %  --  89*   LYMPHS PCT %  --  8*   MONOS PCT %  --  4   EOS PCT %  --  0     Results from last 7 days   Lab Units 20  0619  20  1836   POTASSIUM mmol/L 3 3*   < > 3 1*   CHLORIDE mmol/L 109*   < > 97*   CO2 mmol/L 28   < > 27   BUN mg/dL 11   < > 13   CREATININE mg/dL 0 43*   < > 0 55*   CALCIUM mg/dL 8 2*   < > 8 1*   ALK PHOS U/L  --   --  59   ALT U/L  --   --  16   AST U/L  --   --  39    < > = values in this interval not displayed  Results from last 7 days   Lab Units 02/28/20  1836   INR  1 16               * I Have Reviewed All Lab Data Listed Above  * Additional Pertinent Lab Tests Reviewed: [place] [facility] Admission  Reviewed    Imaging:  Imaging Reports Reviewed Today Include: n/a    Recent Cultures (last 7 days):     Results from last 7 days   Lab Units 02/29/20  0818 02/28/20  1946 02/28/20 1836   BLOOD CULTURE   --  Received in Microbiology Lab  Culture in Progress  No Growth at 24 hrs     LEGIONELLA URINARY ANTIGEN  Negative  --   --        Last 24 Hours Medication List:     Current Facility-Administered Medications:  acetaminophen 650 mg Oral Q6H PRN Sarah Mood, PA-C   albuterol 2 5 mg Nebulization Q4H PRN Oscar Lagos MD   atorvastatin 20 mg Oral Daily Sarah Mood, PA-C   colestipol 1 g Oral BID Sarah Mood, PA-C   cyclobenzaprine 10 mg Oral Daily Sarah Mood, PA-C   dicyclomine 20 mg Oral TID PRN Sarah Mood, PA-C   DULoxetine 60 mg Oral Daily Sarah Mood, PA-C   ergocalciferol 50,000 Units Oral Weekly Sarah Mood, PA-C   fluticasone-vilanterol 1 puff Inhalation Daily Sarah Mood, PA-C   furosemide 40 mg Oral Daily Sarah Mood, PA-C   gabapentin 300 mg Oral BID Sarah Mood, PA-C   guaiFENesin 600 mg Oral BID Sarah Mood, PA-C   heparin (porcine) 5,000 Units Subcutaneous [facility] Sarah Mood, PA-C   influenza vaccine 0 5 mL Intramuscular Once Oscar Lagos MD   ipratropium-albuterol 3 mL Nebulization Q6H While awake Oscar Lagos MD   methylPREDNISolone sodium succinate 40 mg Intravenous Q12H 300 Vimal Hernandez MD   ondansetron 4 mg Intravenous Q6H PRN Sarah Mood, PA-C   oseltamivir 75 mg Oral Q12H [facility] Sarah Mood, PA-C   pantoprazole 40 mg Oral Early Morning Sarah Mood, PA-C   potassium chloride 10 mEq Oral Daily PRN Sarah Mood, PA-C   potassium chloride 40 mEq Oral Once Oscar Lagos MD   QUEtiapine 50 mg Oral TID Sarah Mood, PA-C        Today, Patient Was Seen By: Elvi Jackson Geetha Sosa MD    ** Please Note: Dictation voice to text software may have been used in the creation of this document   **

## 2020-03-01 NOTE — ASSESSMENT & PLAN NOTE
· Secondary to influenza  · Continue Tamiflu and supportive care  · Give DuoNebs q 6 hours while awake and albuterol nebs q 2 hours p r n  For shortness of breath or wheeze  · Patient met sepsis criteria that she was febrile with T-max of 100 8, tachycardic at 95, tachypnea as high as 22, and known infection being multilobar pneumonia

## 2020-03-01 NOTE — ASSESSMENT & PLAN NOTE
· Likely secondary to viral pneumonia  · Procalcitonin negative x2, antibiotics discontinued  · In light of continued high oxygen requirements, will check a CTA to rule out PE and further evaluate patient's lungs

## 2020-03-02 PROBLEM — J44.0 CHRONIC OBSTRUCTIVE PULMONARY DISEASE WITH ACUTE LOWER RESPIRATORY INFECTION (HCC): Status: ACTIVE | Noted: 2017-10-25

## 2020-03-02 PROBLEM — A41.3 SEPSIS DUE TO HAEMOPHILUS INFLUENZAE WITH ACUTE HYPOXIC RESPIRATORY FAILURE WITHOUT SEPTIC SHOCK (HCC): Status: ACTIVE | Noted: 2020-02-29

## 2020-03-02 PROBLEM — J96.01 SEPSIS DUE TO HAEMOPHILUS INFLUENZAE WITH ACUTE HYPOXIC RESPIRATORY FAILURE WITHOUT SEPTIC SHOCK (HCC): Status: ACTIVE | Noted: 2020-02-29

## 2020-03-02 PROBLEM — F32.A ANXIETY AND DEPRESSION: Status: ACTIVE | Noted: 2017-10-25

## 2020-03-02 PROBLEM — R65.20 SEPSIS DUE TO HAEMOPHILUS INFLUENZAE WITH ACUTE HYPOXIC RESPIRATORY FAILURE WITHOUT SEPTIC SHOCK (HCC): Status: ACTIVE | Noted: 2020-02-29

## 2020-03-02 PROBLEM — J11.00 PNEUMONIA OF RIGHT LOWER LOBE DUE TO INFLUENZA A VIRUS: Status: ACTIVE | Noted: 2020-02-29

## 2020-03-02 LAB
ANION GAP SERPL CALCULATED.3IONS-SCNC: 7 MMOL/L (ref 4–13)
BUN SERPL-MCNC: 13 MG/DL (ref 7–25)
CALCIUM SERPL-MCNC: 8.5 MG/DL (ref 8.6–10.5)
CHLORIDE SERPL-SCNC: 106 MMOL/L (ref 98–107)
CO2 SERPL-SCNC: 31 MMOL/L (ref 21–31)
CREAT SERPL-MCNC: 0.46 MG/DL (ref 0.6–1.2)
ERYTHROCYTE [DISTWIDTH] IN BLOOD BY AUTOMATED COUNT: 14.4 % (ref 11.5–14.5)
GFR SERPL CREATININE-BSD FRML MDRD: 106 ML/MIN/1.73SQ M
GIANT PLATELETS BLD QL SMEAR: PRESENT
GLUCOSE SERPL-MCNC: 146 MG/DL (ref 65–99)
HCT VFR BLD AUTO: 34.6 % (ref 42–47)
HGB BLD-MCNC: 11.8 G/DL (ref 12–16)
MCH RBC QN AUTO: 31.1 PG (ref 26–34)
MCHC RBC AUTO-ENTMCNC: 33.9 G/DL (ref 31–37)
MCV RBC AUTO: 92 FL (ref 81–99)
PLATELET # BLD AUTO: 231 THOUSANDS/UL (ref 149–390)
PLATELET BLD QL SMEAR: ADEQUATE
PMV BLD AUTO: 9.5 FL (ref 8.6–11.7)
POTASSIUM SERPL-SCNC: 3.6 MMOL/L (ref 3.5–5.5)
RBC # BLD AUTO: 3.78 MILLION/UL (ref 3.9–5.2)
RBC MORPH BLD: NORMAL
SODIUM SERPL-SCNC: 144 MMOL/L (ref 134–143)
WBC # BLD AUTO: 9.5 THOUSAND/UL (ref 4.8–10.8)

## 2020-03-02 PROCEDURE — 80048 BASIC METABOLIC PNL TOTAL CA: CPT | Performed by: INTERNAL MEDICINE

## 2020-03-02 PROCEDURE — 94640 AIRWAY INHALATION TREATMENT: CPT

## 2020-03-02 PROCEDURE — 94760 N-INVAS EAR/PLS OXIMETRY 1: CPT

## 2020-03-02 PROCEDURE — 85027 COMPLETE CBC AUTOMATED: CPT | Performed by: INTERNAL MEDICINE

## 2020-03-02 PROCEDURE — 99233 SBSQ HOSP IP/OBS HIGH 50: CPT | Performed by: NURSE PRACTITIONER

## 2020-03-02 RX ORDER — CEFTRIAXONE 1 G/50ML
1000 INJECTION, SOLUTION INTRAVENOUS EVERY 24 HOURS
Status: DISCONTINUED | OUTPATIENT
Start: 2020-03-02 | End: 2020-03-03 | Stop reason: HOSPADM

## 2020-03-02 RX ORDER — LEVALBUTEROL 1.25 MG/.5ML
1.25 SOLUTION, CONCENTRATE RESPIRATORY (INHALATION)
Status: DISCONTINUED | OUTPATIENT
Start: 2020-03-02 | End: 2020-03-03 | Stop reason: HOSPADM

## 2020-03-02 RX ORDER — SODIUM CHLORIDE FOR INHALATION 0.9 %
3 VIAL, NEBULIZER (ML) INHALATION
Status: DISCONTINUED | OUTPATIENT
Start: 2020-03-02 | End: 2020-03-02

## 2020-03-02 RX ADMIN — GABAPENTIN 300 MG: 300 CAPSULE ORAL at 09:36

## 2020-03-02 RX ADMIN — GUAIFENESIN 600 MG: 600 TABLET, EXTENDED RELEASE ORAL at 17:45

## 2020-03-02 RX ADMIN — GABAPENTIN 300 MG: 300 CAPSULE ORAL at 17:45

## 2020-03-02 RX ADMIN — QUETIAPINE FUMARATE 50 MG: 50 TABLET ORAL at 17:06

## 2020-03-02 RX ADMIN — LEVALBUTEROL HYDROCHLORIDE 1.25 MG: 1.25 SOLUTION, CONCENTRATE RESPIRATORY (INHALATION) at 21:30

## 2020-03-02 RX ADMIN — LEVALBUTEROL HYDROCHLORIDE 1.25 MG: 1.25 SOLUTION, CONCENTRATE RESPIRATORY (INHALATION) at 14:37

## 2020-03-02 RX ADMIN — HEPARIN SODIUM 5000 UNITS: 5000 INJECTION INTRAVENOUS; SUBCUTANEOUS at 14:14

## 2020-03-02 RX ADMIN — ACETAMINOPHEN 650 MG: 325 TABLET ORAL at 17:45

## 2020-03-02 RX ADMIN — IPRATROPIUM BROMIDE 0.5 MG: 0.5 SOLUTION RESPIRATORY (INHALATION) at 21:30

## 2020-03-02 RX ADMIN — ATORVASTATIN CALCIUM 20 MG: 20 TABLET, FILM COATED ORAL at 09:34

## 2020-03-02 RX ADMIN — METHYLPREDNISOLONE SODIUM SUCCINATE 40 MG: 40 INJECTION, POWDER, FOR SOLUTION INTRAMUSCULAR; INTRAVENOUS at 09:34

## 2020-03-02 RX ADMIN — FUROSEMIDE 40 MG: 40 TABLET ORAL at 09:35

## 2020-03-02 RX ADMIN — CYCLOBENZAPRINE HYDROCHLORIDE 10 MG: 10 TABLET, FILM COATED ORAL at 09:36

## 2020-03-02 RX ADMIN — PANTOPRAZOLE SODIUM 40 MG: 40 TABLET, DELAYED RELEASE ORAL at 06:30

## 2020-03-02 RX ADMIN — GUAIFENESIN 600 MG: 600 TABLET, EXTENDED RELEASE ORAL at 09:36

## 2020-03-02 RX ADMIN — QUETIAPINE FUMARATE 50 MG: 50 TABLET ORAL at 09:36

## 2020-03-02 RX ADMIN — IPRATROPIUM BROMIDE 0.5 MG: 0.5 SOLUTION RESPIRATORY (INHALATION) at 14:37

## 2020-03-02 RX ADMIN — CEFTRIAXONE 1000 MG: 1 INJECTION, SOLUTION INTRAVENOUS at 17:07

## 2020-03-02 RX ADMIN — OSELTAMIVIR PHOSPHATE 75 MG: 75 CAPSULE ORAL at 09:34

## 2020-03-02 RX ADMIN — DULOXETINE HYDROCHLORIDE 60 MG: 30 CAPSULE, DELAYED RELEASE ORAL at 09:36

## 2020-03-02 RX ADMIN — ACETAMINOPHEN 650 MG: 325 TABLET ORAL at 10:52

## 2020-03-02 RX ADMIN — HEPARIN SODIUM 5000 UNITS: 5000 INJECTION INTRAVENOUS; SUBCUTANEOUS at 06:30

## 2020-03-02 RX ADMIN — FLUTICASONE FUROATE AND VILANTEROL TRIFENATATE 1 PUFF: 200; 25 POWDER RESPIRATORY (INHALATION) at 09:34

## 2020-03-02 RX ADMIN — IPRATROPIUM BROMIDE AND ALBUTEROL SULFATE 3 ML: 2.5; .5 SOLUTION RESPIRATORY (INHALATION) at 07:23

## 2020-03-02 NOTE — ASSESSMENT & PLAN NOTE
· Substitute Protonix 40 mg p o  Daily for pre-hospital Nexium and continue pre-hospital Bentyl 20 mg p o  T i d  P r n

## 2020-03-02 NOTE — PROGRESS NOTES
Progress Note - Shanice Glenwood 1955, 62 y o  female MRN: [id number]    Unit/Bed#: -01 Encounter: [id number]    Primary Care Provider: KATIE Faria   Date and time admitted to hospital: 2/28/2020  6:12 PM        * Sepsis due to Haemophilus influenzae with acute hypoxic respiratory failure without septic shock [facility])  Assessment & Plan  · Secondary to influenza A  · Continue Tamiflu and supportive care  · Give DuoNebs q 6 hours while awake and albuterol nebs q 2 hours p r n  For shortness of breath or wheeze  · Patient met sepsis criteria that she was febrile with T-max of 100 8, tachycardic at 95, tachypnea as high as 22, and known infection being multilobar pneumonia  · Improving     Influenza A  Assessment & Plan  · Continue Tamiflu    Pneumonia of right lower lobe due to influenza A virus  Assessment & Plan  · Likely viral pneumonia  · Respiratory protocol  · Nebulizer treatments  · mucinex   · Procalcitonin negative x2  · Antibiotics discontinued initially, however, will restart patient on rocephin IV on 3/2/2020 since no improvement  · Check CTA: Multifocal pneumonia throughout right mid and lower lung zones and in the posterior left lung base  Reactive mediastinal and right hilar lymphadenopathy  Acute on chronic respiratory failure with hypoxia (HCC)  Assessment & Plan  · Likely secondary to viral pneumonia  · Procalcitonin negative x2, antibiotics will be restarted  · In light of continued high oxygen requirements, will check a CTA to rule out PE and further evaluate patient's lungs - negative for PE, Positive for: Multifocal pneumonia throughout right mid and lower lung zones and in the posterior left lung base  Reactive mediastinal and right hilar lymphadenopathy  · Patient wears 2 liters NC at HS at home; here patient has been on high flow 8 liters      Chronic obstructive pulmonary disease with acute lower respiratory infection ([address] )  Assessment & Plan  · With acute exacerbation  · Titrate down on steroids  · Continue nebulizers    Gastroesophageal reflux disease without esophagitis  Assessment & Plan  · Substitute Protonix 40 mg p o  Daily for pre-hospital Nexium and continue pre-hospital Bentyl 20 mg p o  T i d  P r n  Anxiety and depression  Assessment & Plan  · Continue with cymbalta and seroquel      VTE Pharmacologic Prophylaxis: Pharmacologic: Heparin    Patient Centered Rounds: I have performed bedside rounds with nursing staff today  Discussions with Specialists or Other Care Team Provider: nursing, PT OT, respiratory therapy  Education and Discussions with Family / Patient: discussed with the patient    Current Length of Stay: 3 day(s)    Current Patient Status: Inpatient   Certification Statement: The patient will continue to require additional inpatient hospital stay due to need for iv antibiotics, respiratory therapy  Discharge Plan: home when appropriate    Code Status: Level 1 - Full Code    Subjective:   Patient is lying in bed  She states that she feels like she needs to cough something up, but is unable to do so  She states she feels no improvement  Objective:     Vitals:   Temp (24hrs), Av 5 °F (36 4 °C), Min:96 6 °F (35 9 °C), Max:98 5 °F (36 9 °C)    Temp:  [96 6 °F (35 9 °C)-98 5 °F (36 9 °C)] 97 5 °F (36 4 °C)  HR:  [80-87] 87  Resp:  [22-24] 22  BP: ()/(55-57) 115/57  SpO2:  [87 %-95 %] 90 %  Body mass index is 33 67 kg/m²  Input and Output Summary (last 24 hours): Intake/Output Summary (Last 24 hours) at 3/2/2020 1450  Last data filed at 3/2/2020 0911  Gross per 24 hour   Intake 540 ml   Output 900 ml   Net -360 ml       Physical Exam:     Physical Exam   Constitutional: She is oriented to person, place, and time  She appears well-developed  She is cooperative  She appears ill  HENT:   Head: Normocephalic and atraumatic     Nose: Nose normal    Mouth/Throat: Oropharynx is clear and moist and mucous membranes are normal  Eyes: Conjunctivae and EOM are normal    Neck: Full passive range of motion without pain  Cardiovascular: Normal rate, regular rhythm, normal heart sounds and normal pulses  Pulmonary/Chest: Tachypnea noted  She has rales in the right lower field and the left lower field  Patient on high-flow nasal canula  Abdominal: Soft  Normal appearance and bowel sounds are normal    Musculoskeletal:   Generalized weakness   Neurological: She is alert and oriented to person, place, and time  Skin: Skin is warm  Psychiatric: She has a normal mood and affect  Her speech is normal and behavior is normal        Additional Data:     Labs:    Results from last 7 days   Lab Units 03/02/20  0511  02/29/20  0514   WBC Thousand/uL 9 50   < > 5 10   HEMOGLOBIN g/dL 11 8*   < > 12 3   HEMATOCRIT % 34 6*   < > 36 8*   PLATELETS Thousands/uL 231   < > 144*   NEUTROS PCT %  --   --  89*   LYMPHS PCT %  --   --  8*   MONOS PCT %  --   --  4   EOS PCT %  --   --  0    < > = values in this interval not displayed  Results from last 7 days   Lab Units 03/02/20  0511  02/28/20  1836   POTASSIUM mmol/L 3 6   < > 3 1*   CHLORIDE mmol/L 106   < > 97*   CO2 mmol/L 31   < > 27   BUN mg/dL 13   < > 13   CREATININE mg/dL 0 46*   < > 0 55*   CALCIUM mg/dL 8 5*   < > 8 1*   ALK PHOS U/L  --   --  59   ALT U/L  --   --  16   AST U/L  --   --  39    < > = values in this interval not displayed  Results from last 7 days   Lab Units 02/28/20  1836   INR  1 16               * I Have Reviewed All Lab Data Listed Above  * Additional Pertinent Lab Tests Reviewed: [facility]  Reviewed    Imaging:  Imaging Reports Reviewed Today Include: none    Recent Cultures (last 7 days):     Results from last 7 days   Lab Units 02/29/20  0818 02/28/20  1946 02/28/20  1836   BLOOD CULTURE   --  No Growth at 24 hrs  No Growth at 48 hrs     LEGIONELLA URINARY ANTIGEN  Negative  --   --        Last 24 Hours Medication List: Current Facility-Administered Medications:  acetaminophen 650 mg Oral Q6H PRN Saurabh Barbosa PA-C   albuterol 2 5 mg Nebulization Q4H PRN Zeeshan Hawk MD   atorvastatin 20 mg Oral Daily Saurabh Barbosa PA-C   cefTRIAXone 1,000 mg Intravenous Q24H KATIE Grover   colestipol 1 g Oral BID Saurabh Barbosa PA-C   cyclobenzaprine 10 mg Oral Daily Saurabh Barbosa PA-C   dicyclomine 20 mg Oral TID PRN Saurabh Barbosa PA-C   DULoxetine 60 mg Oral Daily Saurabh Barbosa PA-C   ergocalciferol 50,000 Units Oral Weekly Saurabh Barbosa PA-C   fluticasone-vilanterol 1 puff Inhalation Daily Saurabh Barbosa PA-C   furosemide 40 mg Oral Daily Saurabh Barbosa PA-C   gabapentin 300 mg Oral BID Saurabh Barbosa PA-C   guaiFENesin 600 mg Oral BID Saurabh Barbosa PA-C   heparin (porcine) 5,000 Units Subcutaneous [facility] Saurabh Barbosa PA-C   influenza vaccine 0 5 mL Intramuscular Once Zeeshan Hawk MD   ipratropium 0 5 mg Nebulization TID Zeeshan Hawk MD   levalbuterol 1 25 mg Nebulization TID Zeeshan Hawk MD   methylPREDNISolone sodium succinate 40 mg Intravenous Q12H 300 Vimal Hernandez MD   ondansetron 4 mg Intravenous Q6H PRN Saurabh Barbosa PA-C   oseltamivir 75 mg Oral Q12H [address] Saurabh Barbosa PA-C   pantoprazole 40 mg Oral Early Morning Saurabh Barbosa PA-C   potassium chloride 10 mEq Oral Daily PRN Saurabh Barbosa PA-C   QUEtiapine 50 mg Oral TID Saurabh Barbosa PA-C        Today, Patient Was Seen By: KATIE Russell    ** Please Note: Dictation voice to text software may have been used in the creation of this document   **

## 2020-03-02 NOTE — ASSESSMENT & PLAN NOTE
· Secondary to influenza A  · Continue Tamiflu and supportive care  · Give DuoNebs q 6 hours while awake and albuterol nebs q 2 hours p r n  For shortness of breath or wheeze  · Patient met sepsis criteria that she was febrile with T-max of 100 8, tachycardic at 95, tachypnea as high as 22, and known infection being multilobar pneumonia    · Improving

## 2020-03-02 NOTE — ASSESSMENT & PLAN NOTE
· Likely secondary to viral pneumonia  · Procalcitonin negative x2, antibiotics will be restarted  · In light of continued high oxygen requirements, will check a CTA to rule out PE and further evaluate patient's lungs - negative for PE, Positive for: Multifocal pneumonia throughout right mid and lower lung zones and in the posterior left lung base  Reactive mediastinal and right hilar lymphadenopathy  · Patient wears 2 liters NC at HS at home; here patient has been on high flow 8 liters

## 2020-03-02 NOTE — SOCIAL WORK
Pt discussed during care coordination rounds  Pt not yet stable for discharge  Pt still on 6L, only wears 2L at home at baseline  IMM provided  Pt denies need for [facility] at this time  CM to follow

## 2020-03-02 NOTE — RESPIRATORY THERAPY NOTE
Orders obtained for high-flow 02 (low Pa02)  Pt  Placed on 8lpm high-flow nasal cannula = Sa02 is 95%  Per nursing the pt  Has been using the [employer] Peeks  On her own  Pt  Encouraged by myself to use the inc  Daleen Dines  As much as possible  Pt  Is cooperative for therapy and states some relief of [facility]  While on the high-flow nasal cannula  Pt  Is too call if she feels any worse-call bell is at the pt's side

## 2020-03-02 NOTE — ASSESSMENT & PLAN NOTE
· Likely viral pneumonia  · Respiratory protocol  · Nebulizer treatments  · mucinex   · Procalcitonin negative x2  · Antibiotics discontinued initially, however, will restart patient on rocephin IV on 3/2/2020 since no improvement  · Check CTA: Multifocal pneumonia throughout right mid and lower lung zones and in the posterior left lung base  Reactive mediastinal and right hilar lymphadenopathy

## 2020-03-02 NOTE — PLAN OF CARE
Problem: Potential for Falls  Goal: Patient will remain free of falls  Description  INTERVENTIONS:  - Assess patient frequently for physical needs  -  Identify cognitive and physical deficits and behaviors that affect risk of falls  -  [place] fall precautions as indicated by assessment   - Educate patient/family on patient safety including physical limitations  - Instruct patient to call for assistance with activity based on assessment  - Modify environment to reduce risk of injury  - Consider OT/PT consult to assist with strengthening/mobility  Outcome: Progressing     Problem: Nutrition/Hydration-ADULT  Goal: Nutrient/Hydration intake appropriate for improving, restoring or maintaining nutritional needs  Description  Monitor and assess patient's nutrition/hydration status for malnutrition  Collaborate with interdisciplinary team and initiate plan and interventions as ordered  Monitor patient's weight and dietary intake as ordered or per policy  Utilize nutrition screening tool and intervene as necessary  Determine patient's food preferences and provide high-protein, high-caloric foods as appropriate       INTERVENTIONS:  - Monitor oral intake, urinary output, labs, and treatment plans  - Assess nutrition and hydration status and recommend course of action  - Evaluate amount of meals eaten  - Assist patient with eating if necessary   - Allow adequate time for meals  - Recommend/ encourage appropriate diets, oral nutritional supplements, and vitamin/mineral supplements  - Order, calculate, and assess calorie counts as needed  - Recommend, monitor, and adjust tube feedings and TPN/PPN based on assessed needs  - Assess need for intravenous fluids  - Provide specific nutrition/hydration education as appropriate  - Include patient/family/caregiver in decisions related to nutrition  Outcome: Progressing     Problem: PAIN - ADULT  Goal: Verbalizes/displays adequate comfort level or baseline comfort level  Description  Interventions:  - Encourage patient to monitor pain and request assistance  - Assess pain using appropriate pain scale  - Administer analgesics based on type and severity of pain and evaluate response  - Implement non-pharmacological measures as appropriate and evaluate response  - Consider cultural and social influences on pain and pain management  - Notify physician/advanced practitioner if interventions unsuccessful or patient reports new pain  Outcome: Progressing     Problem: INFECTION - ADULT  Goal: Absence or prevention of progression during hospitalization  Description  INTERVENTIONS:  - Assess and monitor for signs and symptoms of infection  - Monitor lab/diagnostic results  - Monitor all insertion sites, i e  indwelling lines, tubes, and drains  - Monitor endotracheal if appropriate and nasal secretions for changes in amount and color  - [place] appropriate cooling/warming therapies per order  - Administer medications as ordered  - Instruct and encourage patient and family to use good hand hygiene technique  - Identify and instruct in appropriate isolation precautions for identified infection/condition  Outcome: Progressing  Goal: Absence of fever/infection during neutropenic period  Description  INTERVENTIONS:  - Monitor WBC    Outcome: Progressing     Problem: SAFETY ADULT  Goal: Patient will remain free of falls  Description  INTERVENTIONS:  - Assess patient frequently for physical needs  -  Identify cognitive and physical deficits and behaviors that affect risk of falls    -  [place] fall precautions as indicated by assessment   - Educate patient/family on patient safety including physical limitations  - Instruct patient to call for assistance with activity based on assessment  - Modify environment to reduce risk of injury  - Consider OT/PT consult to assist with strengthening/mobility  Outcome: Progressing  Goal: Maintain or return to baseline ADL function  Description  INTERVENTIONS:  -  Assess patient's ability to carry out ADLs; assess patient's baseline for ADL function and identify physical deficits which impact ability to perform ADLs (bathing, care of mouth/teeth, toileting, grooming, dressing, etc )  - Assess/evaluate cause of self-care deficits   - Assess range of motion  - Assess patient's mobility; develop plan if impaired  - Assess patient's need for assistive devices and provide as appropriate  - Encourage maximum independence but intervene and supervise when necessary  - Involve family in performance of ADLs  - Assess for home care needs following discharge   - Consider OT consult to assist with ADL evaluation and planning for discharge  - Provide patient education as appropriate  Outcome: Progressing  Goal: Maintain or return mobility status to optimal level  Description  INTERVENTIONS:  - Assess patient's baseline mobility status (ambulation, transfers, stairs, etc )    - Identify cognitive and physical deficits and behaviors that affect mobility  - Identify mobility aids required to assist with transfers and/or ambulation (gait belt, sit-to-stand, lift, walker, cane, etc )  - [place] fall precautions as indicated by assessment  - Record patient progress and toleration of activity level on Mobility SBAR; progress patient to next Phase/Stage  - Instruct patient to call for assistance with activity based on assessment  - Consider rehabilitation consult to assist with strengthening/weightbearing, etc   Outcome: Progressing     Problem: DISCHARGE PLANNING  Goal: Discharge to home or other facility with appropriate resources  Description  INTERVENTIONS:  - Identify barriers to discharge w/patient and caregiver  - Arrange for needed discharge resources and transportation as appropriate  - Identify discharge learning needs (meds, wound care, etc )  - Arrange for interpretive services to assist at discharge as needed  - Refer to Case Management Department for coordinating discharge planning if the patient needs post-hospital services based on physician/advanced practitioner order or complex needs related to functional status, cognitive ability, or social support system  Outcome: Progressing     Problem: Knowledge Deficit  Goal: Patient/family/caregiver demonstrates understanding of disease process, treatment plan, medications, and discharge instructions  Description  Complete learning assessment and assess knowledge base    Interventions:  - Provide teaching at level of understanding  - Provide teaching via preferred learning methods  Outcome: Progressing     Problem: Prexisting or High Potential for Compromised Skin Integrity  Goal: Skin integrity is maintained or improved  Description  INTERVENTIONS:  - Identify patients at risk for skin breakdown  - Assess and monitor skin integrity  - Assess and monitor nutrition and hydration status  - Monitor labs   - Assess for incontinence   - Turn and reposition patient  - Assist with mobility/ambulation  - Relieve pressure over bony prominences  - Avoid friction and shearing  - Provide appropriate hygiene as needed including keeping skin clean and dry  - Evaluate need for skin moisturizer/barrier cream  - Collaborate with interdisciplinary team   - Patient/family teaching  - Consider wound care consult   Outcome: Progressing

## 2020-03-03 ENCOUNTER — APPOINTMENT (INPATIENT)
Dept: RADIOLOGY | Facility: HOSPITAL | Age: 65
DRG: 871 | End: 2020-03-03
Payer: COMMERCIAL

## 2020-03-03 VITALS
HEIGHT: 60 IN | OXYGEN SATURATION: 90 % | WEIGHT: 172.4 LBS | RESPIRATION RATE: 18 BRPM | SYSTOLIC BLOOD PRESSURE: 114 MMHG | BODY MASS INDEX: 33.85 KG/M2 | HEART RATE: 97 BPM | TEMPERATURE: 97.2 F | DIASTOLIC BLOOD PRESSURE: 82 MMHG

## 2020-03-03 LAB
ALBUMIN SERPL BCP-MCNC: 3.4 G/DL (ref 3.5–5.7)
ALP SERPL-CCNC: 55 U/L (ref 55–165)
ALT SERPL W P-5'-P-CCNC: 45 U/L (ref 7–52)
ANION GAP SERPL CALCULATED.3IONS-SCNC: 8 MMOL/L (ref 4–13)
AST SERPL W P-5'-P-CCNC: 55 U/L (ref 13–39)
BILIRUB SERPL-MCNC: 0.5 MG/DL (ref 0.2–1)
BUN SERPL-MCNC: 14 MG/DL (ref 7–25)
CALCIUM SERPL-MCNC: 8.5 MG/DL (ref 8.6–10.5)
CHLORIDE SERPL-SCNC: 104 MMOL/L (ref 98–107)
CO2 SERPL-SCNC: 30 MMOL/L (ref 21–31)
CREAT SERPL-MCNC: 0.42 MG/DL (ref 0.6–1.2)
GFR SERPL CREATININE-BSD FRML MDRD: 109 ML/MIN/1.73SQ M
GLUCOSE SERPL-MCNC: 139 MG/DL (ref 65–99)
MAGNESIUM SERPL-MCNC: 2.3 MG/DL (ref 1.9–2.7)
POTASSIUM SERPL-SCNC: 3.8 MMOL/L (ref 3.5–5.5)
PROT SERPL-MCNC: 6.1 G/DL (ref 6.4–8.9)
SODIUM SERPL-SCNC: 142 MMOL/L (ref 134–143)

## 2020-03-03 PROCEDURE — 94760 N-INVAS EAR/PLS OXIMETRY 1: CPT

## 2020-03-03 PROCEDURE — 99239 HOSP IP/OBS DSCHRG MGMT >30: CPT | Performed by: NURSE PRACTITIONER

## 2020-03-03 PROCEDURE — 83735 ASSAY OF MAGNESIUM: CPT | Performed by: NURSE PRACTITIONER

## 2020-03-03 PROCEDURE — 71046 X-RAY EXAM CHEST 2 VIEWS: CPT

## 2020-03-03 PROCEDURE — 94640 AIRWAY INHALATION TREATMENT: CPT

## 2020-03-03 PROCEDURE — 80053 COMPREHEN METABOLIC PANEL: CPT | Performed by: NURSE PRACTITIONER

## 2020-03-03 PROCEDURE — 90682 RIV4 VACC RECOMBINANT DNA IM: CPT | Performed by: INTERNAL MEDICINE

## 2020-03-03 PROCEDURE — 97166 OT EVAL MOD COMPLEX 45 MIN: CPT

## 2020-03-03 PROCEDURE — 94761 N-INVAS EAR/PLS OXIMETRY MLT: CPT

## 2020-03-03 PROCEDURE — 97163 PT EVAL HIGH COMPLEX 45 MIN: CPT

## 2020-03-03 PROCEDURE — G0008 ADMIN INFLUENZA VIRUS VAC: HCPCS | Performed by: INTERNAL MEDICINE

## 2020-03-03 RX ORDER — CEFDINIR 300 MG/1
300 CAPSULE ORAL EVERY 12 HOURS SCHEDULED
Status: DISCONTINUED | OUTPATIENT
Start: 2020-03-03 | End: 2020-03-03 | Stop reason: HOSPADM

## 2020-03-03 RX ORDER — QUETIAPINE FUMARATE 50 MG/1
50 TABLET, FILM COATED ORAL 3 TIMES DAILY
Qty: 90 TABLET | Refills: 0 | Status: SHIPPED | OUTPATIENT
Start: 2020-03-03 | End: 2020-07-07 | Stop reason: SDUPTHER

## 2020-03-03 RX ORDER — GUAIFENESIN 600 MG/1
600 TABLET, EXTENDED RELEASE ORAL 2 TIMES DAILY
Qty: 40 TABLET | Refills: 0 | Status: SHIPPED | OUTPATIENT
Start: 2020-03-03 | End: 2020-03-23

## 2020-03-03 RX ORDER — CEFDINIR 300 MG/1
300 CAPSULE ORAL EVERY 12 HOURS SCHEDULED
Qty: 20 CAPSULE | Refills: 0 | Status: SHIPPED | OUTPATIENT
Start: 2020-03-03 | End: 2020-03-07 | Stop reason: HOSPADM

## 2020-03-03 RX ORDER — PREDNISONE 10 MG/1
TABLET ORAL
Qty: 63 TABLET | Refills: 0 | Status: SHIPPED | OUTPATIENT
Start: 2020-03-03 | End: 2020-03-18

## 2020-03-03 RX ORDER — OSELTAMIVIR PHOSPHATE 75 MG/1
75 CAPSULE ORAL EVERY 12 HOURS SCHEDULED
Qty: 4 CAPSULE | Refills: 0 | Status: SHIPPED | OUTPATIENT
Start: 2020-03-03 | End: 2020-03-07 | Stop reason: HOSPADM

## 2020-03-03 RX ORDER — GABAPENTIN 300 MG/1
300 CAPSULE ORAL 2 TIMES DAILY
Qty: 60 CAPSULE | Refills: 0 | Status: SHIPPED | OUTPATIENT
Start: 2020-03-03 | End: 2020-07-28 | Stop reason: SDUPTHER

## 2020-03-03 RX ADMIN — LEVALBUTEROL HYDROCHLORIDE 1.25 MG: 1.25 SOLUTION, CONCENTRATE RESPIRATORY (INHALATION) at 15:53

## 2020-03-03 RX ADMIN — HEPARIN SODIUM 5000 UNITS: 5000 INJECTION INTRAVENOUS; SUBCUTANEOUS at 13:44

## 2020-03-03 RX ADMIN — GABAPENTIN 300 MG: 300 CAPSULE ORAL at 09:58

## 2020-03-03 RX ADMIN — QUETIAPINE FUMARATE 50 MG: 50 TABLET ORAL at 01:03

## 2020-03-03 RX ADMIN — METHYLPREDNISOLONE SODIUM SUCCINATE 40 MG: 40 INJECTION, POWDER, FOR SOLUTION INTRAMUSCULAR; INTRAVENOUS at 01:04

## 2020-03-03 RX ADMIN — OSELTAMIVIR PHOSPHATE 75 MG: 75 CAPSULE ORAL at 09:57

## 2020-03-03 RX ADMIN — METHYLPREDNISOLONE SODIUM SUCCINATE 40 MG: 40 INJECTION, POWDER, FOR SOLUTION INTRAMUSCULAR; INTRAVENOUS at 09:58

## 2020-03-03 RX ADMIN — FUROSEMIDE 40 MG: 40 TABLET ORAL at 09:57

## 2020-03-03 RX ADMIN — CYCLOBENZAPRINE HYDROCHLORIDE 10 MG: 10 TABLET, FILM COATED ORAL at 09:57

## 2020-03-03 RX ADMIN — OSELTAMIVIR PHOSPHATE 75 MG: 75 CAPSULE ORAL at 01:03

## 2020-03-03 RX ADMIN — HEPARIN SODIUM 5000 UNITS: 5000 INJECTION INTRAVENOUS; SUBCUTANEOUS at 01:04

## 2020-03-03 RX ADMIN — GABAPENTIN 300 MG: 300 CAPSULE ORAL at 17:31

## 2020-03-03 RX ADMIN — QUETIAPINE FUMARATE 50 MG: 50 TABLET ORAL at 15:32

## 2020-03-03 RX ADMIN — FLUTICASONE FUROATE AND VILANTEROL TRIFENATATE 1 PUFF: 200; 25 POWDER RESPIRATORY (INHALATION) at 09:57

## 2020-03-03 RX ADMIN — CEFTRIAXONE 1000 MG: 1 INJECTION, SOLUTION INTRAVENOUS at 14:45

## 2020-03-03 RX ADMIN — GUAIFENESIN 600 MG: 600 TABLET, EXTENDED RELEASE ORAL at 09:57

## 2020-03-03 RX ADMIN — ATORVASTATIN CALCIUM 20 MG: 20 TABLET, FILM COATED ORAL at 09:58

## 2020-03-03 RX ADMIN — HEPARIN SODIUM 5000 UNITS: 5000 INJECTION INTRAVENOUS; SUBCUTANEOUS at 06:07

## 2020-03-03 RX ADMIN — PANTOPRAZOLE SODIUM 40 MG: 40 TABLET, DELAYED RELEASE ORAL at 06:04

## 2020-03-03 RX ADMIN — QUETIAPINE FUMARATE 50 MG: 50 TABLET ORAL at 09:00

## 2020-03-03 RX ADMIN — INFLUENZA A VIRUS A/BRISBANE/02/2018 (H1N1) RECOMBINANT HEMAGGLUTININ ANTIGEN, INFLUENZA A VIRUS A/KANSAS/14/2017 (H3N2) RECOMBINANT HEMAGGLUTININ ANTIGEN, INFLUENZA B VIRUS B/PHUKET/3073/2013 RECOMBINANT HEMAGGLUTININ ANTIGEN, AND INFLUENZA B VIRUS B/MARYLAND/15/2016 RECOMBINANT HEMAGGLUTININ ANTIGEN 0.5 ML: 45; 45; 45; 45 INJECTION INTRAMUSCULAR at 17:31

## 2020-03-03 RX ADMIN — LEVALBUTEROL HYDROCHLORIDE 1.25 MG: 1.25 SOLUTION, CONCENTRATE RESPIRATORY (INHALATION) at 07:49

## 2020-03-03 RX ADMIN — IPRATROPIUM BROMIDE 0.5 MG: 0.5 SOLUTION RESPIRATORY (INHALATION) at 15:53

## 2020-03-03 RX ADMIN — GUAIFENESIN 600 MG: 600 TABLET, EXTENDED RELEASE ORAL at 17:31

## 2020-03-03 RX ADMIN — DULOXETINE HYDROCHLORIDE 60 MG: 30 CAPSULE, DELAYED RELEASE ORAL at 09:57

## 2020-03-03 NOTE — RESPIRATORY THERAPY NOTE
Home Oxygen Qualifying Test       Patient name: Dang Summers        : 1955   Date of Test:  March 3, 2020  Diagnosis: Sepsis due to haemophilus influenzae with acute hypoxic respiratory failure without septic shock  Home Oxygen Test:    Medicare Guidelines require item(s) 1-5 on all ambulatory patients or 1 and 2 on non-ambulatory patients  1   Baseline SPO2 on Room Air at rest- 88%  2   SPO2 during exercise on Room Air- N/A  During exercise monitor SpO2  If SPO2 increases >=89% with ambulation do not add supplemental oxygen  If <= 88% on room air add O2 via NC and titrate patient  Patient must be ambulated with O2 and titrated to > 88% with exertion  3   SPO2 on Oxygen at rest - 92% on 4 lpm     4   SPO2 during exercise on Oxygen- 91-92% on a liter flow of 4 lpm     5   Exercise performed:          walking, duration 10 (min), distance 300 (feet)          [x]  Supplemental Home Oxygen is indicated  []  Client does not qualify for home oxygen  Respiratory Additional Notes-   Pt  ambulated a good, steady pace on O2 @ 4lpm via NC  Oximetry reading was maintained @ 91-92%  Pt  stated that was SOB, but nothing worse than usual  Increased WOB and purse lipped breathing noted, especially @ end of desaturation study  Pt  was returned to her room on O2 @ 4lpm NC and in satisfactory condition       Edwar Gee, RT

## 2020-03-03 NOTE — ASSESSMENT & PLAN NOTE
· Secondary to influenza A  · Continue Tamiflu and supportive care  · Give DuoNebs q 6 hours while awake and albuterol nebs q 2 hours p r n  For shortness of breath or wheeze  · Patient met sepsis criteria that she was febrile with T-max of 100 8, tachycardic at 95, tachypnea as high as 22, and known infection being multilobar pneumonia  · Improving   · Patient will be given cefdinir p o   At discharge  · Patient should have a chest x-ray performed 1 week from today's date, prior to follow-up with her PCP

## 2020-03-03 NOTE — UTILIZATION REVIEW
Initial Clinical Review    Admission: Date/Time/Statement:   Admission Orders (From admission, onward)     Ordered        02/28/20 2059  Inpatient Admission  Once                   Orders Placed This Encounter   Procedures    Inpatient Admission     Standing Status:   Standing     Number of Occurrences:   1     Order Specific Question:   Admitting Physician     Answer:   Gilda Reddy [[id number]]     Order Specific Question:   Level of Care     Answer:   Med Surg [[id number]]     Order Specific Question:   Estimated length of stay     Answer:   More than 2 Midnights     Order Specific Question:   Certification     Answer:   I certify that inpatient services are medically necessary for this patient for a duration of greater than two midnights  See H&P and MD Progress Notes for additional information about the patient's course of treatment  ED Arrival Information     Expected Arrival Acuity Means of Arrival Escorted By Service Admission Type    - 2/28/2020 18:10 Urgent Amb[facility] Urgent    Arrival Complaint    flu symptoms        Chief Complaint   Patient presents with    Cough     productive green sputum x6 days    Weakness - Generalized     with vomiting, diarrhea, and chills x6 days     Assessment/Plan:   Mrs Brandon Rausch is a 63 yo female who presents to the ED via EMS from home with progressive SOB and dyspnea, wheezing, subjective fever, productive cough for greenish sputum and generalized weakness with vomiting, diarrhea and chills x 6 days  Diarrhea has subsided  + multiple sick contacts with the flu  Pt did not have flu shot  PMH: COPD, home oxygen use at night  Pt is admitted to INPATIENT status with Sepsis and Pneumonia - blood and urine cultures, IV antibiotics, Oxygen, duonebs q 6 hr and PRN  Influenza A - Tamiflu x 5 days  COPD - IV steroids, Breo  GERD - Protonix PO  Depression - home meds  2/29 pt is dyspneic on slight exertion    Is on Oxygen at 6L with diminshed breath sounds  Pulse ox is 94% on 6L  ED Triage Vitals   Temperature Pulse Respirations Blood Pressure SpO2   02/28/20 1816 02/28/20 1816 02/28/20 1816 02/28/20 1816 02/28/20 1816   (!) 100 8 °F (38 2 °C) 93 18 105/70 (!) 88 %      Temp Source Heart Rate Source Patient Position - Orthostatic VS BP Location FiO2 (%)   02/28/20 1816 02/28/20 1845 02/29/20 0656 02/28/20 2146 --   Temporal Monitor Lying Left arm       Pain Score       02/28/20 1816       7          Wt Readings from Last 1 Encounters:   02/28/20 78 2 kg (172 lb 6 4 oz)     Additional Vital Signs:   02/29/20 1150  99 °F (37 2 °C)  87  20  107/57    92 %  Nasal cannula   02/29/20 0745            93 %  Nasal cannula   02/29/20 0656  96 9 °F (36 1 °C)Abnormal   84  21  109/60    94 %  Nasal cannula   02/29/20 0346            94 %     02/29/20 0045    78  22      95 %     02/29/20 0032            94 %     02/28/20 2146  99 1 °F (37 3 °C)  95  22  122/61    92 %  Nasal cannula   02/28/20 2129    90  20      97 %  Nasal cannula   02/28/20 2115    86  22      97 %  Nasal cannula   02/28/20 2100    86  17  109/64  80  95 %  Nasal cannula   02/28/20 2052    89  18      97 %  Nasal cannula   02/28/20 2045    90  22      94 %  Nasal cannula   02/28/20 2030    93  20  103/63  78  96 %  Nasal cannula   02/28/20 1945    95    105/53  76  92 %  Nasal cannula   02/28/20 1845    89        95 %  Nasal cannula   02/28/20 1820            94 %  Nasal cannula     Pertinent Labs/Diagnostic Test Results:     2/28 CT abd, pelvis -  Peribronchial and subpleural groundglass and reticular interstitial opacities predominantly affecting the right lung although small focal areas also seen within the left lower lobe  Additionally, a focal consolidation is seen within the dependent portion of the right lower lobe    This is new since 7/19/2019 and most consistent with pneumonia (favor atypical), although pneumonitis should be considered in the appropriate clinical setting  No acute abnormality within the abdomen or pelvis  2/28 ECG - NSR with no evidence of cardiac ischemia       Results from last 7 days   Lab Units 03/02/20  0511 03/01/20  0650 02/29/20  0514 02/28/20  1836   WBC Thousand/uL 9 50 10 00 5 10 5 20   HEMOGLOBIN g/dL 11 8* 11 9* 12 3 13 4   HEMATOCRIT % 34 6* 35 6* 36 8* 39 4*   PLATELETS Thousands/uL 231 174 144* 153   NEUTROS ABS Thousands/µL  --   --  4 50 4 00     Results from last 7 days   Lab Units 03/03/20  0619 03/02/20  0511 03/01/20  0619 02/29/20  0514 02/28/20  1836   SODIUM mmol/L 142 144* 144* 139 134   POTASSIUM mmol/L 3 8 3 6 3 3* 3 4* 3 1*   CHLORIDE mmol/L 104 106 109* 105 97*   CO2 mmol/L 30 31 28 25 27   ANION GAP mmol/L 8 7 7 9 10   BUN mg/dL 14 13 11 10 13   CREATININE mg/dL 0 42* 0 46* 0 43* 0 57* 0 55*   EGFR ml/min/1 73sq m 109 106 109 99 100   CALCIUM mg/dL 8 5* 8 5* 8 2* 7 9* 8 1*   MAGNESIUM mg/dL 2 3  --   --   --  2 0     Results from last 7 days   Lab Units 03/03/20  0619 02/28/20  1836   AST U/L 55* 39   ALT U/L 45 16   ALK PHOS U/L 55 59   TOTAL PROTEIN g/dL 6 1* 6 4   ALBUMIN g/dL 3 4* 3 8   TOTAL BILIRUBIN mg/dL 0 50 0 40     Results from last 7 days   Lab Units 03/03/20  0619 03/02/20  0511 03/01/20  0619 02/29/20  0514 02/28/20  1836   GLUCOSE RANDOM mg/dL 139* 146* 152* 192* 114*     Results from last 7 days   Lab Units 03/01/20  2146 02/29/20  0029   PH ART  7 420 7 420   PCO2 ART mm Hg 36 6 36 3   PO2 ART mm Hg 63 0* 72 0*   HCO3 ART mmol/L 23 1 23 3   BASE EXC ART mmol/L -0 7 -0 5   O2 CONTENT ART mL/dL 12 9 15 4   O2 HGB, ARTERIAL % 91 0* 93 5*   ABG SOURCE  Radial, Left Radial, Left     Results from last 7 days   Lab Units 02/28/20  1836   TROPONIN I ng/mL <0 03     Results from last 7 days   Lab Units 02/28/20  1836   PROTIME seconds 13 5*   INR  1 16   PTT seconds 30     Results from last 7 days   Lab Units 03/01/20  0619 02/29/20  1843 02/29/20  0514 02/28/20  1836 PROCALCITONIN ng/ml <0 05 0 06 <0 05 <0 05     Results from last 7 days   Lab Units 02/28/20  1836   LACTIC ACID mmol/L 1 5     Results from last 7 days   Lab Units 02/29/20  0821   CLARITY UA  Clear   COLOR UA  Yellow   SPEC GRAV UA  1 010   PH UA  6 0   GLUCOSE UA mg/dl Negative   KETONES UA mg/dl Negative   BLOOD UA  Negative   PROTEIN UA mg/dl Negative   NITRITE UA  Positive*   BILIRUBIN UA  Negative   UROBILINOGEN UA E U /dl 0 2   LEUKOCYTES UA  Negative   WBC UA /hpf 2-4*   RBC UA /hpf None Seen   BACTERIA UA /hpf Occasional   EPITHELIAL CELLS WET PREP /hpf Occasional     Results from last 7 days   Lab Units 02/29/20  0818 02/28/20  1844   STREP PNEUMONIAE ANTIGEN, URINE  Negative  --    LEGIONELLA URINARY ANTIGEN  Negative  --    INFLUENZA A PCR   --  Detected*   INFLUENZA B PCR   --  None Detected   RSV PCR   --  None Detected     Results from last 7 days   Lab Units 02/28/20  1946 02/28/20  1836   BLOOD CULTURE  No Growth at 48 hrs  No Growth at 72 hrs       ED Treatment:   Medication Administration from 02/28/2020 1810 to 02/28/2020 2143    Date/Time Order Dose Route Action   02/28/2020 1852 albuterol inhalation solution 5 mg 5 mg Nebulization Given   02/28/2020 1854 ipratropium (ATROVENT) 0 02 % inhalation solution 0 5 mg 0 5 mg Nebulization Given   02/28/2020 2041 cefepime (MAXIPIME) IVPB (premix) 2,000 mg 2,000 mg Intravenous New Bag   02/28/2020 1949 potassium chloride (K-DUR,KLOR-CON) CR tablet 40 mEq 40 mEq Oral Given   02/28/2020 1948 acetaminophen (TYLENOL) tablet 975 mg 975 mg Oral Given   02/28/2020 1943 ondansetron (ZOFRAN) injection 4 mg 4 mg Intravenous Given   02/28/2020 2047 methylPREDNISolone sodium succinate (Solu-MEDROL) injection 80 mg 80 mg Intravenous Given   02/28/2020 2011 iohexol (OMNIPAQUE) 350 MG/ML injection (MULTI-DOSE) 100 mL 100 mL Intravenous Given   02/28/2020 2049 oseltamivir (TAMIFLU) capsule 75 mg 75 mg Oral Given   02/28/2020 2106 albuterol inhalation solution 5 mg 5 mg Nebulization Given   02/28/2020 2106 ipratropium (ATROVENT) 0 02 % inhalation solution 0 5 mg 0 5 mg Nebulization Given   02/28/2020 2103 sodium chloride 0 9 % bolus 1,000 mL 1,000 mL Intravenous New Bag   02/28/2020 2124 levofloxacin (LEVAQUIN) IVPB (premix) 750 mg 750 mg Intravenous New Bag        Past Medical History:   Diagnosis Date    Anxiety     COPD (chronic obstructive pulmonary disease) ([place] [address] )     Depression     Diverticulitis     GERD (gastroesophageal reflux disease)     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[address] )     Psychiatric disorder     Sleep apnea      Present on Admission:   Sepsis due to Haemophilus influenzae with acute hypoxic respiratory failure without septic shock (HCC)   Pneumonia of right lower lobe due to influenza A virus   Influenza A   Chronic obstructive pulmonary disease with acute lower respiratory infection ([place] [address] )   Gastroesophageal reflux disease without esophagitis   Acute on chronic respiratory failure with hypoxia (HCC)   Anxiety and depression    Admitting Diagnosis: Pneumonia [J18 9]  Influenza A [J10 1]  Flu-like symptoms [R68 89]     Age/Sex: 62 y o  female     Admission Orders:  Scheduled Medications:    Medications:  atorvastatin 20 mg Oral Daily   cefTRIAXone 1,000 mg Intravenous Q24H   colestipol 1 g Oral BID   cyclobenzaprine 10 mg Oral Daily   DULoxetine 60 mg Oral Daily   ergocalciferol 50,000 Units Oral Weekly   fluticasone-vilanterol 1 puff Inhalation Daily   furosemide 40 mg Oral Daily   gabapentin 300 mg Oral BID   guaiFENesin 600 mg Oral BID   heparin (porcine) 5,000 Units Subcutaneous Q8H [address]   influenza vaccine 0 5 mL Intramuscular Once   ipratropium 0 5 mg Nebulization TID   levalbuterol 1 25 mg Nebulization TID   methylPREDNISolone sodium succinate 40 mg Intravenous Q12H [address]   oseltamivir 75 mg Oral Q12H LLOYD   pantoprazole 40 mg Oral Early Morning   QUEtiapine 50 mg Oral TID     Continuous IV Infusions:     PRN Meds:    acetaminophen 650 mg Oral Q6H PRN albuterol 2 5 mg Nebulization Q2H PRN x1 2/29   dicyclomine 20 mg Oral TID PRN    ondansetron 4 mg Intravenous Q6H PRN    potassium chloride 10 mEq Oral Daily PRN      SCDs  OOB as angela   Oral care  Aspiration prec  HOURLY INCENTIVE SPIROMETRY   procalcitonin  Sputum culture  Urine strep and legionella  Blood cultures  Regular diet   IP CONSULT TO CASE MANAGEMENT    Network Utilization Review Department  [email or web address]  ATTENTION: Please call with any questions or concerns to [phone] and carefully listen to the prompts so that you are directed to the right person  All voicemails are confidential   Catherine Grand all requests for admission clinical reviews, approved or denied determinations and any other requests to dedicated fax number below belonging to the campus where the patient is receiving treatment   List of dedicated fax numbers for the Facilities:  [address] DENIALS (Administrative/Medical Necessity) [phone]   [address] (Maternity/NICU/Pediatrics) [phone]   Hilaria Ojeda [phone]   130 [facility] [phone]   [address] [place] [address] [phone]   Joseph Headley [phone]   1205 [facility] 1525 [facility] [phone]   [facility]  [phone]   [address] [facility] [address]  2401 [facility] [address] [facility] [phone]

## 2020-03-03 NOTE — OCCUPATIONAL THERAPY NOTE
Occupational Therapy Evaluation      Jessica Funk    3/3/2020    Principal Problem:    Sepsis due to Haemophilus influenzae with acute hypoxic respiratory failure without septic shock ([facility] )  Active Problems:    Anxiety and depression    Chronic obstructive pulmonary disease with acute lower respiratory infection (HCC)    Gastroesophageal reflux disease without esophagitis    Pneumonia of right lower lobe due to influenza A virus    Influenza A    Acute on chronic respiratory failure with hypoxia (HCC)      Past Medical History:   Diagnosis Date    Anxiety     COPD (chronic obstructive pulmonary disease) ([facility] )     Depression     Diverticulitis     GERD (gastroesophageal reflux disease)     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[place] [address] )     Psychiatric disorder     Sleep apnea        Past Surgical History:   Procedure Laterality Date    APPENDECTOMY      CHOLECYSTECTOMY      partial x2 per pt    HIATAL HERNIA REPAIR      INGUINAL HERNIA REPAIR Right     LAPAROSCOPIC TUBAL LIGATION Bilateral     ROTATOR CUFF REPAIR Right     UVULOPALATOPHARYNGOPLASTY          03/03/20 0925   Note Type   Note type Eval only   Restrictions/Precautions   Weight Bearing Precautions Per Order No   Other Precautions O2;Fall Risk  (5L O2 presently)   Pain Assessment   Pain Assessment No/denies pain   Pain Score No Pain   Home Living   Type of Home Apartment  ([facility])   Home Layout One level;Elevator; Access; Performs ADLs on one level; Able to live on main level with bedroom/bathroom   Bathroom Shower/Tub Tub/shower unit   [employer] Raised   Bathroom Equipment Grab bars in shower;Grab bars around toilet   [address]   (N/A)   Additional Comments D transportation only   Prior Function   Level of [place] Independent with ADLs and functional mobility   Lives With Alone   ADL Assistance Independent   IADLs Independent   Falls in the last 6 months 0   Comments 2 L O2 HS, D transportation only   Psychosocial   Psychosocial (WDL) WDL   Subjective   Subjective I'm feeling much better   ADL   Eating Assistance 7  Independent   Grooming Assistance 7  Independent   UB Bathing Assistance 5  Supervision/Setup   UB Bathing Deficit Setup   LB Bathing Assistance 5  Supervision/Setup   LB Bathing Deficit Setup   UB Dressing Assistance 5  Supervision/Setup   UB Dressing Deficit Setup   LB Dressing Assistance 5  Supervision/Setup   LB Dressing Deficit Setup   Toileting Assistance  5  Supervision/Setup   Toileting Deficit   (r/t lines)   Additional Comments pt reports completing self care I'ly in bathroom following set up, feeling much better, able to reach feet readily from seated position   Bed Mobility   Supine to Sit 5  Supervision   Sit to Supine 5  Supervision   Transfers   Sit to Stand 5  Supervision   Stand to Sit 5  Supervision   Toilet transfer 5  Supervision   Balance   Static Sitting Normal   Dynamic Sitting Good   Static Standing Good   Dynamic Standing Fair +   Ambulatory Fair +   Activity Tolerance   Activity Tolerance Patient tolerated treatment well   Nurse Made Aware yes   RUE Assessment   RUE Assessment WFL   LUE Assessment   LUE Assessment WFL   Hand Function   Gross Motor Coordination Functional   Fine Motor Coordination Functional   Cognition   Overall Cognitive Status WFL   Arousal/Participation Alert; Cooperative   Attention Within functional limits   Orientation Level Oriented X4   Memory Within functional limits   Following Commands Follows all commands and directions without difficulty   Assessment   Limitation   (pt able to complete self care, improving, no OT needs)   Prognosis Good   Assessment Pt is a 62 y o  female seen for OT evaluation s/p admit to [address] on 2/28/2020 w/ Sepsis due to Haemophilus influenzae with acute hypoxic respiratory failure without septic shock ([facility] )    Comorbidities affecting pt's functional performance at time of assessment include: COPD and Pna, Influenza A, DDD, Anxiety and depression, GERD, Chronic Back Pain, see H & P for additional PMHx    Personal factors affecting pt at time of IE include:steps to enter environment  Prior to admission, pt was I with self care ADL's, Mobility, IADL's  Upon evaluation: Pt is able to complete self care ADL's  Pt reports feeling much better, just 'still a little weak'  Active OT intervention does not appear to be indicated at this time  PT is addressing current needs  From OT standpoint, recommendation at time of d/c would be home independently     Goals   Patient Goals to go back home   Plan   Treatment Interventions   (eval only)   Goal Expiration Date 03/03/20   OT Treatment Day 0   OT Frequency Eval only   Recommendation   OT Discharge Recommendation Home independent   OT - OK to Discharge Yes   Barthel Index   Feeding 10   Bathing 5  (set upin bathroom)   Grooming Score 5   Dressing Score 10   Bladder Score 10   Bowels Score 10   Toilet Use Score 10   Transfers (Bed/Chair) Score 10   Mobility (Level Surface) Score 0  (limited to room)   Stairs Score 0  (DNT)   Barthel Index Score 70   Sanaz Benavides, OT

## 2020-03-03 NOTE — ASSESSMENT & PLAN NOTE
· Likely secondary to viral pneumonia  · Procalcitonin negative x2, antibiotics will be restarted  · In light of continued high oxygen requirements, will check a CTA to rule out PE and further evaluate patient's lungs - negative for PE, Positive for: Multifocal pneumonia throughout right mid and lower lung zones and in the posterior left lung base  Reactive mediastinal and right hilar lymphadenopathy  · Patient wears 2 liters NC at HS at home; here patient has been on high flow 8 liters (3/2/2020)  Desat study was performed today:      Home Oxygen Qualifying Test         Patient name: Sebastian Demarco        : 1955   Date of Test:  March 3, 2020             Diagnosis: Sepsis due to haemophilus influenzae with acute hypoxic respiratory failure without septic shock        Home Oxygen Test:    Medicare Guidelines require item(s) 1-5 on all ambulatory patients or 1 and 2 on non-ambulatory patients         1  Baseline SPO2 on Room Air at rest- 88%                    2   SPO2 during exercise on Room Air- N/A  During exercise monitor SpO2  If SPO2 increases >=89% with ambulation do not add supplemental oxygen  If <= 88% on room air add O2 via NC and titrate patient  Patient must be ambulated with O2 and titrated to > 88% with exertion          3  SPO2 on Oxygen at rest - 92% on 4 lpm      4  SPO2 during exercise on Oxygen- 91-92% on a liter flow of 4 lpm      5  Exercise performed:          walking, duration 10 (min), distance 300 (feet)           [x]  Supplemental Home Oxygen is indicated      []  Client does not qualify for home oxygen         Respiratory Additional Notes-   Pt  ambulated a good, steady pace on O2 @ 4lpm via NC  Oximetry reading was maintained @ 91-92%  Pt  stated that was SOB, but nothing worse than usual  Increased WOB and purse lipped breathing noted, especially @ end of desaturation study   Pt  was returned to her room on O2 @ 4lpm NC and in satisfactory condition       ·

## 2020-03-03 NOTE — PLAN OF CARE
Problem: Potential for Falls  Goal: Patient will remain free of falls  Description  INTERVENTIONS:  - Assess patient frequently for physical needs  -  Identify cognitive and physical deficits and behaviors that affect risk of falls  -  [place] fall precautions as indicated by assessment   - Educate patient/family on patient safety including physical limitations  - Instruct patient to call for assistance with activity based on assessment  - Modify environment to reduce risk of injury  - Consider OT/PT consult to assist with strengthening/mobility  Outcome: Progressing     Problem: Nutrition/Hydration-ADULT  Goal: Nutrient/Hydration intake appropriate for improving, restoring or maintaining nutritional needs  Description  Monitor and assess patient's nutrition/hydration status for malnutrition  Collaborate with interdisciplinary team and initiate plan and interventions as ordered  Monitor patient's weight and dietary intake as ordered or per policy  Utilize nutrition screening tool and intervene as necessary  Determine patient's food preferences and provide high-protein, high-caloric foods as appropriate       INTERVENTIONS:  - Monitor oral intake, urinary output, labs, and treatment plans  - Assess nutrition and hydration status and recommend course of action  - Evaluate amount of meals eaten  - Assist patient with eating if necessary   - Allow adequate time for meals  - Recommend/ encourage appropriate diets, oral nutritional supplements, and vitamin/mineral supplements  - Order, calculate, and assess calorie counts as needed    - Assess need for intravenous fluids  - Provide specific nutrition/hydration education as appropriate  - Include patient/family/caregiver in decisions related to nutrition   Outcome: Progressing     Problem: PAIN - ADULT  Goal: Verbalizes/displays adequate comfort level or baseline comfort level  Description  Interventions:  - Encourage patient to monitor pain and request assistance  - Assess pain using appropriate pain scale  - Administer analgesics based on type and severity of pain and evaluate response  - Implement non-pharmacological measures as appropriate and evaluate response  - Consider cultural and social influences on pain and pain management  - Notify physician/advanced practitioner if interventions unsuccessful or patient reports new pain  Outcome: Progressing     Problem: INFECTION - ADULT  Goal: Absence or prevention of progression during hospitalization  Description  INTERVENTIONS:  - Assess and monitor for signs and symptoms of infection  - Monitor lab/diagnostic results  - Monitor all insertion sites, i e  indwelling lines, tubes, and drains  - Monitor endotracheal if appropriate and nasal secretions for changes in amount and color  - [place] appropriate cooling/warming therapies per order  - Administer medications as ordered  - Instruct and encourage patient and family to use good hand hygiene technique  - Identify and instruct in appropriate isolation precautions for identified infection/condition  Outcome: Progressing  Goal: Absence of fever/infection during neutropenic period  Description  INTERVENTIONS:  - Monitor WBC    Outcome: Progressing     Problem: SAFETY ADULT  Goal: Patient will remain free of falls  Description  INTERVENTIONS:  - Assess patient frequently for physical needs  -  Identify cognitive and physical deficits and behaviors that affect risk of falls    -  [place] fall precautions as indicated by assessment   - Educate patient/family on patient safety including physical limitations  - Instruct patient to call for assistance with activity based on assessment  - Modify environment to reduce risk of injury  - Consider OT/PT consult to assist with strengthening/mobility  Outcome: Progressing  Goal: Maintain or return to baseline ADL function  Description  INTERVENTIONS:  -  Assess patient's ability to carry out ADLs; assess patient's baseline for ADL function and identify physical deficits which impact ability to perform ADLs (bathing, care of mouth/teeth, toileting, grooming, dressing, etc )  - Assess/evaluate cause of self-care deficits   - Assess range of motion  - Assess patient's mobility; develop plan if impaired  - Assess patient's need for assistive devices and provide as appropriate  - Encourage maximum independence but intervene and supervise when necessary  - Involve family in performance of ADLs  - Assess for home care needs following discharge   - Consider OT consult to assist with ADL evaluation and planning for discharge  - Provide patient education as appropriate  Outcome: Progressing  Goal: Maintain or return mobility status to optimal level  Description  INTERVENTIONS:  - Assess patient's baseline mobility status (ambulation, transfers, stairs, etc )    - Identify cognitive and physical deficits and behaviors that affect mobility  - Identify mobility aids required to assist with transfers and/or ambulation (gait belt, sit-to-stand, lift, walker, cane, etc )  - [place] fall precautions as indicated by assessment  - Record patient progress and toleration of activity level on Mobility SBAR; progress patient to next Phase/Stage  - Instruct patient to call for assistance with activity based on assessment  - Consider rehabilitation consult to assist with strengthening/weightbearing, etc   Outcome: Progressing     Problem: DISCHARGE PLANNING  Goal: Discharge to home or other facility with appropriate resources  Description  INTERVENTIONS:  - Identify barriers to discharge w/patient and caregiver  - Arrange for needed discharge resources and transportation as appropriate  - Identify discharge learning needs (meds, wound care, etc )    - Refer to Case Management Department for coordinating discharge planning if the patient needs post-hospital services based on physician/advanced practitioner order or complex needs related to functional status, cognitive ability, or social support system   Outcome: Progressing     Problem: Knowledge Deficit  Goal: Patient/family/caregiver demonstrates understanding of disease process, treatment plan, medications, and discharge instructions  Description  Complete learning assessment and assess knowledge base    Interventions:  - Provide teaching at level of understanding  - Provide teaching via preferred learning methods  Outcome: Progressing     Problem: Prexisting or High Potential for Compromised Skin Integrity  Goal: Skin integrity is maintained or improved  Description  INTERVENTIONS:  - Identify patients at risk for skin breakdown  - Assess and monitor skin integrity  - Assess and monitor nutrition and hydration status  - Monitor labs   - Assess for incontinence   - Turn and reposition patient  - Assist with mobility/ambulation  - Relieve pressure over bony prominences  - Avoid friction and shearing  - Provide appropriate hygiene as needed including keeping skin clean and dry  - Evaluate need for skin moisturizer/barrier cream  - Collaborate with interdisciplinary team   - Patient/family teaching  - Consider wound care consult   Outcome: Progressing

## 2020-03-03 NOTE — SOCIAL WORK
D/c order written, pt had a desat study and her oxygen rx has changed, new rx and testing was sent to [facility], I met with the pt and  had called her family, pt stated she does not need hhc, pt denies any d/c needs, she called her family and they will transport the pt home, they were instructed to bring the portable oxygen tank from home with them for the transport, [facility] was called and made aware of the new order, pt stated to me that she does have a nebulizer at home, pt and family are in agreement with the d/c and d/c plan home, d/c plan was discussed at care coordination rounds today,

## 2020-03-03 NOTE — DISCHARGE SUMMARY
Discharge- Sanket López 1955, 62 y o  female MRN: [id number]    Unit/Bed#: -01 Encounter: [id number]    Primary Care Provider: KATIE Fish   Date and time admitted to hospital: 2/28/2020  6:12 PM        * Sepsis due to Haemophilus influenzae with acute hypoxic respiratory failure without septic shock [facility])  Assessment & Plan  · Secondary to influenza A  · Continue Tamiflu and supportive care  · Give DuoNebs q 6 hours while awake and albuterol nebs q 2 hours p r n  For shortness of breath or wheeze  · Patient met sepsis criteria that she was febrile with T-max of 100 8, tachycardic at 95, tachypnea as high as 22, and known infection being multilobar pneumonia  · Improving   · Patient will be given cefdinir p o  At discharge  · Patient should have a chest x-ray performed 1 week from today's date, prior to follow-up with her PCP    Influenza A  Assessment & Plan  · Continue Tamiflu for 2 more days    Pneumonia of right lower lobe due to influenza A virus  Assessment & Plan  · Likely viral pneumonia  · Respiratory protocol  · Nebulizer treatments  · mucinex   · Procalcitonin negative x2  · Antibiotics discontinued initially, however, will restart patient on rocephin IV on 3/2/2020 since no improvement  · Check CTA: Multifocal pneumonia throughout right mid and lower lung zones and in the posterior left lung base  Reactive mediastinal and right hilar lymphadenopathy  · Patient will continue with steroid taper and cefdinir p o  Antibiotic  Acute on chronic respiratory failure with hypoxia (HCC)  Assessment & Plan  · Likely secondary to viral pneumonia  · Procalcitonin negative x2, antibiotics will be restarted  · In light of continued high oxygen requirements, will check a CTA to rule out PE and further evaluate patient's lungs - negative for PE, Positive for: Multifocal pneumonia throughout right mid and lower lung zones and in the posterior left lung base   Reactive mediastinal and right hilar lymphadenopathy  · Patient wears 2 liters NC at HS at home; here patient has been on high flow 8 liters (3/2/2020)  Desat study was performed today:      Home Oxygen Qualifying Test         Patient name: Pradeep Kendrick        : 1955   Date of Test:  March 3, 2020             Diagnosis: Sepsis due to haemophilus influenzae with acute hypoxic respiratory failure without septic shock        Home Oxygen Test:    Medicare Guidelines require item(s) 1-5 on all ambulatory patients or 1 and 2 on non-ambulatory patients         1  Baseline SPO2 on Room Air at rest- 88%                    2   SPO2 during exercise on Room Air- N/A  During exercise monitor SpO2  If SPO2 increases >=89% with ambulation do not add supplemental oxygen  If <= 88% on room air add O2 via NC and titrate patient  Patient must be ambulated with O2 and titrated to > 88% with exertion          3  SPO2 on Oxygen at rest - 92% on 4 lpm      4  SPO2 during exercise on Oxygen- 91-92% on a liter flow of 4 lpm      5  Exercise performed:          walking, duration 10 (min), distance 300 (feet)           [x]  Supplemental Home Oxygen is indicated      []  Client does not qualify for home oxygen         Respiratory Additional Notes-   Pt  ambulated a good, steady pace on O2 @ 4lpm via NC  Oximetry reading was maintained @ 91-92%  Pt  stated that was SOB, but nothing worse than usual  Increased WOB and purse lipped breathing noted, especially @ end of desaturation study  Pt  was returned to her room on O2 @ 4lpm NC and in satisfactory condition       ·     Chronic obstructive pulmonary disease with acute lower respiratory infection ([facility] [address] )  Assessment & Plan  · With acute exacerbation  · Titrate down on steroids  · Continue nebulizers    Gastroesophageal reflux disease without esophagitis  Assessment & Plan  · Substitute Protonix 40 mg p o   Daily for pre-hospital Nexium and continue pre-hospital Bentyl 20 mg p o  T i d  P r n  Anxiety and depression  Assessment & Plan  · Continue with cymbalta and seroquel        Discharging Physician / Practitioner: Hollie Marroquin  PCP: Hollie Flores  Admission Date:   Admission Orders (From admission, onward)     Ordered        02/28/20 2059  Inpatient Admission  Once                   Discharge Date: 03/03/20    Resolved Problems  Date Reviewed: 3/3/2020    None          Consultations During Hospital Stay:  · Respiratory therapy  · Case management  · PT OT    Procedures Performed:   · 2/28/2020: CT ABDOMEN AND PELVIS WITH IV CONTRAST     INDICATION:   Abdominal pain, acute, nonlocalized      COMPARISON:  CT 9/19/2019      TECHNIQUE:  CT examination of the abdomen and pelvis was performed  Axial, sagittal, and coronal 2D reformatted images were created from the source data and submitted for interpretation      Radiation dose length product (DLP) for this visit:  474 1 mGy-cm   This examination, like all CT scans performed in the [facility], was performed utilizing techniques to minimize radiation dose exposure, including the use of iterative   reconstruction and automated exposure control      IV Contrast:  100 mL of iohexol (OMNIPAQUE)  Enteric Contrast:  Enteric contrast was not administered      FINDINGS:     ABDOMEN     LOWER CHEST: Peribronchial and subpleural groundglass opacities and reticulation predominantly affecting the right lung although small focal areas also seen within the left lower lobe  This is new since 7/19/2019 and could represent pneumonia or   pneumonitis      LIVER/BILIARY TREE:  Unremarkable      GALLBLADDER:  Surgically absent      SPLEEN:  Unremarkable      PANCREAS:  Unremarkable      ADRENAL GLANDS:  Unremarkable      KIDNEYS/URETERS:  One or more sharply circumscribed subcentimeter renal hypodensities are noted   These lesions are too small to accurately characterize, but are statistically most likely to represent benign cortical renal cyst(s)  According to the   guidelines published in the [facility] Paper of the ACR Incidental Findings Committee (Radiology 2010), no further workup of these lesions is recommended  Bilateral renal sinus cysts  No hydronephrosis      STOMACH AND BOWEL:  A colorectal and small bowel anastomoses are noted  Colonic diverticulosis without evidence of diverticulitis  No disproportionate dilation of small or large bowel loops      APPENDIX:  No findings to suggest appendicitis      ABDOMINOPELVIC CAVITY:  No ascites or free intraperitoneal air  No lymphadenopathy      VESSELS:  Unremarkable for patient's age      PELVIS     REPRODUCTIVE ORGANS:  Unremarkable for patient's age      URINARY BLADDER:  Unremarkable      ABDOMINAL WALL/INGUINAL REGIONS:  Unremarkable      OSSEOUS STRUCTURES:  No acute fracture or destructive osseous lesion      IMPRESSION:     Peribronchial and subpleural groundglass and reticular interstitial opacities predominantly affecting the right lung although small focal areas also seen within the left lower lobe  Additionally, a focal consolidation is seen within the dependent   portion of the right lower lobe  This is new since 7/19/2019 and most consistent with pneumonia (favor atypical), although pneumonitis should be considered in the appropriate clinical setting      No acute abnormality within the abdomen or pelvis  · 02/28/2020:CHEST      INDICATION:   cough      COMPARISON:  10/25/2017     EXAM PERFORMED/VIEWS:  XR CHEST PA & LATERAL        FINDINGS:     Cardiomediastinal silhouette appears unremarkable      There is right lower lobe consolidation suggestive of pneumonia  Abbe Vigil No pneumothorax or pleural effusion      Osseous structures appear within normal limits for patient age      IMPRESSION:     Right lower lobe consolidation compatible with pneumonia  Recommend radiographic follow-up in one month to ensure resolution      · 03/01/2020:CTA - CHEST WITH IV CONTRAST - PULMONARY ANGIOGRAM     INDICATION:   Shortness of breath  PE suspected, low pretest prob      COMPARISON: Chest x-ray and abdominal pelvic CT examinations performed February 28, 2020      TECHNIQUE: CTA examination of the chest was performed using angiographic technique according to a protocol specifically tailored to evaluate for pulmonary embolism  Axial, sagittal, and coronal 2D reformatted images were created from the source data and   submitted for interpretation  In addition, coronal 3D MIP postprocessing was performed on the acquisition scanner        Radiation dose length product (DLP) for this visit:  326 5 mGy-cm   This examination, like all CT scans performed in the [facility], was performed utilizing techniques to minimize radiation dose exposure, including the use of iterative   reconstruction and automated exposure control      IV Contrast:  75 mL of iohexol (OMNIPAQUE)     FINDINGS:     PULMONARY ARTERIAL TREE:  No pulmonary embolus is seen       LUNGS:  There is patchy groundglass and alveolar airspace opacity throughout the right lung with relative sparing of the right lung apex  There is patchy groundglass opacity in the dependent left lung  Findings are most consistent with multifocal   pneumonia  No dominant noncalcified pulmonary mass      PLEURA:  Unremarkable      HEART/GREAT VESSELS:  Unremarkable for patient's age      MEDIASTINUM AND GONZALEZ:  Mediastinal and right hilar lymphadenopathy is noted, probably reactive  No necrotic nodes are identified      CHEST WALL AND LOWER NECK:   Unremarkable      VISUALIZED STRUCTURES IN THE UPPER ABDOMEN:  Unremarkable      OSSEOUS STRUCTURES:  No acute fracture or destructive osseous lesion    Healed right-sided rib fractures noted      IMPRESSION:     No pulmonary embolus      Multifocal pneumonia throughout right mid and lower lung zones and in the posterior left lung base      Reactive mediastinal and right hilar lymphadenopathy  · 03/03/2020:CHEST      INDICATION:   pneumonia      COMPARISON:  Chest radiograph from 2/28/2020 and chest CT from 3/1/2020      EXAM PERFORMED/VIEWS:  XR CHEST PA & LATERAL     FINDINGS:     Cardiomediastinal silhouette appears unremarkable      Persistent bibasilar pneumonia, greater on the right  No effusion or pneumothorax      Osseous structures appear within normal limits for patient age      IMPRESSION:     Persistent right greater than left pneumonia  Significant Findings / Test Results:   · Patient was positive for influenza A    Incidental Findings:   · None     Test Results Pending at Discharge (will require follow up): · None     Outpatient Tests Requested:  · Patient should have follow-up chest x-ray in 1 week  Complications:     Patient will require increased oxygen use at home  Reason for Admission: Cough and generalized weakness x6 days    Hospital Course:     Claudia Zafar is a 62 y o  female patient who originally presented to the hospital on 2/28/2020 due to cough and generalized weakness x6 days  Patient presents ER for further evaluation treatment of her 6 day history of cough productive of greenish sputum accompanied with some vomiting diarrhea and chills  Patient states that her diarrhea has subsided a couple days ago but she does persist with some nausea vomiting with 1 episode of vomiting today      Patient reports progressive shortness of breath to the point of dyspnea at rest and she does have a history of COPD and is on O2 2 L nasal cannula at night though she does not currently follow with Pulmonary she is on multiple respiratory medications  Patient does complain of some wheezing and subjective fever with multiple sick contacts being multiple family members with the flu  Patient reports a fever of 102 at home with a documented T-max 100 8° here      Patient did not receive her flu shot this year      While the patient was here she was started on IV Rocephin and was transitioned to oral antibiotics upon discharge  Patient also had IV steroids which were transition to a prednisone taper  On day of discharge, patient was still using increased amounts of oxygen via nasal cannula  A desat study was performed which did show that the patient does need to have increased amount of oxygen continuously at this point time  Patient should follow-up with her PCP and have a chest x-ray in approximately 1 week, prior to her follow-up appointment, to verify that her pneumonia is improving        Please see above list of diagnoses and related plan for additional information  Condition at Discharge: fair     Discharge Day Visit / Exam:     Subjective:  Patient is lying in bed  She states that she does feel that she is ready to go home  I did explain to her that since she is on a higher amount of oxygen we will need to have a desaturation study performed prior to her discharge, patient does agree  With desat study results, it was agreed the patient is appropriate for discharge home and follow-up with PCP  Vitals: Blood Pressure: 114/82 (03/03/20 1531)  Pulse: 97 (03/03/20 1531)  Temperature: (!) 97 2 °F (36 2 °C) (03/03/20 1531)  Temp Source: Temporal (03/03/20 1531)  Respirations: 18 (03/03/20 1531)  Height: 5' (152 4 cm) (02/28/20 2146)  Weight - Scale: 78 2 kg (172 lb 6 4 oz) (02/28/20 2146)  SpO2: 97 % (03/03/20 1531)  Exam:   Physical Exam   Constitutional: She is oriented to person, place, and time  She appears well-developed  She is cooperative  She appears ill  HENT:   Head: Normocephalic and atraumatic  Nose: Nose normal    Mouth/Throat: Oropharynx is clear and moist and mucous membranes are normal    Eyes: Conjunctivae and EOM are normal    Neck: Full passive range of motion without pain  Cardiovascular: Normal rate, regular rhythm, normal heart sounds and normal pulses  Pulmonary/Chest: Tachypnea noted   She has rhonchi in the right middle field, the right lower field, the left middle field and the left lower field  Patient usually uses oxygen 2 L nasal cannula at HS  Desaturation study proves that patient will require around the clock oxygen via nasal cannula  This will be at 4 L nasal cannula  Patient does continue to have occasional productive cough  Patient should have follow-up chest x-ray in 1 week, prior to visiting with PCP  Abdominal: Soft  Normal appearance and bowel sounds are normal    Musculoskeletal: Normal range of motion  Neurological: She is alert and oriented to person, place, and time  Skin: Skin is warm  Psychiatric: She has a normal mood and affect  Her speech is normal and behavior is normal        Discussion with Family:  Discussed with patient, mother, daughter at the bedside  Discharge instructions/Information to patient and family:   See after visit summary for information provided to patient and family  Provisions for Follow-Up Care:  See after visit summary for information related to follow-up care and any pertinent home health orders  Disposition:     Home    For Discharges to [facility]:   · Not Applicable to this Patient - Not Applicable to this Patient    Planned Readmission:    No     Discharge Statement:  I spent 45 minutes discharging the patient  This time was spent on the day of discharge  I had direct contact with the patient on the day of discharge  Greater than 50% of the total time was spent examining patient, answering all patient questions, arranging and discussing plan of care with patient as well as directly providing post-discharge instructions  Additional time then spent on discharge activities  Discharge Medications:  See after visit summary for reconciled discharge medications provided to patient and family        ** Please Note: This note has been constructed using a voice recognition system **

## 2020-03-03 NOTE — DISCHARGE INSTR - AVS FIRST PAGE
You will need to have a chest x-ray in 1 week  Please have the chest x-ray done before you see your family doctor  Please take all of your antibiotics and steroids           Home Oxygen Qualifying Test         Patient name: Melissa Reddy        : 1955   Date of Test:  March 3, 2020             Diagnosis: Sepsis due to haemophilus influenzae with acute hypoxic respiratory failure without septic shock        Home Oxygen Test:    Medicare Guidelines require item(s) 1-5 on all ambulatory patients or 1 and 2 on non-ambulatory patients         1  Baseline SPO2 on Room Air at rest- 88%                    2   SPO2 during exercise on Room Air- N/A  During exercise monitor SpO2  If SPO2 increases >=89% with ambulation do not add supplemental oxygen  If <= 88% on room air add O2 via NC and titrate patient  Patient must be ambulated with O2 and titrated to > 88% with exertion          3  SPO2 on Oxygen at rest - 92% on 4 lpm      4  SPO2 during exercise on Oxygen- 91-92% on a liter flow of 4 lpm      5  Exercise performed:          walking, duration 10 (min), distance 300 (feet)           [x]  Supplemental Home Oxygen is indicated      []  Client does not qualify for home oxygen         Respiratory Additional Notes-   Pt  ambulated a good, steady pace on O2 @ 4lpm via NC  Oximetry reading was maintained @ 91-92%  Pt  stated that was SOB, but nothing worse than usual  Increased WOB and purse lipped breathing noted, especially @ end of desaturation study   Pt  was returned to her room on O2 @ 4lpm NC and in satisfactory condition       Violetta Flores, RT

## 2020-03-03 NOTE — PHYSICAL THERAPY NOTE
Physical Therapy Evaluation     Patient's Name: Herbert Wasserman    Admitting Diagnosis  Pneumonia [J18 9]  Influenza A [J10 1]  Flu-like symptoms [R68 89]    Problem List  Patient Active Problem List   Diagnosis    Anxiety and depression    Chronic back pain    Chronic bronchitis (HCC)    Chronic diarrhea    Chronic obstructive pulmonary disease with acute lower respiratory infection ([place] [address] )    COPD exacerbation ([place] [address] )    DDD (degenerative disc disease), lumbar    Herniation of lumbar intervertebral disc    Depression    Gastric ulcer    Gastroesophageal reflux disease without esophagitis    Sepsis due to Haemophilus influenzae with acute hypoxic respiratory failure without septic shock ([place] [address] )    Pneumonia of right lower lobe due to influenza A virus    Influenza A    Acute on chronic respiratory failure with hypoxia ([place] [address] )       Past Medical History  Past Medical History:   Diagnosis Date    Anxiety     COPD (chronic obstructive pulmonary disease) ([place] [address] )     Depression     Diverticulitis     GERD (gastroesophageal reflux disease)     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[place] [address] )     Psychiatric disorder     Sleep apnea        Past Surgical History  Past Surgical History:   Procedure Laterality Date    APPENDECTOMY      CHOLECYSTECTOMY      partial x2 per pt    HIATAL HERNIA REPAIR      INGUINAL HERNIA REPAIR Right     LAPAROSCOPIC TUBAL LIGATION Bilateral     ROTATOR CUFF REPAIR Right     UVULOPALATOPHARYNGOPLASTY            03/03/20 0924   Note Type   Note type Eval/Treat   Pain Assessment   Pain Assessment No/denies pain   Pain Score No Pain   Home Living   Type of Home Apartment   Home Layout One level; Access; Elevator  (3rd floor apt)   Bathroom Shower/Tub Tub/shower unit   [employer] Raised   Bathroom Equipment Grab bars in shower;Grab bars around toilet   [address]   (no AD at baseline)   Additional Comments pt does not drive   Prior Function   Level of [place] Independent with ADLs and functional mobility   Lives With Alone   ADL Assistance Independent   IADLs Independent   Falls in the last 6 months 0   Comments pt wears 2 L O2 at baseline   Restrictions/Precautions   Weight Bearing Precautions Per Order No   Other Precautions O2;Fall Risk  (5 L O2 via NC)   General   Family/Caregiver Present No   Cognition   Overall Cognitive Status WFL   Arousal/Participation Alert   Orientation Level Oriented X4   Memory Within functional limits   Following Commands Follows all commands and directions without difficulty   Comments pt agreeable to PT session   RUE Assessment   RUE Assessment WFL   LUE Assessment   LUE Assessment WFL   RLE Assessment   RLE Assessment WFL   LLE Assessment   LLE Assessment WFL   Coordination   Movements are Fluid and Coordinated 1   Sensation WFL   Bed Mobility   Supine to Sit 5  Supervision   Additional items Assist x 1;HOB elevated; Increased time required   Sit to Supine 5  Supervision   Additional items Assist x 1; Increased time required;Verbal cues   Transfers   Sit to Stand 5  Supervision   Additional items Assist x 1; Increased time required;Verbal cues   Stand to Sit 5  Supervision   Additional items Assist x 1; Increased time required;Verbal cues   Ambulation/Elevation   Gait pattern Improper Weight shift;Decreased foot clearance; Short stride   Gait Assistance 5  Supervision   Additional items Assist x 1;Verbal cues; Tactile cues   Assistive Device None   Distance 25 ft   Stair Management Assistance Not tested   Balance   Static Sitting Normal   Dynamic Sitting Good   Static Standing Good   Dynamic Standing Fair +   Ambulatory Fair +   Endurance Deficit   Endurance Deficit Yes   Activity Tolerance   Activity Tolerance Patient tolerated treatment well   Nurse Made Aware Yes, RN verbalized pt appropriate for PT session   Assessment   Prognosis Good   Problem List Decreased strength;Decreased endurance; Impaired balance;Decreased mobility Assessment Pt is 62 y o  female seen for PT evaluation on 3/3/2020 s/p admit to [address] on 2/28/2020 w/ Sepsis due to Haemophilus influenzae with acute hypoxic respiratory failure without septic shock ([facility] [address] )  PT consulted to assess pt's functional mobility and d/c needs  Order placed for PT eval and tx, w/ up and OOB as tolerated order  Performed at least 2 patient identifiers during session: Name and wristband  Comorbidities affecting pt's physical performance at time of assessment include: acute on chronic respiratory failure c hypoxia, COPD, GERD, anxiety and depression  PTA, pt was independent w/ all functional mobility w/ no AD or DME, ambulates household distances and lives alone in 3rd level apartment c elevator access and 0 HELLEN  Personal factors affecting pt at time of IE include: unable to perform dynamic tasks in community, limited home support, decreased initiation and engagement and unable to perform physical activity  Please find objective findings from PT assessment regarding body systems outlined above with impairments and limitations including weakness, impaired balance, decreased endurance, decreased activity tolerance, decreased functional mobility tolerance, decreased safety awareness and fall risk, as well as mobility assessment (need for cueing for mobility technique)  The following objective measures performed on IE also reveal limitations: Barthel Index: 65/100  Pt's clinical presentation is currently unstable/unpredictable seen in pt's presentation of ongoing medical assessment  Pt to benefit from continued PT tx to address deficits as defined above and maximize level of functional independent mobility and consistency  From PT/mobility standpoint, recommendation at time of d/c would be anticipate no needs pending progress in order to facilitate return to PLOF     Barriers to Discharge Decreased caregiver support  (pt lives alone)   Goals   Patient Goals "to return home"   STG Expiration Date 03/13/20   Short Term Goal #1 In 7-10 days: Increase bilateral LE strength 1/2 grade to facilitate independent mobility, Perform all transfers independently to improve independence, Ambulate > 150 ft  with least restrictive assistive device independently w/o LOB and w/ normalized gait pattern 100% of the time, Increase all balance 1/2 grade to decrease risk for falls, Complete exercise program independently and Tolerate 4 hr OOB to faciliate upright tolerance   PT Treatment Day 0   Plan   Treatment/Interventions Functional transfer training; Therapeutic exercise; Endurance training;Patient/family training;Gait training;Spoke to nursing   PT Frequency 2-3x/wk   Recommendation   Recommendation Home independently  (anticipate no skilled PT needs)   Equipment Recommended   (none at this time)   Barthel Index   Feeding 10   Bathing 0   Grooming Score 5   Dressing Score 10   Bladder Score 10   Bowels Score 10   Toilet Use Score 10   Transfers (Bed/Chair) Score 10   Mobility (Level Surface) Score 0   Stairs Score 0   Barthel Index Score 65         Ginna Pichardo, PT

## 2020-03-03 NOTE — DISCHARGE INSTRUCTIONS
Influenza   WHAT YOU NEED TO KNOW:   Influenza (the flu) is an infection caused by the influenza virus  The flu is easily spread when an infected person coughs, sneezes, or has close contact with others  You may be able to spread the flu to others for 1 week or longer after signs or symptoms appear  DISCHARGE INSTRUCTIONS:   Call 911 for any of the following:   · You have trouble breathing, and your lips look purple or blue  · You have a seizure  Seek care immediately if:   · You are dizzy, or you are urinating less or not at all  · You have a headache with a stiff neck, and you feel tired or confused  · You have new pain or pressure in your chest     · Your symptoms, such as shortness of breath, vomiting, or diarrhea, get worse  · Your symptoms, such as fever and coughing, seem to get better, but then get worse  Contact your healthcare provider if:   · You have new muscle pain or weakness  · You have questions or concerns about your condition or care  Medicines: You may need any of the following:  · Acetaminophen  decreases pain and fever  It is available without a doctor's order  Ask how much to take and how often to take it  Follow directions  Acetaminophen can cause liver damage if not taken correctly  · NSAIDs , such as ibuprofen, help decrease swelling, pain, and fever  This medicine is available with or without a doctor's order  NSAIDs can cause stomach bleeding or kidney problems in certain people  If you take blood thinner medicine, always ask your healthcare provider if NSAIDs are safe for you  Always read the medicine label and follow directions  · Antivirals  help fight a viral infection  · Take your medicine as directed  Contact your healthcare provider if you think your medicine is not helping or if you have side effects  Tell him or her if you are allergic to any medicine  Keep a list of the medicines, vitamins, and herbs you take   Include the amounts, and when and why you take them  Bring the list or the pill bottles to follow-up visits  Carry your medicine list with you in case of an emergency  Rest  as much as you can to help you recover  Drink liquids as directed  to help prevent dehydration  Ask how much liquid to drink each day and which liquids are best for you  Prevent the spread of influenza:   · Wash your hands often  Use soap and water  Wash your hands after you use the bathroom, change a child's diapers, or sneeze  Wash your hands before you prepare or eat food  Use gel hand cleanser when soap and water are not available  Do not touch your eyes, nose, or mouth unless you have washed your hands first            · Cover your mouth when you sneeze or cough  Cough into a tissue or the bend of your arm  · Clean shared items with a germ-killing   Clean table surfaces, doorknobs, and light switches  Do not share towels, silverware, and dishes with people who are sick  Wash bed sheets, towels, silverware, and dishes with soap and water  · Wear a mask  over your mouth and nose if you are sick or are near anyone who is sick  · Stay away from others  if you are sick  · Influenza vaccine  helps prevent influenza (flu)  Everyone older than 6 months should get a yearly influenza vaccine  Get the vaccine as soon as it is available, usually in September or October each year  Follow up with your healthcare provider as directed:  Write down your questions so you remember to ask them during your visits  © 2017 [facility] Information is for End User's use only and may not be sold, redistributed or otherwise used for commercial purposes  All illustrations and images included in CareNotes® are the copyrighted property of [employer]  or Garry Geller  The above information is an  only  It is not intended as medical advice for individual conditions or treatments   Talk to your doctor, nurse or pharmacist before following any medical regimen to see if it is safe and effective for you  Community Acquired Pneumonia   WHAT YOU NEED TO KNOW:   Community-acquired pneumonia (CAP) is a lung infection that you get outside of a hospital or nursing home setting  Your lungs become inflamed and cannot work well  CAP may be caused by bacteria, viruses, or fungi  DISCHARGE INSTRUCTIONS:   Seek care immediately if:   · You are confused and cannot think clearly  · You have increased trouble breathing  · Your lips or fingernails turn gray or blue  Contact your healthcare provider if:   · Your symptoms do not get better, or they get worse  · You are urinating less, or not at all  · You have questions or concerns about your condition or care  Medicines:   · Medicines  may be given to treat a bacterial, viral, or fungal infection  You may also be given medicines to dilate your bronchial tubes to help you breathe more easily  · Take your medicine as directed  Contact your healthcare provider if you think your medicine is not helping or if you have side effects  Tell him or her if you are allergic to any medicine  Keep a list of the medicines, vitamins, and herbs you take  Include the amounts, and when and why you take them  Bring the list or the pill bottles to follow-up visits  Carry your medicine list with you in case of an emergency  Follow up with your healthcare provider within 3 days or as directed: You may need another x-ray  Write down your questions so you remember to ask them during your visits  Deep breathing and coughing:  Deep breathing helps open the air passages in your lungs  Coughing helps bring up mucus from your lungs  Take a deep breath and hold the breath as long as you can  Then push the air out of your lungs with a deep, strong cough  Spit out any mucus you have coughed up  Take 10 deep breaths in a row every hour that you are awake  Remember to follow each deep breath with a cough  Do not smoke or allow others to smoke around you:  Nicotine and other chemicals in cigarettes and cigars can cause lung damage  Ask your healthcare provider for information if you currently smoke and need help to quit  E-cigarettes or smokeless tobacco still contain nicotine  Talk to your healthcare provider before you use these products  Manage CAP at home:   · Breathe warm, moist air  This helps loosen mucus  Loosely place a warm, wet washcloth over your nose and mouth  A room humidifier may also help make the air moist     · Drink liquids as directed  Ask your healthcare provider how much liquid to drink each day and which liquids to drink  Liquids help make mucus thin and easier to get out of your body  · Gently tap your chest   This helps loosen mucus so it is easier to cough  Lie with your head lower than your chest several times a day and tap your chest      · Get plenty of rest   Rest helps your body heal   Prevent CAP:   · Wash your hands often with soap and water  Carry germ-killing hand gel with you  You can use the gel to clean your hands when soap and water are not available  Do not touch your eyes, nose, or mouth unless you have washed your hands first      · Clean surfaces often  Clean doorknobs, countertops, cell phones, and other surfaces that are touched often  · Always cover your mouth when you cough  Cough into a tissue or your shirtsleeve so you do not spread germs from your hands  · Try to avoid people who have a cold or the flu  If you are sick, stay away from others as much as possible  · Ask about vaccines  You may need a vaccine to help prevent pneumonia  Get an influenza (flu) vaccine every year as soon as it becomes available  © 2017 [employer] Information is for End User's use only and may not be sold, redistributed or otherwise used for commercial purposes   All illustrations and images included in CareNotes® are the copyrighted property of A  [employer] Inc  or [facility]  The above information is an  only  It is not intended as medical advice for individual conditions or treatments  Talk to your doctor, nurse or pharmacist before following any medical regimen to see if it is safe and effective for you  Community Acquired Pneumonia   WHAT YOU NEED TO KNOW:   What is community-acquired pneumonia (CAP)? CAP is a lung infection that you get outside of a hospital or nursing home setting  Your lungs become inflamed and cannot work well  CAP may be caused by bacteria, viruses, or fungi  What increases my risk for CAP? · Chronic lung disease    · Cigarette smoking    · Brain disorders such as stroke, dementia, and cerebral palsy    · Weakened immune system    · Recent surgery or trauma    · Surgery for cancer of the mouth, throat, or neck    · Medical conditions such as diabetes or heart disease  What are the signs and symptoms of CAP?   · Cough that may bring up green, yellow, or bloody mucus    · Fever, chills, or severe shaking    · Shortness of breath    · Breathing and heartbeat that are faster than usual    · Pain in your chest or back when you breathe in or cough    · Fatigue and loss of appetite    · Trouble thinking clearly (especially in elderly people)  How is CAP diagnosed? Your healthcare provider will listen to your lungs for abnormal sounds  You may also need any of the following:  · X-ray or CT scan  pictures may show a lung infection or other problems, such as fluid around your lungs  You may be given contrast liquid to help your lungs show up better in the pictures  Tell the healthcare provider if you have ever had an allergic reaction to contrast liquid  · A pulse oximeter  is a device that measures the amount of oxygen in your blood  · Blood and sputum tests  may be done to check for the germ causing your infection       · Bronchoscopy  is a procedure to look inside your airway and learn the cause of your airway or lung condition  A bronchoscope (thin tube with a light) is inserted into your mouth and moved down your throat to your airway  You may be given medicine to numb your throat and help you relax during the procedure  Tissue and fluid may be collected from your airway or lungs to be tested  How is CAP treated? Treatment will depend on what type of germ is causing your CAP, and how bad your symptoms are  You may need antibiotics if your pneumonia is caused by bacteria  Antiviral medicines may be given if you have viral pneumonia  You may need medicines that dilate your bronchial tubes  You may need oxygen if your blood oxygen level is lower than it should be  You may need to be admitted to the hospital if your pneumonia is severe  What can I do to manage CAP?   · Do not smoke or allow others to smoke around you  Nicotine and other chemicals in cigarettes and cigars can cause lung damage  Ask your healthcare provider for information if you currently smoke and need help to quit  E-cigarettes or smokeless tobacco still contain nicotine  Talk to your healthcare provider before you use these products  · Breathe warm, moist air  This helps loosen mucus  Loosely place a warm, wet washcloth over your nose and mouth  A room humidifier may also help make the air moist     · Take deep breaths  Deep breaths help open your airway  Take 2 deep breaths and cough 2 or 3 times every hour  Coughing helps get mucus out of your body  · Drink liquids as directed  Ask your healthcare provider how much liquid to drink each day and which liquids to drink  Liquids help make mucus thin and easier to get out of your body  · Gently tap your chest   This helps loosen mucus so it is easier to cough  Lie with your head lower than your chest several times a day and tap your chest      · Get plenty of rest   Rest helps your body heal   How can I prevent CAP? · Wash your hands often with soap and water    Carry germ-killing hand gel with you  You can use the gel to clean your hands when soap and water are not available  Do not touch your eyes, nose, or mouth unless you have washed your hands first      · Clean surfaces often  Clean doorknobs, countertops, cell phones, and other surfaces that are touched often  · Always cover your mouth when you cough  Cough into a tissue or your shirtsleeve so you do not spread germs from your hands  · Try to avoid people who have a cold or the flu  If you are sick, stay away from others as much as possible  · Ask about vaccines  You may need a vaccine to help prevent pneumonia  Get an influenza (flu) vaccine every year as soon as it becomes available  When should I seek immediate care? · You are confused and cannot think clearly  · You have increased trouble breathing  · Your lips or fingernails turn gray or blue  When should I contact my healthcare provider? · Your symptoms do not get better, or get worse  · You are urinating less, or not at all  · You have questions or concerns about your condition or care  CARE AGREEMENT:   You have the right to help plan your care  Learn about your health condition and how it may be treated  Discuss treatment options with your caregivers to decide what care you want to receive  You always have the right to refuse treatment  The above information is an  only  It is not intended as medical advice for individual conditions or treatments  Talk to your doctor, nurse or pharmacist before following any medical regimen to see if it is safe and effective for you  © 2017 [address]  Information is for End User's use only and may not be sold, redistributed or otherwise used for commercial purposes  All illustrations and images included in CareNotes® are the copyrighted property of A D A M , Inc  or Garry Geller

## 2020-03-03 NOTE — PLAN OF CARE
Problem: PHYSICAL THERAPY ADULT  Goal: Performs mobility at highest level of function for planned discharge setting  See evaluation for individualized goals  Description  Treatment/Interventions: Functional transfer training, Therapeutic exercise, Endurance training, Patient/family training, Gait training, Spoke to nursing  Equipment Recommended: (none at this time)       See flowsheet documentation for full assessment, interventions and recommendations  Note:   Prognosis: Good  Problem List: Decreased strength, Decreased endurance, Impaired balance, Decreased mobility  Assessment: Pt is 62 y o  female seen for PT evaluation on 3/3/2020 s/p admit [address] on 2/28/2020 w/ Sepsis due to Haemophilus influenzae with acute hypoxic respiratory failure without septic shock ([facility] [address] )  PT consulted to assess pt's functional mobility and d/c needs  Order placed for PT eval and tx, w/ up and OOB as tolerated order  Performed at least 2 patient identifiers during session: Name and wristband  Comorbidities affecting pt's physical performance at time of assessment include: acute on chronic respiratory failure c hypoxia, COPD, GERD, anxiety and depression  PTA, pt was independent w/ all functional mobility w/ no AD or DME, ambulates household distances and lives alone in 3rd level apartment c elevator access and 0 HELLEN  Personal factors affecting pt at time of IE include: unable to perform dynamic tasks in community, limited home support, decreased initiation and engagement and unable to perform physical activity  Please find objective findings from PT assessment regarding body systems outlined above with impairments and limitations including weakness, impaired balance, decreased endurance, decreased activity tolerance, decreased functional mobility tolerance, decreased safety awareness and fall risk, as well as mobility assessment (need for cueing for mobility technique)   The following objective measures performed on IE also reveal limitations: Barthel Index: 65/100  Pt's clinical presentation is currently unstable/unpredictable seen in pt's presentation of ongoing medical assessment  Pt to benefit from continued PT tx to address deficits as defined above and maximize level of functional independent mobility and consistency  From PT/mobility standpoint, recommendation at time of d/c would be anticipate no needs pending progress in order to facilitate return to PLOF  Barriers to Discharge: Decreased caregiver support(pt lives alone)     Recommendation: Home independently(anticipate no skilled PT needs)          See flowsheet documentation for full assessment

## 2020-03-03 NOTE — RESPIRATORY THERAPY NOTE
Pt  was discharged home with oxygen @ 4 lpm via [place]  Pt 's daughter brought an empty [employer] O2 tank from home  Pt  was supplied with an oxygen tank from [facility] and was instructed that she must return the O2 tank tomorrow, March 4, 2020

## 2020-03-03 NOTE — ASSESSMENT & PLAN NOTE
· Likely viral pneumonia  · Respiratory protocol  · Nebulizer treatments  · mucinex   · Procalcitonin negative x2  · Antibiotics discontinued initially, however, will restart patient on rocephin IV on 3/2/2020 since no improvement  · Check CTA: Multifocal pneumonia throughout right mid and lower lung zones and in the posterior left lung base  Reactive mediastinal and right hilar lymphadenopathy  · Patient will continue with steroid taper and cefdinir p o  Antibiotic

## 2020-03-04 LAB — BACTERIA BLD CULT: NORMAL

## 2020-03-05 ENCOUNTER — OFFICE VISIT (OUTPATIENT)
Dept: FAMILY MEDICINE CLINIC | Facility: HOME HEALTHCARE | Age: 65
End: 2020-03-05
Payer: COMMERCIAL

## 2020-03-05 ENCOUNTER — APPOINTMENT (EMERGENCY)
Dept: CT IMAGING | Facility: HOSPITAL | Age: 65
DRG: 194 | End: 2020-03-05
Payer: COMMERCIAL

## 2020-03-05 ENCOUNTER — APPOINTMENT (EMERGENCY)
Dept: RADIOLOGY | Facility: HOSPITAL | Age: 65
DRG: 194 | End: 2020-03-05
Payer: COMMERCIAL

## 2020-03-05 ENCOUNTER — TRANSITIONAL CARE MANAGEMENT (OUTPATIENT)
Dept: FAMILY MEDICINE CLINIC | Facility: HOME HEALTHCARE | Age: 65
End: 2020-03-05

## 2020-03-05 ENCOUNTER — HOSPITAL ENCOUNTER (INPATIENT)
Facility: HOSPITAL | Age: 65
LOS: 2 days | Discharge: HOME HEALTH | DRG: 194 | End: 2020-03-07
Attending: EMERGENCY MEDICINE | Admitting: INTERNAL MEDICINE
Payer: COMMERCIAL

## 2020-03-05 VITALS
HEART RATE: 102 BPM | BODY MASS INDEX: 30.02 KG/M2 | OXYGEN SATURATION: 90 % | DIASTOLIC BLOOD PRESSURE: 84 MMHG | HEIGHT: 61 IN | SYSTOLIC BLOOD PRESSURE: 138 MMHG | RESPIRATION RATE: 20 BRPM | WEIGHT: 159 LBS | TEMPERATURE: 98.8 F

## 2020-03-05 DIAGNOSIS — J11.00 PNEUMONIA OF RIGHT LOWER LOBE DUE TO INFLUENZA A VIRUS: ICD-10-CM

## 2020-03-05 DIAGNOSIS — R06.00 DYSPNEA: Primary | ICD-10-CM

## 2020-03-05 DIAGNOSIS — R06.03 RESPIRATORY DISTRESS: ICD-10-CM

## 2020-03-05 DIAGNOSIS — E87.6 HYPOKALEMIA: ICD-10-CM

## 2020-03-05 DIAGNOSIS — J96.21 ACUTE ON CHRONIC RESPIRATORY FAILURE WITH HYPOXIA (HCC): ICD-10-CM

## 2020-03-05 DIAGNOSIS — R93.89 ABNORMAL CT OF THE CHEST: ICD-10-CM

## 2020-03-05 DIAGNOSIS — Z76.89 ENCOUNTER FOR SUPPORT AND COORDINATION OF TRANSITION OF CARE: Primary | ICD-10-CM

## 2020-03-05 DIAGNOSIS — J44.1 COPD EXACERBATION (HCC): ICD-10-CM

## 2020-03-05 PROBLEM — J18.9 HCAP (HEALTHCARE-ASSOCIATED PNEUMONIA): Status: ACTIVE | Noted: 2020-03-05

## 2020-03-05 LAB
ALBUMIN SERPL BCP-MCNC: 2.7 G/DL (ref 3.5–5)
ALP SERPL-CCNC: 71 U/L (ref 46–116)
ALT SERPL W P-5'-P-CCNC: 60 U/L (ref 12–78)
ANION GAP SERPL CALCULATED.3IONS-SCNC: 6 MMOL/L (ref 4–13)
APTT PPP: 25 SECONDS (ref 23–37)
AST SERPL W P-5'-P-CCNC: 25 U/L (ref 5–45)
BACTERIA BLD CULT: NORMAL
BASE EX.OXY STD BLDV CALC-SCNC: 92.9 % (ref 60–80)
BASE EXCESS BLDV CALC-SCNC: 6.9 MMOL/L
BASOPHILS # BLD AUTO: 0.01 THOUSANDS/ΜL (ref 0–0.1)
BASOPHILS NFR BLD AUTO: 0 % (ref 0–1)
BILIRUB SERPL-MCNC: 0.7 MG/DL (ref 0.2–1)
BILIRUB UR QL STRIP: NEGATIVE
BUN SERPL-MCNC: 13 MG/DL (ref 5–25)
CALCIUM SERPL-MCNC: 8.8 MG/DL (ref 8.3–10.1)
CHLORIDE SERPL-SCNC: 102 MMOL/L (ref 100–108)
CLARITY UR: CLEAR
CO2 SERPL-SCNC: 34 MMOL/L (ref 21–32)
COLOR UR: YELLOW
CREAT SERPL-MCNC: 0.53 MG/DL (ref 0.6–1.3)
EOSINOPHIL # BLD AUTO: 0.07 THOUSAND/ΜL (ref 0–0.61)
EOSINOPHIL NFR BLD AUTO: 1 % (ref 0–6)
ERYTHROCYTE [DISTWIDTH] IN BLOOD BY AUTOMATED COUNT: 13.5 % (ref 11.6–15.1)
GFR SERPL CREATININE-BSD FRML MDRD: 101 ML/MIN/1.73SQ M
GLUCOSE SERPL-MCNC: 107 MG/DL (ref 65–140)
GLUCOSE UR STRIP-MCNC: NEGATIVE MG/DL
HCO3 BLDV-SCNC: 30.4 MMOL/L (ref 24–30)
HCT VFR BLD AUTO: 38 % (ref 34.8–46.1)
HGB BLD-MCNC: 12.8 G/DL (ref 11.5–15.4)
HGB UR QL STRIP.AUTO: NEGATIVE
IMM GRANULOCYTES # BLD AUTO: 0.17 THOUSAND/UL (ref 0–0.2)
IMM GRANULOCYTES NFR BLD AUTO: 2 % (ref 0–2)
INR PPP: 1.04 (ref 0.84–1.19)
KETONES UR STRIP-MCNC: ABNORMAL MG/DL
LACTATE SERPL-SCNC: 1.1 MMOL/L (ref 0.5–2)
LEUKOCYTE ESTERASE UR QL STRIP: NEGATIVE
LYMPHOCYTES # BLD AUTO: 1.61 THOUSANDS/ΜL (ref 0.6–4.47)
LYMPHOCYTES NFR BLD AUTO: 19 % (ref 14–44)
MAGNESIUM SERPL-MCNC: 2 MG/DL (ref 1.6–2.6)
MCH RBC QN AUTO: 31 PG (ref 26.8–34.3)
MCHC RBC AUTO-ENTMCNC: 33.7 G/DL (ref 31.4–37.4)
MCV RBC AUTO: 92 FL (ref 82–98)
MONOCYTES # BLD AUTO: 1 THOUSAND/ΜL (ref 0.17–1.22)
MONOCYTES NFR BLD AUTO: 12 % (ref 4–12)
NEUTROPHILS # BLD AUTO: 5.68 THOUSANDS/ΜL (ref 1.85–7.62)
NEUTS SEG NFR BLD AUTO: 66 % (ref 43–75)
NITRITE UR QL STRIP: NEGATIVE
NRBC BLD AUTO-RTO: 0 /100 WBCS
NT-PROBNP SERPL-MCNC: 519 PG/ML
O2 CT BLDV-SCNC: 17.8 ML/DL
PCO2 BLDV: 39.2 MM HG (ref 42–50)
PH BLDV: 7.51 [PH] (ref 7.3–7.4)
PH UR STRIP.AUTO: 7 [PH]
PLATELET # BLD AUTO: 336 THOUSANDS/UL (ref 149–390)
PLATELET # BLD AUTO: 348 THOUSANDS/UL (ref 149–390)
PMV BLD AUTO: 10.2 FL (ref 8.9–12.7)
PMV BLD AUTO: 10.4 FL (ref 8.9–12.7)
PO2 BLDV: 71.2 MM HG (ref 35–45)
POTASSIUM SERPL-SCNC: 3.1 MMOL/L (ref 3.5–5.3)
PROCALCITONIN SERPL-MCNC: <0.05 NG/ML
PROT SERPL-MCNC: 6.6 G/DL (ref 6.4–8.2)
PROT UR STRIP-MCNC: NEGATIVE MG/DL
PROTHROMBIN TIME: 13.6 SECONDS (ref 11.6–14.5)
RBC # BLD AUTO: 4.13 MILLION/UL (ref 3.81–5.12)
SODIUM SERPL-SCNC: 142 MMOL/L (ref 136–145)
SP GR UR STRIP.AUTO: <=1.005 (ref 1–1.03)
TROPONIN I SERPL-MCNC: <0.02 NG/ML
UROBILINOGEN UR QL STRIP.AUTO: 0.2 E.U./DL
WBC # BLD AUTO: 8.54 THOUSAND/UL (ref 4.31–10.16)

## 2020-03-05 PROCEDURE — 93005 ELECTROCARDIOGRAM TRACING: CPT

## 2020-03-05 PROCEDURE — 85025 COMPLETE CBC W/AUTO DIFF WBC: CPT | Performed by: EMERGENCY MEDICINE

## 2020-03-05 PROCEDURE — 36415 COLL VENOUS BLD VENIPUNCTURE: CPT | Performed by: EMERGENCY MEDICINE

## 2020-03-05 PROCEDURE — 84145 PROCALCITONIN (PCT): CPT | Performed by: PHYSICIAN ASSISTANT

## 2020-03-05 PROCEDURE — 85049 AUTOMATED PLATELET COUNT: CPT | Performed by: PHYSICIAN ASSISTANT

## 2020-03-05 PROCEDURE — 85730 THROMBOPLASTIN TIME PARTIAL: CPT | Performed by: EMERGENCY MEDICINE

## 2020-03-05 PROCEDURE — 81003 URINALYSIS AUTO W/O SCOPE: CPT | Performed by: EMERGENCY MEDICINE

## 2020-03-05 PROCEDURE — 84145 PROCALCITONIN (PCT): CPT | Performed by: EMERGENCY MEDICINE

## 2020-03-05 PROCEDURE — 83735 ASSAY OF MAGNESIUM: CPT | Performed by: EMERGENCY MEDICINE

## 2020-03-05 PROCEDURE — 94760 N-INVAS EAR/PLS OXIMETRY 1: CPT

## 2020-03-05 PROCEDURE — 94640 AIRWAY INHALATION TREATMENT: CPT

## 2020-03-05 PROCEDURE — 84484 ASSAY OF TROPONIN QUANT: CPT | Performed by: EMERGENCY MEDICINE

## 2020-03-05 PROCEDURE — 96361 HYDRATE IV INFUSION ADD-ON: CPT

## 2020-03-05 PROCEDURE — 99223 1ST HOSP IP/OBS HIGH 75: CPT | Performed by: PHYSICIAN ASSISTANT

## 2020-03-05 PROCEDURE — 99285 EMERGENCY DEPT VISIT HI MDM: CPT | Performed by: EMERGENCY MEDICINE

## 2020-03-05 PROCEDURE — 99496 TRANSJ CARE MGMT HIGH F2F 7D: CPT | Performed by: FAMILY MEDICINE

## 2020-03-05 PROCEDURE — 71275 CT ANGIOGRAPHY CHEST: CPT

## 2020-03-05 PROCEDURE — 96365 THER/PROPH/DIAG IV INF INIT: CPT

## 2020-03-05 PROCEDURE — 82805 BLOOD GASES W/O2 SATURATION: CPT | Performed by: EMERGENCY MEDICINE

## 2020-03-05 PROCEDURE — 99285 EMERGENCY DEPT VISIT HI MDM: CPT

## 2020-03-05 PROCEDURE — 87040 BLOOD CULTURE FOR BACTERIA: CPT | Performed by: EMERGENCY MEDICINE

## 2020-03-05 PROCEDURE — 83880 ASSAY OF NATRIURETIC PEPTIDE: CPT | Performed by: EMERGENCY MEDICINE

## 2020-03-05 PROCEDURE — 94664 DEMO&/EVAL PT USE INHALER: CPT

## 2020-03-05 PROCEDURE — 96375 TX/PRO/DX INJ NEW DRUG ADDON: CPT

## 2020-03-05 PROCEDURE — 85610 PROTHROMBIN TIME: CPT | Performed by: EMERGENCY MEDICINE

## 2020-03-05 PROCEDURE — 94640 AIRWAY INHALATION TREATMENT: CPT | Performed by: EMERGENCY MEDICINE

## 2020-03-05 PROCEDURE — 83605 ASSAY OF LACTIC ACID: CPT | Performed by: EMERGENCY MEDICINE

## 2020-03-05 PROCEDURE — 80053 COMPREHEN METABOLIC PANEL: CPT | Performed by: EMERGENCY MEDICINE

## 2020-03-05 RX ORDER — METHYLPREDNISOLONE SODIUM SUCCINATE 125 MG/2ML
60 INJECTION, POWDER, LYOPHILIZED, FOR SOLUTION INTRAMUSCULAR; INTRAVENOUS ONCE
Status: COMPLETED | OUTPATIENT
Start: 2020-03-05 | End: 2020-03-05

## 2020-03-05 RX ORDER — MONTELUKAST SODIUM 4 MG/1
1 TABLET, CHEWABLE ORAL 2 TIMES DAILY
Status: DISCONTINUED | OUTPATIENT
Start: 2020-03-05 | End: 2020-03-07 | Stop reason: HOSPADM

## 2020-03-05 RX ORDER — CEFEPIME HYDROCHLORIDE 2 G/50ML
2000 INJECTION, SOLUTION INTRAVENOUS ONCE
Status: COMPLETED | OUTPATIENT
Start: 2020-03-05 | End: 2020-03-05

## 2020-03-05 RX ORDER — ONDANSETRON 2 MG/ML
4 INJECTION INTRAMUSCULAR; INTRAVENOUS EVERY 6 HOURS PRN
Status: DISCONTINUED | OUTPATIENT
Start: 2020-03-05 | End: 2020-03-07 | Stop reason: HOSPADM

## 2020-03-05 RX ORDER — IPRATROPIUM BROMIDE AND ALBUTEROL SULFATE 2.5; .5 MG/3ML; MG/3ML
3 SOLUTION RESPIRATORY (INHALATION) ONCE
Status: COMPLETED | OUTPATIENT
Start: 2020-03-05 | End: 2020-03-05

## 2020-03-05 RX ORDER — FUROSEMIDE 40 MG/1
40 TABLET ORAL DAILY
Status: DISCONTINUED | OUTPATIENT
Start: 2020-03-06 | End: 2020-03-07 | Stop reason: HOSPADM

## 2020-03-05 RX ORDER — POTASSIUM CHLORIDE 750 MG/1
10 TABLET, EXTENDED RELEASE ORAL DAILY PRN
Status: DISCONTINUED | OUTPATIENT
Start: 2020-03-05 | End: 2020-03-07 | Stop reason: HOSPADM

## 2020-03-05 RX ORDER — PANTOPRAZOLE SODIUM 40 MG/1
40 TABLET, DELAYED RELEASE ORAL
Status: DISCONTINUED | OUTPATIENT
Start: 2020-03-06 | End: 2020-03-07 | Stop reason: HOSPADM

## 2020-03-05 RX ORDER — GUAIFENESIN 600 MG
600 TABLET, EXTENDED RELEASE 12 HR ORAL 2 TIMES DAILY
Status: DISCONTINUED | OUTPATIENT
Start: 2020-03-05 | End: 2020-03-07 | Stop reason: HOSPADM

## 2020-03-05 RX ORDER — GABAPENTIN 300 MG/1
300 CAPSULE ORAL 2 TIMES DAILY
Status: DISCONTINUED | OUTPATIENT
Start: 2020-03-05 | End: 2020-03-07 | Stop reason: HOSPADM

## 2020-03-05 RX ORDER — ALBUTEROL SULFATE 2.5 MG/3ML
2.5 SOLUTION RESPIRATORY (INHALATION) EVERY 6 HOURS PRN
Status: DISCONTINUED | OUTPATIENT
Start: 2020-03-05 | End: 2020-03-07 | Stop reason: HOSPADM

## 2020-03-05 RX ORDER — CYCLOBENZAPRINE HCL 10 MG
10 TABLET ORAL DAILY
Status: DISCONTINUED | OUTPATIENT
Start: 2020-03-06 | End: 2020-03-07 | Stop reason: HOSPADM

## 2020-03-05 RX ORDER — HEPARIN SODIUM 5000 [USP'U]/ML
5000 INJECTION, SOLUTION INTRAVENOUS; SUBCUTANEOUS EVERY 8 HOURS SCHEDULED
Status: DISCONTINUED | OUTPATIENT
Start: 2020-03-05 | End: 2020-03-07 | Stop reason: HOSPADM

## 2020-03-05 RX ORDER — QUETIAPINE FUMARATE 25 MG/1
50 TABLET, FILM COATED ORAL 3 TIMES DAILY
Status: DISCONTINUED | OUTPATIENT
Start: 2020-03-05 | End: 2020-03-07 | Stop reason: HOSPADM

## 2020-03-05 RX ORDER — DULOXETIN HYDROCHLORIDE 30 MG/1
60 CAPSULE, DELAYED RELEASE ORAL DAILY
Status: DISCONTINUED | OUTPATIENT
Start: 2020-03-06 | End: 2020-03-07 | Stop reason: HOSPADM

## 2020-03-05 RX ORDER — POTASSIUM CHLORIDE 20 MEQ/1
40 TABLET, EXTENDED RELEASE ORAL ONCE
Status: COMPLETED | OUTPATIENT
Start: 2020-03-05 | End: 2020-03-05

## 2020-03-05 RX ORDER — ATORVASTATIN CALCIUM 10 MG/1
20 TABLET, FILM COATED ORAL DAILY
Status: DISCONTINUED | OUTPATIENT
Start: 2020-03-06 | End: 2020-03-07 | Stop reason: HOSPADM

## 2020-03-05 RX ORDER — FLUTICASONE FUROATE AND VILANTEROL 200; 25 UG/1; UG/1
1 POWDER RESPIRATORY (INHALATION)
Status: DISCONTINUED | OUTPATIENT
Start: 2020-03-06 | End: 2020-03-07 | Stop reason: HOSPADM

## 2020-03-05 RX ORDER — DICYCLOMINE HCL 20 MG
20 TABLET ORAL 3 TIMES DAILY PRN
Status: DISCONTINUED | OUTPATIENT
Start: 2020-03-05 | End: 2020-03-07 | Stop reason: HOSPADM

## 2020-03-05 RX ADMIN — SODIUM CHLORIDE 1000 ML: 0.9 INJECTION, SOLUTION INTRAVENOUS at 14:12

## 2020-03-05 RX ADMIN — GABAPENTIN 300 MG: 300 CAPSULE ORAL at 22:36

## 2020-03-05 RX ADMIN — IPRATROPIUM BROMIDE AND ALBUTEROL SULFATE 3 ML: 2.5; .5 SOLUTION RESPIRATORY (INHALATION) at 14:14

## 2020-03-05 RX ADMIN — GUAIFENESIN 600 MG: 600 TABLET, EXTENDED RELEASE ORAL at 22:36

## 2020-03-05 RX ADMIN — POTASSIUM CHLORIDE 40 MEQ: 20 TABLET, EXTENDED RELEASE ORAL at 19:40

## 2020-03-05 RX ADMIN — IOHEXOL 160 ML: 350 INJECTION, SOLUTION INTRAVENOUS at 18:42

## 2020-03-05 RX ADMIN — QUETIAPINE FUMARATE 50 MG: 25 TABLET ORAL at 22:36

## 2020-03-05 RX ADMIN — HEPARIN SODIUM 5000 UNITS: 5000 INJECTION INTRAVENOUS; SUBCUTANEOUS at 22:40

## 2020-03-05 RX ADMIN — CEFEPIME HYDROCHLORIDE 2000 MG: 2 INJECTION, SOLUTION INTRAVENOUS at 14:14

## 2020-03-05 RX ADMIN — METHYLPREDNISOLONE SODIUM SUCCINATE 60 MG: 125 INJECTION, POWDER, FOR SOLUTION INTRAMUSCULAR; INTRAVENOUS at 14:10

## 2020-03-05 RX ADMIN — SODIUM CHLORIDE 1000 ML: 0.9 INJECTION, SOLUTION INTRAVENOUS at 19:43

## 2020-03-05 NOTE — PROGRESS NOTES
[address]       NAME: Sarkis Mcdaniels is a 62 y o  female  : 1955    MRN: [id number]  DATE: 2020  TIME: 12:33 PM    Assessment and Plan   Diagnoses and all orders for this visit:    Encounter for support and coordination of transition of care    Respiratory distress        No problem-specific Assessment & Plan notes found for this encounter  Patient Instructions           Chief Complaint     Chief Complaint   Patient presents with    Pneumonia     patient states she was d/c from hospital with pneumonia    Fall     patient states she fell today twice    Influenza     patient states she was daignosed with the flu but didn't  tamiflu yet         History of Present Illness         Patient presented to [facility] for transition of care appointment, appointment was actually scheduled for tomorrow but did accommodate her on to schedule  Review of records and discussion with patient reveal that she was recently admitted for influenza and pneumonia, discharged last week  Feels worse since discharge  States that she was given oxygen at home, she does not have oxygen on her at the appointment  Did have the repeat chest x-ray which was ordered, and resulted for persistent pneumonia  Previous provider did send on the staff for treatment, patient admits that she has not picked that up  Also did not  the Tamiflu prescribed for her  She is fatigued, and satting 88-90% on room air  Did place her on 3 L via nasal cannula, pulse ox came up to 92%  She cannot speak in full sentences, and is using accessory muscles  She is unsteady on her feet and reports falling twice today  Denies any loss of consciousness, did not hit her head  Patient with significant unsteadiness while trying to rise from chair  Patient advised that concern for respiratory distress, she should go immediately to the emergency room    She is agreeable, EMS was called and [facility] was [facility]  Review of Systems   Review of Systems   Constitutional: Positive for chills and fatigue  HENT: Positive for congestion  Respiratory: Positive for chest tightness, shortness of breath and wheezing  Cardiovascular: Negative  Gastrointestinal: Negative  Genitourinary: Negative  Musculoskeletal: Positive for arthralgias and gait problem  Neurological: Positive for dizziness and weakness  Psychiatric/Behavioral: Negative            Current Medications       Current Outpatient Medications:     albuterol (2 5 mg/3 mL) 0 083 % nebulizer solution, Take 1 vial (2 5 mg total) by nebulization every 6 (six) hours as needed for wheezing or shortness of breath, Disp: 120 vial, Rfl: 1    albuterol (PROVENTIL HFA,VENTOLIN HFA) 90 mcg/act inhaler, Inhale 2 puffs every 6 (six) hours as needed for wheezing, Disp: 1 Inhaler, Rfl: 1    atorvastatin (LIPITOR) 20 mg tablet, Take 1 tablet (20 mg total) by mouth daily, Disp: 90 tablet, Rfl: 0    cefdinir (OMNICEF) 300 mg capsule, Take 1 capsule (300 mg total) by mouth every 12 (twelve) hours for 10 days, Disp: 20 capsule, Rfl: 0    colestipol (COLESTID) 1 g tablet, Take 1 tablet (1 g total) by mouth 2 (two) times a day, Disp: 180 tablet, Rfl: 3    cyclobenzaprine (FLEXERIL) 10 mg tablet, Take 1 tablet (10 mg total) by mouth daily, Disp: 90 tablet, Rfl: 0    dicyclomine (BENTYL) 20 mg tablet, Take 1 tablet (20 mg total) by mouth 3 (three) times a day as needed (as needed), Disp: 270 tablet, Rfl: 0    DULoxetine (CYMBALTA) 60 mg delayed release capsule, Take 1 capsule (60 mg total) by mouth daily, Disp: 90 capsule, Rfl: 0    ergocalciferol (VITAMIN D2) 50,000 units, Take 1 capsule (50,000 Units total) by mouth once a week, Disp: 4 capsule, Rfl: 2    esomeprazole (NexIUM) 40 MG capsule, Take 1 capsule (40 mg total) by mouth daily, Disp: 90 capsule, Rfl: 0    fluticasone-salmeterol (ADVAIR DISKUS) 500-50 mcg/dose inhaler, Inhale 1 puff 2 (two) times a day, Disp: 1 Inhaler, Rfl: 2    furosemide (LASIX) 40 mg tablet, Take 1 tablet (40 mg total) by mouth daily, Disp: 90 tablet, Rfl: 0    gabapentin (NEURONTIN) 300 mg capsule, Take 1 capsule (300 mg total) by mouth 2 (two) times a day, Disp: 60 capsule, Rfl: 0    guaiFENesin (MUCINEX) 600 mg 12 hr tablet, Take 1 tablet (600 mg total) by mouth 2 (two) times a day for 20 days, Disp: 40 tablet, Rfl: 0    naproxen sodium (ALEVE) 220 MG tablet, Take 1 tablet (220 mg total) by mouth every 12 (twelve) hours as needed for mild pain, Disp: 120 tablet, Rfl: 0    oseltamivir (TAMIFLU) 75 mg capsule, Take 1 capsule (75 mg total) by mouth every 12 (twelve) hours for 2 days, Disp: 4 capsule, Rfl: 0    potassium chloride (K-DUR) 10 mEq tablet, Take 1 tablet (10 mEq total) by mouth daily as needed (when pt takes lasix), Disp: 30 tablet, Rfl: 0    predniSONE 10 mg tablet, Take 4 tablets (40 mg total) by mouth 2 (two) times a day with meals for 3 days, THEN 3 tablets (30 mg total) 2 (two) times a day with meals for 3 days, THEN 2 tablets (20 mg total) 2 (two) times a day with meals for 3 days, THEN 1 tablet (10 mg total) 2 (two) times a day with meals for 3 days, THEN 1 tablet (10 mg total) daily for 3 days  , Disp: 63 tablet, Rfl: 0    QUEtiapine (SEROquel) 50 mg tablet, Take 1 tablet (50 mg total) by mouth 3 (three) times a day 1 tab PO daily 2 tabs PO qHS, Disp: 90 tablet, Rfl: 0    Current Allergies     Allergies as of 03/05/2020 - Reviewed 03/05/2020   Allergen Reaction Noted    Erythromycin Throat Swelling 10/25/2017    Morphine Delirium             The following portions of the patient's history were reviewed and updated as appropriate: allergies, current medications, past family history, past medical history, past social history, past surgical history and problem list      Past Medical History:   Diagnosis Date    Anxiety     COPD (chronic obstructive pulmonary disease) ([facility] [address]     Depression     Diverticulitis     GERD (gastroesophageal reflux disease)     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[address] )     Psychiatric disorder     Sleep apnea        Past Surgical History:   Procedure Laterality Date    APPENDECTOMY      CHOLECYSTECTOMY      partial x2 per pt    HIATAL HERNIA REPAIR      INGUINAL HERNIA REPAIR Right     LAPAROSCOPIC TUBAL LIGATION Bilateral     ROTATOR CUFF REPAIR Right     UVULOPALATOPHARYNGOPLASTY         Family History   Problem Relation Age of Onset    No Known Problems Mother     No Known Problems Father     No Known Problems Sister     No Known Problems Daughter     Breast cancer Maternal Grandmother 28    No Known Problems Paternal Grandmother     No Known Problems Maternal Aunt          Medications have been verified  Objective   /84   Pulse 102   Temp 98 8 °F (37 1 °C)   Resp 20   Ht 5' 1" (1 549 m)   Wt 72 1 kg (159 lb)   SpO2 90%   BMI 30 04 kg/m²        Physical Exam     Physical Exam   Constitutional: She is oriented to person, place, and time  She is easily aroused  She has a sickly appearance  She appears distressed  Nasal cannula in place  Cardiovascular: Regular rhythm, normal heart sounds and intact distal pulses  Pulmonary/Chest: Accessory muscle usage present  Tachypnea noted  She is in respiratory distress  She has decreased breath sounds  Neurological: She is alert, oriented to person, place, and time and easily aroused  Significant gait unsteadiness   Skin: Skin is dry  Capillary refill takes less than 2 seconds  Nursing note and vitals reviewed

## 2020-03-05 NOTE — PATIENT INSTRUCTIONS
Concern for respiratory distress, patient transported to [facility] via EMS    Will follow-up after discharge

## 2020-03-05 NOTE — ED PROVIDER NOTES
History  Chief Complaint   Patient presents with    Shortness of Breath     pt reccently discharged from hospital with post influenza pneumonia  pt was unable to  medication, seen by PCP and pt was hypoxic in the mid 80's on RA     Patient is a 60-year-old female with a history of COPD, depression, diverticulitis, GERD, hyperlipidemia, coming in today after patient was found at her local PCP with hypoxia shortness of breath tachypnea tachycardia  I did receive a call from PCP stating that patient was there a day earlier for appointment  She did recently get discharged from [facility] in hospital for post influenza multifocal pneumonia  She did not  for Tamiflu or antibiotics  Since that time 2 days ago, patient is progressively worsening shortness of breath, cough and not feeling well  She has had no fevers, chest pain, palpitations or syncope  She continue feels not well    She has no lower extremity edema      History provided by:  Patient   used: No    Shortness of Breath   Severity:  Moderate  Onset quality:  Gradual  Timing:  Constant  Progression:  Worsening  Chronicity:  Recurrent  Context: not activity, not animal exposure, not emotional upset, not fumes, not known allergens, not occupational exposure, not pollens, not smoke exposure, not strong odors, not URI and not weather changes    Relieved by:  Nothing  Worsened by:  Coughing, deep breathing, exertion and movement  Ineffective treatments:  None tried  Associated symptoms: cough and wheezing    Associated symptoms: no abdominal pain, no chest pain, no claudication, no diaphoresis, no ear pain, no fever, no headaches, no hemoptysis, no neck pain, no PND, no rash, no sore throat, no sputum production, no syncope, no swollen glands and no vomiting    Cough:     Cough characteristics:  Non-productive    Sputum characteristics:  Nondescript    Severity:  Moderate    Onset quality:  Gradual    Timing: Constant    Progression:  Unable to specify    Chronicity:  Recurrent  Wheezing:     Severity:  Moderate    Onset quality:  Gradual    Timing:  Constant    Progression:  Worsening    Chronicity:  Recurrent  Risk factors: no recent alcohol use, no family hx of DVT, no hx of cancer, no hx of PE/DVT, no obesity, no oral contraceptive use, no prolonged immobilization, no recent surgery and no tobacco use        Prior to Admission Medications   Prescriptions Last Dose Informant Patient Reported? Taking?    DULoxetine (CYMBALTA) 60 mg delayed release capsule   No No   Sig: Take 1 capsule (60 mg total) by mouth daily   QUEtiapine (SEROquel) 50 mg tablet   No No   Sig: Take 1 tablet (50 mg total) by mouth 3 (three) times a day 1 tab PO daily 2 tabs PO qHS   albuterol (2 5 mg/3 mL) 0 083 % nebulizer solution   No No   Sig: Take 1 vial (2 5 mg total) by nebulization every 6 (six) hours as needed for wheezing or shortness of breath   albuterol (PROVENTIL HFA,VENTOLIN HFA) 90 mcg/act inhaler   No No   Sig: Inhale 2 puffs every 6 (six) hours as needed for wheezing   atorvastatin (LIPITOR) 20 mg tablet   No No   Sig: Take 1 tablet (20 mg total) by mouth daily   cefdinir (OMNICEF) 300 mg capsule   No No   Sig: Take 1 capsule (300 mg total) by mouth every 12 (twelve) hours for 10 days   colestipol (COLESTID) 1 g tablet   No No   Sig: Take 1 tablet (1 g total) by mouth 2 (two) times a day   cyclobenzaprine (FLEXERIL) 10 mg tablet   No No   Sig: Take 1 tablet (10 mg total) by mouth daily   dicyclomine (BENTYL) 20 mg tablet   No No   Sig: Take 1 tablet (20 mg total) by mouth 3 (three) times a day as needed (as needed)   ergocalciferol (VITAMIN D2) 50,000 units   No No   Sig: Take 1 capsule (50,000 Units total) by mouth once a week   esomeprazole (NexIUM) 40 MG capsule   No No   Sig: Take 1 capsule (40 mg total) by mouth daily   fluticasone-salmeterol (ADVAIR DISKUS) 500-50 mcg/dose inhaler   No No   Sig: Inhale 1 puff 2 (two) times a day   furosemide (LASIX) 40 mg tablet   No No   Sig: Take 1 tablet (40 mg total) by mouth daily   gabapentin (NEURONTIN) 300 mg capsule   No No   Sig: Take 1 capsule (300 mg total) by mouth 2 (two) times a day   guaiFENesin (MUCINEX) 600 mg 12 hr tablet   No No   Sig: Take 1 tablet (600 mg total) by mouth 2 (two) times a day for 20 days   naproxen sodium (ALEVE) 220 MG tablet   No No   Sig: Take 1 tablet (220 mg total) by mouth every 12 (twelve) hours as needed for mild pain   oseltamivir (TAMIFLU) 75 mg capsule   No No   Sig: Take 1 capsule (75 mg total) by mouth every 12 (twelve) hours for 2 days   potassium chloride (K-DUR) 10 mEq tablet   No No   Sig: Take 1 tablet (10 mEq total) by mouth daily as needed (when pt takes lasix)   predniSONE 10 mg tablet   No No   Sig: Take 4 tablets (40 mg total) by mouth 2 (two) times a day with meals for 3 days, THEN 3 tablets (30 mg total) 2 (two) times a day with meals for 3 days, THEN 2 tablets (20 mg total) 2 (two) times a day with meals for 3 days, THEN 1 tablet (10 mg total) 2 (two) times a day with meals for 3 days, THEN 1 tablet (10 mg total) daily for 3 days        Facility-Administered Medications: None       Past Medical History:   Diagnosis Date    Anxiety     COPD (chronic obstructive pulmonary disease) ([place] [address] )     Depression     Diverticulitis     GERD (gastroesophageal reflux disease)     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[address] )     Psychiatric disorder     Sleep apnea        Past Surgical History:   Procedure Laterality Date    APPENDECTOMY      CHOLECYSTECTOMY      partial x2 per pt    HIATAL HERNIA REPAIR      INGUINAL HERNIA REPAIR Right     LAPAROSCOPIC TUBAL LIGATION Bilateral     ROTATOR CUFF REPAIR Right     UVULOPALATOPHARYNGOPLASTY         Family History   Problem Relation Age of Onset    No Known Problems Mother     No Known Problems Father     No Known Problems Sister     No Known Problems Daughter     Breast cancer Maternal Grandmother 28    No Known Problems Paternal Grandmother     No Known Problems Maternal Aunt      I have reviewed and agree with the history as documented  E-Cigarette/Vaping    E-Cigarette Use Never User      E-Cigarette/Vaping Substances    Nicotine No      Social History     Tobacco Use    Smoking status: Former Smoker     Packs/day: 1 00     Types: Cigarettes     Last attempt to quit: 12/10/2019     Years since quittin 2    Smokeless tobacco: Never Used    Tobacco comment: using patches   Substance Use Topics    Alcohol use: Yes     Frequency: Never     Comment: occasionally    Drug use: Yes     Types: Marijuana     Comment: daily in e-pen form       Review of Systems   Constitutional: Negative  Negative for diaphoresis and fever  HENT: Negative  Negative for ear pain and sore throat  Eyes: Negative  Negative for visual disturbance  Respiratory: Positive for cough, shortness of breath and wheezing  Negative for hemoptysis, sputum production and chest tightness  Cardiovascular: Negative  Negative for chest pain, palpitations, claudication, syncope and PND  Gastrointestinal: Negative  Negative for abdominal pain, nausea and vomiting  Genitourinary: Negative  Negative for difficulty urinating and dysuria  Musculoskeletal: Negative  Negative for back pain and neck pain  Skin: Negative for rash  Neurological: Negative for weakness and headaches  Hematological: Negative  Psychiatric/Behavioral: Negative for confusion  All other systems reviewed and are negative  Physical Exam  Physical Exam   Constitutional: She is oriented to person, place, and time  She appears well-developed and well-nourished  No distress  HENT:   Head: Normocephalic and atraumatic  Dry mucous membranes  Patient maintaining airway and secretions  Eyes: Pupils are equal, round, and reactive to light  Conjunctivae and EOM are normal    Neck: Normal range of motion  Neck supple     Cardiovascular: Regular rhythm, normal heart sounds and intact distal pulses  Tachycardia present  No murmur heard  Pulses:       Radial pulses are 2+ on the right side, and 2+ on the left side  Dorsalis pedis pulses are 2+ on the right side, and 2+ on the left side  Pulmonary/Chest: Effort normal  No accessory muscle usage or stridor  Tachypnea noted  No respiratory distress  She has wheezes in the right lower field and the left lower field  She has rhonchi in the right lower field and the left lower field  Abdominal: Soft  Bowel sounds are normal  She exhibits no distension  There is no tenderness  Musculoskeletal: Normal range of motion  She exhibits no edema  Right lower leg: Normal         Left lower leg: Normal    Neurological: She is alert and oriented to person, place, and time  No cranial nerve deficit  GCS 15  No slurred speech  No facial asymmetry  Skin: Skin is warm  Capillary refill takes less than 2 seconds  She is not diaphoretic  Nursing note and vitals reviewed        Vital Signs  ED Triage Vitals [03/05/20 1301]   Temperature Pulse Respirations Blood Pressure SpO2   (!) 97 4 °F (36 3 °C) 102 20 129/84 (!) 89 %      Temp Source Heart Rate Source Patient Position - Orthostatic VS BP Location FiO2 (%)   Oral Monitor Lying Right arm --      Pain Score       6           Vitals:    03/05/20 1345 03/05/20 1400 03/05/20 1430 03/05/20 1500   BP: 130/83 131/77 120/79    Pulse: 98 99 101 102   Patient Position - Orthostatic VS:             Visual Acuity      ED Medications  Medications   sodium chloride 0 9 % bolus 1,000 mL (has no administration in time range)   potassium chloride (K-DUR,KLOR-CON) CR tablet 40 mEq (has no administration in time range)   cefepime (MAXIPIME) IVPB (premix) 2,000 mg (2,000 mg Intravenous New Bag 3/5/20 1414)   sodium chloride 0 9 % bolus 1,000 mL (1,000 mL Intravenous New Bag 3/5/20 1412)   ipratropium-albuterol (DUO-NEB) 0 5-2 5 mg/3 mL inhalation solution 3 mL (3 mL Nebulization Given 3/5/20 1414)   methylPREDNISolone sodium succinate (Solu-MEDROL) injection 60 mg (60 mg Intravenous Given 3/5/20 1410)       Diagnostic Studies  Results Reviewed     Procedure Component Value Units Date/Time    Lactic acid x2 [[id number]]  (Normal) Collected:  03/05/20 1430    Lab Status:  Final result Specimen:  Blood from Arm, Right Updated:  03/05/20 1505     LACTIC ACID 1 1 mmol/L     Narrative:       Result may be elevated if tourniquet was used during collection      NT-BNP PRO [[id number]]  (Abnormal) Collected:  03/05/20 1430    Lab Status:  Final result Specimen:  Blood from Arm, Right Updated:  03/05/20 1505     NT-proBNP 519 pg/mL     Magnesium [[id number]]  (Normal) Collected:  03/05/20 1430    Lab Status:  Final result Specimen:  Blood from Arm, Right Updated:  03/05/20 1505     Magnesium 2 0 mg/dL     Troponin I [[id number]]  (Normal) Collected:  03/05/20 1430    Lab Status:  Final result Specimen:  Blood from Arm, Right Updated:  03/05/20 1504     Troponin I <0 02 ng/mL     Comprehensive metabolic panel [[id number]]  (Abnormal) Collected:  03/05/20 1430    Lab Status:  Final result Specimen:  Blood from Arm, Right Updated:  03/05/20 1459     Sodium 142 mmol/L      Potassium 3 1 mmol/L      Chloride 102 mmol/L      CO2 34 mmol/L      ANION GAP 6 mmol/L      BUN 13 mg/dL      Creatinine 0 53 mg/dL      Glucose 107 mg/dL      Calcium 8 8 mg/dL      AST 25 U/L      ALT 60 U/L      Alkaline Phosphatase 71 U/L      Total Protein 6 6 g/dL      Albumin 2 7 g/dL      Total Bilirubin 0 70 mg/dL      eGFR 101 ml/min/1 73sq m     Narrative:       [place] guidelines for Chronic Kidney Disease (CKD):     Stage 1 with normal or high GFR (GFR > 90 mL/min/1 73 square meters)    Stage 2 Mild CKD (GFR = 60-89 mL/min/1 73 square meters)    Stage 3A Moderate CKD (GFR = 45-59 mL/min/1 73 square meters)    Stage 3B Moderate CKD (GFR = 30-44 mL/min/1 73 square meters)    Stage 4 Severe CKD (GFR = 15-29 mL/min/1 73 square meters)    Stage 5 End Stage CKD (GFR <15 mL/min/1 73 square meters)  Note: GFR calculation is accurate only with a steady state creatinine    Protime-INR [[id number]]  (Normal) Collected:  03/05/20 1430    Lab Status:  Final result Specimen:  Blood from Arm, Right Updated:  03/05/20 1450     Protime 13 6 seconds      INR 1 04    APTT [[id number]]  (Normal) Collected:  03/05/20 1430    Lab Status:  Final result Specimen:  Blood from Arm, Right Updated:  03/05/20 1450     PTT 25 seconds     Blood gas, Venous [[id number]]  (Abnormal) Collected:  03/05/20 1327    Lab Status:  Final result Specimen:  Blood from Arm, Right Updated:  03/05/20 1340     pH, David 7 507     pCO2, David 39 2 mm Hg      pO2, David 71 2 mm Hg      HCO3, David 30 4 mmol/L      Base Excess, David 6 9 mmol/L      O2 Content, David 17 8 ml/dL      O2 HGB, VENOUS 92 9 %     CBC and differential [[id number]] Collected:  03/05/20 1327    Lab Status:  Final result Specimen:  Blood from Arm, Right Updated:  03/05/20 1340     WBC 8 54 Thousand/uL      RBC 4 13 Million/uL      Hemoglobin 12 8 g/dL      Hematocrit 38 0 %      MCV 92 fL      MCH 31 0 pg      MCHC 33 7 g/dL      RDW 13 5 %      MPV 10 2 fL      Platelets 806 Thousands/uL      nRBC 0 /100 WBCs      Neutrophils Relative 66 %      Immat GRANS % 2 %      Lymphocytes Relative 19 %      Monocytes Relative 12 %      Eosinophils Relative 1 %      Basophils Relative 0 %      Neutrophils Absolute 5 68 Thousands/µL      Immature Grans Absolute 0 17 Thousand/uL      Lymphocytes Absolute 1 61 Thousands/µL      Monocytes Absolute 1 00 Thousand/µL      Eosinophils Absolute 0 07 Thousand/µL      Basophils Absolute 0 01 Thousands/µL     Blood culture #1 [[id number]] Collected:  03/05/20 1327    Lab Status: In process Specimen:  Blood from Arm, Right Updated:  03/05/20 1337    Blood culture #2 [[id number]] Collected:  03/05/20 1327    Lab Status:   In process Specimen:  Blood from Arm, Left Updated:  03/05/20 1337    Procalcitonin [[id number]] Collected:  03/05/20 1327    Lab Status: In process Specimen:  Blood from Arm, Right Updated:  03/05/20 1336    UA w Reflex to Microscopic w Reflex to Culture [[id number]]     Lab Status:  No result Specimen:  Urine, Clean Catch                  CTA ED chest PE Study    (Results Pending)              Procedures  Procedures         ED Course  ED Course as of Mar 05 1815   Thu Mar 05, 2020   1303 Patient is a 20-year-old female coming in today with complaints of increasing shortness of breath and fatigue  On exam she is hypoxic on room air but increases with 3 L nasal cannula  She is also tachycardic  Given her recent hospitalization and continued fatigue CT scan cardia and hypoxia will CT rule out PE given high wells and high perc  Also give DuoNeb and IV fluids carefully  Will review chart as well    Portions of the record may have been created with voice recognition software  Occasional wrong word or "sound a like" substitutions may have occurred due to the inherent limitations of voice recognition software  Read the chart carefully and recognize, using context, where substitutions have occurred  1313 In review of chart, patient was admitted for acute hypoxic respiratory failure due to multiple liver post influenza pneumonia  She was treated with Tamiflu, IV antibiotics and prednisone  She was supposed to be discharged home with prednisone, Tamiflu and Cefdinir  However, never picked them up      1346 CM has met with patient       1502 Patient CMP stable except for mild drop in potassium which were placed orally  1553 Patient to CT      1814 Please refer to paper chart as epic has system down time  Patient had lost IV access  Multiple attempts at IVs were not successful in till IV by ultrasound  T2 was negative within the 2nd EKG  Patient was taken to CT  Patient did have an increase in temperature and was diaphoretic    Tylenol was ordered  Signed out to DR galeano for follow up and ct             HEART Risk Score      Most Recent Value   Heart Score Risk Calculator   History  1 Filed at: 03/05/2020 1815   ECG  1 Filed at: 03/05/2020 1815   Age  1 Filed at: 03/05/2020 1815   Risk Factors  2 Filed at: 03/05/2020 1815   Troponin  0 Filed at: 03/05/2020 1815   HEART Score  5 Filed at: 03/05/2020 1815            PERC Rule for PE      Most Recent Value   PERC Rule for PE   Age >=50  1 Filed at: 03/05/2020 1302   HR >=100  1 Filed at: 03/05/2020 1302   O2 Sat on room air < 95%  1 Filed at: 03/05/2020 1302   History of PE or DVT  0 Filed at: 03/05/2020 1302   Recent trauma or surgery  0 Filed at: 03/05/2020 1302   Hemoptysis  0 Filed at: 03/05/2020 1302   Exogenous estrogen  0 Filed at: 03/05/2020 1302   Unilateral leg swelling  0 Filed at: 03/05/2020 1302   PERC Rule for PE Results  3 Filed at: 03/05/2020 1302                Wells' Criteria for PE      Most Recent Value   Wells' Criteria for PE   Clinical signs and symptoms of DVT  0 Filed at: 03/05/2020 1302   PE is primary diagnosis or equally likely  0 Filed at: 03/05/2020 1302   HR >100  1 5 Filed at: 03/05/2020 1302   Immobilization at least 3 days or Surgery in the previous 4 weeks  1 5 Filed at: 03/05/2020 1302   Previous, objectively diagnosed PE or DVT  0 Filed at: 03/05/2020 1302   Hemoptysis  0 Filed at: 03/05/2020 1302   Malignancy with treatment within 6 months or palliative  0 Filed at: 03/05/2020 1302   Wells' Criteria Total  3 Filed at: 03/05/2020 1302            MDM  Number of Diagnoses or Management Options  Diagnosis management comments:     EKG INTERPRETATION at 1:06 p m  RHYTHM:  Sinus tachy at 100 beats per minute  AXIS:  Normal axis  INTERVALS:  Pr interval measured at 120 milliseconds  QRS COMPLEX:  QRS measured at 70 milliseconds  ST SEGMENT:  Nonspecific ST segment changes    Diffuse artifact  QT INTERVAL:  QTC measured at 457 milliseconds  COMPARED WITH PRIOR    Interpretation by Marcell Estes DO      Differential diagnosis includes but not limited to:  COPD exacerbation, asthma exacerbation, pneumonia, PE, pulmonary edema, pulmonary effusions, lung mass/malignancy, CHF, ACS, NSTEMI, acute kidney injury, electrolyte dysfunction, inhalation injury, anemia, valvular disorder, viral etiology,         Amount and/or Complexity of Data Reviewed  Clinical lab tests: ordered  Tests in the radiology section of CPT®: ordered  Tests in the medicine section of CPT®: ordered          Disposition  Final diagnoses:   Dyspnea   COPD exacerbation ([facility] 75 )   Hypokalemia     Time reflects when diagnosis was documented in both MDM as applicable and the Disposition within this note     Time User Action Codes Description Comment    3/5/2020  3:04 PM Jaleel Patterson Add [R06 00] Dyspnea     3/5/2020  3:04 PM Harjinder Saalzar Add [J44 1] COPD exacerbation ([facility] )     3/5/2020  3:04 PM Harjinder Salazar Add [E87 6] Hypokalemia       ED Disposition     None      Follow-up Information    None         Patient's Medications   Discharge Prescriptions    No medications on file     No discharge procedures on file      PDMP Review       Value Time User    PDMP Reviewed  Yes 3/3/2020  3:59 PM Tawanda Sánchez [address] Sue           ED Provider  Electronically Signed by           Noelle Mendoza DO  03/05/20 2466

## 2020-03-05 NOTE — ED NOTES
Pt treated through Epic downtime, please see paper charting for more information   Downtime from [address] to [address] Gum, RN  03/05/20 1823       Destiny Sarmiento, JODEE  03/05/20 1913

## 2020-03-05 NOTE — CASE MANAGEMENT
I self referred the patient do to her recent discharge from [address] [place] [address] on 3/3/2020  The patient stated she did not  her medications after discharge because she did not know she had any medications to  and she can not get them because she has no ride to get there  The patient was seen at her PCP's office today and they sent her to the ED  I discussed resources with the patient she denied needing any help at this time  The patient lives alone in a a third floor apartment  There is an elevator in the building  She has a nebulizer and O2 @ 2 lpm PRN  She gets her O2 from [facility]  She does not receive meals on wheels or home health services at this time  She is independent with her ADL's  She does not drive she has friends or family take her to where she needs to go  She uses [facility] [phone] I spoke with Angeles Romano at [employer], they will deliver her medications to her house just have the patient call when she is being discharged  She has [employer]

## 2020-03-06 PROBLEM — E87.6 HYPOKALEMIA: Status: ACTIVE | Noted: 2020-03-06

## 2020-03-06 LAB
ALBUMIN SERPL BCP-MCNC: 2.5 G/DL (ref 3.5–5)
ALP SERPL-CCNC: 68 U/L (ref 46–116)
ALT SERPL W P-5'-P-CCNC: 48 U/L (ref 12–78)
ANION GAP SERPL CALCULATED.3IONS-SCNC: 9 MMOL/L (ref 4–13)
AST SERPL W P-5'-P-CCNC: 20 U/L (ref 5–45)
ATRIAL RATE: 102 BPM
ATRIAL RATE: 98 BPM
BILIRUB SERPL-MCNC: 0.5 MG/DL (ref 0.2–1)
BUN SERPL-MCNC: 14 MG/DL (ref 5–25)
CALCIUM SERPL-MCNC: 8.9 MG/DL (ref 8.3–10.1)
CHLORIDE SERPL-SCNC: 107 MMOL/L (ref 100–108)
CO2 SERPL-SCNC: 27 MMOL/L (ref 21–32)
CREAT SERPL-MCNC: 0.47 MG/DL (ref 0.6–1.3)
ERYTHROCYTE [DISTWIDTH] IN BLOOD BY AUTOMATED COUNT: 13.7 % (ref 11.6–15.1)
GFR SERPL CREATININE-BSD FRML MDRD: 105 ML/MIN/1.73SQ M
GLUCOSE SERPL-MCNC: 100 MG/DL (ref 65–140)
HCT VFR BLD AUTO: 36 % (ref 34.8–46.1)
HGB BLD-MCNC: 12 G/DL (ref 11.5–15.4)
MAGNESIUM SERPL-MCNC: 2.4 MG/DL (ref 1.6–2.6)
MCH RBC QN AUTO: 31.2 PG (ref 26.8–34.3)
MCHC RBC AUTO-ENTMCNC: 33.3 G/DL (ref 31.4–37.4)
MCV RBC AUTO: 94 FL (ref 82–98)
P AXIS: 43 DEGREES
P AXIS: 55 DEGREES
PHOSPHATE SERPL-MCNC: 3.3 MG/DL (ref 2.3–4.1)
PLATELET # BLD AUTO: 373 THOUSANDS/UL (ref 149–390)
PMV BLD AUTO: 10.3 FL (ref 8.9–12.7)
POTASSIUM SERPL-SCNC: 3.8 MMOL/L (ref 3.5–5.3)
PR INTERVAL: 120 MS
PR INTERVAL: 120 MS
PROCALCITONIN SERPL-MCNC: <0.05 NG/ML
PROCALCITONIN SERPL-MCNC: <0.05 NG/ML
PROT SERPL-MCNC: 6.5 G/DL (ref 6.4–8.2)
QRS AXIS: 21 DEGREES
QRS AXIS: 29 DEGREES
QRSD INTERVAL: 70 MS
QRSD INTERVAL: 72 MS
QT INTERVAL: 350 MS
QT INTERVAL: 358 MS
QTC INTERVAL: 456 MS
QTC INTERVAL: 457 MS
RBC # BLD AUTO: 3.85 MILLION/UL (ref 3.81–5.12)
S PNEUM AG UR QL: NEGATIVE
SODIUM SERPL-SCNC: 143 MMOL/L (ref 136–145)
T WAVE AXIS: 29 DEGREES
T WAVE AXIS: 52 DEGREES
VENTRICULAR RATE: 102 BPM
VENTRICULAR RATE: 98 BPM
WBC # BLD AUTO: 6.68 THOUSAND/UL (ref 4.31–10.16)

## 2020-03-06 PROCEDURE — 80053 COMPREHEN METABOLIC PANEL: CPT | Performed by: PHYSICIAN ASSISTANT

## 2020-03-06 PROCEDURE — 93010 ELECTROCARDIOGRAM REPORT: CPT | Performed by: INTERNAL MEDICINE

## 2020-03-06 PROCEDURE — 84145 PROCALCITONIN (PCT): CPT | Performed by: PHYSICIAN ASSISTANT

## 2020-03-06 PROCEDURE — 85027 COMPLETE CBC AUTOMATED: CPT | Performed by: PHYSICIAN ASSISTANT

## 2020-03-06 PROCEDURE — 84100 ASSAY OF PHOSPHORUS: CPT | Performed by: PHYSICIAN ASSISTANT

## 2020-03-06 PROCEDURE — 83735 ASSAY OF MAGNESIUM: CPT | Performed by: PHYSICIAN ASSISTANT

## 2020-03-06 PROCEDURE — 99232 SBSQ HOSP IP/OBS MODERATE 35: CPT | Performed by: PHYSICIAN ASSISTANT

## 2020-03-06 PROCEDURE — 99222 1ST HOSP IP/OBS MODERATE 55: CPT | Performed by: INTERNAL MEDICINE

## 2020-03-06 PROCEDURE — 87449 NOS EACH ORGANISM AG IA: CPT | Performed by: PHYSICIAN ASSISTANT

## 2020-03-06 RX ORDER — METHYLPREDNISOLONE SODIUM SUCCINATE 40 MG/ML
40 INJECTION, POWDER, LYOPHILIZED, FOR SOLUTION INTRAMUSCULAR; INTRAVENOUS EVERY 12 HOURS SCHEDULED
Status: DISCONTINUED | OUTPATIENT
Start: 2020-03-06 | End: 2020-03-07 | Stop reason: HOSPADM

## 2020-03-06 RX ORDER — CEFEPIME HYDROCHLORIDE 2 G/50ML
2000 INJECTION, SOLUTION INTRAVENOUS EVERY 12 HOURS
Status: DISCONTINUED | OUTPATIENT
Start: 2020-03-06 | End: 2020-03-06

## 2020-03-06 RX ADMIN — QUETIAPINE FUMARATE 50 MG: 25 TABLET ORAL at 16:18

## 2020-03-06 RX ADMIN — QUETIAPINE FUMARATE 50 MG: 25 TABLET ORAL at 21:15

## 2020-03-06 RX ADMIN — METHYLPREDNISOLONE SODIUM SUCCINATE 40 MG: 40 INJECTION, POWDER, FOR SOLUTION INTRAMUSCULAR; INTRAVENOUS at 21:15

## 2020-03-06 RX ADMIN — FUROSEMIDE 40 MG: 40 TABLET ORAL at 10:25

## 2020-03-06 RX ADMIN — GABAPENTIN 300 MG: 300 CAPSULE ORAL at 10:25

## 2020-03-06 RX ADMIN — POTASSIUM CHLORIDE 10 MEQ: 750 TABLET, EXTENDED RELEASE ORAL at 10:26

## 2020-03-06 RX ADMIN — DULOXETINE 60 MG: 30 CAPSULE, DELAYED RELEASE ORAL at 10:25

## 2020-03-06 RX ADMIN — HEPARIN SODIUM 5000 UNITS: 5000 INJECTION INTRAVENOUS; SUBCUTANEOUS at 21:15

## 2020-03-06 RX ADMIN — GUAIFENESIN 600 MG: 600 TABLET, EXTENDED RELEASE ORAL at 17:17

## 2020-03-06 RX ADMIN — HEPARIN SODIUM 5000 UNITS: 5000 INJECTION INTRAVENOUS; SUBCUTANEOUS at 14:55

## 2020-03-06 RX ADMIN — GABAPENTIN 300 MG: 300 CAPSULE ORAL at 17:17

## 2020-03-06 RX ADMIN — CEFEPIME HYDROCHLORIDE 2000 MG: 2 INJECTION, SOLUTION INTRAVENOUS at 10:27

## 2020-03-06 RX ADMIN — QUETIAPINE FUMARATE 50 MG: 25 TABLET ORAL at 10:25

## 2020-03-06 RX ADMIN — FLUTICASONE FUROATE AND VILANTEROL TRIFENATATE 1 PUFF: 200; 25 POWDER RESPIRATORY (INHALATION) at 10:26

## 2020-03-06 RX ADMIN — CYCLOBENZAPRINE HYDROCHLORIDE 10 MG: 10 TABLET, FILM COATED ORAL at 10:26

## 2020-03-06 RX ADMIN — ATORVASTATIN CALCIUM 20 MG: 10 TABLET, FILM COATED ORAL at 16:17

## 2020-03-06 RX ADMIN — HEPARIN SODIUM 5000 UNITS: 5000 INJECTION INTRAVENOUS; SUBCUTANEOUS at 05:17

## 2020-03-06 RX ADMIN — GUAIFENESIN 600 MG: 600 TABLET, EXTENDED RELEASE ORAL at 10:26

## 2020-03-06 RX ADMIN — METHYLPREDNISOLONE SODIUM SUCCINATE 40 MG: 40 INJECTION, POWDER, FOR SOLUTION INTRAMUSCULAR; INTRAVENOUS at 10:24

## 2020-03-06 RX ADMIN — PANTOPRAZOLE SODIUM 40 MG: 40 TABLET, DELAYED RELEASE ORAL at 05:17

## 2020-03-06 NOTE — PLAN OF CARE
Problem: Potential for Falls  Goal: Patient will remain free of falls  Description  INTERVENTIONS:  - Assess patient frequently for physical needs  -  Identify cognitive and physical deficits and behaviors that affect risk of falls    -  Eldon fall precautions as indicated by assessment   - Educate patient/family on patient safety including physical limitations  - Instruct patient to call for assistance with activity based on assessment  - Modify environment to reduce risk of injury  - Consider OT/PT consult to assist with strengthening/mobility  Outcome: Progressing     Problem: PAIN - ADULT  Goal: Verbalizes/displays adequate comfort level or baseline comfort level  Description  Interventions:  - Encourage patient to monitor pain and request assistance  - Assess pain using appropriate pain scale  - Administer analgesics based on type and severity of pain and evaluate response  - Implement non-pharmacological measures as appropriate and evaluate response  - Consider cultural and social influences on pain and pain management  - Notify physician/advanced practitioner if interventions unsuccessful or patient reports new pain  Outcome: Progressing     Problem: INFECTION - ADULT  Goal: Absence or prevention of progression during hospitalization  Description  INTERVENTIONS:  - Assess and monitor for signs and symptoms of infection  - Monitor lab/diagnostic results  - Monitor all insertion sites, i e  indwelling lines, tubes, and drains  - Monitor endotracheal if appropriate and nasal secretions for changes in amount and color  - Eldon appropriate cooling/warming therapies per order  - Administer medications as ordered  - Instruct and encourage patient and family to use good hand hygiene technique  - Identify and instruct in appropriate isolation precautions for identified infection/condition  Outcome: Progressing  Goal: Absence of fever/infection during neutropenic period  Description  INTERVENTIONS:  - Monitor WBC    Outcome: Progressing     Problem: SAFETY ADULT  Goal: Patient will remain free of falls  Description  INTERVENTIONS:  - Assess patient frequently for physical needs  -  Identify cognitive and physical deficits and behaviors that affect risk of falls    -  Bath fall precautions as indicated by assessment   - Educate patient/family on patient safety including physical limitations  - Instruct patient to call for assistance with activity based on assessment  - Modify environment to reduce risk of injury  - Consider OT/PT consult to assist with strengthening/mobility  Outcome: Progressing  Goal: Maintain or return to baseline ADL function  Description  INTERVENTIONS:  -  Assess patient's ability to carry out ADLs; assess patient's baseline for ADL function and identify physical deficits which impact ability to perform ADLs (bathing, care of mouth/teeth, toileting, grooming, dressing, etc )  - Assess/evaluate cause of self-care deficits   - Assess range of motion  - Assess patient's mobility; develop plan if impaired  - Assess patient's need for assistive devices and provide as appropriate  - Encourage maximum independence but intervene and supervise when necessary  - Involve family in performance of ADLs  - Assess for home care needs following discharge   - Consider OT consult to assist with ADL evaluation and planning for discharge  - Provide patient education as appropriate  Outcome: Progressing  Goal: Maintain or return mobility status to optimal level  Description  INTERVENTIONS:  - Assess patient's baseline mobility status (ambulation, transfers, stairs, etc )    - Identify cognitive and physical deficits and behaviors that affect mobility  - Identify mobility aids required to assist with transfers and/or ambulation (gait belt, sit-to-stand, lift, walker, cane, etc )  - Bath fall precautions as indicated by assessment  - Record patient progress and toleration of activity level on Mobility SBAR; progress patient to next Phase/Stage  - Instruct patient to call for assistance with activity based on assessment  - Consider rehabilitation consult to assist with strengthening/weightbearing, etc   Outcome: Progressing     Problem: DISCHARGE PLANNING  Goal: Discharge to home or other facility with appropriate resources  Description  INTERVENTIONS:  - Identify barriers to discharge w/patient and caregiver  - Arrange for needed discharge resources and transportation as appropriate  - Identify discharge learning needs (meds, wound care, etc )  - Arrange for interpretive services to assist at discharge as needed  - Refer to Case Management Department for coordinating discharge planning if the patient needs post-hospital services based on physician/advanced practitioner order or complex needs related to functional status, cognitive ability, or social support system  Outcome: Progressing     Problem: Knowledge Deficit  Goal: Patient/family/caregiver demonstrates understanding of disease process, treatment plan, medications, and discharge instructions  Description  Complete learning assessment and assess knowledge base    Interventions:  - Provide teaching at level of understanding  - Provide teaching via preferred learning methods  Outcome: Progressing     Problem: NEUROSENSORY - ADULT  Goal: Achieves stable or improved neurological status  Description  INTERVENTIONS  - Monitor and report changes in neurological status  - Monitor vital signs such as temperature, blood pressure, glucose, and any other labs ordered   - Initiate measures to prevent increased intracranial pressure  - Monitor for seizure activity and implement precautions if appropriate      Outcome: Progressing  Goal: Remains free of injury related to seizures activity  Description  INTERVENTIONS  - Maintain airway, patient safety  and administer oxygen as ordered  - Monitor patient for seizure activity, document and report duration and description of seizure to physician/advanced practitioner  - If seizure occurs,  ensure patient safety during seizure  - Reorient patient post seizure  - Seizure pads on all 4 side rails  - Instruct patient/family to notify RN of any seizure activity including if an aura is experienced  - Instruct patient/family to call for assistance with activity based on nursing assessment  - Administer anti-seizure medications if ordered    Outcome: Progressing  Goal: Achieves maximal functionality and self care  Description  INTERVENTIONS  - Monitor swallowing and airway patency with patient fatigue and changes in neurological status  - Encourage and assist patient to increase activity and self care     - Encourage visually impaired, hearing impaired and aphasic patients to use assistive/communication devices  Outcome: Progressing     Problem: CARDIOVASCULAR - ADULT  Goal: Maintains optimal cardiac output and hemodynamic stability  Description  INTERVENTIONS:  - Monitor I/O, vital signs and rhythm  - Monitor for S/S and trends of decreased cardiac output  - Administer and titrate ordered vasoactive medications to optimize hemodynamic stability  - Assess quality of pulses, skin color and temperature  - Assess for signs of decreased coronary artery perfusion  - Instruct patient to report change in severity of symptoms  Outcome: Progressing  Goal: Absence of cardiac dysrhythmias or at baseline rhythm  Description  INTERVENTIONS:  - Continuous cardiac monitoring, vital signs, obtain 12 lead EKG if ordered  - Administer antiarrhythmic and heart rate control medications as ordered  - Monitor electrolytes and administer replacement therapy as ordered  Outcome: Progressing     Problem: RESPIRATORY - ADULT  Goal: Achieves optimal ventilation and oxygenation  Description  INTERVENTIONS:  - Assess for changes in respiratory status  - Assess for changes in mentation and behavior  - Position to facilitate oxygenation and minimize respiratory effort  - Oxygen administered by appropriate delivery if ordered  - Initiate smoking cessation education as indicated  - Encourage broncho-pulmonary hygiene including cough, deep breathe, Incentive Spirometry  - Assess the need for suctioning and aspirate as needed  - Assess and instruct to report SOB or any respiratory difficulty  - Respiratory Therapy support as indicated  Outcome: Progressing     Problem: GASTROINTESTINAL - ADULT  Goal: Minimal or absence of nausea and/or vomiting  Description  INTERVENTIONS:  - Administer IV fluids if ordered to ensure adequate hydration  - Maintain NPO status until nausea and vomiting are resolved  - Nasogastric tube if ordered  - Administer ordered antiemetic medications as needed  - Provide nonpharmacologic comfort measures as appropriate  - Advance diet as tolerated, if ordered  - Consider nutrition services referral to assist patient with adequate nutrition and appropriate food choices  Outcome: Progressing  Goal: Maintains or returns to baseline bowel function  Description  INTERVENTIONS:  - Assess bowel function  - Encourage oral fluids to ensure adequate hydration  - Administer IV fluids if ordered to ensure adequate hydration  - Administer ordered medications as needed  - Encourage mobilization and activity  - Consider nutritional services referral to assist patient with adequate nutrition and appropriate food choices  Outcome: Progressing  Goal: Maintains adequate nutritional intake  Description  INTERVENTIONS:  - Monitor percentage of each meal consumed  - Identify factors contributing to decreased intake, treat as appropriate  - Assist with meals as needed  - Monitor I&O, weight, and lab values if indicated  - Obtain nutrition services referral as needed  Outcome: Progressing  Goal: Establish and maintain optimal ostomy function  Description  INTERVENTIONS:  - Assess bowel function  - Encourage oral fluids to ensure adequate hydration  - Administer IV fluids if ordered to ensure adequate hydration   - Administer ordered medications as needed  - Encourage mobilization and activity  - Nutrition services referral to assist patient with appropriate food choices  - Assess stoma site  - Consider wound care consult   Outcome: Progressing     Problem: GENITOURINARY - ADULT  Goal: Maintains or returns to baseline urinary function  Description  INTERVENTIONS:  - Assess urinary function  - Encourage oral fluids to ensure adequate hydration if ordered  - Administer IV fluids as ordered to ensure adequate hydration  - Administer ordered medications as needed  - Offer frequent toileting  - Follow urinary retention protocol if ordered  Outcome: Progressing  Goal: Absence of urinary retention  Description  INTERVENTIONS:  - Assess patients ability to void and empty bladder  - Monitor I/O  - Bladder scan as needed  - Discuss with physician/AP medications to alleviate retention as needed  - Discuss catheterization for long term situations as appropriate  Outcome: Progressing  Goal: Urinary catheter remains patent  Description  INTERVENTIONS:  - Assess patency of urinary catheter  - If patient has a chronic ricardo, consider changing catheter if non-functioning  - Follow guidelines for intermittent irrigation of non-functioning urinary catheter  Outcome: Progressing     Problem: METABOLIC, FLUID AND ELECTROLYTES - ADULT  Goal: Electrolytes maintained within normal limits  Description  INTERVENTIONS:  - Monitor labs and assess patient for signs and symptoms of electrolyte imbalances  - Administer electrolyte replacement as ordered  - Monitor response to electrolyte replacements, including repeat lab results as appropriate  - Instruct patient on fluid and nutrition as appropriate  Outcome: Progressing  Goal: Fluid balance maintained  Description  INTERVENTIONS:  - Monitor labs   - Monitor I/O and WT  - Instruct patient on fluid and nutrition as appropriate  - Assess for signs & symptoms of volume excess or deficit  Outcome: Progressing  Goal: Glucose maintained within target range  Description  INTERVENTIONS:  - Monitor Blood Glucose as ordered  - Assess for signs and symptoms of hyperglycemia and hypoglycemia  - Administer ordered medications to maintain glucose within target range  - Assess nutritional intake and initiate nutrition service referral as needed  Outcome: Progressing     Problem: SKIN/TISSUE INTEGRITY - ADULT  Goal: Skin integrity remains intact  Description  INTERVENTIONS  - Identify patients at risk for skin breakdown  - Assess and monitor skin integrity  - Assess and monitor nutrition and hydration status  - Monitor labs (i e  albumin)  - Assess for incontinence   - Turn and reposition patient  - Assist with mobility/ambulation  - Relieve pressure over bony prominences  - Avoid friction and shearing  - Provide appropriate hygiene as needed including keeping skin clean and dry  - Evaluate need for skin moisturizer/barrier cream  - Collaborate with interdisciplinary team (i e  Nutrition, Rehabilitation, etc )   - Patient/family teaching  Outcome: Progressing  Goal: Incision(s), wounds(s) or drain site(s) healing without S/S of infection  Description  INTERVENTIONS  - Assess and document risk factors for skin impairment   - Assess and document dressing, incision, wound bed, drain sites and surrounding tissue  - Consider nutrition services referral as needed  - Oral mucous membranes remain intact  - Provide patient/ family education  Outcome: Progressing  Goal: Oral mucous membranes remain intact  Description  INTERVENTIONS  - Assess oral mucosa and hygiene practices  - Implement preventative oral hygiene regimen  - Implement oral medicated treatments as ordered  - Initiate Nutrition services referral as needed  Outcome: Progressing     Problem: HEMATOLOGIC - ADULT  Goal: Maintains hematologic stability  Description  INTERVENTIONS  - Assess for signs and symptoms of bleeding or hemorrhage  - Monitor labs  - Administer supportive blood products/factors as ordered and appropriate  Outcome: Progressing     Problem: MUSCULOSKELETAL - ADULT  Goal: Maintain or return mobility to safest level of function  Description  INTERVENTIONS:  - Assess patient's ability to carry out ADLs; assess patient's baseline for ADL function and identify physical deficits which impact ability to perform ADLs (bathing, care of mouth/teeth, toileting, grooming, dressing, etc )  - Assess/evaluate cause of self-care deficits   - Assess range of motion  - Assess patient's mobility  - Assess patient's need for assistive devices and provide as appropriate  - Encourage maximum independence but intervene and supervise when necessary  - Involve family in performance of ADLs  - Assess for home care needs following discharge   - Consider OT consult to assist with ADL evaluation and planning for discharge  - Provide patient education as appropriate  Outcome: Progressing  Goal: Maintain proper alignment of affected body part  Description  INTERVENTIONS:  - Support, maintain and protect limb and body alignment  - Provide patient/ family with appropriate education  Outcome: Progressing

## 2020-03-06 NOTE — CONSULTS
Consultation - Pulmonary Medicine   Yelena Mccray 62 y o  female MRN: [id number]  Unit/Bed#: 416-01 Encounter: [id number]      Assessment:  1  Abnormal chest CT scan:  She has had multiple procalcitonin levels that have been negative/normal and her white blood cell count has been normal    She did have a positive influenza screen on 02/28/2020  I am concerned the that this abnormality seen on the CT scan is related to her chronic vaping of marijuana which she has done daily since 2001  I talked to her hospitalist/PA and suggested that this case be reported to the state as a possibly vaping related lung injury  I believe most of her problems are lifestyle changes she is going to have to make  She needs to quit vaping, continue off cigarettes, use her CPAP machine at home  I would repeat a CT of her chest in 6 weeks  If the abnormality still is not going away then I believe she will need a bronchoscopy to rule out a malignancy with her long smoking history  2   Ongoing daily vaping of marijuana:  See above for discussion    3  Ex-smoker:  She stopped only in December of 2019 after smoking most of her adult life    4  Sleep apnea:  She is noncompliant with use of her CPAP machine    5  History of psychiatric problems    6  Possible COPD:  She has been on home oxygen for 2 years continued of a pen smoke cigarettes despite the use of oxygen  She has never had pulmonary functions  I would recommend that she have a pulmonary function study and follow up in our office after she gets out of the hospital     Plan:   See above    55 minutes was spent with this patient  History of Present Illness   Physician Requesting Consult: Oneyda Daniels MD  Reason for Consult / Principal Problem:   Shortness of breath and an abnormal chest CT scan  Hx and PE limited by:   No limitations  HPI: Yelena Mccray is a 62y o  year old female who presents with shortness of breath    She was hospitalized on 03/05/2020 with the above-mentioned complaint  She had been hospitalized earlier this month at another [facility] facility  At that time she had a positive influenza a screen done on 02/28/2020  She was noncompliant with her Tamiflu at she left the hospital   A CT of her chest was done on 03/01/2020 which showed a right lung infiltrate  Consults pulmonary    Review of Systems   Constitutional: Positive for fatigue and fever  Negative for appetite change, chills, diaphoresis and unexpected weight change  HENT: Negative for congestion, dental problem, drooling and ear discharge  Eyes: Negative for photophobia, redness and visual disturbance  Respiratory: Positive for cough and shortness of breath  Negative for apnea, choking, chest tightness, wheezing and stridor  Cardiovascular: Positive for leg swelling  Negative for chest pain and palpitations  Gastrointestinal: Negative for abdominal distention, abdominal pain, anal bleeding, blood in stool and constipation  Endocrine: Negative for polydipsia, polyphagia and polyuria  Genitourinary: Negative for difficulty urinating, dyspareunia and dysuria  Musculoskeletal: Positive for arthralgias, back pain and myalgias  Negative for gait problem, joint swelling, neck pain and neck stiffness  Skin: Negative for color change, pallor and rash  Neurological: Positive for weakness and light-headedness  Negative for tremors, seizures, syncope, speech difficulty and numbness  Hematological: Negative for adenopathy  Does not bruise/bleed easily  Psychiatric/Behavioral: Positive for decreased concentration and dysphoric mood  Negative for agitation, behavioral problems, confusion and hallucinations  The patient is nervous/anxious  The patient is not hyperactive          Historical Information   Past Medical History:   Diagnosis Date    Anxiety     COPD (chronic obstructive pulmonary disease) ([facility] [address] )     Depression     Diverticulitis     GERD (gastroesophageal reflux disease)     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[address]     Psychiatric disorder     Sleep apnea      Past Surgical History:   Procedure Laterality Date    APPENDECTOMY      CHOLECYSTECTOMY      partial x2 per pt    HIATAL HERNIA REPAIR      INGUINAL HERNIA REPAIR Right     LAPAROSCOPIC TUBAL LIGATION Bilateral     ROTATOR CUFF REPAIR Right     UVULOPALATOPHARYNGOPLASTY       Social History   Social History     Substance and Sexual Activity   Alcohol Use Yes    Frequency: Never    Comment: occasionally     Social History     Substance and Sexual Activity   Drug Use Yes    Types: Marijuana    Comment: daily in e-pen form     E-Cigarette/Vaping    E-Cigarette Use Never User      E-Cigarette/Vaping Substances    Nicotine No      Social History     Tobacco Use   Smoking Status Former Smoker    Packs/day: 1 00    Types: Cigarettes    Last attempt to quit: 12/10/2019    Years since quittin 2   Smokeless Tobacco Never Used   Tobacco Comment    using patches     Occupational History:   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    Family History: She had 3 children all of whom alive and well    Meds/Allergies   all current active meds have been reviewed    Allergies   Allergen Reactions    Erythromycin Throat Swelling    Morphine Delirium       Objective   Vitals: Blood pressure 134/80, pulse 93, temperature 97 7 °F (36 5 °C), temperature source Oral, resp  rate 16, height 5' (1 524 m), weight 72 kg (158 lb 11 7 oz), SpO2 98 %, not currently breastfeeding  ,Body mass index is 31 kg/m²  Intake/Output Summary (Last 24 hours) at 3/6/2020 0931  Last data filed at 3/6/2020 0900  Gross per 24 hour   Intake 1170 ml   Output 700 ml   Net 470 ml     Invasive Devices     Peripheral Intravenous Line            Peripheral IV 20 Right;Upper Arm less than 1 day                Physical Exam   Constitutional: She is oriented to person, place, and time     Chronically ill appearing lady sitting up in a chair with nasal cannula oxygen in place   HENT:   Head: Atraumatic  Eyes: No scleral icterus  Neck: Normal range of motion  No thyromegaly present  Cardiovascular:   Distant tones   Pulmonary/Chest: No stridor  She has no wheezes  She has no rales  Decreased excursion   Abdominal: Soft  She exhibits no distension and no mass  There is no rebound and no guarding  Musculoskeletal: She exhibits edema  She exhibits no tenderness  Lymphadenopathy:     She has no cervical adenopathy  Neurological: She is alert and oriented to person, place, and time  No cranial nerve deficit  Skin: Skin is warm  She is not diaphoretic  Psychiatric: She has a normal mood and affect  Her behavior is normal  Judgment and thought content normal    Vitals reviewed  Lab Results:   CBC:   Lab Results   Component Value Date    WBC 6 68 03/06/2020    HGB 12 0 03/06/2020    HCT 36 0 03/06/2020    MCV 94 03/06/2020     03/06/2020    MCH 31 2 03/06/2020    MCHC 33 3 03/06/2020    RDW 13 7 03/06/2020    MPV 10 3 03/06/2020    NRBC 0 03/05/2020   , CMP:   Lab Results   Component Value Date    SODIUM 143 03/06/2020    K 3 8 03/06/2020     03/06/2020    CO2 27 03/06/2020    BUN 14 03/06/2020    CREATININE 0 47 (L) 03/06/2020    CALCIUM 8 9 03/06/2020    AST 20 03/06/2020    ALT 48 03/06/2020    ALKPHOS 68 03/06/2020    EGFR 105 03/06/2020     Imaging Studies: I have personally reviewed pertinent reports  EKG, Pathology, and Other Studies: I have personally reviewed pertinent reports      VTE Prophylaxis: Enoxaparin (Lovenox)    Code Status: Level 1 - Full Code  Advance Directive and Living Will:      Power of :    POLST:      None

## 2020-03-06 NOTE — ASSESSMENT & PLAN NOTE
She present to the ER with complaints of SOB  Most likely secondary to a combination of pneumonia,influenza A, COPD exacerbation  SPO2 89% upon her arrival  Respiratory protocol  Continue management of pneumonia  A m  Labs  Pulmonary consultation appreciated  There was concern CT chest findings were due to vaping marijuana however the patient states she does not vape  Oxygen supplementation  Respiratory protocol

## 2020-03-06 NOTE — H&P
[facility] Internal Medicine  H&P- Ilan Hazard 1955, 62 y o  female MRN: [id number]    Unit/Bed#: 416-01 Encounter: [id number]    Primary Care Provider: KATIE Ramirez   Date and time admitted to hospital: 3/5/2020 12:54 PM        * Acute on chronic respiratory failure with hypoxia ([address] )  Assessment & Plan  She present to the ER with complaints of SOB  Associated wit cough  Most likely secondary to a combination of pneumonia,influenza A, COPD exacerbation  SPO2 89% upon her arrival  Admit to med surg  Respiratory protocol  Check ABG  Continue management of pneumonia  A m  Labs  Pulmonary consult  Supportive care      Hypokalemia  Assessment & Plan  Potassium at 3 1  Replace potassium  A m  CMP  Monitor potassium levels    HCAP (healthcare-associated pneumonia)  Assessment & Plan  She was diagnosed with pneumonia last Saturday  She was admitted until Tuesday  She reports worsen SOB and productive cough  Started on IV cefepime in the ER  Check sputum culture and Gram stain, strep pneumonia urine  Check procalcitonin  Blood cultures pending  Continue IV cefepime for now  Am labs  Supportive care      Influenza A  Assessment & Plan  She tested positive for Influenza A last Saturday  She was prescribed tamiflu but did not get prescription filled  Will continue supportive care       COPD exacerbation [facility])  Assessment & Plan  Presented to the ER with complaints of SOB  Probably probably triggered by a combination of pneumonia and influenza a  She was diagnosed with pneumonia and tested positive for Influenza A last Saturday  Respiratory protocol  Continue PTA respiratory medication  Check sputum culture and Gram stain  Check procalcitonin  Solu-Medrol 40 mg q 12 hours  Continue management of pneumonia  A m   Labs  Pulmonary consult  Supportive care    Gastroesophageal reflux disease without esophagitis  Assessment & Plan  She is currently on Nexium 40 mg po daily  Will give formulary alternative protonix 40 mg PO daily    Chronic back pain  Assessment & Plan  Stable  Continue PTA medications    Anxiety and depression  Assessment & Plan  Currently stable  Continue PTA medication      VTE Prophylaxis: Heparin  / sequential compression device   Code Status: Level 1- Full code  POLST: POLST form is not discussed and not completed at this time  Discussion with family: None    Anticipated Length of Stay:  Patient will be admitted on an Inpatient basis with an anticipated length of stay of  > 2 midnights  Justification for Hospital Stay:  Acute on chronic respiratory failure with hypoxia HCAP, COPD exacerbation,    Total Time for Visit, including Counseling / Coordination of Care: 30 minutes  Greater than 50% of this total time spent on direct patient counseling and coordination of care  Chief Complaint:   Shortness of breath    History of Present Illness:    Susie Ferrell is a 62 y o  female who presents to the emergency room with complaints of shortness of breath  She was recently discharged from [place] [address] with post influenza pneumonia  She was prescribed antibiotics and Tamiflu but was unable to a  her medication  She was seen by her PCP today for complaints of worsening shortness of breath and tachycardia with SpO2 in mid 80s while on room air  She was advised that she should come to the emergency room for further evaluation  She denies chest pain, dizziness, weakness, nausea, vomiting, diarrhea, abdominal pain  She also denies recent travel  Labs completed in emergency room results as shown below  Chest x-ray completed on 03/03/2020 showed a persistent right greater than left pneumonia  CTA chest PE study is completed today with official report pending  She was started on IV cefepime in emergency room    She also received nebulized albuterol and Atrovent, Solu-Medrol 60 mg IV normal saline 1 L, potassium chloride 40 mEq p o   At bedside she is awake and alert she states that she still feels quite short of breath with some tightness in her chest   She also has intermittent cough  Patient has been admitted on inpatient status [facility] care for further workup and management for acute on chronic respiratory failure with hypoxia, COPD exacerbation, hospital associated pneumonia hypokalemia    Review of Systems:    Review of Systems   Constitutional: Positive for chills and fever  Negative for diaphoresis and fatigue  HENT: Negative for sore throat, tinnitus, trouble swallowing and voice change  Eyes: Negative for photophobia, pain, redness and visual disturbance  Respiratory: Positive for cough, chest tightness and shortness of breath  Cardiovascular: Negative for chest pain, palpitations and leg swelling  Gastrointestinal: Negative for abdominal pain, nausea and vomiting  Endocrine: Negative for polydipsia, polyphagia and polyuria  Genitourinary: Negative for difficulty urinating, dysuria, flank pain, frequency and hematuria  Musculoskeletal: Negative for arthralgias, back pain, gait problem and joint swelling  Skin: Negative for color change, pallor and wound  Neurological: Positive for weakness and headaches  Negative for dizziness, tremors, seizures and syncope  Psychiatric/Behavioral: Negative for agitation, confusion and sleep disturbance  The patient is not nervous/anxious          Past Medical and Surgical History:     Past Medical History:   Diagnosis Date    Anxiety     COPD [facility]chronic obstructive pulmonary disease) ([facility] )     Depression     Diverticulitis     GERD (gastroesophageal reflux disease)     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  Authorizing Provider   albuterol (2 5 mg/3 mL) 0 083 % nebulizer solution Take 1 vial (2 5 mg total) by nebulization every 6 (six) hours as needed for wheezing or shortness of breath 2/6/20 5/6/20  KATIE Maki   albuterol (PROVENTIL HFA,VENTOLIN HFA) 90 mcg/act inhaler Inhale 2 puffs every 6 (six) hours as needed for wheezing 2/6/20 3/7/20  KATIE Maki   atorvastatin (LIPITOR) 20 mg tablet Take 1 tablet (20 mg total) by mouth daily 2/6/20 5/6/20  KATIE Maki   cefdinir (OMNICEF) 300 mg capsule Take 1 capsule (300 mg total) by mouth every 12 (twelve) hours for 10 days 3/3/20 3/13/20  KATIE Faulkner   colestipol (COLESTID) 1 g tablet Take 1 tablet (1 g total) by mouth 2 (two) times a day 1/2/20   Becca Mendoza,    cyclobenzaprine (FLEXERIL) 10 mg tablet Take 1 tablet (10 mg total) by mouth daily 2/6/20 5/6/20  KATIE Maki   dicyclomine (BENTYL) 20 mg tablet Take 1 tablet (20 mg total) by mouth 3 (three) times a day as needed (as needed) 2/6/20 5/6/20  KATIE Maki   DULoxetine (CYMBALTA) 60 mg delayed release capsule Take 1 capsule (60 mg total) by mouth daily 2/6/20 5/6/20  KATIE Maki   ergocalciferol (VITAMIN D2) 50,000 units Take 1 capsule (50,000 Units total) by mouth once a week 2/12/20 5/12/20  KATIE Maki   esomeprazole (NexIUM) 40 MG capsule Take 1 capsule (40 mg total) by mouth daily 2/6/20 5/6/20  KATIE Maki   fluticasone-salmeterol (ADVAIR DISKUS) 500-50 mcg/dose inhaler Inhale 1 puff 2 (two) times a day 2/6/20 5/6/20  KATIE Maki   furosemide (LASIX) 40 mg tablet Take 1 tablet (40 mg total) by mouth daily 2/6/20 5/6/20  KATIE Maki   gabapentin (NEURONTIN) 300 mg capsule Take 1 capsule (300 mg total) by mouth 2 (two) times a day 3/3/20 4/2/20 KATIE Grover   guaiFENesin (MUCINEX) 600 mg 12 hr tablet Take 1 tablet (600 mg total) by mouth 2 (two) times a day for 20 days 3/3/20 3/23/20  KATIE Finney   naproxen sodium (ALEVE) 220 MG tablet Take 1 tablet (220 mg total) by mouth every 12 (twelve) hours as needed for mild pain 2/6/20 3/7/20  KATIE Stephens   oseltamivir (TAMIFLU) 75 mg capsule Take 1 capsule (75 mg total) by mouth every 12 (twelve) hours for 2 days 3/3/20 3/5/20  KATIE Finney   potassium chloride (K-DUR) 10 mEq tablet Take 1 tablet (10 mEq total) by mouth daily as needed (when pt takes lasix) 2/6/20 3/7/20  KATIE Stephens   predniSONE 10 mg tablet Take 4 tablets (40 mg total) by mouth 2 (two) times a day with meals for 3 days, THEN 3 tablets (30 mg total) 2 (two) times a day with meals for 3 days, THEN 2 tablets (20 mg total) 2 (two) times a day with meals for 3 days, THEN 1 tablet (10 mg total) 2 (two) times a day with meals for 3 days, THEN 1 tablet (10 mg total) daily for 3 days  3/3/20 3/18/20  KATIE Grover   QUEtiapine (SEROquel) 50 mg tablet Take 1 tablet (50 mg total) by mouth 3 (three) times a day 1 tab PO daily 2 tabs PO qHS 3/3/20 4/2/20  KATIE Grover   albuterol (PROAIR HFA) 90 mcg/act inhaler Inhale 2 puffs every 6 (six) hours as needed for wheezing [facility] 2/10/20 3/5/20  KATIE Stephens     I have reviewed home medications with patient personally  Allergies:    Allergies   Allergen Reactions    Erythromycin Throat Swelling    Morphine Delirium       Social History:     Marital Status:    Occupation: Retired  Patient Pre-hospital Living Situation: Lives alone  Patient Pre-hospital Level of Mobility: Active  Patient Pre-hospital Diet Restrictions: None reported  Substance Use History:   Social History     Substance and Sexual Activity   Alcohol Use Yes    Frequency: Never    Comment: occasionally     Social History     Tobacco Use   Smoking Status Former Smoker    Packs/day: 1 00    Types: Cigarettes    Last attempt to quit: 12/10/2019    Years since quittin 2   Smokeless Tobacco Never Used   Tobacco Comment    using patches     Social History     Substance and Sexual Activity   Drug Use Yes    Types: Marijuana    Comment: daily in e-pen form       Family History:    Family History   Problem Relation Age of Onset    No Known Problems Mother     No Known Problems Father     No Known Problems Sister     No Known Problems Daughter     Breast cancer Maternal Grandmother 28    No Known Problems Paternal Grandmother     No Known Problems Maternal Aunt        Physical Exam:     Vitals:   Blood Pressure: 140/83 (20)  Pulse: 100 (20)  Temperature: 97 8 °F (36 6 °C) (20)  Temp Source: Oral (20)  Respirations: 18 (20)  Height: 5' (152 4 cm) (20)  Weight - Scale: 72 kg (158 lb 11 7 oz) (20)  SpO2: 95 % (20)    Physical Exam   Constitutional: No distress  HENT:   Head: Normocephalic  Mouth/Throat: Oropharynx is clear and moist    Eyes: Pupils are equal, round, and reactive to light  EOM are normal  Right eye exhibits no discharge  No scleral icterus  Neck: Normal range of motion  Neck supple  No JVD present  Cardiovascular: Regular rhythm and intact distal pulses  Tachycardic   Pulmonary/Chest: Effort normal and breath sounds normal  No stridor  No respiratory distress  She has no wheezes  She has no rales  Abdominal: Soft  Bowel sounds are normal  She exhibits no distension and no mass  There is no tenderness  There is no guarding  Musculoskeletal: Normal range of motion  She exhibits no edema, tenderness or deformity  Lymphadenopathy:     She has no cervical adenopathy  Skin: Skin is warm  Capillary refill takes less than 2 seconds  No rash noted  She is not diaphoretic  No erythema  No pallor  Psychiatric: She has a normal mood and affect  Vitals reviewed  Additional Data:     Lab Results: I have personally reviewed pertinent reports  Results from last 7 days   Lab Units 03/06/20  0518  03/05/20  1327   WBC Thousand/uL 6 68  --  8 54   HEMOGLOBIN g/dL 12 0  --  12 8   HEMATOCRIT % 36 0  --  38 0   PLATELETS Thousands/uL 373   < > 336   NEUTROS PCT %  --   --  66   LYMPHS PCT %  --   --  19   MONOS PCT %  --   --  12   EOS PCT %  --   --  1    < > = values in this interval not displayed  Results from last 7 days   Lab Units 03/06/20  0518   SODIUM mmol/L 143   POTASSIUM mmol/L 3 8   CHLORIDE mmol/L 107   CO2 mmol/L 27   BUN mg/dL 14   CREATININE mg/dL 0 47*   ANION GAP mmol/L 9   CALCIUM mg/dL 8 9   ALBUMIN g/dL 2 5*   TOTAL BILIRUBIN mg/dL 0 50   ALK PHOS U/L 68   ALT U/L 48   AST U/L 20   GLUCOSE RANDOM mg/dL 100     Results from last 7 days   Lab Units 03/05/20  1430   INR  1 04             Results from last 7 days   Lab Units 03/05/20  1430 03/05/20  1327 03/01/20  0619 02/29/20  1843 02/29/20  0514 02/28/20  1836   LACTIC ACID mmol/L 1 1  --   --   --   --  1 5   PROCALCITONIN ng/ml  --  <0 05 <0 05 0 06 <0 05 <0 05       Imaging: I have personally reviewed pertinent reports  CTA ED chest PE Study    (Results Pending)       EKG, Pathology, and Other Studies Reviewed on Admission:   · EKG:     Allscripts / Epic Records Reviewed: Yes     ** Please Note: This note has been constructed using a voice recognition system   **

## 2020-03-06 NOTE — ASSESSMENT & PLAN NOTE
She present to the ER with complaints of SOB  Associated wit cough  Most likely secondary to a combination of pneumonia,influenza A, COPD exacerbation  SPO2 89% upon her arrival  Admit to med surg  Respiratory protocol  Check ABG  Continue management of pneumonia  A m   Labs  Pulmonary consult  Supportive care

## 2020-03-06 NOTE — ASSESSMENT & PLAN NOTE
She was diagnosed with pneumonia last Saturday  She was admitted until Tuesday  She reports worsen SOB and productive cough  Started on IV cefepime in the ER  Check sputum culture and Gram stain, strep pneumonia urine  Check procalcitonin  Blood cultures pending  Continue IV cefepime for now  Am labs  Supportive care

## 2020-03-06 NOTE — UTILIZATION REVIEW
Initial Clinical Review    Admission: Date/Time/Statement: Admission Orders (From admission, onward)     Ordered        03/05/20 1941  Inpatient Admission  Once                   Orders Placed This Encounter   Procedures    Inpatient Admission     Standing Status:   Standing     Number of Occurrences:   1     Order Specific Question:   Admitting Physician     Answer:   Dany Garcia [[id number]]     Order Specific Question:   Level of Care     Answer:   Med Surg [[id number]]     Order Specific Question:   Estimated length of stay     Answer:   More than 2 Midnights     Order Specific Question:   Certification     Answer:   I certify that inpatient services are medically necessary for this patient for a duration of greater than two midnights  See H&P and MD Progress Notes for additional information about the patient's course of treatment  ED Arrival Information     Expected Arrival Acuity Means of Arrival Escorted By Service Admission Type    - 3/5/2020 12:54 Urgent [facility] Urgent    Arrival Complaint    sob        Chief Complaint   Patient presents with    Shortness of Breath     pt reccently discharged from hospital with post influenza pneumonia  pt was unable to  medication, seen by PCP and pt was hypoxic in the mid 80's on RA     Assessment/Plan:   62 yom to er via ems from pcp for hypoxia, sob & tachycardia  Hx COPD, depression, diverticulitis, GERD, hyperlipidemia, daily marijuana vaping, musa  Pt not compliant with o2 or cpap uses at home  Recently d/c with flu & multifocal pneumonia, however did not get abt & Tamiflu rx filled when d/c'd  Presents with progressive sob & cough, wheezing, dry mucous membranes  Admission work-up showing hypokalemia, elevated cr & bnp, +flu & multifocal pneumonia   Admitted to inpatient status for acute on chronic respiratory failure with hypoxia, started on iv steroids & ivabt, placed on aspiration precautions, pulmonary consulted  3/6  Remains on iv steroids & ivabt at this time  Persistent guerra, lungs with rhonchi & decreased breath sounds  Per pulm: needs lifestyle changes, follow-up ct chest 6 weeks, may need bronch      ED Triage Vitals [03/05/20 1301]   Temperature Pulse Respirations Blood Pressure SpO2   (!) 97 4 °F (36 3 °C) 102 20 129/84 (!) 89 %      Temp Source Heart Rate Source Patient Position - Orthostatic VS BP Location FiO2 (%)   Oral Monitor Lying Right arm --      Pain Score       6        Wt Readings from Last 1 Encounters:   03/06/20 72 kg (158 lb 11 7 oz)     Additional Vital Signs:   03/05/20 1400    99  22  131/77  99  95 %  Nasal cannula     03/05/20 1345    98  20  130/83  100  95 %  Nasal cannula     03/05/20 1315    103  20      95 %  Nasal cannula     03/05/20 1304              None (Room air)     03/05/20 1301  97 4 °F (36 3 °C)Abnormal   102  20  129/84    89 %Abnormal   None (Room air)  Lying   Pertinent Labs/Diagnostic Test Results:   Results from last 7 days   Lab Units 03/06/20  0518 03/05/20  2341 03/05/20  1327 03/02/20  0511 03/01/20  0650 02/29/20  0514 02/28/20  1836   WBC Thousand/uL 6 68  --  8 54 9 50 10 00 5 10 5 20   HEMOGLOBIN g/dL 12 0  --  12 8 11 8* 11 9* 12 3 13 4   HEMATOCRIT % 36 0  --  38 0 34 6* 35 6* 36 8* 39 4*   PLATELETS Thousands/uL 373 348 336 231 174 144* 153   NEUTROS ABS Thousands/µL  --   --  5 68  --   --  4 50 4 00     Results from last 7 days   Lab Units 03/06/20  0518 03/05/20  1430 03/03/20  0619 03/02/20  0511 03/01/20  0619  02/28/20  1836   SODIUM mmol/L 143 142 142 144* 144*   < > 134   POTASSIUM mmol/L 3 8 3 1* 3 8 3 6 3 3*   < > 3 1*   CHLORIDE mmol/L 107 102 104 106 109*   < > 97*   CO2 mmol/L 27 34* 30 31 28   < > 27   ANION GAP mmol/L 9 6 8 7 7   < > 10   BUN mg/dL 14 13 14 13 11   < > 13   CREATININE mg/dL 0 47* 0 53* 0 42* 0 46* 0 43*   < > 0 55*   EGFR ml/min/1 73sq m 105 101 109 106 109   < > 100   CALCIUM mg/dL 8 9 8 8 8 5* 8 5* 8 2*   < > 8 1*   MAGNESIUM mg/dL 2 4 2 0 2 3  --   --   --  2 0   PHOSPHORUS mg/dL 3 3  --   --   --   --   --   --     < > = values in this interval not displayed       Results from last 7 days   Lab Units 03/06/20  0518 03/05/20  1430 03/03/20  0619 02/28/20  1836   AST U/L 20 25 55* 39   ALT U/L 48 60 45 16   ALK PHOS U/L 68 71 55 59   TOTAL PROTEIN g/dL 6 5 6 6 6 1* 6 4   ALBUMIN g/dL 2 5* 2 7* 3 4* 3 8   TOTAL BILIRUBIN mg/dL 0 50 0 70 0 50 0 40     Results from last 7 days   Lab Units 03/06/20  0518 03/05/20  1430 03/03/20  0619 03/02/20  0511 03/01/20  0619 02/29/20  0514 02/28/20  1836   GLUCOSE RANDOM mg/dL 100 107 139* 146* 152* 192* 114*     Results from last 7 days   Lab Units 03/01/20  2146 02/29/20  0029   PH ART  7 420 7 420   PCO2 ART mm Hg 36 6 36 3   PO2 ART mm Hg 63 0* 72 0*   HCO3 ART mmol/L 23 1 23 3   BASE EXC ART mmol/L -0 7 -0 5   O2 CONTENT ART mL/dL 12 9 15 4   O2 HGB, ARTERIAL % 91 0* 93 5*   ABG SOURCE  Radial, Left Radial, Left     Results from last 7 days   Lab Units 03/05/20  1327   PH TAYLOR  7 507*   PCO2 TAYLOR mm Hg 39 2*   PO2 TAYLOR mm Hg 71 2*   HCO3 TAYLOR mmol/L 30 4*   BASE EXC TAYLOR mmol/L 6 9   O2 CONTENT TAYLOR ml/dL 17 8   O2 HGB, VENOUS % 92 9*     Results from last 7 days   Lab Units 03/05/20  1430 02/28/20  1836   TROPONIN I ng/mL <0 02 <0 03     Results from last 7 days   Lab Units 03/05/20  1430 02/28/20  1836   PROTIME seconds 13 6 13 5*   INR  1 04 1 16   PTT seconds 25 30     Results from last 7 days   Lab Units 03/05/20  2341 03/05/20  1327 03/01/20  0619 02/29/20  1843 02/29/20  0514   PROCALCITONIN ng/ml <0 05 <0 05 <0 05 0 06 <0 05     Results from last 7 days   Lab Units 03/05/20  1430 02/28/20  1836   LACTIC ACID mmol/L 1 1 1 5     Results from last 7 days   Lab Units 03/05/20  1430   NT-PRO BNP pg/mL 519*     Results from last 7 days   Lab Units 03/05/20  1835 02/29/20  0821   CLARITY UA  Clear Clear   COLOR UA  Yellow Yellow   SPEC GRAV UA  <=1 005 1 010   PH UA  7 0 6 0   GLUCOSE UA mg/dl Negative Negative   KETONES UA mg/dl 15 (1+)* Negative   BLOOD UA  Negative Negative   PROTEIN UA mg/dl Negative Negative   NITRITE UA  Negative Positive*   BILIRUBIN UA  Negative Negative   UROBILINOGEN UA E U /dl 0 2 0 2   LEUKOCYTES UA  Negative Negative   WBC UA /hpf  --  2-4*   RBC UA /hpf  --  None Seen   BACTERIA UA /hpf  --  Occasional   EPITHELIAL CELLS WET PREP /hpf  --  Occasional     Results from last 7 days   Lab Units 02/29/20  0818 02/28/20  1844   STREP PNEUMONIAE ANTIGEN, URINE  Negative  --    LEGIONELLA URINARY ANTIGEN  Negative  --    INFLUENZA A PCR   --  Detected*   INFLUENZA B PCR   --  None Detected   RSV PCR   --  None Detected     Results from last 7 days   Lab Units 03/05/20  1327 02/28/20  1946 02/28/20  1836   BLOOD CULTURE  Received in Microbiology Lab  Culture in Progress  Received in Microbiology Lab  Culture in Progress  No Growth After 5 Days  No Growth After 5 Days  3/5  cta chest=1  Limited timing of contrast bolus without gross evidence of filling defect to indicate PE  2  Right greater than left patchy airspace opacities as above with a somewhat peripheral distribution  Multilobar infiltrates are most likely to have this appearance but atypical disease should also be considered  Correlate clinically and recommend follow-up to resolution    Ekg= Normal sinus rhythm  Possible Left atrial enlargement    ED Treatment:   Medication Administration from 03/05/2020 1224 to 03/05/2020 2021       Date/Time Order Dose Route Action     03/05/2020 1414 cefepime (MAXIPIME) IVPB (premix) 2,000 mg 2,000 mg Intravenous New Bag     03/05/2020 1412 sodium chloride 0 9 % bolus 1,000 mL 1,000 mL Intravenous New Bag     03/05/2020 1414 ipratropium-albuterol (DUO-NEB) 0 5-2 5 mg/3 mL inhalation solution 3 mL 3 mL Nebulization Given     03/05/2020 1410 methylPREDNISolone sodium succinate (Solu-MEDROL) injection 60 mg 60 mg Intravenous Given     03/05/2020 1943 sodium chloride 0 9 % bolus 1,000 mL 1,000 mL Intravenous New Bag     03/05/2020 1940 potassium chloride (K-DUR,KLOR-CON) CR tablet 40 mEq 40 mEq Oral Given     03/05/2020 1842 iohexol (OMNIPAQUE) 350 MG/ML injection (MULTI-DOSE) 160 mL 160 mL Intravenous Given        Past Medical History:   Diagnosis Date    Anxiety     COPD (chronic obstructive pulmonary disease) ([facility] )     Depression     Diverticulitis     GERD (gastroesophageal reflux disease)     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[facility] )     Psychiatric disorder     Sleep apnea      Present on Admission:   Anxiety and depression   Chronic back pain   COPD exacerbation (HCC)   Gastroesophageal reflux disease without esophagitis   Influenza A   Acute on chronic respiratory failure with hypoxia ([facility])  Admitting Diagnosis: Shortness of breath [R06 02]  Hypokalemia [E87 6]  Dyspnea [R06 00]  COPD exacerbation (HCC) [J44 1]  Age/Sex: 62 y o  female  Admission Orders:  o2 to keep sats>92%  Aspiration precautions  Elevated hob 30 degrees  scd's  Pt/ot eval & tx  Consult pulmonary  Scheduled Medications:  atorvastatin 20 mg Oral Daily   cefepime 2,000 mg Intravenous Q12H   colestipol 1 g Oral BID   cyclobenzaprine 10 mg Oral Daily   DULoxetine 60 mg Oral Daily   fluticasone-vilanterol 1 puff Inhalation Daily   furosemide 40 mg Oral Daily   gabapentin 300 mg Oral BID   guaiFENesin 600 mg Oral BID   heparin (porcine) 5,000 Units Subcutaneous Q8H [facility]   methylPREDNISolone sodium succinate 40 mg Intravenous Q12H LLOYD   pantoprazole 40 mg Oral Early Morning   QUEtiapine 50 mg Oral TID     PRN Meds:  albuterol 2 5 mg Nebulization Q6H PRN   dicyclomine 20 mg Oral TID PRN   ondansetron 4 mg Intravenous Q6H PRN   potassium chloride 10 mEq Oral Daily PRN     Network Utilization Review Department  [email or web address]  ATTENTION: Please call with any questions or concerns to [phone] and carefully listen to the prompts so that you are directed to the right person   All voicemails are confidential   Paul Tolliver all requests for admission clinical reviews, approved or denied determinations and any other requests to dedicated fax number below belonging to the campus where the patient is receiving treatment   List of dedicated fax numbers for the Facilities:  [address] DENIALS (Administrative/Medical Necessity) [phone]   [address]  (Maternity/NICU/Pediatrics) [phone]   Hnanah Baptise [phone]   Areta Sell [phone]   Emmy Pitch [phone]   Lola Je [phone]   [address] [place] [address] [phone]   [facility]  [phone]   [address] [facility]  [address] [facility] [address] [facility] [phone]

## 2020-03-06 NOTE — ASSESSMENT & PLAN NOTE
Presented to the ER with complaints of SOB  Probably triggered by a combination of pneumonia and influenza a  She was diagnosed with pneumonia and tested positive for Influenza A last Saturday  Respiratory protocol  Continue PTA respiratory medication  Solu-Medrol 40 mg q 12 hours  Continue management of pneumonia  A m   Labs

## 2020-03-06 NOTE — ASSESSMENT & PLAN NOTE
Presented to the ER with complaints of SOB  Probably probably triggered by a combination of pneumonia and influenza a  She was diagnosed with pneumonia and tested positive for Influenza A last Saturday  Respiratory protocol  Continue PTA respiratory medication  Check sputum culture and Gram stain  Check procalcitonin  Solu-Medrol 40 mg q 12 hours  Continue management of pneumonia  A m   Labs  Pulmonary consult  Supportive care

## 2020-03-06 NOTE — PLAN OF CARE
Problem: Potential for Falls  Goal: Patient will remain free of falls  Description  INTERVENTIONS:  - Assess patient frequently for physical needs  -  Identify cognitive and physical deficits and behaviors that affect risk of falls    -  [place] fall precautions as indicated by assessment   - Educate patient/family on patient safety including physical limitations  - Instruct patient to call for assistance with activity based on assessment  - Modify environment to reduce risk of injury  - Consider OT/PT consult to assist with strengthening/mobility  Outcome: Progressing     Problem: PAIN - ADULT  Goal: Verbalizes/displays adequate comfort level or baseline comfort level  Description  Interventions:  - Encourage patient to monitor pain and request assistance  - Assess pain using appropriate pain scale  - Administer analgesics based on type and severity of pain and evaluate response  - Implement non-pharmacological measures as appropriate and evaluate response  - Consider cultural and social influences on pain and pain management  - Notify physician/advanced practitioner if interventions unsuccessful or patient reports new pain  Outcome: Progressing     Problem: INFECTION - ADULT  Goal: Absence or prevention of progression during hospitalization  Description  INTERVENTIONS:  - Assess and monitor for signs and symptoms of infection  - Monitor lab/diagnostic results  - Monitor all insertion sites, i e  indwelling lines, tubes, and drains  - Monitor endotracheal if appropriate and nasal secretions for changes in amount and color  - [place] appropriate cooling/warming therapies per order  - Administer medications as ordered  - Instruct and encourage patient and family to use good hand hygiene technique  - Identify and instruct in appropriate isolation precautions for identified infection/condition  Outcome: Progressing  Goal: Absence of fever/infection during neutropenic period  Description  INTERVENTIONS:  - Monitor WBC    Outcome: Progressing     Problem: SAFETY ADULT  Goal: Patient will remain free of falls  Description  INTERVENTIONS:  - Assess patient frequently for physical needs  -  Identify cognitive and physical deficits and behaviors that affect risk of falls    -  [place] fall precautions as indicated by assessment   - Educate patient/family on patient safety including physical limitations  - Instruct patient to call for assistance with activity based on assessment  - Modify environment to reduce risk of injury  - Consider OT/PT consult to assist with strengthening/mobility  Outcome: Progressing  Goal: Maintain or return to baseline ADL function  Description  INTERVENTIONS:  -  Assess patient's ability to carry out ADLs; assess patient's baseline for ADL function and identify physical deficits which impact ability to perform ADLs (bathing, care of mouth/teeth, toileting, grooming, dressing, etc )  - Assess/evaluate cause of self-care deficits   - Assess range of motion  - Assess patient's mobility; develop plan if impaired  - Assess patient's need for assistive devices and provide as appropriate  - Encourage maximum independence but intervene and supervise when necessary  - Involve family in performance of ADLs  - Assess for home care needs following discharge   - Consider OT consult to assist with ADL evaluation and planning for discharge  - Provide patient education as appropriate  Outcome: Progressing  Goal: Maintain or return mobility status to optimal level  Description  INTERVENTIONS:  - Assess patient's baseline mobility status (ambulation, transfers, stairs, etc )    - Identify cognitive and physical deficits and behaviors that affect mobility  - Identify mobility aids required to assist with transfers and/or ambulation (gait belt, sit-to-stand, lift, walker, cane, etc )  - [place] fall precautions as indicated by assessment  - Record patient progress and toleration of activity level on Mobility SBAR; progress patient to next Phase/Stage  - Instruct patient to call for assistance with activity based on assessment  - Consider rehabilitation consult to assist with strengthening/weightbearing, etc   Outcome: Progressing     Problem: DISCHARGE PLANNING  Goal: Discharge to home or other facility with appropriate resources  Description  INTERVENTIONS:  - Identify barriers to discharge w/patient and caregiver  - Arrange for needed discharge resources and transportation as appropriate  - Identify discharge learning needs (meds, wound care, etc )  - Arrange for interpretive services to assist at discharge as needed  - Refer to Case Management Department for coordinating discharge planning if the patient needs post-hospital services based on physician/advanced practitioner order or complex needs related to functional status, cognitive ability, or social support system  Outcome: Progressing     Problem: Knowledge Deficit  Goal: Patient/family/caregiver demonstrates understanding of disease process, treatment plan, medications, and discharge instructions  Description  Complete learning assessment and assess knowledge base    Interventions:  - Provide teaching at level of understanding  - Provide teaching via preferred learning methods  Outcome: Progressing     Problem: NEUROSENSORY - ADULT  Goal: Achieves stable or improved neurological status  Description  INTERVENTIONS  - Monitor and report changes in neurological status  - Monitor vital signs such as temperature, blood pressure, glucose, and any other labs ordered   - Initiate measures to prevent increased intracranial pressure  - Monitor for seizure activity and implement precautions if appropriate      Outcome: Progressing  Goal: Remains free of injury related to seizures activity  Description  INTERVENTIONS  - Maintain airway, patient safety  and administer oxygen as ordered  - Monitor patient for seizure activity, document and report duration and description of seizure to physician/advanced practitioner  - If seizure occurs,  ensure patient safety during seizure  - Reorient patient post seizure  - Seizure pads on all 4 side rails  - Instruct patient/family to notify RN of any seizure activity including if an aura is experienced  - Instruct patient/family to call for assistance with activity based on nursing assessment  - Administer anti-seizure medications if ordered    Outcome: Progressing  Goal: Achieves maximal functionality and self care  Description  INTERVENTIONS  - Monitor swallowing and airway patency with patient fatigue and changes in neurological status  - Encourage and assist patient to increase activity and self care     - Encourage visually impaired, hearing impaired and aphasic patients to use assistive/communication devices  Outcome: Progressing     Problem: CARDIOVASCULAR - ADULT  Goal: Maintains optimal cardiac output and hemodynamic stability  Description  INTERVENTIONS:  - Monitor I/O, vital signs and rhythm  - Monitor for S/S and trends of decreased cardiac output  - Administer and titrate ordered vasoactive medications to optimize hemodynamic stability  - Assess quality of pulses, skin color and temperature  - Assess for signs of decreased coronary artery perfusion  - Instruct patient to report change in severity of symptoms  Outcome: Progressing  Goal: Absence of cardiac dysrhythmias or at baseline rhythm  Description  INTERVENTIONS:  - Continuous cardiac monitoring, vital signs, obtain 12 lead EKG if ordered  - Administer antiarrhythmic and heart rate control medications as ordered  - Monitor electrolytes and administer replacement therapy as ordered  Outcome: Progressing     Problem: RESPIRATORY - ADULT  Goal: Achieves optimal ventilation and oxygenation  Description  INTERVENTIONS:  - Assess for changes in respiratory status  - Assess for changes in mentation and behavior  - Position to facilitate oxygenation and minimize respiratory effort  - Oxygen administered by appropriate delivery if ordered  - Initiate smoking cessation education as indicated  - Encourage broncho-pulmonary hygiene including cough, deep breathe, Incentive Spirometry  - Assess the need for suctioning and aspirate as needed  - Assess and instruct to report SOB or any respiratory difficulty  - Respiratory Therapy support as indicated  Outcome: Progressing     Problem: GASTROINTESTINAL - ADULT  Goal: Minimal or absence of nausea and/or vomiting  Description  INTERVENTIONS:  - Administer IV fluids if ordered to ensure adequate hydration  - Maintain NPO status until nausea and vomiting are resolved  - Nasogastric tube if ordered  - Administer ordered antiemetic medications as needed  - Provide nonpharmacologic comfort measures as appropriate  - Advance diet as tolerated, if ordered  - Consider nutrition services referral to assist patient with adequate nutrition and appropriate food choices  Outcome: Progressing  Goal: Maintains or returns to baseline bowel function  Description  INTERVENTIONS:  - Assess bowel function  - Encourage oral fluids to ensure adequate hydration  - Administer IV fluids if ordered to ensure adequate hydration  - Administer ordered medications as needed  - Encourage mobilization and activity  - Consider nutritional services referral to assist patient with adequate nutrition and appropriate food choices  Outcome: Progressing  Goal: Maintains adequate nutritional intake  Description  INTERVENTIONS:  - Monitor percentage of each meal consumed  - Identify factors contributing to decreased intake, treat as appropriate  - Assist with meals as needed  - Monitor I&O, weight, and lab values if indicated  - Obtain nutrition services referral as needed  Outcome: Progressing  Goal: Establish and maintain optimal ostomy function  Description  INTERVENTIONS:  - Assess bowel function  - Encourage oral fluids to ensure adequate hydration  - Administer IV fluids if ordered to ensure adequate hydration   - Administer ordered medications as needed  - Encourage mobilization and activity  - Nutrition services referral to assist patient with appropriate food choices  - Assess stoma site  - Consider wound care consult   Outcome: Progressing     Problem: GENITOURINARY - ADULT  Goal: Maintains or returns to baseline urinary function  Description  INTERVENTIONS:  - Assess urinary function  - Encourage oral fluids to ensure adequate hydration if ordered  - Administer IV fluids as ordered to ensure adequate hydration  - Administer ordered medications as needed  - Offer frequent toileting  - Follow urinary retention protocol if ordered  Outcome: Progressing  Goal: Absence of urinary retention  Description  INTERVENTIONS:  - Assess patients ability to void and empty bladder  - Monitor I/O  - Bladder scan as needed  - Discuss with physician/AP medications to alleviate retention as needed  - Discuss catheterization for long term situations as appropriate  Outcome: Progressing  Goal: Urinary catheter remains patent  Description  INTERVENTIONS:  - Assess patency of urinary catheter  - If patient has a chronic ricardo, consider changing catheter if non-functioning  - Follow guidelines for intermittent irrigation of non-functioning urinary catheter  Outcome: Progressing     Problem: METABOLIC, FLUID AND ELECTROLYTES - ADULT  Goal: Electrolytes maintained within normal limits  Description  INTERVENTIONS:  - Monitor labs and assess patient for signs and symptoms of electrolyte imbalances  - Administer electrolyte replacement as ordered  - Monitor response to electrolyte replacements, including repeat lab results as appropriate  - Instruct patient on fluid and nutrition as appropriate  Outcome: Progressing  Goal: Fluid balance maintained  Description  INTERVENTIONS:  - Monitor labs   - Monitor I/O and WT  - Instruct patient on fluid and nutrition as appropriate  - Assess for signs & symptoms of volume excess or deficit  Outcome: Progressing  Goal: Glucose maintained within target range  Description  INTERVENTIONS:  - Monitor Blood Glucose as ordered  - Assess for signs and symptoms of hyperglycemia and hypoglycemia  - Administer ordered medications to maintain glucose within target range  - Assess nutritional intake and initiate nutrition service referral as needed  Outcome: Progressing     Problem: SKIN/TISSUE INTEGRITY - ADULT  Goal: Skin integrity remains intact  Description  INTERVENTIONS  - Identify patients at risk for skin breakdown  - Assess and monitor skin integrity  - Assess and monitor nutrition and hydration status  - Monitor labs (i e  albumin)  - Assess for incontinence   - Turn and reposition patient  - Assist with mobility/ambulation  - Relieve pressure over bony prominences  - Avoid friction and shearing  - Provide appropriate hygiene as needed including keeping skin clean and dry  - Evaluate need for skin moisturizer/barrier cream  - Collaborate with interdisciplinary team (i e  Nutrition, Rehabilitation, etc )   - Patient/family teaching  Outcome: Progressing  Goal: Incision(s), wounds(s) or drain site(s) healing without S/S of infection  Description  INTERVENTIONS  - Assess and document risk factors for skin impairment   - Assess and document dressing, incision, wound bed, drain sites and surrounding tissue  - Consider nutrition services referral as needed  - Oral mucous membranes remain intact  - Provide patient/ family education  Outcome: Progressing  Goal: Oral mucous membranes remain intact  Description  INTERVENTIONS  - Assess oral mucosa and hygiene practices  - Implement preventative oral hygiene regimen  - Implement oral medicated treatments as ordered  - Initiate Nutrition services referral as needed  Outcome: Progressing     Problem: MUSCULOSKELETAL - ADULT  Goal: Maintain or return mobility to safest level of function  Description  INTERVENTIONS:  - Assess patient's ability to carry out ADLs; assess patient's baseline for ADL function and identify physical deficits which impact ability to perform ADLs (bathing, care of mouth/teeth, toileting, grooming, dressing, etc )  - Assess/evaluate cause of self-care deficits   - Assess range of motion  - Assess patient's mobility  - Assess patient's need for assistive devices and provide as appropriate  - Encourage maximum independence but intervene and supervise when necessary  - Involve family in performance of ADLs  - Assess for home care needs following discharge   - Consider OT consult to assist with ADL evaluation and planning for discharge  - Provide patient education as appropriate  Outcome: Progressing  Goal: Maintain proper alignment of affected body part  Description  INTERVENTIONS:  - Support, maintain and protect limb and body alignment  - Provide patient/ family with appropriate education  Outcome: Progressing     Problem: HEMATOLOGIC - ADULT  Goal: Maintains hematologic stability  Description  INTERVENTIONS  - Assess for signs and symptoms of bleeding or hemorrhage  - Monitor labs  - Administer supportive blood products/factors as ordered and appropriate  Outcome: Progressing

## 2020-03-06 NOTE — UTILIZATION REVIEW
Notification of Inpatient Admission/Inpatient Authorization Request   This is a Notification of Inpatient Admission for [facility]  Be advised that this patient was admitted to our facility under Inpatient Status  Contact Maik Poe at [phone] for additional admission information  [facility] DEPT  DEDICATED -[phone]  Patient Name:   Ирина Rivera   YOB: 1955       [address]   [address]:   [address]  Tax ID: [id number]  NPI: [id number] Attending Provider/NPI: Boy Vergara [[id number]]   Place of Service Code: 24     Place of Service Name:  [address] [place] [address]   Start Date: 3/5/20 1940     Discharge Date & Time: No discharge date for patient encounter  Type of Admission: Inpatient Status Discharge Disposition   (if discharged): Home/Self Care   Patient Diagnoses: Shortness of breath [R06 02]  Hypokalemia [E87 6]  Dyspnea [R06 00]  COPD exacerbation ([facility] [address] ) [J44 1]     Orders: Admission Orders (From admission, onward)     Ordered        03/05/20 1941  Inpatient Admission  Once                    Assigned Utilization Review Contact: Maik Poe  Utilization   Network Utilization Review Department  Phone: [phone]; Fax [phone]  Email: [email or web address]  [email or web address]   ATTENTION PAYERS: Please call the assigned Utilization  directly with any questions or concerns ALL voicemails in the department are confidential  Send all requests for admission clinical reviews, approved or denied determinations and any other requests to dedicated fax number belonging to the campus where the patient is receiving treatment

## 2020-03-06 NOTE — PHYSICAL THERAPY NOTE
PHYSICAL THERAPY          Patient Name: Pradeep Kendrick  JCDYV'S Date: 3/6/2020     Order received and chart review performed  Per nursing report, pt is performing functional mobility independently with no device  Pt does not require skilled PT intervention at this time; will d/c PT orders

## 2020-03-06 NOTE — PROGRESS NOTES
Progress Note - Sandy Marino 1955, 62 y o  female MRN: [id number]    Unit/Bed#: 416-01 Encounter: [id number]    Primary Care Provider: KATIE Stephens   Date and time admitted to hospital: 3/5/2020 12:54 PM        * Acute on chronic respiratory failure with hypoxia ([facility] [address] )  Assessment & Plan  She present to the ER with complaints of SOB  Most likely secondary to a combination of pneumonia,influenza A, COPD exacerbation  SPO2 89% upon her arrival  Respiratory protocol  Continue management of pneumonia  A m  Labs  Pulmonary consultation appreciated  There was concern CT chest findings were due to vaping marijuana however the patient states she does not vape  Oxygen supplementation  Respiratory protocol  HCAP (healthcare-associated pneumonia)  Assessment & Plan  She was diagnosed with pneumonia last Saturday  She was discharged from [address] [place] [address] on Tuesday  She reports worsen SOB and productive cough  Started on IV cefepime in the ER  Sputum culture and Gram stain order- no specimen obtained,  Strep pneumonia urinary antigen- pending  Procalcitonin negative x 2  Likely viral pneumonia, will discontinue IV cefepime  Am labs        Influenza A  Assessment & Plan  She tested positive for Influenza A last Saturday  She was prescribed tamiflu but did not get prescription filled  Will continue supportive care       COPD exacerbation [facility])  Assessment & Plan  Presented to the ER with complaints of SOB  Probably triggered by a combination of pneumonia and influenza a  She was diagnosed with pneumonia and tested positive for Influenza A last Saturday  Respiratory protocol  Continue PTA respiratory medication  Solu-Medrol 40 mg q 12 hours  Continue management of pneumonia  A m  Labs      Hypokalemia  Assessment & Plan  Potassium at 3 1 on admission, now resolved     Continue to monitor potassium levels    Anxiety and depression  Assessment & Plan  Currently stable  Continue PTA medication    Chronic back pain  Assessment & Plan  Stable  Continue PTA medications    Gastroesophageal reflux disease without esophagitis  Assessment & Plan  She is currently on Nexium 40 mg po daily  Will give formulary alternative protonix 40 mg PO daily      VTE Pharmacologic Prophylaxis:   Pharmacologic: Heparin  Mechanical VTE Prophylaxis in Place: Yes    Patient Centered Rounds: I have performed bedside rounds with nursing staff today  Discussions with Specialists or Other Care Team Provider:  nursing, CM, and attending    Time Spent for Care: 20 minutes  More than 50% of total time spent on counseling and coordination of care as described above  Current Length of Stay: 1 day(s)    Current Patient Status: Inpatient   Certification Statement: The patient will continue to require additional inpatient hospital stay due to continued need for IV steroids  Discharge Plan: TBD    Code Status: Level 1 - Full Code      Subjective: The patient was seen and examined  The patient states her shortness of breath has improved  Objective:     Vitals:   Temp (24hrs), Av 4 °F (36 3 °C), Min:96 1 °F (35 6 °C), Max:97 8 °F (36 6 °C)    Temp:  [96 1 °F (35 6 °C)-97 8 °F (36 6 °C)] 97 7 °F (36 5 °C)  HR:  [] 96  Resp:  [14-22] 16  BP: (125-161)/(59-85) 125/77  SpO2:  [90 %-98 %] 93 %  Body mass index is 31 kg/m²  Input and Output Summary (last 24 hours): Intake/Output Summary (Last 24 hours) at 3/6/2020 1459  Last data filed at 3/6/2020 1257  Gross per 24 hour   Intake 1360 ml   Output 700 ml   Net 660 ml       Physical Exam:     Physical Exam   Constitutional: She is oriented to person, place, and time  Vital signs are normal  She is active and cooperative  Nasal cannula in place  Cardiovascular: Normal rate and regular rhythm  Pulmonary/Chest: Effort normal  She has decreased breath sounds  She has no rhonchi  She has no rales  Abdominal: Soft   Normal appearance and bowel sounds are normal  There is no tenderness  Neurological: She is alert and oriented to person, place, and time  No cranial nerve deficit  Skin: Skin is warm, dry and intact  Nursing note and vitals reviewed  Additional Data:     Labs:    Results from last 7 days   Lab Units 03/06/20  0518  03/05/20  1327   WBC Thousand/uL 6 68  --  8 54   HEMOGLOBIN g/dL 12 0  --  12 8   HEMATOCRIT % 36 0  --  38 0   PLATELETS Thousands/uL 373   < > 336   NEUTROS PCT %  --   --  66   LYMPHS PCT %  --   --  19   MONOS PCT %  --   --  12   EOS PCT %  --   --  1    < > = values in this interval not displayed  Results from last 7 days   Lab Units 03/06/20  0518   SODIUM mmol/L 143   POTASSIUM mmol/L 3 8   CHLORIDE mmol/L 107   CO2 mmol/L 27   BUN mg/dL 14   CREATININE mg/dL 0 47*   ANION GAP mmol/L 9   CALCIUM mg/dL 8 9   ALBUMIN g/dL 2 5*   TOTAL BILIRUBIN mg/dL 0 50   ALK PHOS U/L 68   ALT U/L 48   AST U/L 20   GLUCOSE RANDOM mg/dL 100     Results from last 7 days   Lab Units 03/05/20  1430   INR  1 04             Results from last 7 days   Lab Units 03/06/20  0518 03/05/20  2341 03/05/20  1430 03/05/20  1327 03/01/20  0619 02/29/20  1843  02/28/20  1836   LACTIC ACID mmol/L  --   --  1 1  --   --   --   --  1 5   PROCALCITONIN ng/ml <0 05 <0 05  --  <0 05 <0 05 0 06   < > <0 05    < > = values in this interval not displayed  * I Have Reviewed All Lab Data Listed Above  * Additional Pertinent Lab Tests Reviewed: All Labs Within Last 24 Hours Reviewed    Imaging:    Imaging Reports Reviewed Today Include: none  Imaging Personally Reviewed by Myself Includes:  none    Recent Cultures (last 7 days):     Results from last 7 days   Lab Units 03/05/20  1327 02/29/20  0818 02/28/20  1946 02/28/20  1836   BLOOD CULTURE  Received in Microbiology Lab  Culture in Progress  Received in Microbiology Lab  Culture in Progress  --  No Growth After 5 Days  No Growth After 5 Days     LEGIONELLA URINARY ANTIGEN   --  Negative  --   -- Last 24 Hours Medication List:     Current Facility-Administered Medications:  albuterol 2 5 mg Nebulization Q6H PRN Dez Cohen, SALLIE   atorvastatin 20 mg Oral Daily Dez Cohen, PA-ANANT   colestipol 1 g Oral BID Dez Cohen, PA-C   cyclobenzaprine 10 mg Oral Daily Dez Cohen, PA-C   dicyclomine 20 mg Oral TID PRN Dez Cohen, PA-C   DULoxetine 60 mg Oral Daily Dez Cohen, PA-C   fluticasone-vilanterol 1 puff Inhalation Daily Dez Cohen, PA-C   furosemide 40 mg Oral Daily Dez Cohen, PA-C   gabapentin 300 mg Oral BID Dez Cohen, PA-C   guaiFENesin 600 mg Oral BID Dez Cohen, PA-ANANT   heparin (porcine) 5,000 Units Subcutaneous Q8H [address] Dezalejandro Cohen, PA-ANANT   methylPREDNISolone sodium succinate 40 mg Intravenous Q12H [address] Dez Cohen, SALLIE   ondansetron 4 mg Intravenous Q6H PRN Dez Cohen, SALLIE   pantoprazole 40 mg Oral Early Morning Dez Cohen, SALLIE   potassium chloride 10 mEq Oral Daily PRN Dez Cohen, SALLIE   QUEtiapine 50 mg Oral TID Dez Cohen, SALLIE        Today, Patient Was Seen By: Phil Newman PA-C    ** Please Note: Dictation voice to text software may have been used in the creation of this document   **

## 2020-03-06 NOTE — ASSESSMENT & PLAN NOTE
She was diagnosed with pneumonia last Saturday  She was discharged from [facility] on Tuesday  She reports worsen SOB and productive cough  Started on IV cefepime in the ER  Sputum culture and Gram stain order- no specimen obtained,  Strep pneumonia urinary antigen- pending  Procalcitonin negative x 2  Likely viral pneumonia, will discontinue IV cefepime     Am labs

## 2020-03-06 NOTE — ASSESSMENT & PLAN NOTE
She tested positive for Influenza A last Saturday  She was prescribed tamiflu but did not get prescription filled  Will continue supportive care

## 2020-03-06 NOTE — OCCUPATIONAL THERAPY NOTE
Occupational Therapy Screen        Patient Name: Ney Lopez  ASHCP'D Date: 3/6/2020      OT orders received  Chart received performed  Based on conversation with nursing, pt appears to be performing at functional baseline  OT performed screen and discussed results with patient  Pt does not demonstrate need for skilled OT at this time  Pt will not be seen further by OT services  DC OT orders  If pt's functional status declines please re-consult       Adalberto Hopson OTR/L

## 2020-03-06 NOTE — RESPIRATORY THERAPY NOTE
RT Protocol Note  Sol Paul 62 y o  female MRN: [id number]  Unit/Bed#: 416-01 Encounter: [id number]    Assessment    Principal Problem:    Acute on chronic respiratory failure with hypoxia (HCC)  Active Problems:    Anxiety and depression    Chronic back pain    COPD exacerbation (HCC)    Gastroesophageal reflux disease without esophagitis    Influenza A    HCAP (healthcare-associated pneumonia)      Home Pulmonary Medications:  Patient does use Albuteral PRN   She has a pap machine at home but has not used it in a very long time, she does not want to use it here at the hospital  She wears oxygen at home  Home Devices/Therapy: Home O2, BiPAP/CPAP    Past Medical History:   Diagnosis Date    Anxiety     COPD [facility]chronic obstructive [facility] disease) ([facility] )     Depression     Diverticulitis     GERD (gastroesophageal reflux disease)     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[address] )     Psychiatric disorder     Sleep apnea      Social History     Socioeconomic History    Marital status:      Spouse name: None    Number of children: None    Years of education: None    Highest education level: None   Occupational History    None   Social Needs    Financial resource strain: None    Food insecurity:     Worry: None     Inability: None    Transportation needs:     Medical: None     Non-medical: None   Tobacco Use    Smoking status: Former Smoker     Packs/day: 1 00     Types: Cigarettes     Last attempt to quit: 12/10/2019     Years since quittin 2    Smokeless tobacco: Never Used    Tobacco comment: using patches   Substance and Sexual Activity    Alcohol use: Yes     Frequency: Never     Comment: occasionally    Drug use: Yes     Types: Marijuana     Comment: daily in e-pen form    Sexual activity: None   Lifestyle    Physical activity:     Days per week: None     Minutes per session: None    Stress: None   Relationships    Social connections:     Talks on phone: None     Gets together: None Attends [employer] witness service: None     Active member of club or organization: None     Attends meetings of clubs or organizations: None     Relationship status: None    Intimate partner violence:     Fear of current or ex partner: None     Emotionally abused: None     Physically abused: None     Forced sexual activity: None   Other Topics Concern    None   Social History Narrative    None       Subjective    Subjective Data: Patient stated she is doing better    Objective  PRN Albuteral, Acapella flutter valve to help improve bronchial hygiene  Physical Exam:   Assessment Type: During-treatment  General Appearance: Drowsy  Respiratory Pattern: Dyspnea with exertion  Chest Assessment: Chest expansion symmetrical  Bilateral Breath Sounds: Diminished, Coarse  Cough: Non-productive, Congested  O2 Device: 3 lpm oxygen via nasal cannula    Vitals:  Blood pressure 140/83, pulse 100, temperature 97 8 °F (36 6 °C), temperature source Oral, resp  rate 18, height 5' (1 524 m), weight 72 5 kg (159 lb 13 3 oz), SpO2 95 %, not currently breastfeeding  Results from last 7 days   Lab Units 03/01/20  2146   PH ART  7 420   PCO2 ART mm Hg 36 6   PO2 ART mm Hg 63 0*   HCO3 ART mmol/L 23 1   BASE EXC ART mmol/L -0 7   O2 CONTENT ART mL/dL 12 9   O2 HGB, ARTERIAL % 91 0*   ABG SOURCE  Radial, Left   CHUCK TEST  Yes       Imaging and other studies: I have personally reviewed pertinent reports  O2 Device: 3 lpm oxygen via nasal cannula     Plan    Respiratory Plan: Vent/NIV/HFNC, Mild Distress pathway, Home Bronchodilator Patient pathway  Airway Clearance Plan: Flutter     Resp Comments: Patient received in her room, she was sleeping, easily woken  Patient does wear 2 lpm oxygen at home  She stated she does have pap therapy at home but hasnt used it in a very long time, she does not want to use pap therapy here at the hospital (if she changes her mind, she will let us know)  She does use albuteral treatments only PRN   Patient at this time does not have wheezing and she isn't sob, but she does have a coarse congested cough on command,  Acapella flutter valve has been started

## 2020-03-07 VITALS
HEIGHT: 60 IN | SYSTOLIC BLOOD PRESSURE: 134 MMHG | TEMPERATURE: 97.8 F | OXYGEN SATURATION: 91 % | BODY MASS INDEX: 31.21 KG/M2 | RESPIRATION RATE: 18 BRPM | HEART RATE: 85 BPM | DIASTOLIC BLOOD PRESSURE: 77 MMHG | WEIGHT: 158.95 LBS

## 2020-03-07 PROBLEM — R93.89 ABNORMAL CT OF THE CHEST: Status: ACTIVE | Noted: 2020-03-07

## 2020-03-07 LAB
ANION GAP SERPL CALCULATED.3IONS-SCNC: 8 MMOL/L (ref 4–13)
BASOPHILS # BLD AUTO: 0.01 THOUSANDS/ΜL (ref 0–0.1)
BASOPHILS NFR BLD AUTO: 0 % (ref 0–1)
BUN SERPL-MCNC: 16 MG/DL (ref 5–25)
CALCIUM SERPL-MCNC: 9.3 MG/DL (ref 8.3–10.1)
CHLORIDE SERPL-SCNC: 106 MMOL/L (ref 100–108)
CO2 SERPL-SCNC: 27 MMOL/L (ref 21–32)
CREAT SERPL-MCNC: 0.43 MG/DL (ref 0.6–1.3)
EOSINOPHIL # BLD AUTO: 0 THOUSAND/ΜL (ref 0–0.61)
EOSINOPHIL NFR BLD AUTO: 0 % (ref 0–6)
ERYTHROCYTE [DISTWIDTH] IN BLOOD BY AUTOMATED COUNT: 13.9 % (ref 11.6–15.1)
GFR SERPL CREATININE-BSD FRML MDRD: 109 ML/MIN/1.73SQ M
GLUCOSE SERPL-MCNC: 144 MG/DL (ref 65–140)
HCT VFR BLD AUTO: 35.2 % (ref 34.8–46.1)
HGB BLD-MCNC: 11.5 G/DL (ref 11.5–15.4)
IMM GRANULOCYTES # BLD AUTO: 0.09 THOUSAND/UL (ref 0–0.2)
IMM GRANULOCYTES NFR BLD AUTO: 1 % (ref 0–2)
LYMPHOCYTES # BLD AUTO: 0.84 THOUSANDS/ΜL (ref 0.6–4.47)
LYMPHOCYTES NFR BLD AUTO: 11 % (ref 14–44)
MCH RBC QN AUTO: 31.2 PG (ref 26.8–34.3)
MCHC RBC AUTO-ENTMCNC: 32.7 G/DL (ref 31.4–37.4)
MCV RBC AUTO: 95 FL (ref 82–98)
MONOCYTES # BLD AUTO: 0.5 THOUSAND/ΜL (ref 0.17–1.22)
MONOCYTES NFR BLD AUTO: 7 % (ref 4–12)
NEUTROPHILS # BLD AUTO: 5.99 THOUSANDS/ΜL (ref 1.85–7.62)
NEUTS SEG NFR BLD AUTO: 81 % (ref 43–75)
NRBC BLD AUTO-RTO: 0 /100 WBCS
PLATELET # BLD AUTO: 375 THOUSANDS/UL (ref 149–390)
PMV BLD AUTO: 10.4 FL (ref 8.9–12.7)
POTASSIUM SERPL-SCNC: 4.2 MMOL/L (ref 3.5–5.3)
RBC # BLD AUTO: 3.69 MILLION/UL (ref 3.81–5.12)
SODIUM SERPL-SCNC: 141 MMOL/L (ref 136–145)
TROPONIN I SERPL-MCNC: <0.02 NG/ML
WBC # BLD AUTO: 7.43 THOUSAND/UL (ref 4.31–10.16)

## 2020-03-07 PROCEDURE — 94664 DEMO&/EVAL PT USE INHALER: CPT

## 2020-03-07 PROCEDURE — 80048 BASIC METABOLIC PNL TOTAL CA: CPT | Performed by: PHYSICIAN ASSISTANT

## 2020-03-07 PROCEDURE — 99239 HOSP IP/OBS DSCHRG MGMT >30: CPT | Performed by: PHYSICIAN ASSISTANT

## 2020-03-07 PROCEDURE — 94760 N-INVAS EAR/PLS OXIMETRY 1: CPT

## 2020-03-07 PROCEDURE — 85025 COMPLETE CBC W/AUTO DIFF WBC: CPT | Performed by: PHYSICIAN ASSISTANT

## 2020-03-07 PROCEDURE — 94761 N-INVAS EAR/PLS OXIMETRY MLT: CPT

## 2020-03-07 RX ADMIN — FUROSEMIDE 40 MG: 40 TABLET ORAL at 08:27

## 2020-03-07 RX ADMIN — PANTOPRAZOLE SODIUM 40 MG: 40 TABLET, DELAYED RELEASE ORAL at 05:41

## 2020-03-07 RX ADMIN — QUETIAPINE FUMARATE 50 MG: 25 TABLET ORAL at 08:27

## 2020-03-07 RX ADMIN — HEPARIN SODIUM 5000 UNITS: 5000 INJECTION INTRAVENOUS; SUBCUTANEOUS at 05:41

## 2020-03-07 RX ADMIN — ALBUTEROL SULFATE 2.5 MG: 2.5 SOLUTION RESPIRATORY (INHALATION) at 15:00

## 2020-03-07 RX ADMIN — CYCLOBENZAPRINE HYDROCHLORIDE 10 MG: 10 TABLET, FILM COATED ORAL at 08:27

## 2020-03-07 RX ADMIN — QUETIAPINE FUMARATE 50 MG: 25 TABLET ORAL at 15:55

## 2020-03-07 RX ADMIN — FLUTICASONE FUROATE AND VILANTEROL TRIFENATATE 1 PUFF: 200; 25 POWDER RESPIRATORY (INHALATION) at 08:27

## 2020-03-07 RX ADMIN — ATORVASTATIN CALCIUM 20 MG: 10 TABLET, FILM COATED ORAL at 15:55

## 2020-03-07 RX ADMIN — POTASSIUM CHLORIDE 10 MEQ: 750 TABLET, EXTENDED RELEASE ORAL at 08:27

## 2020-03-07 RX ADMIN — GUAIFENESIN 600 MG: 600 TABLET, EXTENDED RELEASE ORAL at 08:27

## 2020-03-07 RX ADMIN — METHYLPREDNISOLONE SODIUM SUCCINATE 40 MG: 40 INJECTION, POWDER, FOR SOLUTION INTRAMUSCULAR; INTRAVENOUS at 08:26

## 2020-03-07 RX ADMIN — DULOXETINE 60 MG: 30 CAPSULE, DELAYED RELEASE ORAL at 08:26

## 2020-03-07 RX ADMIN — GABAPENTIN 300 MG: 300 CAPSULE ORAL at 08:27

## 2020-03-07 NOTE — PLAN OF CARE
Problem: Potential for Falls  Goal: Patient will remain free of falls  Description  INTERVENTIONS:  - Assess patient frequently for physical needs  -  Identify cognitive and physical deficits and behaviors that affect risk of falls    -  [place] fall precautions as indicated by assessment   - Educate patient/family on patient safety including physical limitations  - Instruct patient to call for assistance with activity based on assessment  - Modify environment to reduce risk of injury  - Consider OT/PT consult to assist with strengthening/mobility  Outcome: Progressing     Problem: PAIN - ADULT  Goal: Verbalizes/displays adequate comfort level or baseline comfort level  Description  Interventions:  - Encourage patient to monitor pain and request assistance  - Assess pain using appropriate pain scale  - Administer analgesics based on type and severity of pain and evaluate response  - Implement non-pharmacological measures as appropriate and evaluate response  - Consider cultural and social influences on pain and pain management  - Notify physician/advanced practitioner if interventions unsuccessful or patient reports new pain  Outcome: Progressing     Problem: INFECTION - ADULT  Goal: Absence or prevention of progression during hospitalization  Description  INTERVENTIONS:  - Assess and monitor for signs and symptoms of infection  - Monitor lab/diagnostic results  - Monitor all insertion sites, i e  indwelling lines, tubes, and drains  - Monitor endotracheal if appropriate and nasal secretions for changes in amount and color  - [place] appropriate cooling/warming therapies per order  - Administer medications as ordered  - Instruct and encourage patient and family to use good hand hygiene technique  - Identify and instruct in appropriate isolation precautions for identified infection/condition  Outcome: Progressing  Goal: Absence of fever/infection during neutropenic period  Description  INTERVENTIONS:  - Monitor WBC    Outcome: Progressing     Problem: SAFETY ADULT  Goal: Patient will remain free of falls  Description  INTERVENTIONS:  - Assess patient frequently for physical needs  -  Identify cognitive and physical deficits and behaviors that affect risk of falls    -  [place] fall precautions as indicated by assessment   - Educate patient/family on patient safety including physical limitations  - Instruct patient to call for assistance with activity based on assessment  - Modify environment to reduce risk of injury  - Consider OT/PT consult to assist with strengthening/mobility  Outcome: Progressing  Goal: Maintain or return to baseline ADL function  Description  INTERVENTIONS:  -  Assess patient's ability to carry out ADLs; assess patient's baseline for ADL function and identify physical deficits which impact ability to perform ADLs (bathing, care of mouth/teeth, toileting, grooming, dressing, etc )  - Assess/evaluate cause of self-care deficits   - Assess range of motion  - Assess patient's mobility; develop plan if impaired  - Assess patient's need for assistive devices and provide as appropriate  - Encourage maximum independence but intervene and supervise when necessary  - Involve family in performance of ADLs  - Assess for home care needs following discharge   - Consider OT consult to assist with ADL evaluation and planning for discharge  - Provide patient education as appropriate  Outcome: Progressing  Goal: Maintain or return mobility status to optimal level  Description  INTERVENTIONS:  - Assess patient's baseline mobility status (ambulation, transfers, stairs, etc )    - Identify cognitive and physical deficits and behaviors that affect mobility  - Identify mobility aids required to assist with transfers and/or ambulation (gait belt, sit-to-stand, lift, walker, cane, etc )  - [place] fall precautions as indicated by assessment  - Record patient progress and toleration of activity level on Mobility SBAR; progress patient to next Phase/Stage  - Instruct patient to call for assistance with activity based on assessment  - Consider rehabilitation consult to assist with strengthening/weightbearing, etc   Outcome: Progressing     Problem: DISCHARGE PLANNING  Goal: Discharge to home or other facility with appropriate resources  Description  INTERVENTIONS:  - Identify barriers to discharge w/patient and caregiver  - Arrange for needed discharge resources and transportation as appropriate  - Identify discharge learning needs (meds, wound care, etc )  - Arrange for interpretive services to assist at discharge as needed  - Refer to Case Management Department for coordinating discharge planning if the patient needs post-hospital services based on physician/advanced practitioner order or complex needs related to functional status, cognitive ability, or social support system  Outcome: Progressing     Problem: Knowledge Deficit  Goal: Patient/family/caregiver demonstrates understanding of disease process, treatment plan, medications, and discharge instructions  Description  Complete learning assessment and assess knowledge base    Interventions:  - Provide teaching at level of understanding  - Provide teaching via preferred learning methods  Outcome: Progressing     Problem: NEUROSENSORY - ADULT  Goal: Achieves stable or improved neurological status  Description  INTERVENTIONS  - Monitor and report changes in neurological status  - Monitor vital signs such as temperature, blood pressure, glucose, and any other labs ordered   - Initiate measures to prevent increased intracranial pressure  - Monitor for seizure activity and implement precautions if appropriate      Outcome: Progressing  Goal: Remains free of injury related to seizures activity  Description  INTERVENTIONS  - Maintain airway, patient safety  and administer oxygen as ordered  - Monitor patient for seizure activity, document and report duration and description of seizure to physician/advanced practitioner  - If seizure occurs,  ensure patient safety during seizure  - Reorient patient post seizure  - Seizure pads on all 4 side rails  - Instruct patient/family to notify RN of any seizure activity including if an aura is experienced  - Instruct patient/family to call for assistance with activity based on nursing assessment  - Administer anti-seizure medications if ordered    Outcome: Progressing  Goal: Achieves maximal functionality and self care  Description  INTERVENTIONS  - Monitor swallowing and airway patency with patient fatigue and changes in neurological status  - Encourage and assist patient to increase activity and self care     - Encourage visually impaired, hearing impaired and aphasic patients to use assistive/communication devices  Outcome: Progressing     Problem: CARDIOVASCULAR - ADULT  Goal: Maintains optimal cardiac output and hemodynamic stability  Description  INTERVENTIONS:  - Monitor I/O, vital signs and rhythm  - Monitor for S/S and trends of decreased cardiac output  - Administer and titrate ordered vasoactive medications to optimize hemodynamic stability  - Assess quality of pulses, skin color and temperature  - Assess for signs of decreased coronary artery perfusion  - Instruct patient to report change in severity of symptoms  Outcome: Progressing  Goal: Absence of cardiac dysrhythmias or at baseline rhythm  Description  INTERVENTIONS:  - Continuous cardiac monitoring, vital signs, obtain 12 lead EKG if ordered  - Administer antiarrhythmic and heart rate control medications as ordered  - Monitor electrolytes and administer replacement therapy as ordered  Outcome: Progressing     Problem: RESPIRATORY - ADULT  Goal: Achieves optimal ventilation and oxygenation  Description  INTERVENTIONS:  - Assess for changes in respiratory status  - Assess for changes in mentation and behavior  - Position to facilitate oxygenation and minimize respiratory effort  - Oxygen administered by appropriate delivery if ordered  - Initiate smoking cessation education as indicated  - Encourage broncho-pulmonary hygiene including cough, deep breathe, Incentive Spirometry  - Assess the need for suctioning and aspirate as needed  - Assess and instruct to report SOB or any respiratory difficulty  - Respiratory Therapy support as indicated  Outcome: Progressing     Problem: GASTROINTESTINAL - ADULT  Goal: Minimal or absence of nausea and/or vomiting  Description  INTERVENTIONS:  - Administer IV fluids if ordered to ensure adequate hydration  - Maintain NPO status until nausea and vomiting are resolved  - Nasogastric tube if ordered  - Administer ordered antiemetic medications as needed  - Provide nonpharmacologic comfort measures as appropriate  - Advance diet as tolerated, if ordered  - Consider nutrition services referral to assist patient with adequate nutrition and appropriate food choices  Outcome: Progressing  Goal: Maintains or returns to baseline bowel function  Description  INTERVENTIONS:  - Assess bowel function  - Encourage oral fluids to ensure adequate hydration  - Administer IV fluids if ordered to ensure adequate hydration  - Administer ordered medications as needed  - Encourage mobilization and activity  - Consider nutritional services referral to assist patient with adequate nutrition and appropriate food choices  Outcome: Progressing  Goal: Maintains adequate nutritional intake  Description  INTERVENTIONS:  - Monitor percentage of each meal consumed  - Identify factors contributing to decreased intake, treat as appropriate  - Assist with meals as needed  - Monitor I&O, weight, and lab values if indicated  - Obtain nutrition services referral as needed  Outcome: Progressing  Goal: Establish and maintain optimal ostomy function  Description  INTERVENTIONS:  - Assess bowel function  - Encourage oral fluids to ensure adequate hydration  - Administer IV fluids if ordered to ensure adequate hydration   - Administer ordered medications as needed  - Encourage mobilization and activity  - Nutrition services referral to assist patient with appropriate food choices  - Assess stoma site  - Consider wound care consult   Outcome: Progressing     Problem: GENITOURINARY - ADULT  Goal: Maintains or returns to baseline urinary function  Description  INTERVENTIONS:  - Assess urinary function  - Encourage oral fluids to ensure adequate hydration if ordered  - Administer IV fluids as ordered to ensure adequate hydration  - Administer ordered medications as needed  - Offer frequent toileting  - Follow urinary retention protocol if ordered  Outcome: Progressing  Goal: Absence of urinary retention  Description  INTERVENTIONS:  - Assess patients ability to void and empty bladder  - Monitor I/O  - Bladder scan as needed  - Discuss with physician/AP medications to alleviate retention as needed  - Discuss catheterization for long term situations as appropriate  Outcome: Progressing  Goal: Urinary catheter remains patent  Description  INTERVENTIONS:  - Assess patency of urinary catheter  - If patient has a chronic ricardo, consider changing catheter if non-functioning  - Follow guidelines for intermittent irrigation of non-functioning urinary catheter  Outcome: Progressing     Problem: METABOLIC, FLUID AND ELECTROLYTES - ADULT  Goal: Electrolytes maintained within normal limits  Description  INTERVENTIONS:  - Monitor labs and assess patient for signs and symptoms of electrolyte imbalances  - Administer electrolyte replacement as ordered  - Monitor response to electrolyte replacements, including repeat lab results as appropriate  - Instruct patient on fluid and nutrition as appropriate  Outcome: Progressing  Goal: Fluid balance maintained  Description  INTERVENTIONS:  - Monitor labs   - Monitor I/O and WT  - Instruct patient on fluid and nutrition as appropriate  - Assess for signs & symptoms of volume excess or deficit  Outcome: Progressing  Goal: Glucose maintained within target range  Description  INTERVENTIONS:  - Monitor Blood Glucose as ordered  - Assess for signs and symptoms of hyperglycemia and hypoglycemia  - Administer ordered medications to maintain glucose within target range  - Assess nutritional intake and initiate nutrition service referral as needed  Outcome: Progressing     Problem: SKIN/TISSUE INTEGRITY - ADULT  Goal: Skin integrity remains intact  Description  INTERVENTIONS  - Identify patients at risk for skin breakdown  - Assess and monitor skin integrity  - Assess and monitor nutrition and hydration status  - Monitor labs (i e  albumin)  - Assess for incontinence   - Turn and reposition patient  - Assist with mobility/ambulation  - Relieve pressure over bony prominences  - Avoid friction and shearing  - Provide appropriate hygiene as needed including keeping skin clean and dry  - Evaluate need for skin moisturizer/barrier cream  - Collaborate with interdisciplinary team (i e  Nutrition, Rehabilitation, etc )   - Patient/family teaching  Outcome: Progressing  Goal: Incision(s), wounds(s) or drain site(s) healing without S/S of infection  Description  INTERVENTIONS  - Assess and document risk factors for skin impairment   - Assess and document dressing, incision, wound bed, drain sites and surrounding tissue  - Consider nutrition services referral as needed  - Oral mucous membranes remain intact  - Provide patient/ family education  Outcome: Progressing  Goal: Oral mucous membranes remain intact  Description  INTERVENTIONS  - Assess oral mucosa and hygiene practices  - Implement preventative oral hygiene regimen  - Implement oral medicated treatments as ordered  - Initiate Nutrition services referral as needed  Outcome: Progressing     Problem: HEMATOLOGIC - ADULT  Goal: Maintains hematologic stability  Description  INTERVENTIONS  - Assess for signs and symptoms of bleeding or hemorrhage  - Monitor labs  - Administer supportive blood products/factors as ordered and appropriate  Outcome: Progressing     Problem: MUSCULOSKELETAL - ADULT  Goal: Maintain or return mobility to safest level of function  Description  INTERVENTIONS:  - Assess patient's ability to carry out ADLs; assess patient's baseline for ADL function and identify physical deficits which impact ability to perform ADLs (bathing, care of mouth/teeth, toileting, grooming, dressing, etc )  - Assess/evaluate cause of self-care deficits   - Assess range of motion  - Assess patient's mobility  - Assess patient's need for assistive devices and provide as appropriate  - Encourage maximum independence but intervene and supervise when necessary  - Involve family in performance of ADLs  - Assess for home care needs following discharge   - Consider OT consult to assist with ADL evaluation and planning for discharge  - Provide patient education as appropriate  Outcome: Progressing  Goal: Maintain proper alignment of affected body part  Description  INTERVENTIONS:  - Support, maintain and protect limb and body alignment  - Provide patient/ family with appropriate education  Outcome: Progressing     Problem: DISCHARGE PLANNING - CARE MANAGEMENT  Goal: Discharge to post-acute care or home with appropriate resources  Description  INTERVENTIONS:  - Conduct assessment to determine patient/family and health care team treatment goals, and need for post-acute services based on payer coverage, community resources, and patient preferences, and barriers to discharge  - Address psychosocial, clinical, and financial barriers to discharge as identified in assessment in conjunction with the patient/family and health care team  - Arrange appropriate level of post-acute services according to patient's   needs and preference and payer coverage in collaboration with the physician and health care team  - Communicate with and update the patient/family, physician, and health care team regarding progress on the discharge plan  - Arrange appropriate transportation to post-acute venues  -home on dc with outpatient follow up   Outcome: Progressing

## 2020-03-07 NOTE — ASSESSMENT & PLAN NOTE
Cta Ed Chest Pe Study  Result Date: 3/5/2020  Impression: 1  Limited timing of contrast bolus without gross evidence of filling defect to indicate PE  2  Right greater than left patchy airspace opacities as above with a somewhat peripheral distribution  Multilobar infiltrates are most likely to have this appearance but atypical disease should also be considered  Correlate clinically and recommend follow-up to resolution  I personally discussed this study with José Miguel Han on 3/5/2020 at 5:56 PM  Workstation performed: [id number]     Recommend repeat outpatient scan in 6 weeks per pulmonology  This may represent vaping related injury  Will place ambulatory referral to pulmonology

## 2020-03-07 NOTE — ASSESSMENT & PLAN NOTE
She was diagnosed with pneumonia and admitted 2/28/20 - 3/3/20  She reports worsening SOB and productive cough since  Discharge home  Started on IV cefepime in the ER, however, following negative procalcitonin x 2, this is likely a viral pneumonia  Sputum culture and Gram stain order - no specimen able to be obtained    Strep pneumonia urinary antigen - negative

## 2020-03-07 NOTE — ASSESSMENT & PLAN NOTE
She tested positive for Influenza A last Saturday  She was prescribed tamiflu but did not get prescription filled  Continue supportive care at home

## 2020-03-07 NOTE — PLAN OF CARE
Problem: Potential for Falls  Goal: Patient will remain free of falls  Description  INTERVENTIONS:  - Assess patient frequently for physical needs  -  Identify cognitive and physical deficits and behaviors that affect risk of falls    -  [place] fall precautions as indicated by assessment   - Educate patient/family on patient safety including physical limitations  - Instruct patient to call for assistance with activity based on assessment  - Modify environment to reduce risk of injury  - Consider OT/PT consult to assist with strengthening/mobility  3/7/2020 1612 by Alex Cano RN  Outcome: Completed  3/7/2020 0737 by Alex Cano RN  Outcome: Progressing     Problem: PAIN - ADULT  Goal: Verbalizes/displays adequate comfort level or baseline comfort level  Description  Interventions:  - Encourage patient to monitor pain and request assistance  - Assess pain using appropriate pain scale  - Administer analgesics based on type and severity of pain and evaluate response  - Implement non-pharmacological measures as appropriate and evaluate response  - Consider cultural and social influences on pain and pain management  - Notify physician/advanced practitioner if interventions unsuccessful or patient reports new pain  3/7/2020 1612 by Alex Cano RN  Outcome: Completed  3/7/2020 0737 by Alex Cano RN  Outcome: Progressing     Problem: INFECTION - ADULT  Goal: Absence or prevention of progression during hospitalization  Description  INTERVENTIONS:  - Assess and monitor for signs and symptoms of infection  - Monitor lab/diagnostic results  - Monitor all insertion sites, i e  indwelling lines, tubes, and drains  - Monitor endotracheal if appropriate and nasal secretions for changes in amount and color  - [place] appropriate cooling/warming therapies per order  - Administer medications as ordered  - Instruct and encourage patient and family to use good hand hygiene technique  - Identify and instruct in appropriate isolation precautions for identified infection/condition  3/7/2020 1612 by Angeles Richey RN  Outcome: Completed  3/7/2020 0737 by Angeles Richey RN  Outcome: Progressing  Goal: Absence of fever/infection during neutropenic period  Description  INTERVENTIONS:  - Monitor WBC    3/7/2020 1612 by Angeles Richey RN  Outcome: Completed  3/7/2020 0737 by Angeles Richey RN  Outcome: Progressing     Problem: SAFETY ADULT  Goal: Patient will remain free of falls  Description  INTERVENTIONS:  - Assess patient frequently for physical needs  -  Identify cognitive and physical deficits and behaviors that affect risk of falls    -  [place] fall precautions as indicated by assessment   - Educate patient/family on patient safety including physical limitations  - Instruct patient to call for assistance with activity based on assessment  - Modify environment to reduce risk of injury  - Consider OT/PT consult to assist with strengthening/mobility  3/7/2020 1612 by Angeles Richey RN  Outcome: Completed  3/7/2020 0737 by Angeles Richey RN  Outcome: Progressing  Goal: Maintain or return to baseline ADL function  Description  INTERVENTIONS:  -  Assess patient's ability to carry out ADLs; assess patient's baseline for ADL function and identify physical deficits which impact ability to perform ADLs (bathing, care of mouth/teeth, toileting, grooming, dressing, etc )  - Assess/evaluate cause of self-care deficits   - Assess range of motion  - Assess patient's mobility; develop plan if impaired  - Assess patient's need for assistive devices and provide as appropriate  - Encourage maximum independence but intervene and supervise when necessary  - Involve family in performance of ADLs  - Assess for home care needs following discharge   - Consider OT consult to assist with ADL evaluation and planning for discharge  - Provide patient education as appropriate  3/7/2020 1612 by Angeles Richey RN  Outcome: Completed  3/7/2020 0737 by Angeles Richey RN  Outcome: Progressing  Goal: Maintain or return mobility status to optimal level  Description  INTERVENTIONS:  - Assess patient's baseline mobility status (ambulation, transfers, stairs, etc )    - Identify cognitive and physical deficits and behaviors that affect mobility  - Identify mobility aids required to assist with transfers and/or ambulation (gait belt, sit-to-stand, lift, walker, cane, etc )  - [place] fall precautions as indicated by assessment  - Record patient progress and toleration of activity level on Mobility SBAR; progress patient to next Phase/Stage  - Instruct patient to call for assistance with activity based on assessment  - Consider rehabilitation consult to assist with strengthening/weightbearing, etc   3/7/2020 1612 by Jamey Valentino RN  Outcome: Completed  3/7/2020 0737 by Jamey Valentino RN  Outcome: Progressing     Problem: DISCHARGE PLANNING  Goal: Discharge to home or other facility with appropriate resources  Description  INTERVENTIONS:  - Identify barriers to discharge w/patient and caregiver  - Arrange for needed discharge resources and transportation as appropriate  - Identify discharge learning needs (meds, wound care, etc )  - Arrange for interpretive services to assist at discharge as needed  - Refer to Case Management Department for coordinating discharge planning if the patient needs post-hospital services based on physician/advanced practitioner order or complex needs related to functional status, cognitive ability, or social support system  3/7/2020 1612 by Jamey Valentino RN  Outcome: Completed  3/7/2020 0737 by Jamey Valentino RN  Outcome: Progressing     Problem: Knowledge Deficit  Goal: Patient/family/caregiver demonstrates understanding of disease process, treatment plan, medications, and discharge instructions  Description  Complete learning assessment and assess knowledge base    Interventions:  - Provide teaching at level of understanding  - Provide teaching via preferred learning methods  3/7/2020 1612 by Alex Cano RN  Outcome: Completed  3/7/2020 0737 by Alex Cano RN  Outcome: Progressing     Problem: NEUROSENSORY - ADULT  Goal: Achieves stable or improved neurological status  Description  INTERVENTIONS  - Monitor and report changes in neurological status  - Monitor vital signs such as temperature, blood pressure, glucose, and any other labs ordered   - Initiate measures to prevent increased intracranial pressure  - Monitor for seizure activity and implement precautions if appropriate      3/7/2020 1612 by Alex Cano RN  Outcome: Completed  3/7/2020 0737 by Alex Cano RN  Outcome: Progressing  Goal: Remains free of injury related to seizures activity  Description  INTERVENTIONS  - Maintain airway, patient safety  and administer oxygen as ordered  - Monitor patient for seizure activity, document and report duration and description of seizure to physician/advanced practitioner  - If seizure occurs,  ensure patient safety during seizure  - Reorient patient post seizure  - Seizure pads on all 4 side rails  - Instruct patient/family to notify RN of any seizure activity including if an aura is experienced  - Instruct patient/family to call for assistance with activity based on nursing assessment  - Administer anti-seizure medications if ordered    3/7/2020 1612 by Alex Cano RN  Outcome: Completed  3/7/2020 0737 by Alex Cano RN  Outcome: Progressing  Goal: Achieves maximal functionality and self care  Description  INTERVENTIONS  - Monitor swallowing and airway patency with patient fatigue and changes in neurological status  - Encourage and assist patient to increase activity and self care     - Encourage visually impaired, hearing impaired and aphasic patients to use assistive/communication devices  3/7/2020 1612 by Alex Cano RN  Outcome: Completed  3/7/2020 0737 by Alex Cano RN  Outcome: Progressing     Problem: CARDIOVASCULAR - ADULT  Goal: Maintains optimal cardiac output and hemodynamic stability  Description  INTERVENTIONS:  - Monitor I/O, vital signs and rhythm  - Monitor for S/S and trends of decreased cardiac output  - Administer and titrate ordered vasoactive medications to optimize hemodynamic stability  - Assess quality of pulses, skin color and temperature  - Assess for signs of decreased coronary artery perfusion  - Instruct patient to report change in severity of symptoms  3/7/2020 1612 by Jamey Cooper RN  Outcome: Completed  3/7/2020 0737 by Jamey Cooper RN  Outcome: Progressing  Goal: Absence of cardiac dysrhythmias or at baseline rhythm  Description  INTERVENTIONS:  - Continuous cardiac monitoring, vital signs, obtain 12 lead EKG if ordered  - Administer antiarrhythmic and heart rate control medications as ordered  - Monitor electrolytes and administer replacement therapy as ordered  3/7/2020 1612 by Jamey Cooper RN  Outcome: Completed  3/7/2020 0737 by Jamey Cooper RN  Outcome: Progressing     Problem: RESPIRATORY - ADULT  Goal: Achieves optimal ventilation and oxygenation  Description  INTERVENTIONS:  - Assess for changes in respiratory status  - Assess for changes in mentation and behavior  - Position to facilitate oxygenation and minimize respiratory effort  - Oxygen administered by appropriate delivery if ordered  - Initiate smoking cessation education as indicated  - Encourage broncho-pulmonary hygiene including cough, deep breathe, Incentive Spirometry  - Assess the need for suctioning and aspirate as needed  - Assess and instruct to report SOB or any respiratory difficulty  - Respiratory Therapy support as indicated  3/7/2020 1612 by Jamey Cooper RN  Outcome: Completed  3/7/2020 0737 by Jamey Cooper RN  Outcome: Progressing     Problem: GASTROINTESTINAL - ADULT  Goal: Minimal or absence of nausea and/or vomiting  Description  INTERVENTIONS:  - Administer IV fluids if ordered to ensure adequate hydration  - Maintain NPO status until nausea and vomiting are resolved  - Nasogastric tube if ordered  - Administer ordered antiemetic medications as needed  - Provide nonpharmacologic comfort measures as appropriate  - Advance diet as tolerated, if ordered  - Consider nutrition services referral to assist patient with adequate nutrition and appropriate food choices  3/7/2020 1612 by Mervat Boone RN  Outcome: Completed  3/7/2020 0737 by Mervat Boone RN  Outcome: Progressing  Goal: Maintains or returns to baseline bowel function  Description  INTERVENTIONS:  - Assess bowel function  - Encourage oral fluids to ensure adequate hydration  - Administer IV fluids if ordered to ensure adequate hydration  - Administer ordered medications as needed  - Encourage mobilization and activity  - Consider nutritional services referral to assist patient with adequate nutrition and appropriate food choices  3/7/2020 1612 by Mervat Boone RN  Outcome: Completed  3/7/2020 0737 by Mervat Boone RN  Outcome: Progressing  Goal: Maintains adequate nutritional intake  Description  INTERVENTIONS:  - Monitor percentage of each meal consumed  - Identify factors contributing to decreased intake, treat as appropriate  - Assist with meals as needed  - Monitor I&O, weight, and lab values if indicated  - Obtain nutrition services referral as needed  3/7/2020 1612 by Mervat Boone RN  Outcome: Completed  3/7/2020 0737 by Mervat Boone RN  Outcome: Progressing  Goal: Establish and maintain optimal ostomy function  Description  INTERVENTIONS:  - Assess bowel function  - Encourage oral fluids to ensure adequate hydration  - Administer IV fluids if ordered to ensure adequate hydration   - Administer ordered medications as needed  - Encourage mobilization and activity  - Nutrition services referral to assist patient with appropriate food choices  - Assess stoma site  - Consider wound care consult   3/7/2020 1612 by Mervat Boone RN  Outcome: Completed  3/7/2020 0737 by Mervat Boone RN  Outcome: Progressing     Problem: GENITOURINARY - ADULT  Goal: Maintains or returns to baseline urinary function  Description  INTERVENTIONS:  - Assess urinary function  - Encourage oral fluids to ensure adequate hydration if ordered  - Administer IV fluids as ordered to ensure adequate hydration  - Administer ordered medications as needed  - Offer frequent toileting  - Follow urinary retention protocol if ordered  3/7/2020 1612 by Kasey Travis RN  Outcome: Completed  3/7/2020 0737 by Kasey Travis RN  Outcome: Progressing  Goal: Absence of urinary retention  Description  INTERVENTIONS:  - Assess patients ability to void and empty bladder  - Monitor I/O  - Bladder scan as needed  - Discuss with physician/AP medications to alleviate retention as needed  - Discuss catheterization for long term situations as appropriate  3/7/2020 1612 by Kasey Travis RN  Outcome: Completed  3/7/2020 0737 by Kasey Travis RN  Outcome: Progressing  Goal: Urinary catheter remains patent  Description  INTERVENTIONS:  - Assess patency of urinary catheter  - If patient has a chronic ricardo, consider changing catheter if non-functioning  - Follow guidelines for intermittent irrigation of non-functioning urinary catheter  3/7/2020 1612 by Kasey Travis RN  Outcome: Completed  3/7/2020 0737 by Kasey Travis RN  Outcome: Progressing     Problem: METABOLIC, FLUID AND ELECTROLYTES - ADULT  Goal: Electrolytes maintained within normal limits  Description  INTERVENTIONS:  - Monitor labs and assess patient for signs and symptoms of electrolyte imbalances  - Administer electrolyte replacement as ordered  - Monitor response to electrolyte replacements, including repeat lab results as appropriate  - Instruct patient on fluid and nutrition as appropriate  3/7/2020 1612 by Kasey Travis RN  Outcome: Completed  3/7/2020 0737 by Kasey Travis RN  Outcome: Progressing  Goal: Fluid balance maintained  Description  INTERVENTIONS:  - Monitor labs   - Monitor I/O and WT  - Instruct patient on fluid and nutrition as appropriate  - Assess for signs & symptoms of volume excess or deficit  3/7/2020 1612 by Rockne Bosworth, RN  Outcome: Completed  3/7/2020 0737 by Rockne Bosworth, RN  Outcome: Progressing  Goal: Glucose maintained within target range  Description  INTERVENTIONS:  - Monitor Blood Glucose as ordered  - Assess for signs and symptoms of hyperglycemia and hypoglycemia  - Administer ordered medications to maintain glucose within target range  - Assess nutritional intake and initiate nutrition service referral as needed  3/7/2020 1612 by Rockne Bosworth, RN  Outcome: Completed  3/7/2020 0737 by Rockne Bosworth, RN  Outcome: Progressing     Problem: SKIN/TISSUE INTEGRITY - ADULT  Goal: Skin integrity remains intact  Description  INTERVENTIONS  - Identify patients at risk for skin breakdown  - Assess and monitor skin integrity  - Assess and monitor nutrition and hydration status  - Monitor labs (i e  albumin)  - Assess for incontinence   - Turn and reposition patient  - Assist with mobility/ambulation  - Relieve pressure over bony prominences  - Avoid friction and shearing  - Provide appropriate hygiene as needed including keeping skin clean and dry  - Evaluate need for skin moisturizer/barrier cream  - Collaborate with interdisciplinary team (i e  Nutrition, Rehabilitation, etc )   - Patient/family teaching  3/7/2020 1612 by Rockne Bosworth, RN  Outcome: Completed  3/7/2020 0737 by Rockne Bosworth, RN  Outcome: Progressing  Goal: Incision(s), wounds(s) or drain site(s) healing without S/S of infection  Description  INTERVENTIONS  - Assess and document risk factors for skin impairment   - Assess and document dressing, incision, wound bed, drain sites and surrounding tissue  - Consider nutrition services referral as needed  - Oral mucous membranes remain intact  - Provide patient/ family education  3/7/2020 1612 by Rockne Bosworth, RN  Outcome: Completed  3/7/2020 0737 by Rockne Bosworth, RN  Outcome: Progressing  Goal: Oral mucous membranes remain intact  Description  INTERVENTIONS  - Assess oral mucosa and hygiene practices  - Implement preventative oral hygiene regimen  - Implement oral medicated treatments as ordered  - Initiate Nutrition services referral as needed  3/7/2020 1612 by Enma Carrion RN  Outcome: Completed  3/7/2020 0737 by Enma Carrion RN  Outcome: Progressing     Problem: HEMATOLOGIC - ADULT  Goal: Maintains hematologic stability  Description  INTERVENTIONS  - Assess for signs and symptoms of bleeding or hemorrhage  - Monitor labs  - Administer supportive blood products/factors as ordered and appropriate  3/7/2020 1612 by Enma Carrion RN  Outcome: Completed  3/7/2020 0737 by Enma Carrion RN  Outcome: Progressing     Problem: MUSCULOSKELETAL - ADULT  Goal: Maintain or return mobility to safest level of function  Description  INTERVENTIONS:  - Assess patient's ability to carry out ADLs; assess patient's baseline for ADL function and identify physical deficits which impact ability to perform ADLs (bathing, care of mouth/teeth, toileting, grooming, dressing, etc )  - Assess/evaluate cause of self-care deficits   - Assess range of motion  - Assess patient's mobility  - Assess patient's need for assistive devices and provide as appropriate  - Encourage maximum independence but intervene and supervise when necessary  - Involve family in performance of ADLs  - Assess for home care needs following discharge   - Consider OT consult to assist with ADL evaluation and planning for discharge  - Provide patient education as appropriate  3/7/2020 1612 by Enma Carrion RN  Outcome: Completed  3/7/2020 0737 by Enma Carrion RN  Outcome: Progressing  Goal: Maintain proper alignment of affected body part  Description  INTERVENTIONS:  - Support, maintain and protect limb and body alignment  - Provide patient/ family with appropriate education  3/7/2020 1612 by Enma Carrion RN  Outcome: Completed  3/7/2020 0737 by Mervat Boone RN  Outcome: Progressing     Problem: DISCHARGE PLANNING - CARE MANAGEMENT  Goal: Discharge to post-acute care or home with appropriate resources  Description  INTERVENTIONS:  - Conduct assessment to determine patient/family and health care team treatment goals, and need for post-acute services based on payer coverage, community resources, and patient preferences, and barriers to discharge  - Address psychosocial, clinical, and financial barriers to discharge as identified in assessment in conjunction with the patient/family and health care team  - Arrange appropriate level of post-acute services according to patient's   needs and preference and payer coverage in collaboration with the physician and health care team  - Communicate with and update the patient/family, physician, and health care team regarding progress on the discharge plan  - Arrange appropriate transportation to post-acute venues  -home on dc with outpatient follow up   3/7/2020 1612 by Mervat Boone RN  Outcome: Completed  3/7/2020 0737 by Mervat Boone RN  Outcome: Progressing

## 2020-03-07 NOTE — ASSESSMENT & PLAN NOTE
She present to the ER with complaints of SOB  Most likely secondary to a combination of pneumonia,influenza A, COPD exacerbation  SPO2 89% upon her arrival, now stable  Pulmonary consultation appreciated  There was concern CT chest findings were due to vaping marijuana however the patient states she does not vape  Continue Oxygen supplementation continuously at 2L/min

## 2020-03-07 NOTE — DISCHARGE INSTRUCTIONS
Recommend breathing treatments at home every 8 hours around the clock the next few days, then continue as needed  Recommend supplemental oxygen be increased to 2L/min continuously  Recommend discontinuation of tobacco use and any marijuana use   the prednisone that was prescribed on discharge during your last hospital stay  Tamiflu is no longer necessary, this can be discontinued

## 2020-03-07 NOTE — DISCHARGE SUMMARY
Discharge- Lázaro Kenyon 1955, 62 y o  female MRN: [id number]    Unit/Bed#: 416-01 Encounter: [id number]    Primary Care Provider: KATIE Pride   Date and time admitted to hospital: 3/5/2020 12:54 PM        * Acute on chronic respiratory failure with hypoxia [facility])  Assessment & Plan  She present to the ER with complaints of SOB  Most likely secondary to a combination of pneumonia,influenza A, COPD exacerbation  SPO2 89% upon her arrival, now stable  Pulmonary consultation appreciated  There was concern CT chest findings were due to vaping marijuana however the patient states she does not vape  Continue Oxygen supplementation at home  COPD exacerbation ([facility] [address] )  Assessment & Plan  Presented to the ER with complaints of SOB  Probably triggered by a combination of pneumonia and influenza A  She was diagnosed with pneumonia and tested positive for Influenza A last Saturday  Respiratory protocol used while here  Continue PTA respiratory medications - albuterol, Breo, Mucinex  Noted symptomatic improvement - Solu-Medrol 40 mg q 12 hours utilized while here, Would continue PO prednisone taper on discharge  Continue management of pneumonia  A m  Labs      Hypokalemia  Assessment & Plan  Potassium at 3 1 on admission, now resolved  Continue to monitor potassium levels  HCAP (healthcare-associated pneumonia)  Assessment & Plan  She was diagnosed with pneumonia and admitted 2/28/20 - 3/3/20  She reports worsening SOB and productive cough since  Discharge home  Started on IV cefepime in the ER, however, following negative procalcitonin x 2, this is likely a viral pneumonia  Sputum culture and Gram stain order - no specimen able to be obtained    Strep pneumonia urinary antigen - negative          Influenza A  Assessment & Plan  She tested positive for Influenza A last Saturday  She was prescribed tamiflu but did not get prescription filled  Continue supportive care at home       Gastroesophageal reflux disease without esophagitis  Assessment & Plan  She is currently on Nexium 40 mg po daily  Given formulary alternative protonix 40 mg PO daily    Chronic back pain  Assessment & Plan  Stable  Continue PTA medications    Anxiety and depression  Assessment & Plan  Currently stable  Continue PTA medication      Discharging Physician / Practitioner: Neil Berry PA-C  PCP: Hollie Cosme  Admission Date:   Admission Orders (From admission, onward)     Ordered        03/05/20 1941  Inpatient Admission  Once                   Discharge Date: 03/07/20    Resolved Problems  Date Reviewed: 3/7/2020    None            Consultations During Hospital Stay:  · none    Procedures Performed:   · none    Significant Findings / Test Results:   Cta Ed Chest Pe Study  Result Date: 3/5/2020  Impression: 1  Limited timing of contrast bolus without gross evidence of filling defect to indicate PE  2  Right greater than left patchy airspace opacities as above with a somewhat peripheral distribution  Multilobar infiltrates are most likely to have this appearance but atypical disease should also be considered  Correlate clinically and recommend follow-up to resolution  I personally discussed this study with Anna Yeh on 3/5/2020 at 5:56 PM  Workstation performed: [id number]       Incidental Findings:   · none    Test Results Pending at Discharge (will require follow up):   · none     Outpatient Tests Requested:  · none    Complications:  none    Reason for Admission: acute on chronic respiratory failure with hypoxia  Hospital Course:   Nicole Romero is a 62 y o  female patient who originally presented to the hospital on 3/5/2020 due to Shortness of Breath (pt reccently discharged from hospital with post influenza pneumonia   pt was unable to  medication, seen by PCP and pt was hypoxic in the mid 80's on RA)    Patient was found to have increased oxygen need and supplemented as needed  She was treated with IV steroids  She made symptomatic improvement and felt well on the day of discharge, but did require a slight increase in her home oxygen needs - recommending 2L/min continuously for now  She needs close outpatient follow up with her PCP and pulmonology after discharge  Home nursing referral was made  Please see above list of diagnoses and related plan for additional information  Condition at Discharge: good     Discharge Day Visit / Exam:   Subjective:  Patient feels well today  Much less SOB, cough  Asking to go home  Vitals: Blood Pressure: 134/77 (03/07/20 0703)  Pulse: 85 (03/07/20 0703)  Temperature: 97 8 °F (36 6 °C) (03/07/20 0703)  Temp Source: Oral (03/07/20 0703)  Respirations: 18 (03/07/20 0703)  Height: 5' (152 4 cm) (03/05/20 2028)  Weight - Scale: 72 1 kg (158 lb 15 2 oz) (03/07/20 0546)  SpO2: 91 % (03/07/20 1505)  Exam:   Physical Exam   Constitutional: She is oriented to person, place, and time  She appears well-developed and well-nourished  Nasal cannula in place  HENT:   Head: Normocephalic  Mouth/Throat: Oropharynx is clear and moist    Neck: Neck supple  Cardiovascular: Normal rate and normal heart sounds  No murmur heard  Pulmonary/Chest: Breath sounds normal  No respiratory distress  She has no wheezes  Abdominal: Soft  Bowel sounds are normal  There is no tenderness  Musculoskeletal: She exhibits no edema  Neurological: She is alert and oriented to person, place, and time  Skin: Skin is warm and dry  Psychiatric: She has a normal mood and affect  Nursing note and vitals reviewed  Discharge instructions/Information to patient and family:   See after visit summary for information provided to patient and family  Provisions for Follow-Up Care:  See after visit summary for information related to follow-up care and any pertinent home health orders        Disposition:   Home      Planned Readmission: no     Discharge Statement:  I spent 40 minutes discharging the patient  This time was spent on the day of discharge  I had direct contact with the patient on the day of discharge  Greater than 50% of the total time was spent examining patient, answering all patient questions, arranging and discussing plan of care with patient as well as directly providing post-discharge instructions  Additional time then spent on discharge activities  Discharge Medications:  See after visit summary for reconciled discharge medications provided to patient and family        ** Please Note: This note has been constructed using a voice recognition system **

## 2020-03-07 NOTE — RESPIRATORY THERAPY NOTE
Home Oxygen Qualifying Test       Patient name: Royal Hammond        : 1955   Date of Test:  2020  Diagnosis: acute on chronic respiratory failure with hypoxia     Home Oxygen Test:    **Medicare Guidelines require item(s) 1-5 on all ambulatory patients or 1 and 2 on non-ambulatory patients  1   Baseline SPO2 on Room Air at rest 92 %  2   SPO2 during exercise on Room Air 86 %  During exercise monitor SpO2  If SPO2 increases >=89% with ambulation do not add supplemental             oxygen  If <= 88% on room air add O2 via NC and titrate patient  Patient must be ambulated with O2 and titrated to > 88% with exertion  3   SPO2 on Oxygen at rest 96 % 2lpm     4   SPO2 during exercise on Oxygen  91% a liter flow of 2 lpm     5   Exercise performed:          walking, duration 10 (min), distance 450 (feet)          []  Supplemental Home Oxygen is indicated  []  Client does not qualify for home oxygen  Respiratory Additional Notes- pt has home oxygen already wears 2-3 l/m @[facility]  Pt ambulated 70 ft on room air and pulse ox 88% and another 20 ft to see if pt would recover but  pulse ox was 86 %  Placed pt on 2 l/m and continued another 360 ft with pulse ox 91%  Respiratory tx given with Albuterol 0 083% for increased SOB/WOB                                                [address] Arun Waggoner, [place]

## 2020-03-07 NOTE — ASSESSMENT & PLAN NOTE
Presented to the ER with complaints of SOB  Probably triggered by a combination of pneumonia and influenza A  She was diagnosed with pneumonia and tested positive for Influenza A last Saturday  Respiratory protocol used while here  Continue PTA respiratory medications - albuterol, Breo, Mucinex  Noted symptomatic improvement - Solu-Medrol 40 mg q 12 hours utilized while here, Would continue PO prednisone taper on discharge as taking previously following discharge from hospital 3/3/20

## 2020-03-09 NOTE — UTILIZATION REVIEW
Notification of Discharge  This is a Notification of Discharge from our facility [address]  Please be advised that this patient has been discharge from our facility  Below you will find the admission and discharge date and time including the patients disposition  PRESENTATION DATE: 3/5/2020 12:54 PM  OBS ADMISSION DATE:   IP ADMISSION DATE: 3/5/20 1940   DISCHARGE DATE: 3/7/2020  4:35 PM  DISPOSITION: Home/Self Care Home/Self Care   Admission Orders listed below:  Admission Orders (From admission, onward)     Ordered        03/05/20 1941  Inpatient Admission  Once                   Please contact the UR Department if additional information is required to close this patient's authorization/case  [address] [facility] Utilization Review Department  Main: [phone] x carefully listen to the prompts  All voicemails are confidential   [email or web address]  Send all requests for admission clinical reviews, approved or denied determinations and any other requests to dedicated fax number below belonging to the campus where the patient is receiving treatment   List of dedicated fax numbers:  [address] DENIALS (Administrative/Medical Necessity) [phone]   [address] (Maternity/NICU/Pediatrics) [phone]   Qiana Walton [phone]   Connie Geiger [phone]   Cullen Opitz [phone]   [facility] [address] [facility] [phone]   [address] [facility] [phone]   [address] [facility]  [address] [address] [address] [facility] [phone]

## 2020-03-11 LAB
BACTERIA BLD CULT: NORMAL
BACTERIA BLD CULT: NORMAL

## 2020-03-16 ENCOUNTER — TRANSITIONAL CARE MANAGEMENT (OUTPATIENT)
Dept: FAMILY MEDICINE CLINIC | Facility: HOME HEALTHCARE | Age: 65
End: 2020-03-16

## 2020-03-20 ENCOUNTER — OFFICE VISIT (OUTPATIENT)
Dept: FAMILY MEDICINE CLINIC | Facility: HOME HEALTHCARE | Age: 65
End: 2020-03-20
Payer: COMMERCIAL

## 2020-03-20 VITALS
SYSTOLIC BLOOD PRESSURE: 118 MMHG | HEIGHT: 60 IN | TEMPERATURE: 98.5 F | DIASTOLIC BLOOD PRESSURE: 60 MMHG | OXYGEN SATURATION: 96 % | HEART RATE: 88 BPM | RESPIRATION RATE: 18 BRPM | BODY MASS INDEX: 32.51 KG/M2 | WEIGHT: 165.6 LBS

## 2020-03-20 DIAGNOSIS — Z91.89 NEED FOR DENTAL CARE: ICD-10-CM

## 2020-03-20 DIAGNOSIS — R06.00 DYSPNEA, UNSPECIFIED TYPE: ICD-10-CM

## 2020-03-20 DIAGNOSIS — Z12.4 SCREENING FOR CERVICAL CANCER: ICD-10-CM

## 2020-03-20 DIAGNOSIS — J44.1 COPD EXACERBATION (HCC): ICD-10-CM

## 2020-03-20 DIAGNOSIS — Z23 NEED FOR TDAP VACCINATION: Primary | ICD-10-CM

## 2020-03-20 DIAGNOSIS — R53.81 PHYSICAL DECONDITIONING: ICD-10-CM

## 2020-03-20 PROCEDURE — 99213 OFFICE O/P EST LOW 20 MIN: CPT | Performed by: FAMILY MEDICINE

## 2020-03-20 RX ORDER — IPRATROPIUM BROMIDE AND ALBUTEROL SULFATE 2.5; .5 MG/3ML; MG/3ML
3 SOLUTION RESPIRATORY (INHALATION) 4 TIMES DAILY
Qty: 360 ML | Refills: 2 | Status: SHIPPED | OUTPATIENT
Start: 2020-03-20 | End: 2020-06-18

## 2020-03-20 RX ORDER — IPRATROPIUM BROMIDE AND ALBUTEROL SULFATE 2.5; .5 MG/3ML; MG/3ML
3 SOLUTION RESPIRATORY (INHALATION) ONCE
Status: COMPLETED | OUTPATIENT
Start: 2020-03-20 | End: 2020-03-20

## 2020-03-20 RX ADMIN — IPRATROPIUM BROMIDE AND ALBUTEROL SULFATE 3 ML: 2.5; .5 SOLUTION RESPIRATORY (INHALATION) at 09:02

## 2020-03-20 NOTE — PROGRESS NOTES
[address]       NAME: Deisy Hidalgo is a 62 y o  female  : 1955    MRN: [id number]  DATE: 2020  TIME: 9:25 AM    Assessment and Plan   Diagnoses and all orders for this visit:    Need for Tdap vaccination  -     Cancel: Tdap vaccine greater than or equal to 6yo IM    Screening for cervical cancer  -     Ambulatory referral to Obstetrics / Gynecology; Future    Need for dental care  -     Ambulatory referral to Dentistry; Future    Dyspnea, unspecified type  -     ipratropium-albuterol (DUO-NEB) 0 5-2 5 mg/3 mL inhalation solution 3 mL    COPD exacerbation (HCC)  -     ipratropium-albuterol (DUO-NEB) 0 5-2 5 mg/3 mL nebulizer solution; Take 1 vial (3 mL total) by nebulization 4 (four) times a day    Physical deconditioning  -     Ambulatory referral to Physical Therapy; Future        No problem-specific Assessment & Plan notes found for this encounter  Patient Instructions           Chief Complaint     Chief Complaint   Patient presents with    Follow-up     flu/pneumonia and dyspnea hospital visit         History of Present Illness       Armaan Tamayo is here for follow-up  A previous visit she presented for transition of care and had to be referred back to the emergency room for respiratory distress  She was admitted for Chronic Obstructive Pulmonary Disease exacerbation and pneumonia  She is on home oxygen, 2 L via nasal cannula  Continues to have fatigue and dyspnea on exertion, but reports stable oxygen requirements  Admits that she is not doing consistent nebulizer treatments, does continue to smoke 1-2 cigarettes daily, and admits to smoking marijuana  Did advise her that it would be in her best interest to avoid smoking anything due to the status of her lungs  She does verbalize agreement understanding  Eating and sleeping well  Does not have chest pain or feel short of breath  States I actually feel a lot better than I did            Review of Systems   Review of Systems   Constitutional: Positive for fatigue  HENT: Negative  Respiratory: Positive for cough, chest tightness and wheezing  Home oxygen at 2 L  Cardiovascular: Negative  Gastrointestinal: Negative  Genitourinary: Negative  Musculoskeletal: Positive for arthralgias  Neurological: Negative  Psychiatric/Behavioral: Negative            Current Medications       Current Outpatient Medications:     albuterol (2 5 mg/3 mL) 0 083 % nebulizer solution, Take 1 vial (2 5 mg total) by nebulization every 6 (six) hours as needed for wheezing or shortness of breath, Disp: 120 vial, Rfl: 1    atorvastatin (LIPITOR) 20 mg tablet, Take 1 tablet (20 mg total) by mouth daily, Disp: 90 tablet, Rfl: 0    colestipol (COLESTID) 1 g tablet, Take 1 tablet (1 g total) by mouth 2 (two) times a day, Disp: 180 tablet, Rfl: 3    cyclobenzaprine (FLEXERIL) 10 mg tablet, Take 1 tablet (10 mg total) by mouth daily, Disp: 90 tablet, Rfl: 0    dicyclomine (BENTYL) 20 mg tablet, Take 1 tablet (20 mg total) by mouth 3 (three) times a day as needed (as needed), Disp: 270 tablet, Rfl: 0    DULoxetine (CYMBALTA) 60 mg delayed release capsule, Take 1 capsule (60 mg total) by mouth daily, Disp: 90 capsule, Rfl: 0    ergocalciferol (VITAMIN D2) 50,000 units, Take 1 capsule (50,000 Units total) by mouth once a week, Disp: 4 capsule, Rfl: 2    esomeprazole (NexIUM) 40 MG capsule, Take 1 capsule (40 mg total) by mouth daily, Disp: 90 capsule, Rfl: 0    fluticasone-salmeterol (ADVAIR DISKUS) 500-50 mcg/dose inhaler, Inhale 1 puff 2 (two) times a day, Disp: 1 Inhaler, Rfl: 2    furosemide (LASIX) 40 mg tablet, Take 1 tablet (40 mg total) by mouth daily, Disp: 90 tablet, Rfl: 0    gabapentin (NEURONTIN) 300 mg capsule, Take 1 capsule (300 mg total) by mouth 2 (two) times a day, Disp: 60 capsule, Rfl: 0    guaiFENesin (MUCINEX) 600 mg 12 hr tablet, Take 1 tablet (600 mg total) by mouth 2 (two) times a day for 20 days, Disp: 40 tablet, Rfl: 0    naproxen sodium (ALEVE) 220 MG tablet, Take 1 tablet (220 mg total) by mouth every 12 (twelve) hours as needed for mild pain, Disp: 120 tablet, Rfl: 0    potassium chloride (K-DUR) 10 mEq tablet, Take 1 tablet (10 mEq total) by mouth daily as needed (when pt takes lasix), Disp: 30 tablet, Rfl: 0    QUEtiapine (SEROquel) 50 mg tablet, Take 1 tablet (50 mg total) by mouth 3 (three) times a day 1 tab PO daily 2 tabs PO qHS, Disp: 90 tablet, Rfl: 0    ipratropium-albuterol (DUO-NEB) 0 5-2 5 mg/3 mL nebulizer solution, Take 1 vial (3 mL total) by nebulization 4 (four) times a day, Disp: 360 mL, Rfl: 2  No current facility-administered medications for this visit       Current Allergies     Allergies as of 03/20/2020 - Reviewed 03/20/2020   Allergen Reaction Noted    Erythromycin Throat Swelling 10/25/2017    Morphine Delirium             The following portions of the patient's history were reviewed and updated as appropriate: allergies, current medications, past family history, past medical history, past social history, past surgical history and problem list      Past Medical History:   Diagnosis Date    Anxiety     [facility]     Depression     Diverticulitis     GERD (gastroesophageal reflux disease)     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 )     Psychiatric disorder     Sleep apnea        Past Surgical History:   Procedure Laterality Date    APPENDECTOMY      CHOLECYSTECTOMY      partial x2 per pt    HIATAL HERNIA REPAIR      INGUINAL HERNIA REPAIR Right     LAPAROSCOPIC TUBAL LIGATION Bilateral     ROTATOR CUFF REPAIR Right     UVULOPALATOPHARYNGOPLASTY         Family History   Problem Relation Age of Onset    No Known Problems Mother     No Known Problems Father     No Known Problems Sister     No Known Problems Daughter     Breast cancer Maternal Grandmother 28    No Known Problems Paternal Grandmother     No Known Problems Maternal Aunt          Medications have been verified  Objective   /60 (BP Location: Right arm, Patient Position: Sitting, Cuff Size: Adult)   Pulse 88   Temp 98 5 °F (36 9 °C) (Temporal)   Resp 18   Ht 5' (1 524 m)   Wt 75 1 kg (165 lb 9 6 oz)   SpO2 96%   BMI 32 34 kg/m²        Physical Exam     Physical Exam   Constitutional: She is oriented to person, place, and time  She appears well-developed and well-nourished  HENT:   Right Ear: Hearing, tympanic membrane, external ear and ear canal normal    Left Ear: Hearing, tympanic membrane, external ear and ear canal normal    Nose: Nose normal    Mouth/Throat: Oropharynx is clear and moist and mucous membranes are normal    Eyes: Pupils are equal, round, and reactive to light  Conjunctivae and EOM are normal    Neck: Normal range of motion  Neck supple  Pulmonary/Chest: Effort normal  No respiratory distress  She has wheezes  DuoNeb administered in office, breath sounds after breathing treatment improved, clear to auscultation  Abdominal: Soft  Bowel sounds are normal    Musculoskeletal: Normal range of motion  Neurological: She is alert and oriented to person, place, and time  Skin: Skin is warm and dry  Capillary refill takes less than 2 seconds  Psychiatric: She has a normal mood and affect  Her behavior is normal  Judgment and thought content normal    Nursing note and vitals reviewed

## 2020-03-20 NOTE — PATIENT INSTRUCTIONS
Smoking cessation advised  Reduction of personal exposure to occupation dusts, fumes, and gases  Reduction to indoor and outdoor air pollutants  Advised influenza vaccine  Pneumococcal vaccine advised

## 2020-03-20 NOTE — LETTER
March 20, 2020     Patient: Cassandra Fox   YOB: 1955   Date of Visit: 3/20/2020       To Whom it May Concern:    Cassandra Fox is under my professional care  She was seen in my office on 3/20/2020  Please advised that patient is clear of any viral diseases  Patients does have underlining health conditions such as COPD and  Pneumonia  If you have any questions or concerns, please don't hesitate to call           Sincerely,          KATIE Narvaez        CC: No Recipients

## 2020-03-26 ENCOUNTER — PATIENT OUTREACH (OUTPATIENT)
Dept: CASE MANAGEMENT | Facility: OTHER | Age: 65
End: 2020-03-26

## 2020-04-06 ENCOUNTER — TELEMEDICINE (OUTPATIENT)
Dept: FAMILY MEDICINE CLINIC | Facility: HOME HEALTHCARE | Age: 65
End: 2020-04-06
Payer: COMMERCIAL

## 2020-04-06 DIAGNOSIS — J44.0 CHRONIC OBSTRUCTIVE PULMONARY DISEASE WITH ACUTE LOWER RESPIRATORY INFECTION (HCC): Primary | ICD-10-CM

## 2020-04-06 PROCEDURE — G0071 COMM SVCS BY RHC/FQHC 5 MIN: HCPCS | Performed by: FAMILY MEDICINE

## 2020-04-06 RX ORDER — DULOXETIN HYDROCHLORIDE 30 MG/1
30 CAPSULE, DELAYED RELEASE ORAL DAILY
COMMUNITY
End: 2020-05-11 | Stop reason: SDUPTHER

## 2020-04-08 DIAGNOSIS — E78.2 MIXED HYPERLIPIDEMIA: ICD-10-CM

## 2020-04-08 DIAGNOSIS — F32.A DEPRESSION, UNSPECIFIED DEPRESSION TYPE: ICD-10-CM

## 2020-04-08 DIAGNOSIS — K21.9 GASTROESOPHAGEAL REFLUX DISEASE, ESOPHAGITIS PRESENCE NOT SPECIFIED: ICD-10-CM

## 2020-04-08 DIAGNOSIS — R60.9 EDEMA, UNSPECIFIED TYPE: ICD-10-CM

## 2020-04-08 RX ORDER — DULOXETIN HYDROCHLORIDE 60 MG/1
CAPSULE, DELAYED RELEASE ORAL
Qty: 90 CAPSULE | Refills: 0 | Status: SHIPPED | OUTPATIENT
Start: 2020-04-08 | End: 2020-05-11 | Stop reason: SDUPTHER

## 2020-04-08 RX ORDER — FUROSEMIDE 40 MG/1
TABLET ORAL
Qty: 90 TABLET | Refills: 0 | Status: SHIPPED | OUTPATIENT
Start: 2020-04-08 | End: 2020-07-28 | Stop reason: SDUPTHER

## 2020-04-08 RX ORDER — ESOMEPRAZOLE MAGNESIUM 40 MG/1
CAPSULE, DELAYED RELEASE ORAL
Qty: 90 CAPSULE | Refills: 0 | Status: SHIPPED | OUTPATIENT
Start: 2020-04-08 | End: 2020-05-11 | Stop reason: SDUPTHER

## 2020-04-08 RX ORDER — ATORVASTATIN CALCIUM 20 MG/1
TABLET, FILM COATED ORAL
Qty: 90 TABLET | Refills: 0 | Status: SHIPPED | OUTPATIENT
Start: 2020-04-08 | End: 2020-07-28 | Stop reason: SDUPTHER

## 2020-04-20 ENCOUNTER — DOCUMENTATION (OUTPATIENT)
Dept: FAMILY MEDICINE CLINIC | Facility: HOME HEALTHCARE | Age: 65
End: 2020-04-20

## 2020-04-20 ENCOUNTER — TELEMEDICINE (OUTPATIENT)
Dept: FAMILY MEDICINE CLINIC | Facility: HOME HEALTHCARE | Age: 65
End: 2020-04-20
Payer: COMMERCIAL

## 2020-04-20 DIAGNOSIS — T50.2X5A DIURETIC-INDUCED HYPOKALEMIA: ICD-10-CM

## 2020-04-20 DIAGNOSIS — E87.6 DIURETIC-INDUCED HYPOKALEMIA: ICD-10-CM

## 2020-04-20 DIAGNOSIS — J44.9 CHRONIC BRONCHITIS WITH COPD (CHRONIC OBSTRUCTIVE PULMONARY DISEASE) (HCC): Primary | ICD-10-CM

## 2020-04-20 DIAGNOSIS — J44.0 CHRONIC OBSTRUCTIVE PULMONARY DISEASE WITH ACUTE LOWER RESPIRATORY INFECTION (HCC): Primary | ICD-10-CM

## 2020-04-20 PROCEDURE — G0071 COMM SVCS BY RHC/FQHC 5 MIN: HCPCS | Performed by: FAMILY MEDICINE

## 2020-04-20 RX ORDER — POTASSIUM CHLORIDE 750 MG/1
10 TABLET, FILM COATED, EXTENDED RELEASE ORAL DAILY PRN
Qty: 90 TABLET | Refills: 0 | Status: SHIPPED | OUTPATIENT
Start: 2020-04-20 | End: 2021-02-04 | Stop reason: SDUPTHER

## 2020-05-11 DIAGNOSIS — F32.A DEPRESSION, UNSPECIFIED DEPRESSION TYPE: ICD-10-CM

## 2020-05-11 DIAGNOSIS — R10.9 ABDOMINAL CRAMPING: ICD-10-CM

## 2020-05-11 DIAGNOSIS — M62.838 MUSCLE SPASM: ICD-10-CM

## 2020-05-11 DIAGNOSIS — K21.9 GASTROESOPHAGEAL REFLUX DISEASE, ESOPHAGITIS PRESENCE NOT SPECIFIED: ICD-10-CM

## 2020-05-12 RX ORDER — DULOXETIN HYDROCHLORIDE 60 MG/1
60 CAPSULE, DELAYED RELEASE ORAL DAILY
Qty: 90 CAPSULE | Refills: 0 | Status: SHIPPED | OUTPATIENT
Start: 2020-05-12 | End: 2020-07-28 | Stop reason: SDUPTHER

## 2020-05-12 RX ORDER — DULOXETIN HYDROCHLORIDE 30 MG/1
30 CAPSULE, DELAYED RELEASE ORAL DAILY
Qty: 90 CAPSULE | Refills: 0 | Status: SHIPPED | OUTPATIENT
Start: 2020-05-12 | End: 2020-07-28 | Stop reason: SDUPTHER

## 2020-05-12 RX ORDER — MONTELUKAST SODIUM 4 MG/1
1 TABLET, CHEWABLE ORAL 2 TIMES DAILY
Qty: 180 TABLET | Refills: 0 | Status: SHIPPED | OUTPATIENT
Start: 2020-05-12 | End: 2020-07-13

## 2020-05-12 RX ORDER — ESOMEPRAZOLE MAGNESIUM 40 MG/1
40 CAPSULE, DELAYED RELEASE ORAL DAILY
Qty: 90 CAPSULE | Refills: 0 | Status: SHIPPED | OUTPATIENT
Start: 2020-05-12 | End: 2020-07-13

## 2020-05-12 RX ORDER — CYCLOBENZAPRINE HCL 10 MG
10 TABLET ORAL DAILY
Qty: 90 TABLET | Refills: 0 | Status: SHIPPED | OUTPATIENT
Start: 2020-05-12 | End: 2020-07-28 | Stop reason: SDUPTHER

## 2020-05-20 ENCOUNTER — TELEMEDICINE (OUTPATIENT)
Dept: FAMILY MEDICINE CLINIC | Facility: HOME HEALTHCARE | Age: 65
End: 2020-05-20
Payer: COMMERCIAL

## 2020-05-20 DIAGNOSIS — Z91.81 HISTORY OF RECENT FALL: Primary | ICD-10-CM

## 2020-05-20 PROCEDURE — G0071 COMM SVCS BY RHC/FQHC 5 MIN: HCPCS | Performed by: FAMILY MEDICINE

## 2020-05-29 ENCOUNTER — TELEPHONE (OUTPATIENT)
Dept: GASTROENTEROLOGY | Facility: CLINIC | Age: 65
End: 2020-05-29

## 2020-06-03 ENCOUNTER — TELEPHONE (OUTPATIENT)
Dept: GASTROENTEROLOGY | Facility: CLINIC | Age: 65
End: 2020-06-03

## 2020-06-16 ENCOUNTER — TELEPHONE (OUTPATIENT)
Dept: PULMONOLOGY | Facility: CLINIC | Age: 65
End: 2020-06-16

## 2020-06-18 ENCOUNTER — TELEMEDICINE (OUTPATIENT)
Dept: FAMILY MEDICINE CLINIC | Facility: HOME HEALTHCARE | Age: 65
End: 2020-06-18
Payer: COMMERCIAL

## 2020-06-18 DIAGNOSIS — Z13.29 SCREENING FOR ENDOCRINE, NUTRITIONAL, METABOLIC AND IMMUNITY DISORDER: Primary | ICD-10-CM

## 2020-06-18 DIAGNOSIS — Z13.0 SCREENING FOR ENDOCRINE, NUTRITIONAL, METABOLIC AND IMMUNITY DISORDER: Primary | ICD-10-CM

## 2020-06-18 DIAGNOSIS — Z13.228 SCREENING FOR ENDOCRINE, NUTRITIONAL, METABOLIC AND IMMUNITY DISORDER: Primary | ICD-10-CM

## 2020-06-18 DIAGNOSIS — Z13.21 SCREENING FOR ENDOCRINE, NUTRITIONAL, METABOLIC AND IMMUNITY DISORDER: Primary | ICD-10-CM

## 2020-06-18 PROCEDURE — G0071 COMM SVCS BY RHC/FQHC 5 MIN: HCPCS | Performed by: FAMILY MEDICINE

## 2020-06-19 ENCOUNTER — TELEPHONE (OUTPATIENT)
Dept: GASTROENTEROLOGY | Facility: AMBULARY SURGERY CENTER | Age: 65
End: 2020-06-19

## 2020-06-21 DIAGNOSIS — E87.6 DIURETIC-INDUCED HYPOKALEMIA: Primary | ICD-10-CM

## 2020-06-21 DIAGNOSIS — T50.2X5A DIURETIC-INDUCED HYPOKALEMIA: Primary | ICD-10-CM

## 2020-06-22 ENCOUNTER — TELEMEDICINE (OUTPATIENT)
Dept: GASTROENTEROLOGY | Facility: AMBULARY SURGERY CENTER | Age: 65
End: 2020-06-22
Payer: COMMERCIAL

## 2020-06-22 DIAGNOSIS — Z12.11 SCREENING FOR COLORECTAL CANCER: ICD-10-CM

## 2020-06-22 DIAGNOSIS — R13.14 PHARYNGOESOPHAGEAL DYSPHAGIA: ICD-10-CM

## 2020-06-22 DIAGNOSIS — Z87.19 HISTORY OF DIVERTICULITIS: ICD-10-CM

## 2020-06-22 DIAGNOSIS — R19.5 DECREASED STOOL CALIBER: ICD-10-CM

## 2020-06-22 DIAGNOSIS — K59.00 CONSTIPATION, UNSPECIFIED CONSTIPATION TYPE: Primary | ICD-10-CM

## 2020-06-22 DIAGNOSIS — Z12.12 SCREENING FOR COLORECTAL CANCER: ICD-10-CM

## 2020-06-22 PROCEDURE — 99442 PR PHYS/QHP TELEPHONE EVALUATION 11-20 MIN: CPT | Performed by: INTERNAL MEDICINE

## 2020-06-22 RX ORDER — POTASSIUM CHLORIDE 750 MG/1
TABLET, EXTENDED RELEASE ORAL
Qty: 90 TABLET | Refills: 2 | Status: SHIPPED | OUTPATIENT
Start: 2020-06-22 | End: 2020-06-24 | Stop reason: SDUPTHER

## 2020-06-24 ENCOUNTER — OFFICE VISIT (OUTPATIENT)
Dept: PULMONOLOGY | Facility: CLINIC | Age: 65
End: 2020-06-24
Payer: COMMERCIAL

## 2020-06-24 VITALS
DIASTOLIC BLOOD PRESSURE: 86 MMHG | SYSTOLIC BLOOD PRESSURE: 130 MMHG | WEIGHT: 169 LBS | BODY MASS INDEX: 33.18 KG/M2 | HEART RATE: 91 BPM | HEIGHT: 60 IN | OXYGEN SATURATION: 94 %

## 2020-06-24 DIAGNOSIS — F17.200 SMOKING: Primary | ICD-10-CM

## 2020-06-24 DIAGNOSIS — J44.1 COPD EXACERBATION (HCC): ICD-10-CM

## 2020-06-24 DIAGNOSIS — J96.21 ACUTE ON CHRONIC RESPIRATORY FAILURE WITH HYPOXIA (HCC): ICD-10-CM

## 2020-06-24 DIAGNOSIS — K52.9 CHRONIC DIARRHEA: Primary | ICD-10-CM

## 2020-06-24 DIAGNOSIS — R68.89 FLU-LIKE SYMPTOMS: ICD-10-CM

## 2020-06-24 DIAGNOSIS — R93.89 ABNORMAL CT OF THE CHEST: ICD-10-CM

## 2020-06-24 PROCEDURE — 3008F BODY MASS INDEX DOCD: CPT | Performed by: INTERNAL MEDICINE

## 2020-06-24 PROCEDURE — 3075F SYST BP GE 130 - 139MM HG: CPT | Performed by: INTERNAL MEDICINE

## 2020-06-24 PROCEDURE — 3079F DIAST BP 80-89 MM HG: CPT | Performed by: INTERNAL MEDICINE

## 2020-06-24 PROCEDURE — 99214 OFFICE O/P EST MOD 30 MIN: CPT | Performed by: INTERNAL MEDICINE

## 2020-06-24 RX ORDER — VARENICLINE TARTRATE 25 MG
KIT ORAL
Qty: 53 TABLET | Refills: 0 | Status: SHIPPED | OUTPATIENT
Start: 2020-06-24 | End: 2020-08-17 | Stop reason: SDUPTHER

## 2020-07-07 DIAGNOSIS — G47.00 INSOMNIA, UNSPECIFIED TYPE: ICD-10-CM

## 2020-07-07 RX ORDER — QUETIAPINE FUMARATE 50 MG/1
50 TABLET, FILM COATED ORAL 3 TIMES DAILY
Qty: 90 TABLET | Refills: 0 | Status: SHIPPED | OUTPATIENT
Start: 2020-07-07 | End: 2020-07-08 | Stop reason: SDUPTHER

## 2020-07-08 DIAGNOSIS — G47.00 INSOMNIA, UNSPECIFIED TYPE: ICD-10-CM

## 2020-07-08 RX ORDER — QUETIAPINE FUMARATE 50 MG/1
TABLET, FILM COATED ORAL
Qty: 90 TABLET | Refills: 2 | Status: SHIPPED | OUTPATIENT
Start: 2020-07-08 | End: 2020-11-09 | Stop reason: SDUPTHER

## 2020-07-13 DIAGNOSIS — R10.9 ABDOMINAL CRAMPING: ICD-10-CM

## 2020-07-13 DIAGNOSIS — K21.9 GASTROESOPHAGEAL REFLUX DISEASE, ESOPHAGITIS PRESENCE NOT SPECIFIED: ICD-10-CM

## 2020-07-13 RX ORDER — MONTELUKAST SODIUM 4 MG/1
TABLET, CHEWABLE ORAL
Qty: 180 TABLET | Refills: 0 | Status: SHIPPED | OUTPATIENT
Start: 2020-07-13 | End: 2020-07-28 | Stop reason: SDUPTHER

## 2020-07-13 RX ORDER — ESOMEPRAZOLE MAGNESIUM 40 MG/1
CAPSULE, DELAYED RELEASE ORAL
Qty: 90 CAPSULE | Refills: 0 | Status: SHIPPED | OUTPATIENT
Start: 2020-07-13 | End: 2020-07-28 | Stop reason: SDUPTHER

## 2020-07-17 DIAGNOSIS — K52.9 CHRONIC DIARRHEA: ICD-10-CM

## 2020-07-17 PROCEDURE — U0003 INFECTIOUS AGENT DETECTION BY NUCLEIC ACID (DNA OR RNA); SEVERE ACUTE RESPIRATORY SYNDROME CORONAVIRUS 2 (SARS-COV-2) (CORONAVIRUS DISEASE [COVID-19]), AMPLIFIED PROBE TECHNIQUE, MAKING USE OF HIGH THROUGHPUT TECHNOLOGIES AS DESCRIBED BY CMS-2020-01-R: HCPCS

## 2020-07-19 LAB
INPATIENT: NORMAL
SARS-COV-2 RNA SPEC QL NAA+PROBE: NOT DETECTED

## 2020-07-24 ENCOUNTER — APPOINTMENT (OUTPATIENT)
Dept: LAB | Facility: HOSPITAL | Age: 65
End: 2020-07-24
Payer: COMMERCIAL

## 2020-07-24 ENCOUNTER — HOSPITAL ENCOUNTER (OUTPATIENT)
Dept: PULMONOLOGY | Facility: HOSPITAL | Age: 65
Discharge: HOME | End: 2020-07-24
Attending: INTERNAL MEDICINE
Payer: COMMERCIAL

## 2020-07-24 DIAGNOSIS — Z13.29 SCREENING FOR ENDOCRINE, NUTRITIONAL, METABOLIC AND IMMUNITY DISORDER: ICD-10-CM

## 2020-07-24 DIAGNOSIS — Z13.0 SCREENING FOR ENDOCRINE, NUTRITIONAL, METABOLIC AND IMMUNITY DISORDER: ICD-10-CM

## 2020-07-24 DIAGNOSIS — J44.1 COPD EXACERBATION (HCC): ICD-10-CM

## 2020-07-24 DIAGNOSIS — R68.89 FLU-LIKE SYMPTOMS: ICD-10-CM

## 2020-07-24 DIAGNOSIS — Z13.228 SCREENING FOR ENDOCRINE, NUTRITIONAL, METABOLIC AND IMMUNITY DISORDER: ICD-10-CM

## 2020-07-24 DIAGNOSIS — Z13.21 SCREENING FOR ENDOCRINE, NUTRITIONAL, METABOLIC AND IMMUNITY DISORDER: ICD-10-CM

## 2020-07-24 LAB
25(OH)D3 SERPL-MCNC: 21.2 NG/ML (ref 30–100)
ALBUMIN SERPL BCP-MCNC: 4.2 G/DL (ref 3.5–5)
ALP SERPL-CCNC: 85 U/L (ref 46–116)
ALT SERPL W P-5'-P-CCNC: 25 U/L (ref 12–78)
ANION GAP SERPL CALCULATED.3IONS-SCNC: 9 MMOL/L (ref 4–13)
AST SERPL W P-5'-P-CCNC: 15 U/L (ref 5–45)
BASOPHILS # BLD AUTO: 0.07 THOUSANDS/ΜL (ref 0–0.1)
BASOPHILS NFR BLD AUTO: 1 % (ref 0–1)
BILIRUB SERPL-MCNC: 0.8 MG/DL (ref 0.2–1)
BUN SERPL-MCNC: 12 MG/DL (ref 5–25)
CALCIUM SERPL-MCNC: 9.5 MG/DL (ref 8.3–10.1)
CHLORIDE SERPL-SCNC: 101 MMOL/L (ref 100–108)
CHOLEST SERPL-MCNC: 213 MG/DL (ref 50–200)
CO2 SERPL-SCNC: 28 MMOL/L (ref 21–32)
CREAT SERPL-MCNC: 0.56 MG/DL (ref 0.6–1.3)
EOSINOPHIL # BLD AUTO: 0.1 THOUSAND/ΜL (ref 0–0.61)
EOSINOPHIL NFR BLD AUTO: 1 % (ref 0–6)
ERYTHROCYTE [DISTWIDTH] IN BLOOD BY AUTOMATED COUNT: 14.6 % (ref 11.6–15.1)
EST. AVERAGE GLUCOSE BLD GHB EST-MCNC: 120 MG/DL
GFR SERPL CREATININE-BSD FRML MDRD: 100 ML/MIN/1.73SQ M
GLUCOSE P FAST SERPL-MCNC: 104 MG/DL (ref 65–99)
HBA1C MFR BLD: 5.8 %
HCT VFR BLD AUTO: 42.6 % (ref 34.8–46.1)
HDLC SERPL-MCNC: 64 MG/DL
HGB BLD-MCNC: 13.9 G/DL (ref 11.5–15.4)
IMM GRANULOCYTES # BLD AUTO: 0.02 THOUSAND/UL (ref 0–0.2)
IMM GRANULOCYTES NFR BLD AUTO: 0 % (ref 0–2)
LDLC SERPL CALC-MCNC: 110 MG/DL (ref 0–100)
LYMPHOCYTES # BLD AUTO: 2.07 THOUSANDS/ΜL (ref 0.6–4.47)
LYMPHOCYTES NFR BLD AUTO: 28 % (ref 14–44)
MCH RBC QN AUTO: 30.7 PG (ref 26.8–34.3)
MCHC RBC AUTO-ENTMCNC: 32.6 G/DL (ref 31.4–37.4)
MCV RBC AUTO: 94 FL (ref 82–98)
MONOCYTES # BLD AUTO: 0.54 THOUSAND/ΜL (ref 0.17–1.22)
MONOCYTES NFR BLD AUTO: 7 % (ref 4–12)
NEUTROPHILS # BLD AUTO: 4.71 THOUSANDS/ΜL (ref 1.85–7.62)
NEUTS SEG NFR BLD AUTO: 63 % (ref 43–75)
NRBC BLD AUTO-RTO: 0 /100 WBCS
PLATELET # BLD AUTO: 303 THOUSANDS/UL (ref 149–390)
PMV BLD AUTO: 10.6 FL (ref 8.9–12.7)
POTASSIUM SERPL-SCNC: 3.8 MMOL/L (ref 3.5–5.3)
PROT SERPL-MCNC: 7.8 G/DL (ref 6.4–8.2)
RBC # BLD AUTO: 4.53 MILLION/UL (ref 3.81–5.12)
SODIUM SERPL-SCNC: 138 MMOL/L (ref 136–145)
TRIGL SERPL-MCNC: 194 MG/DL
WBC # BLD AUTO: 7.51 THOUSAND/UL (ref 4.31–10.16)

## 2020-07-24 PROCEDURE — 94060 EVALUATION OF WHEEZING: CPT

## 2020-07-24 PROCEDURE — 82306 VITAMIN D 25 HYDROXY: CPT

## 2020-07-24 PROCEDURE — 94727 GAS DIL/WSHOT DETER LNG VOL: CPT

## 2020-07-24 PROCEDURE — 83036 HEMOGLOBIN GLYCOSYLATED A1C: CPT

## 2020-07-24 PROCEDURE — 94727 GAS DIL/WSHOT DETER LNG VOL: CPT | Performed by: INTERNAL MEDICINE

## 2020-07-24 PROCEDURE — 94729 DIFFUSING CAPACITY: CPT | Performed by: INTERNAL MEDICINE

## 2020-07-24 PROCEDURE — 36415 COLL VENOUS BLD VENIPUNCTURE: CPT

## 2020-07-24 PROCEDURE — 80061 LIPID PANEL: CPT

## 2020-07-24 PROCEDURE — 94729 DIFFUSING CAPACITY: CPT

## 2020-07-24 PROCEDURE — 86769 SARS-COV-2 COVID-19 ANTIBODY: CPT

## 2020-07-24 PROCEDURE — 94060 EVALUATION OF WHEEZING: CPT | Performed by: INTERNAL MEDICINE

## 2020-07-24 PROCEDURE — 85025 COMPLETE CBC W/AUTO DIFF WBC: CPT

## 2020-07-24 PROCEDURE — 94618 PULMONARY STRESS TESTING: CPT | Performed by: INTERNAL MEDICINE

## 2020-07-24 PROCEDURE — 80053 COMPREHEN METABOLIC PANEL: CPT

## 2020-07-24 PROCEDURE — 94618 PULMONARY STRESS TESTING: CPT

## 2020-07-25 LAB — SARS-COV-2 IGG+IGM SERPL QL IA: NORMAL

## 2020-07-28 ENCOUNTER — TELEMEDICINE (OUTPATIENT)
Dept: FAMILY MEDICINE CLINIC | Facility: HOME HEALTHCARE | Age: 65
End: 2020-07-28
Payer: COMMERCIAL

## 2020-07-28 DIAGNOSIS — K59.00 CONSTIPATION, UNSPECIFIED CONSTIPATION TYPE: ICD-10-CM

## 2020-07-28 DIAGNOSIS — R19.5 DECREASED STOOL CALIBER: ICD-10-CM

## 2020-07-28 DIAGNOSIS — F32.A DEPRESSION, UNSPECIFIED DEPRESSION TYPE: ICD-10-CM

## 2020-07-28 DIAGNOSIS — K21.9 GASTROESOPHAGEAL REFLUX DISEASE, ESOPHAGITIS PRESENCE NOT SPECIFIED: ICD-10-CM

## 2020-07-28 DIAGNOSIS — G89.29 OTHER CHRONIC PAIN: ICD-10-CM

## 2020-07-28 DIAGNOSIS — Z12.11 SCREENING FOR COLORECTAL CANCER: ICD-10-CM

## 2020-07-28 DIAGNOSIS — J44.9 CHRONIC OBSTRUCTIVE PULMONARY DISEASE, UNSPECIFIED COPD TYPE (HCC): ICD-10-CM

## 2020-07-28 DIAGNOSIS — F17.200 SMOKER: Primary | ICD-10-CM

## 2020-07-28 DIAGNOSIS — R60.9 EDEMA, UNSPECIFIED TYPE: ICD-10-CM

## 2020-07-28 DIAGNOSIS — Z12.12 SCREENING FOR COLORECTAL CANCER: ICD-10-CM

## 2020-07-28 DIAGNOSIS — R10.9 ABDOMINAL CRAMPING: ICD-10-CM

## 2020-07-28 DIAGNOSIS — M62.838 MUSCLE SPASM: ICD-10-CM

## 2020-07-28 DIAGNOSIS — E55.9 VITAMIN D DEFICIENCY: ICD-10-CM

## 2020-07-28 DIAGNOSIS — E78.2 MIXED HYPERLIPIDEMIA: ICD-10-CM

## 2020-07-28 PROCEDURE — G2025 DIS SITE TELE SVCS RHC/FQHC: HCPCS | Performed by: FAMILY MEDICINE

## 2020-07-28 PROCEDURE — 99213 OFFICE O/P EST LOW 20 MIN: CPT | Performed by: FAMILY MEDICINE

## 2020-07-28 RX ORDER — ATORVASTATIN CALCIUM 20 MG/1
40 TABLET, FILM COATED ORAL DAILY
Qty: 60 TABLET | Refills: 2 | Status: SHIPPED | OUTPATIENT
Start: 2020-07-28 | End: 2020-09-14 | Stop reason: SDUPTHER

## 2020-07-28 RX ORDER — CYCLOBENZAPRINE HCL 10 MG
10 TABLET ORAL DAILY
Qty: 90 TABLET | Refills: 0 | Status: SHIPPED | OUTPATIENT
Start: 2020-07-28 | End: 2020-10-01 | Stop reason: SDUPTHER

## 2020-07-28 RX ORDER — ERGOCALCIFEROL 1.25 MG/1
50000 CAPSULE ORAL WEEKLY
Qty: 4 CAPSULE | Refills: 2 | Status: SHIPPED | OUTPATIENT
Start: 2020-07-28 | End: 2020-11-09 | Stop reason: SDUPTHER

## 2020-07-28 RX ORDER — ESOMEPRAZOLE MAGNESIUM 40 MG/1
40 CAPSULE, DELAYED RELEASE ORAL DAILY
Qty: 90 CAPSULE | Refills: 0 | Status: SHIPPED | OUTPATIENT
Start: 2020-07-28 | End: 2020-12-21 | Stop reason: SDUPTHER

## 2020-07-28 RX ORDER — VARENICLINE TARTRATE 1 MG/1
1 TABLET, FILM COATED ORAL 2 TIMES DAILY
Qty: 60 TABLET | Refills: 0 | Status: SHIPPED | OUTPATIENT
Start: 2020-07-28 | End: 2020-09-02 | Stop reason: SDUPTHER

## 2020-07-28 RX ORDER — GABAPENTIN 300 MG/1
300 CAPSULE ORAL 2 TIMES DAILY
Qty: 60 CAPSULE | Refills: 0 | Status: SHIPPED | OUTPATIENT
Start: 2020-07-28 | End: 2020-09-02 | Stop reason: SDUPTHER

## 2020-07-28 RX ORDER — FUROSEMIDE 40 MG/1
40 TABLET ORAL DAILY
Qty: 90 TABLET | Refills: 0 | Status: SHIPPED | OUTPATIENT
Start: 2020-07-28 | End: 2021-02-04 | Stop reason: SDUPTHER

## 2020-07-28 RX ORDER — MONTELUKAST SODIUM 4 MG/1
1 TABLET, CHEWABLE ORAL 2 TIMES DAILY
Qty: 180 TABLET | Refills: 0 | Status: SHIPPED | OUTPATIENT
Start: 2020-07-28 | End: 2021-02-04 | Stop reason: SDUPTHER

## 2020-07-28 RX ORDER — DULOXETIN HYDROCHLORIDE 60 MG/1
60 CAPSULE, DELAYED RELEASE ORAL DAILY
Qty: 90 CAPSULE | Refills: 0 | Status: SHIPPED | OUTPATIENT
Start: 2020-07-28 | End: 2020-11-09 | Stop reason: SDUPTHER

## 2020-07-28 RX ORDER — DULOXETIN HYDROCHLORIDE 30 MG/1
30 CAPSULE, DELAYED RELEASE ORAL DAILY
Qty: 90 CAPSULE | Refills: 0 | Status: SHIPPED | OUTPATIENT
Start: 2020-07-28 | End: 2020-11-09 | Stop reason: SDUPTHER

## 2020-07-28 RX ORDER — DICYCLOMINE HCL 20 MG
20 TABLET ORAL 3 TIMES DAILY PRN
Qty: 270 TABLET | Refills: 0 | Status: SHIPPED | OUTPATIENT
Start: 2020-07-28 | End: 2021-02-04 | Stop reason: SDUPTHER

## 2020-07-28 NOTE — PROGRESS NOTES
Virtual Brief Visit    Assessment/Plan:    Problem List Items Addressed This Visit        Other    Depression    Relevant Medications    DULoxetine (CYMBALTA) 30 mg delayed release capsule    DULoxetine (CYMBALTA) 60 mg delayed release capsule    varenicline (Chantix Continuing Month Yuriy) 1 mg tablet      Other Visit Diagnoses     Smoker    -  Primary    Relevant Medications    varenicline (Chantix Continuing Month Yuriy) 1 mg tablet    Mixed hyperlipidemia        Relevant Medications    atorvastatin (LIPITOR) 20 mg tablet    colestipol (COLESTID) 1 g tablet    Abdominal cramping        Relevant Medications    colestipol (COLESTID) 1 g tablet    dicyclomine (BENTYL) 20 mg tablet    Muscle spasm        Relevant Medications    cyclobenzaprine (FLEXERIL) 10 mg tablet    Vitamin D deficiency        Relevant Medications    ergocalciferol (VITAMIN D2) 50,000 units    Gastroesophageal reflux disease, esophagitis presence not specified        Relevant Medications    dicyclomine (BENTYL) 20 mg tablet    esomeprazole (NexIUM) 40 MG capsule    Chronic obstructive pulmonary disease, unspecified COPD type (HCC)        Relevant Medications    fluticasone-salmeterol (Advair Diskus) 500-50 mcg/dose inhaler    Edema, unspecified type        Relevant Medications    furosemide (LASIX) 40 mg tablet    Other chronic pain        Relevant Medications    gabapentin (NEURONTIN) 300 mg capsule    Screening for colorectal cancer        Relevant Medications    Na Sulfate-K Sulfate-Mg Sulf 17 5-3 13-1 6 GM/177ML SOLN    Constipation, unspecified constipation type        Relevant Medications    Na Sulfate-K Sulfate-Mg Sulf 17 5-3 13-1 6 GM/177ML SOLN    Decreased stool caliber        Relevant Medications    Na Sulfate-K Sulfate-Mg Sulf 17 5-3 13-1 6 GM/177ML SOLN                Reason for visit is   Chief Complaint   Patient presents with    Follow-up     no acute complaints per pt    Virtual Brief Visit        Encounter provider Lyssa Nash Sander Louise    Provider located at [address] [address]  [phone]    Recent Visits  No visits were found meeting these conditions  Showing recent visits within past 7 days and meeting all other requirements     Today's Visits  Date Type Provider Dept   07/28/20 [address], CRNP [address] [facility] today's visits and meeting all other requirements     Future Appointments  No visits were found meeting these conditions  Showing future appointments within next 150 days and meeting all other requirements        After connecting through telephone, the patient was identified by name and date of birth  Lázaro Kenyon was informed that this is a telemedicine visit and that the visit is being conducted through telephone  My office door was closed  No one else was in the room  She acknowledged consent and understanding of privacy and security of the platform  The patient has agreed to participate and understands she can discontinue the visit at any time  Patient is aware this is a billable service  Subjective    Lázaro Kenyon is a 62 y o  female for routine followup  Christian Briggs presents via tele visit for follow-up  She has a history of Chronic Obstructive Pulmonary Disease, on chronic home oxygen  Reports oxygen requirements are stable at 2 liters/minute  States she only wears oxygen at night  Denies cough, wheezing, or shortness of breath  Denies fevers  Continues to smoke daily, has been decreasing cigarettes with the use of Chantix  Request a refill today  Is known to pulmonology and did see them recently  Denies any complaints or concerns today  Did review recent labs, low-fat diet and daily exercise discussed    She is taking vitamin-D replacement, will recheck labs in a few months           Past Medical History:   Diagnosis Date    Anxiety     COPD (chronic obstructive pulmonary disease) ([facility] )     Depression     Diverticulitis     GERD (gastroesophageal reflux disease)     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[facility] )     Psychiatric disorder     Sleep apnea        Past Surgical History:   Procedure Laterality Date    APPENDECTOMY      CHOLECYSTECTOMY      partial x2 per pt    HIATAL HERNIA REPAIR      INGUINAL HERNIA REPAIR Right     LAPAROSCOPIC TUBAL LIGATION Bilateral     ROTATOR CUFF REPAIR Right     UVULOPALATOPHARYNGOPLASTY         Current Outpatient Medications   Medication Sig Dispense Refill    atorvastatin (LIPITOR) 20 mg tablet Take 2 tablets (40 mg total) by mouth daily 60 tablet 2    colestipol (COLESTID) 1 g tablet Take 1 tablet (1 g total) by mouth 2 (two) times a day 180 tablet 0    cyclobenzaprine (FLEXERIL) 10 mg tablet Take 1 tablet (10 mg total) by mouth daily 90 tablet 0    dicyclomine (BENTYL) 20 mg tablet Take 1 tablet (20 mg total) by mouth 3 (three) times a day as needed (as needed) 270 tablet 0    DULoxetine (CYMBALTA) 30 mg delayed release capsule Take 1 capsule (30 mg total) by mouth daily 90 capsule 0    DULoxetine (CYMBALTA) 60 mg delayed release capsule Take 1 capsule (60 mg total) by mouth daily 90 capsule 0    ergocalciferol (VITAMIN D2) 50,000 units Take 1 capsule (50,000 Units total) by mouth once a week 4 capsule 2    esomeprazole (NexIUM) 40 MG capsule Take 1 capsule (40 mg total) by mouth daily 90 capsule 0    fluticasone-salmeterol (Advair Diskus) 500-50 mcg/dose inhaler Inhale 1 puff 2 (two) times a day 1 Inhaler 2    furosemide (LASIX) 40 mg tablet Take 1 tablet (40 mg total) by mouth daily 90 tablet 0    gabapentin (NEURONTIN) 300 mg capsule Take 1 capsule (300 mg total) by mouth 2 (two) times a day 60 capsule 0    Na Sulfate-K Sulfate-Mg Sulf 17 5-3 13-1 6 GM/177ML SOLN Take 2 Bottles by mouth once for 1 dose 2 Bottle 0    naproxen sodium (ALEVE) 220 MG tablet Take 1 tablet (220 mg total) by mouth every 12 (twelve) hours as needed for mild pain 120 tablet 0    potassium chloride (K-DUR) 10 mEq tablet Take 1 tablet (10 mEq total) by mouth daily as needed (when pt takes lasix) 90 tablet 0    QUEtiapine (SEROquel) 50 mg tablet 1 tab by mouth in the AM; 2 tabs by mouth at bedtime 90 tablet 2    varenicline (Chantix Continuing Month Abdirahman) 1 mg tablet Take 1 tablet (1 mg total) by mouth 2 (two) times a day 60 tablet 0    varenicline (CHANTIX ABDIRAHMAN) 0 5 MG X 11 & 1 MG X 42 tablet Take one 0 5 mg tablet by mouth once daily for 3 days, then one 0 5 mg tablet by mouth twice daily for 4 days, then one 1 mg tablet by mouth twice daily  53 tablet 0     No current facility-administered medications for this visit  Allergies   Allergen Reactions    Erythromycin Throat Swelling    Morphine Delirium       Review of Systems   Constitutional: Negative  HENT: Negative  Respiratory:        Chronic dyspnea on exertion, reports that breathing is stable  Is not wearing oxygen  Cardiovascular: Negative  Gastrointestinal: Negative  Genitourinary: Negative  Musculoskeletal: Positive for arthralgias  Neurological: Negative  Psychiatric/Behavioral: Negative  There were no vitals filed for this visit  I spent 15 minutes with patient today in which greater than 50% of the time was spent in counseling/coordination of care regarding assessment and plan of care    VIRTUAL VISIT DISCLAIMER    Ирина Rivera acknowledges that she has consented to an online visit or consultation  She understands that the online visit is based solely on information provided by her, and that, in the absence of a face-to-face physical evaluation by the physician, the diagnosis she receives is both limited and provisional in terms of accuracy and completeness  This is not intended to replace a full medical face-to-face evaluation by the physician  Ирина Rivera understands and accepts these terms      It was my intent to perform this visit via video technology but the patient was not able to do a video connections of the visit was completed via audio telephone only

## 2020-08-17 ENCOUNTER — TELEPHONE (OUTPATIENT)
Dept: GASTROENTEROLOGY | Facility: CLINIC | Age: 65
End: 2020-08-17

## 2020-08-17 ENCOUNTER — OFFICE VISIT (OUTPATIENT)
Dept: PULMONOLOGY | Facility: CLINIC | Age: 65
End: 2020-08-17
Payer: COMMERCIAL

## 2020-08-17 ENCOUNTER — PREP FOR PROCEDURE (OUTPATIENT)
Dept: SURGERY | Facility: CLINIC | Age: 65
End: 2020-08-17

## 2020-08-17 VITALS
OXYGEN SATURATION: 94 % | BODY MASS INDEX: 33.96 KG/M2 | SYSTOLIC BLOOD PRESSURE: 128 MMHG | DIASTOLIC BLOOD PRESSURE: 84 MMHG | WEIGHT: 173 LBS | HEIGHT: 60 IN | HEART RATE: 90 BPM

## 2020-08-17 DIAGNOSIS — J45.40 MODERATE PERSISTENT ASTHMA WITHOUT COMPLICATION: Primary | ICD-10-CM

## 2020-08-17 DIAGNOSIS — K52.9 CHRONIC DIARRHEA: Primary | ICD-10-CM

## 2020-08-17 DIAGNOSIS — F17.210 CIGARETTE NICOTINE DEPENDENCE WITHOUT COMPLICATION: ICD-10-CM

## 2020-08-17 DIAGNOSIS — J96.11 CHRONIC RESPIRATORY FAILURE WITH HYPOXIA (HCC): ICD-10-CM

## 2020-08-17 PROCEDURE — 3079F DIAST BP 80-89 MM HG: CPT | Performed by: PHYSICIAN ASSISTANT

## 2020-08-17 PROCEDURE — 3074F SYST BP LT 130 MM HG: CPT | Performed by: PHYSICIAN ASSISTANT

## 2020-08-17 PROCEDURE — 3008F BODY MASS INDEX DOCD: CPT | Performed by: PHYSICIAN ASSISTANT

## 2020-08-17 PROCEDURE — 99214 OFFICE O/P EST MOD 30 MIN: CPT | Performed by: PHYSICIAN ASSISTANT

## 2020-08-17 RX ORDER — IPRATROPIUM BROMIDE 42 UG/1
2 SPRAY, METERED NASAL 4 TIMES DAILY PRN
Qty: 15 ML | Refills: 3 | Status: SHIPPED | OUTPATIENT
Start: 2020-08-17 | End: 2021-02-04 | Stop reason: SDUPTHER

## 2020-08-17 RX ORDER — ALBUTEROL SULFATE 90 UG/1
2 AEROSOL, METERED RESPIRATORY (INHALATION) EVERY 6 HOURS PRN
Qty: 18 G | Refills: 5 | Status: SHIPPED | OUTPATIENT
Start: 2020-08-17 | End: 2022-07-28

## 2020-08-17 NOTE — TELEPHONE ENCOUNTER
Scheduled patient 9/23/20 for Colon/EGD  Suprep instructions and procedure packet explained to patient, she expressed understanding

## 2020-08-20 PROBLEM — F17.210 CIGARETTE NICOTINE DEPENDENCE WITHOUT COMPLICATION: Status: ACTIVE | Noted: 2020-08-20

## 2020-08-20 PROBLEM — J45.40 MODERATE PERSISTENT ASTHMA WITHOUT COMPLICATION: Status: ACTIVE | Noted: 2020-08-20

## 2020-08-20 PROBLEM — J96.11 CHRONIC RESPIRATORY FAILURE WITH HYPOXIA (HCC): Status: ACTIVE | Noted: 2020-08-20

## 2020-08-20 NOTE — ASSESSMENT & PLAN NOTE
Continue supplemental oxygen on an as-needed basis to maintain SpO2 greater than or equal to 88%  Increased activity as tolerated  Will check echocardiogram for completeness sake given abnormal weight gain and lower extremity swelling requiring for responded 40 mg p o  Daily

## 2020-08-20 NOTE — ASSESSMENT & PLAN NOTE
Reviewed results of pulmonary function test with patient today in the office  There is no evidence of obstruction or restriction in however she does have a mildly decreased diffusion capacity  Patient continues to have symptomatic relief with Advair and as needed albuterol  She will continue Advair 500/50 1 puff twice daily  She was reminded to rinse her mouth out after each use  May continue albuterol HFA 2 puffs q 6 hours p r n     For allergic symptoms, I have added Atrovent nasal spray 1 spray each nostril 4 times daily as needed

## 2020-08-20 NOTE — PROGRESS NOTES
Pulmonary Follow Up Note   Lilli Rabago 60 y o  female MRN: [id number]  8/20/2020      Assessment:    Moderate persistent asthma without complication  Reviewed results of pulmonary function test with patient today in the office  There is no evidence of obstruction or restriction in however she does have a mildly decreased diffusion capacity  Patient continues to have symptomatic relief with Advair and as needed albuterol  She will continue Advair 500/50 1 puff twice daily  She was reminded to rinse her mouth out after each use  May continue albuterol HFA 2 puffs q 6 hours p r n     For allergic symptoms, I have added Atrovent nasal spray 1 spray each nostril 4 times daily as needed  Cigarette nicotine dependence without complication  Congratulated her on the success she has had so far  Encouraged her to keep up the good work  She will continue Chantix 1 mg p o  Daily q 12 hours  She was reminded to take with food  I instructed her to call the office with any questions or concerns  She is agreeable to low-dose lung cancer screening CT chest     Chronic respiratory failure with hypoxia (HCC)  Continue supplemental oxygen on an as-needed basis to maintain SpO2 greater than or equal to 88%  Increased activity as tolerated  Will check echocardiogram for completeness sake given abnormal weight gain and lower extremity swelling requiring for responded 40 mg p o  Daily  Plan:    Diagnoses and all orders for this visit:    Moderate persistent asthma without complication  -     ipratropium (ATROVENT) 0 06 % nasal spray; 2 sprays into each nostril 4 (four) times a day as needed for rhinitis  -     albuterol (Ventolin HFA) 90 mcg/act inhaler;  Inhale 2 puffs every 6 (six) hours as needed for wheezing    Cigarette nicotine dependence without complication  -     CT lung screening program; Future    Chronic respiratory failure with hypoxia ([facility] )  -     Echo complete with contrast if indicated; Future        Return in about 3 months (around 11/17/2020)  History of Present Illness   HPI:  Kennedi Frost is a 60 y o  female who presents to the office today for routine follow-up  Patient's past medical history positive for asthma, nicotine dependence, chronic hypoxic respiratory failure  She was last seen in our office by Dr Zara Cheng several months ago  He ordered COVID-19 antibodies which were negative  She was previously hospitalized in March of 2020 for suspected pneumonia caused by influenza A  Since last time she was seen, patient states that her symptoms are stable  Unfortunately for her she is still quite symptomatic  She endorses dyspnea on exertion, chronic cough is nonproductive  She attributes this to postnasal drip  She has tried Flonase in the past without success  Denies hemoptysis  She reports an occasional wheeze denies chest pain, palpitations, pleurisy  She also endorses chronic lower extremity edema and 35 lb weight gain in the last 6 months  She is a current everyday smoker stating that she smokes a 3rd of a pack per day  Previously she was smoking 1 pack per day  She is still using Chantix and is finding this helpful  Denies history of blood clots  Denies autoimmune disease  She has oxygen at home for which she states she wears 2 L as needed  Review of Systems   All other systems reviewed and are negative        Historical Information   Past Medical History:   Diagnosis Date    Anxiety     [facility] pulmonary disease) ([facility] )     Depression     Diverticulitis     GERD (gastroesophageal reflux disease)     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[facility] )     Psychiatric disorder     Sleep apnea      Past Surgical History:   Procedure Laterality Date    APPENDECTOMY      CHOLECYSTECTOMY      partial x2 per pt    HIATAL HERNIA REPAIR      INGUINAL HERNIA REPAIR Right     LAPAROSCOPIC TUBAL LIGATION Bilateral     ROTATOR CUFF REPAIR Right     UVULOPALATOPHARYNGOPLASTY       Family History   Problem Relation Age of Onset    No Known Problems Mother     No Known Problems Father     No Known Problems Sister     No Known Problems Daughter     Breast cancer Maternal Grandmother 28    No Known Problems Paternal Grandmother     No Known Problems Maternal Aunt        Social History     Tobacco Use   Smoking Status Current Every Day Smoker    Packs/day: 0 25    Types: Cigarettes    Last attempt to quit: 12/10/2019    Years since quittin 6   Smokeless Tobacco Never Used   Tobacco Comment    trying to quit         Meds/Allergies     Current Outpatient Medications:     atorvastatin (LIPITOR) 20 mg tablet, Take 2 tablets (40 mg total) by mouth daily, Disp: 60 tablet, Rfl: 2    colestipol (COLESTID) 1 g tablet, Take 1 tablet (1 g total) by mouth 2 (two) times a day, Disp: 180 tablet, Rfl: 0    cyclobenzaprine (FLEXERIL) 10 mg tablet, Take 1 tablet (10 mg total) by mouth daily, Disp: 90 tablet, Rfl: 0    dicyclomine (BENTYL) 20 mg tablet, Take 1 tablet (20 mg total) by mouth 3 (three) times a day as needed (as needed), Disp: 270 tablet, Rfl: 0    DULoxetine (CYMBALTA) 30 mg delayed release capsule, Take 1 capsule (30 mg total) by mouth daily, Disp: 90 capsule, Rfl: 0    DULoxetine (CYMBALTA) 60 mg delayed release capsule, Take 1 capsule (60 mg total) by mouth daily, Disp: 90 capsule, Rfl: 0    ergocalciferol (VITAMIN D2) 50,000 units, Take 1 capsule (50,000 Units total) by mouth once a week, Disp: 4 capsule, Rfl: 2    esomeprazole (NexIUM) 40 MG capsule, Take 1 capsule (40 mg total) by mouth daily, Disp: 90 capsule, Rfl: 0    fluticasone-salmeterol (Advair Diskus) 500-50 mcg/dose inhaler, Inhale 1 puff 2 (two) times a day, Disp: 1 Inhaler, Rfl: 2    furosemide (LASIX) 40 mg tablet, Take 1 tablet (40 mg total) by mouth daily, Disp: 90 tablet, Rfl: 0    gabapentin (NEURONTIN) 300 mg capsule, Take 1 capsule (300 mg total) by mouth 2 (two) times a day, Disp: 60 capsule, Rfl: 0    potassium chloride (K-DUR) 10 mEq tablet, Take 1 tablet (10 mEq total) by mouth daily as needed (when pt takes lasix), Disp: 90 tablet, Rfl: 0    QUEtiapine (SEROquel) 50 mg tablet, 1 tab by mouth in the AM; 2 tabs by mouth at bedtime, Disp: 90 tablet, Rfl: 2    varenicline (Chantix Continuing Month Yuriy) 1 mg tablet, Take 1 tablet (1 mg total) by mouth 2 (two) times a day, Disp: 60 tablet, Rfl: 0    albuterol (Ventolin HFA) 90 mcg/act inhaler, Inhale 2 puffs every 6 (six) hours as needed for wheezing, Disp: 18 g, Rfl: 5    ipratropium (ATROVENT) 0 06 % nasal spray, 2 sprays into each nostril 4 (four) times a day as needed for rhinitis, Disp: 15 mL, Rfl: 3    Na Sulfate-K Sulfate-Mg Sulf 17 5-3 13-1 6 GM/177ML SOLN, Take 2 Bottles by mouth once for 1 dose, Disp: 2 Bottle, Rfl: 0    naproxen sodium (ALEVE) 220 MG tablet, Take 1 tablet (220 mg total) by mouth every 12 (twelve) hours as needed for mild pain, Disp: 120 tablet, Rfl: 0  Allergies   Allergen Reactions    Erythromycin Throat Swelling    Morphine Delirium       Vitals: Blood pressure 128/84, pulse 90, height 5' (1 524 m), weight 78 5 kg (173 lb), SpO2 94 %, not currently breastfeeding  Body mass index is 33 79 kg/m²  Oxygen Therapy  SpO2: 94 %    Physical Exam  Physical Exam  Vitals signs reviewed  Constitutional:       Appearance: Normal appearance  She is well-developed  HENT:      Head: Normocephalic and atraumatic  Right Ear: External ear normal       Left Ear: External ear normal       Nose: Nose normal       Mouth/Throat:      Mouth: Mucous membranes are moist       Pharynx: Oropharynx is clear  Eyes:      Extraocular Movements: Extraocular movements intact  Pupils: Pupils are equal, round, and reactive to light  Neck:      Musculoskeletal: Normal range of motion and neck supple  Cardiovascular:      Rate and Rhythm: Normal rate and regular rhythm  Pulses: Normal pulses        Heart sounds: Normal heart sounds  No murmur  Pulmonary:      Effort: Pulmonary effort is normal  No respiratory distress  Breath sounds: Normal breath sounds  No stridor  No wheezing, rhonchi or rales  Abdominal:      Palpations: Abdomen is soft  Tenderness: There is no abdominal tenderness  Hernia: No hernia is present  Musculoskeletal: Normal range of motion  General: No swelling, tenderness or deformity  Skin:     General: Skin is warm and dry  Capillary Refill: Capillary refill takes less than 2 seconds  Neurological:      General: No focal deficit present  Mental Status: She is alert and oriented to person, place, and time  Mental status is at baseline  Psychiatric:         Behavior: Behavior normal          Thought Content: Thought content normal          Judgment: Judgment normal          Labs: I have personally reviewed pertinent lab results  , ABG: No results found for: PHART, PQU6NDH, PO2ART, WGY8SDZ, D5JGSIYM, BEART, SOURCE, BNP: No results found for: BNP, CBC: No results found for: WBC, HGB, HCT, MCV, PLT, ADJUSTEDWBC, MCH, MCHC, RDW, MPV, NRBC, CMP: No results found for: SODIUM, K, CL, CO2, ANIONGAP, BUN, CREATININE, GLUCOSE, CALCIUM, AST, ALT, ALKPHOS, PROT, BILITOT, EGFR, PT/INR: No results found for: PT, INR, Troponin: No results found for: TROPONINI  Lab Results   Component Value Date    WBC 7 51 07/24/2020    HGB 13 9 07/24/2020    HCT 42 6 07/24/2020    MCV 94 07/24/2020     07/24/2020     Lab Results   Component Value Date    CALCIUM 9 5 07/24/2020     06/06/2018    K 3 8 07/24/2020    CO2 28 07/24/2020     07/24/2020    BUN 12 07/24/2020    CREATININE 0 56 (L) 07/24/2020     No results found for: IGE  Lab Results   Component Value Date    ALT 25 07/24/2020    AST 15 07/24/2020    ALKPHOS 85 07/24/2020       Imaging and other studies: I have personally reviewed pertinent reports     and I have personally reviewed pertinent films in PACS CTA chest PE study March 2020  No PE identified though limited study due to contrast timing  Right greater than left patchy airspace opacities  Multi lobar infiltrates  Pulmonary function testing:  Performed 07/24/2020  FEV1/FVC ratio 76 %   FEV1 81 % predicted  FVC 77 % predicted  No significant response to bronchodilator  TLC 95 % predicted  RV 96 % predicted  DLCO corrected for hemoglobin 72 % predicted  6 minutes walk indicates that patient does not require supplemental oxygen at rest or with ambulation  Impression:  No obstructive airflow defect on spirometry  Normal spirometry  Normal lung volumes  Mildly reduced diffusion capacity    Flow volume loop normal

## 2020-08-20 NOTE — ASSESSMENT & PLAN NOTE
Congratulated her on the success she has had so far  Encouraged her to keep up the good work  She will continue Chantix 1 mg p o  Daily q 12 hours  She was reminded to take with food  I instructed her to call the office with any questions or concerns    She is agreeable to low-dose lung cancer screening CT chest

## 2020-08-25 ENCOUNTER — TELEPHONE (OUTPATIENT)
Dept: SLEEP CENTER | Facility: CLINIC | Age: 65
End: 2020-08-25

## 2020-08-25 NOTE — TELEPHONE ENCOUNTER
----- Message from Concetta Lance MD sent at 8/19/2020  4:50 PM EDT -----  Approved  ----- Message -----  From: Edmundo Cardenas MA  Sent: 8/18/2020  10:20 AM EDT  To: [facility] Provider    This sleep study needs approval      If approved please sign and return to clerical pool  If denied please include reasons why  Also provide alternative testing if warranted  Please sign and return to clerical pool

## 2020-09-02 ENCOUNTER — HOSPITAL ENCOUNTER (OUTPATIENT)
Dept: CT IMAGING | Facility: HOSPITAL | Age: 65
End: 2020-09-02
Payer: COMMERCIAL

## 2020-09-02 ENCOUNTER — HOSPITAL ENCOUNTER (OUTPATIENT)
Dept: NON INVASIVE DIAGNOSTICS | Facility: HOSPITAL | Age: 65
Discharge: HOME | End: 2020-09-02
Payer: COMMERCIAL

## 2020-09-02 ENCOUNTER — HOSPITAL ENCOUNTER (OUTPATIENT)
Dept: CT IMAGING | Facility: HOSPITAL | Age: 65
Discharge: HOME | End: 2020-09-02
Payer: COMMERCIAL

## 2020-09-02 DIAGNOSIS — F17.200 SMOKER: ICD-10-CM

## 2020-09-02 DIAGNOSIS — J96.11 CHRONIC RESPIRATORY FAILURE WITH HYPOXIA (HCC): ICD-10-CM

## 2020-09-02 DIAGNOSIS — G89.29 OTHER CHRONIC PAIN: ICD-10-CM

## 2020-09-02 DIAGNOSIS — F17.210 CIGARETTE NICOTINE DEPENDENCE WITHOUT COMPLICATION: ICD-10-CM

## 2020-09-02 PROCEDURE — G0297 LDCT FOR LUNG CA SCREEN: HCPCS

## 2020-09-02 PROCEDURE — 93306 TTE W/DOPPLER COMPLETE: CPT | Performed by: INTERNAL MEDICINE

## 2020-09-02 PROCEDURE — G1004 CDSM NDSC: HCPCS

## 2020-09-02 PROCEDURE — 93306 TTE W/DOPPLER COMPLETE: CPT

## 2020-09-02 RX ORDER — VARENICLINE TARTRATE 1 MG/1
1 TABLET, FILM COATED ORAL 2 TIMES DAILY
Qty: 60 TABLET | Refills: 0 | Status: SHIPPED | OUTPATIENT
Start: 2020-09-02 | End: 2020-10-05 | Stop reason: SDUPTHER

## 2020-09-02 RX ORDER — GABAPENTIN 300 MG/1
300 CAPSULE ORAL 2 TIMES DAILY
Qty: 60 CAPSULE | Refills: 0 | Status: SHIPPED | OUTPATIENT
Start: 2020-09-02 | End: 2020-10-01 | Stop reason: SDUPTHER

## 2020-09-08 ENCOUNTER — TELEPHONE (OUTPATIENT)
Dept: OTHER | Facility: HOSPITAL | Age: 65
End: 2020-09-08

## 2020-09-08 NOTE — TELEPHONE ENCOUNTER
Called patient to review results CT lung cancer screening  There is no evidence of nodules, acute infiltrate, pneumothorax, pleural effusion  Minimal scarring in right lower lobe and right middle lobe  Will plan to repeat low-dose lung cancer screening CT in 12 months  Patient verbalized understanding  Offered no questions at this time

## 2020-09-10 ENCOUNTER — HOSPITAL ENCOUNTER (OUTPATIENT)
Dept: SLEEP CENTER | Facility: HOSPITAL | Age: 65
Discharge: HOME | End: 2020-09-10
Payer: COMMERCIAL

## 2020-09-10 DIAGNOSIS — G47.30 SLEEP APNEA, UNSPECIFIED TYPE: ICD-10-CM

## 2020-09-10 PROCEDURE — 95810 POLYSOM 6/> YRS 4/> PARAM: CPT

## 2020-09-11 ENCOUNTER — TELEPHONE (OUTPATIENT)
Dept: OTHER | Facility: HOSPITAL | Age: 65
End: 2020-09-11

## 2020-09-11 NOTE — PROGRESS NOTES
Sleep Study Documentation    Pre-Sleep Study       Sleep testing procedure explained to patient:YES    Patient napped prior to study:NO    Caffeine:Dayshift worker after 12PM   Caffeine use:YES- ice tea  12 to 26 ounces    Alcohol:Dayshift workers after 5PM: Alcohol use:NO    Typical day for patient:YES       Study Documentation    Sleep Study Indications: snoring, EDS, witnessed apneas, unrefreshing sleep    Sleep Study: Diagnostic   Snore: Moderate  Supplemental O2: no    O2 flow rate (L/min) range n/a  O2 flow rate (L/min) final n/a  Minimum SaO2 55  Baseline SaO2 91        Mode of Therapy:    EKG abnormalities: no     EEG abnormalities: no    Sleep Study Recorded < 2 hours: N/A    Sleep Study Recorded > 2 hours but incomplete study: N/A    Sleep Study Recorded 6 hours but no sleep obtained: NO    Patient classification: retired       Post-Sleep Study    Medication used at bedtime or during sleep study:YES other prescription medications    Patient reports time it took to fall asleep:less than 20 minutes    Patient reports waking up during study:Denied    Patient reports sleeping 4 to 6 hours without dreaming  Patient reports sleep during study:typical    Patient rated sleepiness: Somewhat sleepy or tired    PAP treatment:no

## 2020-09-11 NOTE — TELEPHONE ENCOUNTER
Called patient to review results of sleep study  No answer and voicemail is that set up  Will ask states he to send her a letter informing her of the results and with instructions to call back with questions  Would like to proceed with CPAP titration study when she gets back in contact with us

## 2020-09-14 DIAGNOSIS — E78.2 MIXED HYPERLIPIDEMIA: ICD-10-CM

## 2020-09-14 RX ORDER — ATORVASTATIN CALCIUM 20 MG/1
40 TABLET, FILM COATED ORAL DAILY
Qty: 180 TABLET | Refills: 0 | Status: SHIPPED | OUTPATIENT
Start: 2020-09-14 | End: 2021-02-04 | Stop reason: SDUPTHER

## 2020-09-15 ENCOUNTER — TELEPHONE (OUTPATIENT)
Dept: PULMONOLOGY | Facility: CLINIC | Age: 65
End: 2020-09-15

## 2020-09-15 NOTE — TELEPHONE ENCOUNTER
----- [facility] sent at 9/11/2020 12:03 PM EDT -----  Regarding: letter  Please provided letter to patient instructing her that she has sleep apnea based on sleep study and I would like to go over the results in more detail with her and to go over next plan of action  Please have her call back  Thanks      [facility]

## 2020-09-17 ENCOUNTER — TELEPHONE (OUTPATIENT)
Dept: GASTROENTEROLOGY | Facility: CLINIC | Age: 65
End: 2020-09-17

## 2020-09-22 ENCOUNTER — ANESTHESIA EVENT (OUTPATIENT)
Dept: PERIOP | Facility: HOSPITAL | Age: 65
End: 2020-09-22

## 2020-09-22 NOTE — ANESTHESIA PREPROCEDURE EVALUATION
Procedure:  COLONOSCOPY  EGD    Relevant Problems   GI/HEPATIC   (+) Gastric ulcer   (+) Gastroesophageal reflux disease without esophagitis      MUSCULOSKELETAL   (+) Chronic back pain   (+) DDD (degenerative disc disease), lumbar      NEURO/PSYCH   (+) Anxiety and depression   (+) Chronic back pain   (+) Depression      PULMONARY   (+) Acute on chronic respiratory failure with hypoxia (HCC)   (+) COPD exacerbation (HCC)   (+) Chronic obstructive pulmonary disease with acute lower respiratory infection (HCC)   (+) Chronic respiratory failure with hypoxia (HCC)   (+) HCAP (healthcare-associated pneumonia)   (+) Moderate persistent asthma without complication   (+) Pneumonia of right lower lobe due to influenza A virus   (+) Sleep apnea        Physical Exam    Airway    Mallampati score: III  TM Distance: >3 FB  Neck ROM: full     Dental       Cardiovascular  Rhythm: regular,     Pulmonary  Wheezes,     Other Findings        Anesthesia Plan  ASA Score- 3     Anesthesia Type- IV sedation with anesthesia with ASA Monitors  Additional Monitors:   Airway Plan:           Plan Factors-Exercise tolerance (METS): <4 METS  Chart reviewed  Patient summary reviewed  Induction-     Postoperative Plan-     Informed Consent- Anesthetic plan and risks discussed with patient  I personally reviewed this patient with the CRNA  Discussed and agreed on the Anesthesia Plan with the CRNA  Kevin Quispe Anesthesia Plan  ASA Score- 3     Anesthesia Type- IV sedation with anesthesia with ASA Monitors  Anesthesia Plan  ASA Score- 3     Anesthesia Type- IV sedation with anesthesia with ASA Monitors  Additional Monitors:   Airway Plan:           Plan Factors-    Chart reviewed  Patient summary reviewed  Induction-     Postoperative Plan-     Informed Consent-         Additional Monitors:   Airway Plan:           Plan Factors-    Chart reviewed  Patient summary reviewed              Induction- Postoperative Plan-     Informed Consent-       ECHO - 9/20 WNL  Pt breathing hard from walking from car to waitig room  She says this is normal  Faint wheeze  Pt is on 2L O2 at home  She did nto use her inhaler this morning  Will give nebulizer treatment  Will discuss with GI and may need to do GA for EGD  Cristine Trevino

## 2020-09-23 ENCOUNTER — ANESTHESIA (OUTPATIENT)
Dept: PERIOP | Facility: HOSPITAL | Age: 65
End: 2020-09-23

## 2020-09-23 ENCOUNTER — HOSPITAL ENCOUNTER (OUTPATIENT)
Dept: PERIOP | Facility: HOSPITAL | Age: 65
Setting detail: OUTPATIENT SURGERY
Discharge: HOME | End: 2020-09-23
Attending: INTERNAL MEDICINE
Payer: COMMERCIAL

## 2020-09-23 VITALS
HEART RATE: 73 BPM | TEMPERATURE: 97.6 F | OXYGEN SATURATION: 93 % | DIASTOLIC BLOOD PRESSURE: 74 MMHG | BODY MASS INDEX: 33.96 KG/M2 | SYSTOLIC BLOOD PRESSURE: 136 MMHG | RESPIRATION RATE: 16 BRPM | HEIGHT: 60 IN | WEIGHT: 173 LBS

## 2020-09-23 VITALS — HEART RATE: 82 BPM

## 2020-09-23 DIAGNOSIS — K52.9 CHRONIC DIARRHEA: ICD-10-CM

## 2020-09-23 PROCEDURE — 88305 TISSUE EXAM BY PATHOLOGIST: CPT | Performed by: PATHOLOGY

## 2020-09-23 PROCEDURE — 45385 COLONOSCOPY W/LESION REMOVAL: CPT | Performed by: INTERNAL MEDICINE

## 2020-09-23 PROCEDURE — 94640 AIRWAY INHALATION TREATMENT: CPT

## 2020-09-23 PROCEDURE — 43239 EGD BIOPSY SINGLE/MULTIPLE: CPT | Performed by: INTERNAL MEDICINE

## 2020-09-23 PROCEDURE — 94760 N-INVAS EAR/PLS OXIMETRY 1: CPT

## 2020-09-23 PROCEDURE — 45380 COLONOSCOPY AND BIOPSY: CPT | Performed by: INTERNAL MEDICINE

## 2020-09-23 RX ORDER — ALBUTEROL SULFATE 2.5 MG/3ML
SOLUTION RESPIRATORY (INHALATION)
Status: COMPLETED
Start: 2020-09-23 | End: 2020-09-23

## 2020-09-23 RX ORDER — PROPOFOL 10 MG/ML
INJECTION, EMULSION INTRAVENOUS CONTINUOUS PRN
Status: DISCONTINUED | OUTPATIENT
Start: 2020-09-23 | End: 2020-09-23

## 2020-09-23 RX ORDER — LIDOCAINE HYDROCHLORIDE 10 MG/ML
0.5 INJECTION, SOLUTION EPIDURAL; INFILTRATION; INTRACAUDAL; PERINEURAL ONCE AS NEEDED
Status: DISCONTINUED | OUTPATIENT
Start: 2020-09-23 | End: 2020-09-27 | Stop reason: HOSPADM

## 2020-09-23 RX ORDER — ALBUTEROL SULFATE 2.5 MG/3ML
2.5 SOLUTION RESPIRATORY (INHALATION) ONCE
Status: COMPLETED | OUTPATIENT
Start: 2020-09-23 | End: 2020-09-23

## 2020-09-23 RX ORDER — LIDOCAINE HYDROCHLORIDE 20 MG/ML
INJECTION, SOLUTION EPIDURAL; INFILTRATION; INTRACAUDAL; PERINEURAL AS NEEDED
Status: DISCONTINUED | OUTPATIENT
Start: 2020-09-23 | End: 2020-09-23

## 2020-09-23 RX ORDER — SODIUM CHLORIDE, SODIUM LACTATE, POTASSIUM CHLORIDE, CALCIUM CHLORIDE 600; 310; 30; 20 MG/100ML; MG/100ML; MG/100ML; MG/100ML
100 INJECTION, SOLUTION INTRAVENOUS CONTINUOUS
Status: DISCONTINUED | OUTPATIENT
Start: 2020-09-23 | End: 2020-09-23

## 2020-09-23 RX ORDER — PROPOFOL 10 MG/ML
INJECTION, EMULSION INTRAVENOUS AS NEEDED
Status: DISCONTINUED | OUTPATIENT
Start: 2020-09-23 | End: 2020-09-23

## 2020-09-23 RX ADMIN — LIDOCAINE HYDROCHLORIDE 100 MG: 20 INJECTION, SOLUTION EPIDURAL; INFILTRATION; INTRACAUDAL; PERINEURAL at 07:33

## 2020-09-23 RX ADMIN — ALBUTEROL SULFATE 2.5 MG: 2.5 SOLUTION RESPIRATORY (INHALATION) at 07:13

## 2020-09-23 RX ADMIN — PROPOFOL 110 MCG/KG/MIN: 10 INJECTION, EMULSION INTRAVENOUS at 07:46

## 2020-09-23 RX ADMIN — PROPOFOL 30 MG: 10 INJECTION, EMULSION INTRAVENOUS at 07:37

## 2020-09-23 RX ADMIN — PROPOFOL 30 MG: 10 INJECTION, EMULSION INTRAVENOUS at 08:10

## 2020-09-23 RX ADMIN — PROPOFOL 70 MG: 10 INJECTION, EMULSION INTRAVENOUS at 07:33

## 2020-09-23 RX ADMIN — SODIUM CHLORIDE, SODIUM LACTATE, POTASSIUM CHLORIDE, AND CALCIUM CHLORIDE 100 ML/HR: .6; .31; .03; .02 INJECTION, SOLUTION INTRAVENOUS at 07:02

## 2020-09-23 RX ADMIN — PROPOFOL 50 MG: 10 INJECTION, EMULSION INTRAVENOUS at 07:43

## 2020-09-23 NOTE — H&P
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[place] [address] )     Psychiatric disorder     Sleep apnea      Past Surgical History:   Procedure Laterality Date    APPENDECTOMY      CHOLECYSTECTOMY      partial x2 per pt    HIATAL HERNIA REPAIR      INGUINAL HERNIA REPAIR Right     LAPAROSCOPIC TUBAL LIGATION Bilateral     ROTATOR CUFF REPAIR Right     UVULOPALATOPHARYNGOPLASTY       Social History   Social History     Substance and Sexual Activity   Alcohol Use Yes    Frequency: Never    Comment: occasionally     Social History     Substance and Sexual Activity   Drug Use Yes    Types: Marijuana    Comment: daily in e-pen form     Social History     Tobacco Use   Smoking Status Current Every Day Smoker    Packs/day: 0 25    Types: Cigarettes    Last attempt to quit: 12/10/2019    Years since quittin 7   Smokeless Tobacco Never Used   Tobacco Comment    trying to quit     Family History   Problem Relation Age of Onset    No Known Problems Mother     No Known Problems Father     No Known Problems Sister     No Known Problems Daughter     Breast cancer Maternal Grandmother 28    No Known Problems Paternal Grandmother     No Known Problems Maternal Aunt        Meds/Allergies     (Not in a hospital admission)      Allergies   Allergen Reactions    Erythromycin Throat Swelling    Morphine Delirium       Objective     There were no vitals taken for this visit        PHYSICAL EXAM    Gen: NAD  CV: RRR  CHEST: Clear  ABD: soft, NT/ND  EXT: no edema      ASSESSMENT/PLAN:  This is a 60y o  year old female here for EGD and colonoscopy for constipation, dysphagia, and she is stable and optimized for her procedure      Mitchell Morales MD

## 2020-09-23 NOTE — DISCHARGE INSTRUCTIONS
Cigarette Smoking and Your Health, Ambulatory Care   GENERAL INFORMATION:   What are the risks to my health if I smoke? Chemicals in tobacco are addictive and damage every cell in your body  All tobacco products are dangerous to you and to nonsmokers who breathe your secondhand smoke  Even if you are a light smoker or a social smoker, you have an increased risk for cancer, heart disease, and lung disease  If you are pregnant or have diabetes, smoking increases your risk for complications  What are the benefits to my health if I stop smoking? · Lower risk of cancer, heart disease, blood clots, heart attack, and stroke    · Lower risk of diabetes complications, such as kidney, artery, or eye disease, and nerve damage that can result in amputations    · Lower risk of lung infections and diseases, such as pneumonia, asthma, chronic bronchitis, and emphysema           · Increased benefits of chemotherapy if you already have cancer, and decreased risk for cancer returning after treatment    · Improved circulation, allowing more oxygen to be delivered to your body    · Improved ability to heal and to fight infections  What are the benefits to the health of others if I stop smoking? · Lower risk of lung cancer and heart disease in nonsmokers    · Lower risk of miscarriage, early delivery, low birth weight, and stillbirth    · Lower risk of SIDS, obesity, developmental delay, ear infections, colds, pneumonia, bronchitis, asthma, and ADHD in your child  Where can I find more information and support to stop smoking? It is never too late to stop smoking  Ask your healthcare provider for more information if you need help  · [employer]  Phone: [phone]- 116 [phone]  Web Address: [employer]  Follow up with your healthcare provider as directed:  Write down your questions so you remember to ask them during your visits  CARE AGREEMENT:   You have the right to help plan your care   Learn about your health condition and how it may be treated  Discuss treatment options with your caregivers to decide what care you want to receive  You always have the right to refuse treatment  The above information is an  only  It is not intended as medical advice for individual conditions or treatments  Talk to your doctor, nurse or pharmacist before following any medical regimen to see if it is safe and effective for you  © 2014 [address] is for End User's use only and may not be sold, redistributed or otherwise used for commercial purposes  All illustrations and images included in CareNotes® are the copyrighted property of A D A M , Inc  or Garry Geller

## 2020-09-23 NOTE — ANESTHESIA POSTPROCEDURE EVALUATION
Post-Op Assessment Note    CV Status:  Stable  Pain Score: 0    Pain management: adequate     Mental Status:  Arousable   Hydration Status:  Stable   PONV Controlled:  None   Airway Patency:  Patent      Post Op Vitals Reviewed: Yes      Staff: CRNA         No complications documented      BP   123/70   Temp      Pulse  83   Resp   12   SpO2   98

## 2020-09-28 ENCOUNTER — TELEPHONE (OUTPATIENT)
Dept: GASTROENTEROLOGY | Facility: CLINIC | Age: 65
End: 2020-09-28

## 2020-09-29 ENCOUNTER — TELEPHONE (OUTPATIENT)
Dept: GASTROENTEROLOGY | Facility: CLINIC | Age: 65
End: 2020-09-29

## 2020-09-29 ENCOUNTER — TELEPHONE (OUTPATIENT)
Dept: FAMILY MEDICINE CLINIC | Facility: HOME HEALTHCARE | Age: 65
End: 2020-09-29

## 2020-09-29 NOTE — TELEPHONE ENCOUNTER
I called and scheduled patient for a follow up appointment on 12/2/2020 @ 1:30 pm with Octaviano Conway PA-C @ [facility]  Patient expressed understanding

## 2020-09-29 NOTE — TELEPHONE ENCOUNTER
I tried to call patient but got no answer  I left her a message to call the office  NM      ----- Message from Galen Padilla, [address] sent at 9/21/2020  1:44 PM EDT -----  Can you please reach out to patient to determine if she has a follow-up study appointment sleep medicine, or if I need to place an order?

## 2020-09-29 NOTE — TELEPHONE ENCOUNTER
----- Message from [facility], MD sent at 9/25/2020  5:16 PM EDT -----  Please schedule patient for follow-up in GI clinic in 3 months      Thank you

## 2020-09-30 ENCOUNTER — OFFICE VISIT (OUTPATIENT)
Dept: PULMONOLOGY | Facility: CLINIC | Age: 65
End: 2020-09-30
Payer: COMMERCIAL

## 2020-09-30 VITALS
DIASTOLIC BLOOD PRESSURE: 88 MMHG | HEIGHT: 60 IN | SYSTOLIC BLOOD PRESSURE: 151 MMHG | BODY MASS INDEX: 34.16 KG/M2 | WEIGHT: 174 LBS | HEART RATE: 89 BPM | OXYGEN SATURATION: 90 %

## 2020-09-30 DIAGNOSIS — G47.33 SEVERE OBSTRUCTIVE SLEEP APNEA: ICD-10-CM

## 2020-09-30 DIAGNOSIS — J45.40 MODERATE PERSISTENT ASTHMA WITHOUT COMPLICATION: Primary | ICD-10-CM

## 2020-09-30 DIAGNOSIS — R13.10 DYSPHAGIA, UNSPECIFIED TYPE: ICD-10-CM

## 2020-09-30 DIAGNOSIS — K21.9 GASTROESOPHAGEAL REFLUX DISEASE WITHOUT ESOPHAGITIS: ICD-10-CM

## 2020-09-30 DIAGNOSIS — J96.11 CHRONIC RESPIRATORY FAILURE WITH HYPOXIA (HCC): ICD-10-CM

## 2020-09-30 DIAGNOSIS — J30.2 SEASONAL ALLERGIC RHINITIS, UNSPECIFIED TRIGGER: ICD-10-CM

## 2020-09-30 PROCEDURE — 3079F DIAST BP 80-89 MM HG: CPT | Performed by: PHYSICIAN ASSISTANT

## 2020-09-30 PROCEDURE — 99214 OFFICE O/P EST MOD 30 MIN: CPT | Performed by: PHYSICIAN ASSISTANT

## 2020-09-30 PROCEDURE — 3077F SYST BP >= 140 MM HG: CPT | Performed by: PHYSICIAN ASSISTANT

## 2020-09-30 RX ORDER — FLUTICASONE PROPIONATE 50 MCG
1 SPRAY, SUSPENSION (ML) NASAL DAILY
Qty: 1 BOTTLE | Refills: 2 | Status: SHIPPED | OUTPATIENT
Start: 2020-09-30 | End: 2022-02-10 | Stop reason: SDUPTHER

## 2020-09-30 RX ORDER — CETIRIZINE HYDROCHLORIDE 10 MG/1
10 TABLET ORAL DAILY
Qty: 30 TABLET | Refills: 2 | Status: SHIPPED | OUTPATIENT
Start: 2020-09-30 | End: 2021-02-04 | Stop reason: SDUPTHER

## 2020-09-30 NOTE — ASSESSMENT & PLAN NOTE
Continue Advair 500/50 1 puff twice daily  She was reminded to rinse her mouth after each use  Continue albuterol HFA 2 puffs q 6 hours p r n     For allergic symptoms I instructed her to discontinue use of Atrovent nasal spray  Instead, prescribed Flonase 1 spray each nostril daily p r n  And Zyrtec 10 mg p o  Daily be taken before bed  Patient was recommended to have her influenza vaccination but she deferred at this time

## 2020-09-30 NOTE — PROGRESS NOTES
Pulmonary Follow Up Note   Lilli Rabago 60 y o  female MRN: [id number]  9/30/2020      Assessment:    Moderate persistent asthma without complication  Continue Advair 500/50 1 puff twice daily  She was reminded to rinse her mouth after each use  Continue albuterol HFA 2 puffs q 6 hours p r n     For allergic symptoms I instructed her to discontinue use of Atrovent nasal spray  Instead, prescribed Flonase 1 spray each nostril daily p r n  And Zyrtec 10 mg p o  Daily be taken before bed  Patient was recommended to have her influenza vaccination but she deferred at this time  Chronic respiratory failure with hypoxia (HCC)  Last 6 minutes walk indicated patient does not require supplemental oxygen however diagnostic sleep study noted significant nocturnal hypoxemia  Continue supplemental oxygen during all hours of sleep until CPAP titration study can be obtained  Severe obstructive sleep apnea  Reviewed results of diagnostic sleep study with patient today in the office  Recommend CPAP titration study given significant hypoxemia and severe BANDAR  Continue supplemental oxygen in the meantime  Seasonal allergic rhinitis  Trial Flonase and Zyrtec as above  Dysphagia  Recommends speach and swallow evaluation given persistent dysphagia  Will refer to speech for this  Gastroesophageal reflux disease without esophagitis  Continue Nexium 40 mg p o  Daily  Lifestyle modifications recommended  Plan:    Diagnoses and all orders for this visit:    Moderate persistent asthma without complication    Seasonal allergic rhinitis, unspecified trigger  -     cetirizine (ZyrTEC) 10 mg tablet; Take 1 tablet (10 mg total) by mouth daily  -     fluticasone (FLONASE) 50 mcg/act nasal spray; 1 spray into each nostril daily    Dysphagia, unspecified type  -     Ambulatory referral to Speech Therapy; Future    Severe obstructive sleep apnea  -     CPAP Study;  Future    Chronic respiratory failure with hypoxia ([facility] )    Gastroesophageal reflux disease without esophagitis        Return for Next scheduled follow up  History of Present Illness   HPI:  Sandy Marino is a 60 y o  female who presents to the office today for routine follow-up  Patient's past medical history positive moderate persistent asthma nicotine dependence, chronic hypoxic respiratory failure  Patient was last seen in our office in August of this year  Since then, patient has not required the use of systemic steroids, antibiotics, or been hospitalized for respiratory related illness  She remains on Advair 500/50 1 puff twice daily and as needed albuterol HFA 2 puffs q 6 hours p r n  Which she reports using very infrequently  Since last time she was seen she diagnostic sleep study and echocardiogram   Sleep study revealed that she has severe BANDAR and echo was unremarkable  Currently, patient states that her respiratory symptoms are for the most part stable  She endorses dyspnea on exertion with strenuous activities and cough productive of clear phlegm  She attributes this mostly to seasonal allergies and she also complains of postnasal drip, nasal congestion, and rhinorrhea  Patient was previously prescribed Atrovent nasal spray however feels that this actually makes her feel worse  Currently denies wheezing, chest pain, chest tightness, pleurisy, lower extremity edema, fevers, chills, dizziness, headache  From a sleep apnea standpoint, patient tells me that she has been diagnosed with sleep apnea for many years however has been noncompliant on CPAP in the past   She tells me that she had an oropharyngeal surgery in the past   Worked for many years but has since stopped  She complains of daily snoring and excessive daytime drowsiness  Patient also voices concern of difficulty swallowing stating that she feels that food gets stuck in the back of her throat requiring coughing to expectorated      From a nicotine dependence standpoint, patient has been off cigarettes for the last 2 weeks and is doing well on Chantix  Review of Systems   All other systems reviewed and are negative        Historical Information   Past Medical History:   Diagnosis Date    Anxiety     [facility]) ([facility] )     Depression     Diverticulitis     GERD (gastroesophageal reflux disease)     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[facility] )     Psychiatric disorder     Shortness of breath     Sleep apnea      Past Surgical History:   Procedure Laterality Date    APPENDECTOMY      CHOLECYSTECTOMY      partial x2 per pt    HIATAL HERNIA REPAIR      INGUINAL HERNIA REPAIR Right     LAPAROSCOPIC TUBAL LIGATION Bilateral     ROTATOR CUFF REPAIR Right     UVULOPALATOPHARYNGOPLASTY       Family History   Problem Relation Age of Onset    No Known Problems Mother     No Known Problems Father     No Known Problems Sister     No Known Problems Daughter     Breast cancer Maternal Grandmother 28    No Known Problems Paternal Grandmother     No Known Problems Maternal Aunt        Social History     Tobacco Use   Smoking Status Current Every Day Smoker    Packs/day: 0 25    Types: Cigarettes    Last attempt to quit: 12/10/2019    Years since quittin 8   Smokeless Tobacco Never Used   Tobacco Comment    trying to quit         Meds/Allergies     Current Outpatient Medications:     albuterol (Ventolin HFA) 90 mcg/act inhaler, Inhale 2 puffs every 6 (six) hours as needed for wheezing, Disp: 18 g, Rfl: 5    atorvastatin (LIPITOR) 20 mg tablet, Take 2 tablets (40 mg total) by mouth daily, Disp: 180 tablet, Rfl: 0    cetirizine (ZyrTEC) 10 mg tablet, Take 1 tablet (10 mg total) by mouth daily, Disp: 30 tablet, Rfl: 2    colestipol (COLESTID) 1 g tablet, Take 1 tablet (1 g total) by mouth 2 (two) times a day, Disp: 180 tablet, Rfl: 0    cyclobenzaprine (FLEXERIL) 10 mg tablet, Take 1 tablet (10 mg total) by mouth daily, Disp: 90 tablet, Rfl: 0   dicyclomine (BENTYL) 20 mg tablet, Take 1 tablet (20 mg total) by mouth 3 (three) times a day as needed (as needed), Disp: 270 tablet, Rfl: 0    DULoxetine (CYMBALTA) 30 mg delayed release capsule, Take 1 capsule (30 mg total) by mouth daily, Disp: 90 capsule, Rfl: 0    DULoxetine (CYMBALTA) 60 mg delayed release capsule, Take 1 capsule (60 mg total) by mouth daily, Disp: 90 capsule, Rfl: 0    ergocalciferol (VITAMIN D2) 50,000 units, Take 1 capsule (50,000 Units total) by mouth once a week, Disp: 4 capsule, Rfl: 2    esomeprazole (NexIUM) 40 MG capsule, Take 1 capsule (40 mg total) by mouth daily, Disp: 90 capsule, Rfl: 0    fluticasone (FLONASE) 50 mcg/act nasal spray, 1 spray into each nostril daily, Disp: 1 Bottle, Rfl: 2    fluticasone-salmeterol (Advair Diskus) 500-50 mcg/dose inhaler, Inhale 1 puff 2 (two) times a day, Disp: 1 Inhaler, Rfl: 2    furosemide (LASIX) 40 mg tablet, Take 1 tablet (40 mg total) by mouth daily, Disp: 90 tablet, Rfl: 0    gabapentin (NEURONTIN) 300 mg capsule, Take 1 capsule (300 mg total) by mouth 2 (two) times a day, Disp: 60 capsule, Rfl: 0    ipratropium (ATROVENT) 0 06 % nasal spray, 2 sprays into each nostril 4 (four) times a day as needed for rhinitis, Disp: 15 mL, Rfl: 3    Na Sulfate-K Sulfate-Mg Sulf 17 5-3 13-1 6 GM/177ML SOLN, Take 2 Bottles by mouth once for 1 dose, Disp: 2 Bottle, Rfl: 0    naproxen sodium (ALEVE) 220 MG tablet, Take 1 tablet (220 mg total) by mouth every 12 (twelve) hours as needed for mild pain, Disp: 120 tablet, Rfl: 0    potassium chloride (K-DUR) 10 mEq tablet, Take 1 tablet (10 mEq total) by mouth daily as needed (when pt takes lasix), Disp: 90 tablet, Rfl: 0    QUEtiapine (SEROquel) 50 mg tablet, 1 tab by mouth in the AM; 2 tabs by mouth at bedtime, Disp: 90 tablet, Rfl: 2    varenicline (Chantix Continuing Month Yuriy) 1 mg tablet, Take 1 tablet (1 mg total) by mouth 2 (two) times a day, Disp: 60 tablet, Rfl: 0  Allergies   Allergen Reactions    Erythromycin Throat Swelling    Morphine Delirium       Vitals: Blood pressure 151/88, pulse 89, height 5' (1 524 m), weight 78 9 kg (174 lb), SpO2 90 %, not currently breastfeeding  Body mass index is 33 98 kg/m²  Oxygen Therapy  SpO2: 90 %    Physical Exam  Physical Exam  Vitals signs reviewed  Constitutional:       Appearance: Normal appearance  She is well-developed  She is obese  HENT:      Head: Normocephalic and atraumatic  Right Ear: External ear normal       Left Ear: External ear normal       Nose: Nose normal       Mouth/Throat:      Mouth: Mucous membranes are moist       Pharynx: Oropharynx is clear  Eyes:      Extraocular Movements: Extraocular movements intact  Pupils: Pupils are equal, round, and reactive to light  Neck:      Musculoskeletal: Normal range of motion and neck supple  Cardiovascular:      Rate and Rhythm: Normal rate and regular rhythm  Pulses: Normal pulses  Heart sounds: Normal heart sounds  No murmur  Pulmonary:      Effort: Pulmonary effort is normal  No respiratory distress  Breath sounds: Normal breath sounds  No stridor  No wheezing, rhonchi or rales  Abdominal:      Palpations: Abdomen is soft  Tenderness: There is no abdominal tenderness  Hernia: No hernia is present  Musculoskeletal: Normal range of motion  General: No swelling, tenderness or deformity  Skin:     General: Skin is warm and dry  Capillary Refill: Capillary refill takes less than 2 seconds  Neurological:      General: No focal deficit present  Mental Status: She is alert and oriented to person, place, and time  Mental status is at baseline  Psychiatric:         Behavior: Behavior normal          Thought Content: Thought content normal          Judgment: Judgment normal          Labs: I have personally reviewed pertinent lab results  , ABG: No results found for: PHART, PPH5YYS, PO2ART, SIX5RTW, Q0CCLDNZ, BEART, SOURCE, BNP: No results found for: BNP, CBC: No results found for: WBC, HGB, HCT, MCV, PLT, ADJUSTEDWBC, MCH, MCHC, RDW, MPV, NRBC, CMP: No results found for: SODIUM, K, CL, CO2, ANIONGAP, BUN, CREATININE, GLUCOSE, CALCIUM, AST, ALT, ALKPHOS, PROT, BILITOT, EGFR, PT/INR: No results found for: PT, INR, Troponin: No results found for: TROPONINI  Lab Results   Component Value Date    WBC 7 51 07/24/2020    HGB 13 9 07/24/2020    HCT 42 6 07/24/2020    MCV 94 07/24/2020     07/24/2020     Lab Results   Component Value Date    CALCIUM 9 5 07/24/2020     06/06/2018    K 3 8 07/24/2020    CO2 28 07/24/2020     07/24/2020    BUN 12 07/24/2020    CREATININE 0 56 (L) 07/24/2020     No results found for: IGE  Lab Results   Component Value Date    ALT 25 07/24/2020    AST 15 07/24/2020    ALKPHOS 85 07/24/2020       Imaging and other studies: I have personally reviewed pertinent reports  and I have personally reviewed pertinent films in PACS     CT lung cancer screening 09/02/2020  No suspicious nodules or masses  Minimal scarring in the right lower lobe and right middle lobe  No tracheal or endobronchial lesions  Pulmonary function testing:  Performed 07/24/2020   FEV1/FVC ratio 76 %   FEV1 81 % predicted  FVC 77 % predicted  No response to bronchodilators  TLC 95 % predicted  RV 96 % predicted  DLCO corrected for hemoglobin 72 % predicted  Nonspecific pattern on PFTs  Spirometry shows no signs of obstruction or restriction  Normal lung volumes  Mildly reduced diffusion capacity  Other Studies: I have personally reviewed pertinent reports  6 minutes walk 07/24/2020  6 minutes walk demonstrates lowest SpO2 of 92% on room air  No supplemental oxygen required  Total distance walked 228 75 m  Diagnostic sleep study 09/11/2020  AHI 46 9 events per hour worsened to 65 6 events per hour during REM  Mean oxygen saturation throughout the study was 87 8% with amount of time below 90% 253 8 minutes    Lowest SpO2 was 55%

## 2020-09-30 NOTE — ASSESSMENT & PLAN NOTE
Last 6 minutes walk indicated patient does not require supplemental oxygen however diagnostic sleep study noted significant nocturnal hypoxemia  Continue supplemental oxygen during all hours of sleep until CPAP titration study can be obtained

## 2020-09-30 NOTE — ASSESSMENT & PLAN NOTE
Reviewed results of diagnostic sleep study with patient today in the office  Recommend CPAP titration study given significant hypoxemia and severe BANDAR  Continue supplemental oxygen in the meantime

## 2020-10-01 DIAGNOSIS — G89.29 OTHER CHRONIC PAIN: ICD-10-CM

## 2020-10-01 DIAGNOSIS — M62.838 MUSCLE SPASM: ICD-10-CM

## 2020-10-01 RX ORDER — GABAPENTIN 300 MG/1
300 CAPSULE ORAL 2 TIMES DAILY
Qty: 60 CAPSULE | Refills: 0 | Status: SHIPPED | OUTPATIENT
Start: 2020-10-01 | End: 2020-11-09 | Stop reason: SDUPTHER

## 2020-10-01 RX ORDER — CYCLOBENZAPRINE HCL 10 MG
10 TABLET ORAL DAILY
Qty: 90 TABLET | Refills: 0 | Status: SHIPPED | OUTPATIENT
Start: 2020-10-01 | End: 2020-11-09 | Stop reason: SDUPTHER

## 2020-10-05 ENCOUNTER — TELEPHONE (OUTPATIENT)
Dept: SLEEP CENTER | Facility: CLINIC | Age: 65
End: 2020-10-05

## 2020-10-05 DIAGNOSIS — F17.200 SMOKER: ICD-10-CM

## 2020-10-05 DIAGNOSIS — Z01.812 ENCOUNTER FOR PREPROCEDURE SCREENING LABORATORY TESTING FOR COVID-19: Primary | ICD-10-CM

## 2020-10-05 DIAGNOSIS — Z20.822 ENCOUNTER FOR PREPROCEDURE SCREENING LABORATORY TESTING FOR COVID-19: Primary | ICD-10-CM

## 2020-10-05 RX ORDER — VARENICLINE TARTRATE 1 MG/1
1 TABLET, FILM COATED ORAL 2 TIMES DAILY
Qty: 60 TABLET | Refills: 0 | Status: SHIPPED | OUTPATIENT
Start: 2020-10-05 | End: 2020-11-18 | Stop reason: SDUPTHER

## 2020-10-06 ENCOUNTER — TELEMEDICINE (OUTPATIENT)
Dept: FAMILY MEDICINE CLINIC | Facility: HOME HEALTHCARE | Age: 65
End: 2020-10-06
Payer: COMMERCIAL

## 2020-10-06 ENCOUNTER — APPOINTMENT (EMERGENCY)
Dept: RADIOLOGY | Facility: HOSPITAL | Age: 65
End: 2020-10-06
Payer: COMMERCIAL

## 2020-10-06 ENCOUNTER — HOSPITAL ENCOUNTER (EMERGENCY)
Facility: HOSPITAL | Age: 65
Discharge: HOME | End: 2020-10-06
Attending: EMERGENCY MEDICINE | Admitting: EMERGENCY MEDICINE
Payer: COMMERCIAL

## 2020-10-06 ENCOUNTER — APPOINTMENT (EMERGENCY)
Dept: CT IMAGING | Facility: HOSPITAL | Age: 65
End: 2020-10-06
Payer: COMMERCIAL

## 2020-10-06 VITALS
BODY MASS INDEX: 35.34 KG/M2 | HEIGHT: 60 IN | DIASTOLIC BLOOD PRESSURE: 87 MMHG | TEMPERATURE: 97.3 F | HEART RATE: 87 BPM | RESPIRATION RATE: 20 BRPM | WEIGHT: 180 LBS | OXYGEN SATURATION: 94 % | SYSTOLIC BLOOD PRESSURE: 144 MMHG

## 2020-10-06 DIAGNOSIS — R10.84 GENERALIZED ABDOMINAL PAIN: Primary | ICD-10-CM

## 2020-10-06 DIAGNOSIS — N30.90 CYSTITIS: Primary | ICD-10-CM

## 2020-10-06 LAB
ALBUMIN SERPL BCP-MCNC: 4.7 G/DL (ref 3.5–5.7)
ALP SERPL-CCNC: 85 U/L (ref 55–165)
ALT SERPL W P-5'-P-CCNC: 20 U/L (ref 7–52)
ANION GAP SERPL CALCULATED.3IONS-SCNC: 7 MMOL/L (ref 4–13)
APTT PPP: 27 SECONDS (ref 23–37)
AST SERPL W P-5'-P-CCNC: 16 U/L (ref 13–39)
BACTERIA UR QL AUTO: ABNORMAL /HPF
BASOPHILS # BLD AUTO: 0.1 THOUSANDS/ΜL (ref 0–0.1)
BASOPHILS NFR BLD AUTO: 1 % (ref 0–2)
BILIRUB SERPL-MCNC: 0.5 MG/DL (ref 0.2–1)
BILIRUB UR QL STRIP: NEGATIVE
BUN SERPL-MCNC: 12 MG/DL (ref 7–25)
CALCIUM SERPL-MCNC: 9.7 MG/DL (ref 8.6–10.5)
CHLORIDE SERPL-SCNC: 103 MMOL/L (ref 98–107)
CLARITY UR: ABNORMAL
CO2 SERPL-SCNC: 29 MMOL/L (ref 21–31)
COLOR UR: YELLOW
CREAT SERPL-MCNC: 0.55 MG/DL (ref 0.6–1.2)
EOSINOPHIL # BLD AUTO: 0.1 THOUSAND/ΜL (ref 0–0.61)
EOSINOPHIL NFR BLD AUTO: 1 % (ref 0–5)
ERYTHROCYTE [DISTWIDTH] IN BLOOD BY AUTOMATED COUNT: 14.3 % (ref 11.5–14.5)
GFR SERPL CREATININE-BSD FRML MDRD: 99 ML/MIN/1.73SQ M
GLUCOSE SERPL-MCNC: 110 MG/DL (ref 65–99)
GLUCOSE UR STRIP-MCNC: NEGATIVE MG/DL
HCT VFR BLD AUTO: 40.7 % (ref 42–47)
HGB BLD-MCNC: 13.9 G/DL (ref 12–16)
HGB UR QL STRIP.AUTO: NEGATIVE
INR PPP: 0.97 (ref 0.84–1.19)
KETONES UR STRIP-MCNC: NEGATIVE MG/DL
LACTATE SERPL-SCNC: 1.1 MMOL/L (ref 0.5–2)
LEUKOCYTE ESTERASE UR QL STRIP: NEGATIVE
LIPASE SERPL-CCNC: 14 U/L (ref 11–82)
LYMPHOCYTES # BLD AUTO: 2.4 THOUSANDS/ΜL (ref 0.6–4.47)
LYMPHOCYTES NFR BLD AUTO: 26 % (ref 21–51)
MCH RBC QN AUTO: 31.9 PG (ref 26–34)
MCHC RBC AUTO-ENTMCNC: 34.2 G/DL (ref 31–37)
MCV RBC AUTO: 93 FL (ref 81–99)
MONOCYTES # BLD AUTO: 0.8 THOUSAND/ΜL (ref 0.17–1.22)
MONOCYTES NFR BLD AUTO: 9 % (ref 2–12)
NEUTROPHILS # BLD AUTO: 5.7 THOUSANDS/ΜL (ref 1.4–6.5)
NEUTS SEG NFR BLD AUTO: 63 % (ref 42–75)
NITRITE UR QL STRIP: POSITIVE
NON-SQ EPI CELLS URNS QL MICRO: ABNORMAL /HPF
PH UR STRIP.AUTO: 6 [PH]
PLATELET # BLD AUTO: 315 THOUSANDS/UL (ref 149–390)
PMV BLD AUTO: 9.3 FL (ref 8.6–11.7)
POTASSIUM SERPL-SCNC: 3.7 MMOL/L (ref 3.5–5.5)
PROCALCITONIN SERPL-MCNC: <0.05 NG/ML
PROT SERPL-MCNC: 7.3 G/DL (ref 6.4–8.9)
PROT UR STRIP-MCNC: NEGATIVE MG/DL
PROTHROMBIN TIME: 12.8 SECONDS (ref 11.6–14.5)
RBC # BLD AUTO: 4.37 MILLION/UL (ref 3.9–5.2)
RBC #/AREA URNS AUTO: ABNORMAL /HPF
SODIUM SERPL-SCNC: 139 MMOL/L (ref 134–143)
SP GR UR STRIP.AUTO: 1.02 (ref 1–1.03)
UROBILINOGEN UR QL STRIP.AUTO: 0.2 E.U./DL
WBC # BLD AUTO: 8.9 THOUSAND/UL (ref 4.8–10.8)
WBC #/AREA URNS AUTO: ABNORMAL /HPF

## 2020-10-06 PROCEDURE — 87086 URINE CULTURE/COLONY COUNT: CPT | Performed by: PHYSICIAN ASSISTANT

## 2020-10-06 PROCEDURE — 85610 PROTHROMBIN TIME: CPT | Performed by: PHYSICIAN ASSISTANT

## 2020-10-06 PROCEDURE — 96361 HYDRATE IV INFUSION ADD-ON: CPT

## 2020-10-06 PROCEDURE — 85730 THROMBOPLASTIN TIME PARTIAL: CPT | Performed by: PHYSICIAN ASSISTANT

## 2020-10-06 PROCEDURE — 83690 ASSAY OF LIPASE: CPT | Performed by: PHYSICIAN ASSISTANT

## 2020-10-06 PROCEDURE — 80053 COMPREHEN METABOLIC PANEL: CPT | Performed by: PHYSICIAN ASSISTANT

## 2020-10-06 PROCEDURE — G1004 CDSM NDSC: HCPCS

## 2020-10-06 PROCEDURE — 93005 ELECTROCARDIOGRAM TRACING: CPT

## 2020-10-06 PROCEDURE — G2025 DIS SITE TELE SVCS RHC/FQHC: HCPCS | Performed by: FAMILY MEDICINE

## 2020-10-06 PROCEDURE — 83605 ASSAY OF LACTIC ACID: CPT | Performed by: PHYSICIAN ASSISTANT

## 2020-10-06 PROCEDURE — 84145 PROCALCITONIN (PCT): CPT | Performed by: PHYSICIAN ASSISTANT

## 2020-10-06 PROCEDURE — 99285 EMERGENCY DEPT VISIT HI MDM: CPT

## 2020-10-06 PROCEDURE — 36415 COLL VENOUS BLD VENIPUNCTURE: CPT | Performed by: PHYSICIAN ASSISTANT

## 2020-10-06 PROCEDURE — 85025 COMPLETE CBC W/AUTO DIFF WBC: CPT | Performed by: PHYSICIAN ASSISTANT

## 2020-10-06 PROCEDURE — 87186 SC STD MICRODIL/AGAR DIL: CPT | Performed by: PHYSICIAN ASSISTANT

## 2020-10-06 PROCEDURE — 74177 CT ABD & PELVIS W/CONTRAST: CPT

## 2020-10-06 PROCEDURE — 71045 X-RAY EXAM CHEST 1 VIEW: CPT

## 2020-10-06 PROCEDURE — 96375 TX/PRO/DX INJ NEW DRUG ADDON: CPT

## 2020-10-06 PROCEDURE — 87077 CULTURE AEROBIC IDENTIFY: CPT | Performed by: PHYSICIAN ASSISTANT

## 2020-10-06 PROCEDURE — 96365 THER/PROPH/DIAG IV INF INIT: CPT

## 2020-10-06 PROCEDURE — 99285 EMERGENCY DEPT VISIT HI MDM: CPT | Performed by: PHYSICIAN ASSISTANT

## 2020-10-06 PROCEDURE — 81001 URINALYSIS AUTO W/SCOPE: CPT | Performed by: PHYSICIAN ASSISTANT

## 2020-10-06 PROCEDURE — 99213 OFFICE O/P EST LOW 20 MIN: CPT | Performed by: FAMILY MEDICINE

## 2020-10-06 PROCEDURE — 87040 BLOOD CULTURE FOR BACTERIA: CPT | Performed by: PHYSICIAN ASSISTANT

## 2020-10-06 RX ORDER — FENTANYL CITRATE 50 UG/ML
50 INJECTION, SOLUTION INTRAMUSCULAR; INTRAVENOUS ONCE
Status: COMPLETED | OUTPATIENT
Start: 2020-10-06 | End: 2020-10-06

## 2020-10-06 RX ORDER — CEPHALEXIN 500 MG/1
500 CAPSULE ORAL EVERY 6 HOURS SCHEDULED
Qty: 20 CAPSULE | Refills: 0 | Status: SHIPPED | OUTPATIENT
Start: 2020-10-06 | End: 2020-10-11

## 2020-10-06 RX ADMIN — FENTANYL CITRATE 50 MCG: 50 INJECTION INTRAMUSCULAR; INTRAVENOUS at 18:59

## 2020-10-06 RX ADMIN — IOHEXOL 100 ML: 350 INJECTION, SOLUTION INTRAVENOUS at 19:18

## 2020-10-06 RX ADMIN — SODIUM CHLORIDE 1000 ML: 0.9 INJECTION, SOLUTION INTRAVENOUS at 17:57

## 2020-10-06 RX ADMIN — PIPERACILLIN SODIUM AND TAZOBACTAM SODIUM 3.38 G: 3; .375 INJECTION, POWDER, LYOPHILIZED, FOR SOLUTION INTRAVENOUS at 17:58

## 2020-10-07 LAB
ATRIAL RATE: 95 BPM
P AXIS: 53 DEGREES
PR INTERVAL: 140 MS
QRS AXIS: 25 DEGREES
QRSD INTERVAL: 78 MS
QT INTERVAL: 366 MS
QTC INTERVAL: 459 MS
T WAVE AXIS: 66 DEGREES
VENTRICULAR RATE: 95 BPM

## 2020-10-07 PROCEDURE — 93010 ELECTROCARDIOGRAM REPORT: CPT | Performed by: INTERNAL MEDICINE

## 2020-10-08 LAB — BACTERIA UR CULT: ABNORMAL

## 2020-10-12 LAB
BACTERIA BLD CULT: NORMAL
BACTERIA BLD CULT: NORMAL

## 2020-10-19 ENCOUNTER — TELEPHONE (OUTPATIENT)
Dept: SLEEP CENTER | Facility: CLINIC | Age: 65
End: 2020-10-19

## 2020-11-09 DIAGNOSIS — E55.9 VITAMIN D DEFICIENCY: ICD-10-CM

## 2020-11-09 DIAGNOSIS — M62.838 MUSCLE SPASM: ICD-10-CM

## 2020-11-09 DIAGNOSIS — G89.29 OTHER CHRONIC PAIN: ICD-10-CM

## 2020-11-09 DIAGNOSIS — G47.00 INSOMNIA, UNSPECIFIED TYPE: ICD-10-CM

## 2020-11-09 DIAGNOSIS — F32.A DEPRESSION, UNSPECIFIED DEPRESSION TYPE: ICD-10-CM

## 2020-11-09 DIAGNOSIS — E78.2 MIXED HYPERLIPIDEMIA: ICD-10-CM

## 2020-11-09 RX ORDER — CYCLOBENZAPRINE HCL 10 MG
10 TABLET ORAL DAILY
Qty: 90 TABLET | Refills: 0 | Status: SHIPPED | OUTPATIENT
Start: 2020-11-09 | End: 2020-12-21 | Stop reason: SDUPTHER

## 2020-11-09 RX ORDER — ERGOCALCIFEROL 1.25 MG/1
50000 CAPSULE ORAL WEEKLY
Qty: 4 CAPSULE | Refills: 2 | Status: SHIPPED | OUTPATIENT
Start: 2020-11-09 | End: 2021-04-01

## 2020-11-09 RX ORDER — DULOXETIN HYDROCHLORIDE 30 MG/1
30 CAPSULE, DELAYED RELEASE ORAL DAILY
Qty: 90 CAPSULE | Refills: 0 | Status: SHIPPED | OUTPATIENT
Start: 2020-11-09 | End: 2021-02-04 | Stop reason: SDUPTHER

## 2020-11-09 RX ORDER — GABAPENTIN 300 MG/1
300 CAPSULE ORAL 2 TIMES DAILY
Qty: 180 CAPSULE | Refills: 0 | Status: SHIPPED | OUTPATIENT
Start: 2020-11-09 | End: 2021-02-04 | Stop reason: SDUPTHER

## 2020-11-09 RX ORDER — DULOXETIN HYDROCHLORIDE 60 MG/1
60 CAPSULE, DELAYED RELEASE ORAL DAILY
Qty: 90 CAPSULE | Refills: 0 | Status: SHIPPED | OUTPATIENT
Start: 2020-11-09 | End: 2021-02-04 | Stop reason: SDUPTHER

## 2020-11-09 RX ORDER — QUETIAPINE FUMARATE 50 MG/1
TABLET, FILM COATED ORAL
Qty: 270 TABLET | Refills: 0 | Status: SHIPPED | OUTPATIENT
Start: 2020-11-09 | End: 2021-02-04 | Stop reason: SDUPTHER

## 2020-11-16 DIAGNOSIS — B34.9 VIRAL SYNDROME: ICD-10-CM

## 2020-11-16 DIAGNOSIS — Z03.818 ENCOUNTER FOR PATIENT CONCERN ABOUT EXPOSURE TO INFECTIOUS ORGANISM: Primary | ICD-10-CM

## 2020-11-16 DIAGNOSIS — Z03.818 ENCOUNTER FOR PATIENT CONCERN ABOUT EXPOSURE TO INFECTIOUS ORGANISM: ICD-10-CM

## 2020-11-16 PROCEDURE — U0003 INFECTIOUS AGENT DETECTION BY NUCLEIC ACID (DNA OR RNA); SEVERE ACUTE RESPIRATORY SYNDROME CORONAVIRUS 2 (SARS-COV-2) (CORONAVIRUS DISEASE [COVID-19]), AMPLIFIED PROBE TECHNIQUE, MAKING USE OF HIGH THROUGHPUT TECHNOLOGIES AS DESCRIBED BY CMS-2020-01-R: HCPCS | Performed by: NURSE PRACTITIONER

## 2020-11-17 LAB — SARS-COV-2 RNA SPEC QL NAA+PROBE: NOT DETECTED

## 2020-11-18 ENCOUNTER — OFFICE VISIT (OUTPATIENT)
Dept: PULMONOLOGY | Facility: CLINIC | Age: 65
End: 2020-11-18
Payer: COMMERCIAL

## 2020-11-18 VITALS
TEMPERATURE: 98.1 F | BODY MASS INDEX: 37.89 KG/M2 | SYSTOLIC BLOOD PRESSURE: 161 MMHG | HEART RATE: 48 BPM | HEIGHT: 60 IN | WEIGHT: 193 LBS | OXYGEN SATURATION: 94 % | DIASTOLIC BLOOD PRESSURE: 106 MMHG

## 2020-11-18 DIAGNOSIS — K21.9 GASTROESOPHAGEAL REFLUX DISEASE WITHOUT ESOPHAGITIS: ICD-10-CM

## 2020-11-18 DIAGNOSIS — R13.10 DYSPHAGIA, UNSPECIFIED TYPE: ICD-10-CM

## 2020-11-18 DIAGNOSIS — G47.33 SEVERE OBSTRUCTIVE SLEEP APNEA: ICD-10-CM

## 2020-11-18 DIAGNOSIS — F17.210 CIGARETTE NICOTINE DEPENDENCE WITHOUT COMPLICATION: ICD-10-CM

## 2020-11-18 DIAGNOSIS — J30.2 SEASONAL ALLERGIC RHINITIS, UNSPECIFIED TRIGGER: ICD-10-CM

## 2020-11-18 DIAGNOSIS — J45.40 MODERATE PERSISTENT ASTHMA WITHOUT COMPLICATION: Primary | ICD-10-CM

## 2020-11-18 DIAGNOSIS — J96.11 CHRONIC RESPIRATORY FAILURE WITH HYPOXIA (HCC): ICD-10-CM

## 2020-11-18 PROBLEM — R10.31 RIGHT LOWER QUADRANT ABDOMINAL PAIN: Status: ACTIVE | Noted: 2020-11-18

## 2020-11-18 PROCEDURE — 3008F BODY MASS INDEX DOCD: CPT | Performed by: PHYSICIAN ASSISTANT

## 2020-11-18 PROCEDURE — 3080F DIAST BP >= 90 MM HG: CPT | Performed by: PHYSICIAN ASSISTANT

## 2020-11-18 PROCEDURE — 3077F SYST BP >= 140 MM HG: CPT | Performed by: PHYSICIAN ASSISTANT

## 2020-11-18 PROCEDURE — 99214 OFFICE O/P EST MOD 30 MIN: CPT | Performed by: PHYSICIAN ASSISTANT

## 2020-11-18 RX ORDER — VARENICLINE TARTRATE 1 MG/1
1 TABLET, FILM COATED ORAL 2 TIMES DAILY
Qty: 60 TABLET | Refills: 1 | Status: SHIPPED | OUTPATIENT
Start: 2020-11-18 | End: 2021-02-17 | Stop reason: SDUPTHER

## 2020-11-18 RX ORDER — VARENICLINE TARTRATE 1 MG/1
1 TABLET, FILM COATED ORAL 2 TIMES DAILY
Qty: 60 TABLET | Refills: 1 | Status: SHIPPED | OUTPATIENT
Start: 2020-11-18 | End: 2020-11-18

## 2020-12-02 PROBLEM — K59.00 CONSTIPATION: Status: ACTIVE | Noted: 2020-12-02

## 2020-12-03 ENCOUNTER — TELEPHONE (OUTPATIENT)
Dept: GASTROENTEROLOGY | Facility: CLINIC | Age: 65
End: 2020-12-03

## 2020-12-03 NOTE — TELEPHONE ENCOUNTER
----- Message from Domenica Reynolds sent at 12/3/2020 12:55 PM EST -----  Hello,       You saw patient yesterday 12/2/20  I didn't see any doctumentation of a follow up needed  Would you like to see patient back? If so, how long? Please advise      Thank you,  Tiff

## 2020-12-21 DIAGNOSIS — K21.9 GASTROESOPHAGEAL REFLUX DISEASE: ICD-10-CM

## 2020-12-21 DIAGNOSIS — M62.838 MUSCLE SPASM: ICD-10-CM

## 2020-12-21 RX ORDER — ESOMEPRAZOLE MAGNESIUM 40 MG/1
40 CAPSULE, DELAYED RELEASE ORAL DAILY
Qty: 90 CAPSULE | Refills: 0 | Status: SHIPPED | OUTPATIENT
Start: 2020-12-21 | End: 2021-02-04 | Stop reason: SDUPTHER

## 2020-12-21 RX ORDER — CYCLOBENZAPRINE HCL 10 MG
10 TABLET ORAL DAILY
Qty: 90 TABLET | Refills: 0 | Status: SHIPPED | OUTPATIENT
Start: 2020-12-21 | End: 2021-02-04 | Stop reason: SDUPTHER

## 2020-12-30 ENCOUNTER — HOSPITAL ENCOUNTER (OUTPATIENT)
Dept: RADIOLOGY | Facility: HOSPITAL | Age: 65
Discharge: HOME | End: 2020-12-30
Attending: INTERNAL MEDICINE
Payer: COMMERCIAL

## 2020-12-30 DIAGNOSIS — R13.14 PHARYNGOESOPHAGEAL DYSPHAGIA: ICD-10-CM

## 2020-12-30 PROCEDURE — 74230 X-RAY XM SWLNG FUNCJ C+: CPT

## 2020-12-30 PROCEDURE — 92611 MOTION FLUOROSCOPY/SWALLOW: CPT

## 2020-12-30 NOTE — PROCEDURES
Speech Pathology Videofluoroscopic Swallow Study    Patient Name: Peggy Alegria    XSK[facility]'C Date: 12/30/2020     Problem List  Active Problems:    * No active hospital problems  *      Past Medical History  Past Medical History:   Diagnosis Date    Anxiety     COPD (chronic obstructive pulmonary disease) ([address] )     Depression     Diverticulitis     GERD (gastroesophageal reflux disease)     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HCC)     Psychiatric disorder     Shortness of breath     Sleep apnea        Past Surgical History  Past Surgical History:   Procedure Laterality Date    APPENDECTOMY      CHOLECYSTECTOMY      partial x2 per pt    HIATAL HERNIA REPAIR      INGUINAL HERNIA REPAIR Right     LAPAROSCOPIC TUBAL LIGATION Bilateral     ROTATOR CUFF REPAIR Right     UVULOPALATOPHARYNGOPLASTY           General Information;  Pt is a 60 y o  female with a PMH remarkable for COPD, GERD, sleep apnea, asthma, and chronic bronchitis  Current concerns for dysphagia include feeling of food sticking in her throat/choking sensation when swallowing  She also reported she sometimes wakes up in the night with a choking feeling  Pt quit smoking this year and reported she gained 50lbs  VBS was recommended to assess oropharyngeal stage swallowing skills at this time  Pt was viewed in lateral position and was given trials of pureed, soft moist (apple sauce, soft fruit, soft cookie), solid food (turkey sandwich), and thin liquid  A 13mm pill with thin liquid was also administered  Oral stage:  Pt presented with [facility] oral stage dysphagia  Mastication was timely and grossly effective with materials administered today  Bolus formation and transfer were functional  Oral control appeared adequate with no gross premature spillage over the base of tongue  Pharyngeal stage:  Pt presented with mild pharyngeal dysphagia     Swallowing initiation was delayed, with bolus transfer often to the vallecula/pyriforms prior to swallow initiation  Hyolaryngeal rise and anterior displacement were adequate  Airway closure/protection appeared complete despite incomplete/inconsistent epiglottic inversion  Tongue base retraction and pharyngeal constriction appeared to be reduced  Significant pharyngeal residual noted with sandwich, with multiple swallows and liquid wash needed to clear  Pt does endorse dryness  Esophageal stage:  Esophageal screening was completed  Barium tablet passed through the esophagus without holdup  Slow clearance and retropulsion were seen with thin liquids, and esophageal clearance was further delayed after bite of sandwich  Cricopharyngeal prominence was seen but did not restrict bolus flow through the UES  Strategies and Efficacy: Alternation of liquids and solids assisted with pharyngeal clearance with solid residual     Aspiration Response and Efficacy: No aspiration occurred on this study  Assessment Summary:    Pt presents with functional oral phase swallow and mild pharyngeal dysphagia characterized by delayed swallow initiation and reduced base of tongue retraction/pharyngeal stripping wave  Soft/moist foods passed through the pharynx without significant retention, however multiple swallows and liquid wash were required to clear solid bolus stasis from the vallecula, pyriforms, and posterior pharyngeal wall  Pt does report mucosal dryness  Suspect exacerbation of reflux based on report of waking up choking, vocal hoarseness, and cricopharyngeal prominence  May benefit from GI and/or ENT followup for further assessment  Overall swallow is safe to continue regular diet and thin liquids  Recommend frequent drinks during meals and avoid very dry, hard foods       Recommendations:   Recommended Diet:  regular diet and thin liquids   Recommended Form of Medications: whole with liquid   Aspiration precautions and compensatory swallowing strategies: upright posture, small bites/sips and alternating bites and sips  Consider referral to:  GI, ENT  SLP Dysphagia therapy recommended: No additional ST f/u needed at this time      Results Reviewed with: patient   Pt Education: provided

## 2021-01-29 DIAGNOSIS — Z01.812 ENCOUNTER FOR PREPROCEDURE SCREENING LABORATORY TESTING FOR COVID-19: ICD-10-CM

## 2021-01-29 DIAGNOSIS — Z20.822 ENCOUNTER FOR PREPROCEDURE SCREENING LABORATORY TESTING FOR COVID-19: ICD-10-CM

## 2021-01-29 PROCEDURE — U0005 INFEC AGEN DETEC AMPLI PROBE: HCPCS

## 2021-01-29 PROCEDURE — U0003 INFECTIOUS AGENT DETECTION BY NUCLEIC ACID (DNA OR RNA); SEVERE ACUTE RESPIRATORY SYNDROME CORONAVIRUS 2 (SARS-COV-2) (CORONAVIRUS DISEASE [COVID-19]), AMPLIFIED PROBE TECHNIQUE, MAKING USE OF HIGH THROUGHPUT TECHNOLOGIES AS DESCRIBED BY CMS-2020-01-R: HCPCS

## 2021-01-30 LAB — SARS-COV-2 RNA RESP QL NAA+PROBE: NEGATIVE

## 2021-02-04 DIAGNOSIS — R60.9 EDEMA, UNSPECIFIED TYPE: ICD-10-CM

## 2021-02-04 DIAGNOSIS — J44.9 CHRONIC OBSTRUCTIVE PULMONARY DISEASE, UNSPECIFIED COPD TYPE (HCC): ICD-10-CM

## 2021-02-04 DIAGNOSIS — E78.2 MIXED HYPERLIPIDEMIA: ICD-10-CM

## 2021-02-04 DIAGNOSIS — T50.2X5A DIURETIC-INDUCED HYPOKALEMIA: ICD-10-CM

## 2021-02-04 DIAGNOSIS — M62.838 MUSCLE SPASM: ICD-10-CM

## 2021-02-04 DIAGNOSIS — J45.40 MODERATE PERSISTENT ASTHMA WITHOUT COMPLICATION: ICD-10-CM

## 2021-02-04 DIAGNOSIS — F32.A DEPRESSION, UNSPECIFIED DEPRESSION TYPE: ICD-10-CM

## 2021-02-04 DIAGNOSIS — G89.29 OTHER CHRONIC PAIN: ICD-10-CM

## 2021-02-04 DIAGNOSIS — K21.9 GASTROESOPHAGEAL REFLUX DISEASE: ICD-10-CM

## 2021-02-04 DIAGNOSIS — R10.9 ABDOMINAL CRAMPING: ICD-10-CM

## 2021-02-04 DIAGNOSIS — G47.00 INSOMNIA, UNSPECIFIED TYPE: ICD-10-CM

## 2021-02-04 DIAGNOSIS — E87.6 DIURETIC-INDUCED HYPOKALEMIA: ICD-10-CM

## 2021-02-04 DIAGNOSIS — J30.2 SEASONAL ALLERGIC RHINITIS, UNSPECIFIED TRIGGER: ICD-10-CM

## 2021-02-05 RX ORDER — POTASSIUM CHLORIDE 750 MG/1
10 TABLET, FILM COATED, EXTENDED RELEASE ORAL DAILY PRN
Qty: 90 TABLET | Refills: 0 | Status: SHIPPED | OUTPATIENT
Start: 2021-02-05 | End: 2021-08-05 | Stop reason: SDUPTHER

## 2021-02-05 RX ORDER — DULOXETIN HYDROCHLORIDE 30 MG/1
30 CAPSULE, DELAYED RELEASE ORAL DAILY
Qty: 90 CAPSULE | Refills: 0 | Status: SHIPPED | OUTPATIENT
Start: 2021-02-05 | End: 2021-05-05 | Stop reason: SDUPTHER

## 2021-02-05 RX ORDER — ESOMEPRAZOLE MAGNESIUM 40 MG/1
40 CAPSULE, DELAYED RELEASE ORAL DAILY
Qty: 90 CAPSULE | Refills: 0 | Status: SHIPPED | OUTPATIENT
Start: 2021-02-05 | End: 2021-05-05 | Stop reason: SDUPTHER

## 2021-02-05 RX ORDER — IPRATROPIUM BROMIDE 42 UG/1
2 SPRAY, METERED NASAL 4 TIMES DAILY PRN
Qty: 15 ML | Refills: 3 | Status: SHIPPED | OUTPATIENT
Start: 2021-02-05 | End: 2021-05-05 | Stop reason: SDUPTHER

## 2021-02-05 RX ORDER — DICYCLOMINE HCL 20 MG
20 TABLET ORAL 3 TIMES DAILY PRN
Qty: 270 TABLET | Refills: 0 | Status: SHIPPED | OUTPATIENT
Start: 2021-02-05 | End: 2021-05-05 | Stop reason: SDUPTHER

## 2021-02-05 RX ORDER — CETIRIZINE HYDROCHLORIDE 10 MG/1
10 TABLET ORAL DAILY
Qty: 90 TABLET | Refills: 0 | Status: SHIPPED | OUTPATIENT
Start: 2021-02-05 | End: 2021-04-01 | Stop reason: ALTCHOICE

## 2021-02-05 RX ORDER — GABAPENTIN 300 MG/1
300 CAPSULE ORAL 2 TIMES DAILY
Qty: 180 CAPSULE | Refills: 0 | Status: SHIPPED | OUTPATIENT
Start: 2021-02-05 | End: 2021-05-05 | Stop reason: SDUPTHER

## 2021-02-05 RX ORDER — FUROSEMIDE 40 MG/1
40 TABLET ORAL DAILY
Qty: 90 TABLET | Refills: 0 | Status: SHIPPED | OUTPATIENT
Start: 2021-02-05 | End: 2021-08-05 | Stop reason: SDUPTHER

## 2021-02-05 RX ORDER — ATORVASTATIN CALCIUM 20 MG/1
40 TABLET, FILM COATED ORAL DAILY
Qty: 180 TABLET | Refills: 0 | Status: SHIPPED | OUTPATIENT
Start: 2021-02-05 | End: 2021-08-05 | Stop reason: SDUPTHER

## 2021-02-05 RX ORDER — DULOXETIN HYDROCHLORIDE 60 MG/1
60 CAPSULE, DELAYED RELEASE ORAL DAILY
Qty: 90 CAPSULE | Refills: 0 | Status: SHIPPED | OUTPATIENT
Start: 2021-02-05 | End: 2021-05-05 | Stop reason: SDUPTHER

## 2021-02-05 RX ORDER — QUETIAPINE FUMARATE 50 MG/1
TABLET, FILM COATED ORAL
Qty: 270 TABLET | Refills: 0 | Status: SHIPPED | OUTPATIENT
Start: 2021-02-05 | End: 2021-05-05 | Stop reason: SDUPTHER

## 2021-02-05 RX ORDER — CYCLOBENZAPRINE HCL 10 MG
10 TABLET ORAL DAILY
Qty: 90 TABLET | Refills: 0 | Status: SHIPPED | OUTPATIENT
Start: 2021-02-05 | End: 2021-05-05 | Stop reason: SDUPTHER

## 2021-02-05 RX ORDER — MONTELUKAST SODIUM 4 MG/1
1 TABLET, CHEWABLE ORAL 2 TIMES DAILY
Qty: 180 TABLET | Refills: 0 | Status: SHIPPED | OUTPATIENT
Start: 2021-02-05 | End: 2021-08-05 | Stop reason: SDUPTHER

## 2021-02-17 ENCOUNTER — TELEMEDICINE (OUTPATIENT)
Dept: PULMONOLOGY | Facility: CLINIC | Age: 66
End: 2021-02-17
Payer: COMMERCIAL

## 2021-02-17 VITALS
WEIGHT: 195 LBS | DIASTOLIC BLOOD PRESSURE: 76 MMHG | HEART RATE: 66 BPM | HEIGHT: 60 IN | SYSTOLIC BLOOD PRESSURE: 117 MMHG | BODY MASS INDEX: 38.28 KG/M2

## 2021-02-17 DIAGNOSIS — F17.210 CIGARETTE NICOTINE DEPENDENCE WITHOUT COMPLICATION: ICD-10-CM

## 2021-02-17 DIAGNOSIS — J45.40 MODERATE PERSISTENT ASTHMA WITHOUT COMPLICATION: Primary | ICD-10-CM

## 2021-02-17 DIAGNOSIS — G47.33 SEVERE OBSTRUCTIVE SLEEP APNEA: ICD-10-CM

## 2021-02-17 DIAGNOSIS — Z12.4 SCREENING FOR CERVICAL CANCER: Primary | ICD-10-CM

## 2021-02-17 DIAGNOSIS — J96.11 CHRONIC RESPIRATORY FAILURE WITH HYPOXIA (HCC): ICD-10-CM

## 2021-02-17 PROCEDURE — 99442 PR PHYS/QHP TELEPHONE EVALUATION 11-20 MIN: CPT | Performed by: PHYSICIAN ASSISTANT

## 2021-02-17 RX ORDER — VARENICLINE TARTRATE 1 MG/1
1 TABLET, FILM COATED ORAL 2 TIMES DAILY
Qty: 60 TABLET | Refills: 2 | Status: SHIPPED | OUTPATIENT
Start: 2021-02-17 | End: 2021-08-05

## 2021-02-17 NOTE — ASSESSMENT & PLAN NOTE
Patient is stable on her current pulmonary regimen  Continue Advair 500/50  1 puff twice daily  She was reminded to rinse her mouth out after each use  Continue albuterol HFA q 6 hours p r n  Mike Wright She is up-to-date on influenza pneumococcal vaccinations

## 2021-02-17 NOTE — PROGRESS NOTES
Virtual Brief Visit    Assessment/Plan:    Problem List Items Addressed This Visit        Respiratory    Moderate persistent asthma without complication - Primary      Patient is stable on her current pulmonary regimen  Continue Advair 500/50  1 puff twice daily  She was reminded to rinse her mouth out after each use  Continue albuterol HFA q 6 hours p r n  Reji Jovel She is up-to-date on influenza pneumococcal vaccinations  Chronic respiratory failure with hypoxia (HCC)       Continue supplemental oxygen at 2 liters/minute q h s     Keep oxygen saturations above 88%  Severe obstructive sleep apnea       Unfortunately, patient had to cancel her last CPAP titration study  We will attempt to reschedule this now for her  In the meantime continue supplemental oxygen during all hours of sleep  She knows this is not an ideal treatment for obstructive sleep apnea  Other    Cigarette nicotine dependence without complication    Relevant Medications    varenicline (Chantix Continuing Month Pak) 1 mg tablet                Reason for visit is   Chief Complaint   Patient presents with    Virtual Regular Visit     cancelled CPAP study    Virtual Brief Visit        [facility]    Provider located at [address] [place] [id number] [phone]    Recent Visits  No visits were found meeting these conditions  Showing recent visits within past 7 days and meeting all other requirements     Today's Visits  Date Type Provider Dept   02/17/21 Telemedicine Quique Flores PA-C [facility]   Showing today's visits and meeting all other requirements     Future Appointments  No visits were found meeting these conditions     Showing future appointments within next 150 days and meeting all other requirements        After connecting through telephone, the patient was identified by name and date of birth  Bernice Curry was informed that this is a telemedicine visit and that the visit is being conducted through telephone  My office door was closed  No one else was in the room  She acknowledged consent and understanding of privacy and security of the platform  The patient has agreed to participate and understands she can discontinue the visit at any time  Patient is aware this is a billable service  Subjective    Bernice Curry is a 60 y o  female   Presents via virtual visit for routine follow-up  Patient's past medical history positive for moderate persistent asthma, nicotine dependence, obesity, severe BANDAR, chronic hypoxic respiratory failure on 2 L q h s  HPI     Patient was last seen in our office in November  Since that time she has not required the use of systemic steroids, antibiotics or been hospitalized for respiratory related illness  She reports that her respiratory symptoms have been stable  Denies acute shortness of breath, dyspnea on exertion, cough with mucopurulent sputum, or wheeze  She does report chronic cough has been stable  She has ongoing abdominal issues for which she has establish care with GI and plans to see them back in March to review results of her colonoscopy  She is compliant on Advair and as needed albuterol  She used her albuterol inhaler this week but prior to that has not used it in several weeks  She is compliant on Flonase for her nasal congestion and postnasal drip  Patient  Unfortunately started smoking again  She had Chantix but admits that she has a lot of stresses in her right life recently because she is trying to move her mother out of her home minute to a nursing home      Past Medical History:   Diagnosis Date    Anxiety     [facility]) [facility]     Depression     Diverticulitis     GERD (gastroesophageal reflux disease)     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     Psychiatric disorder     Shortness of breath     Sleep apnea        Past Surgical History:   Procedure Laterality Date    APPENDECTOMY      CHOLECYSTECTOMY      partial x2 per pt    HIATAL HERNIA REPAIR      INGUINAL HERNIA REPAIR Right     LAPAROSCOPIC TUBAL LIGATION Bilateral     ROTATOR CUFF REPAIR Right     UVULOPALATOPHARYNGOPLASTY         Current Outpatient Medications   Medication Sig Dispense Refill    albuterol (Ventolin HFA) 90 mcg/act inhaler Inhale 2 puffs every 6 (six) hours as needed for wheezing 18 g 5    atorvastatin (LIPITOR) 20 mg tablet Take 2 tablets (40 mg total) by mouth daily 180 tablet 0    cetirizine (ZyrTEC) 10 mg tablet Take 1 tablet (10 mg total) by mouth daily 90 tablet 0    cyclobenzaprine (FLEXERIL) 10 mg tablet Take 1 tablet (10 mg total) by mouth daily 90 tablet 0    dicyclomine (BENTYL) 20 mg tablet Take 1 tablet (20 mg total) by mouth 3 (three) times a day as needed (as needed) 270 tablet 0    DULoxetine (CYMBALTA) 30 mg delayed release capsule Take 1 capsule (30 mg total) by mouth daily 90 capsule 0    DULoxetine (CYMBALTA) 60 mg delayed release capsule Take 1 capsule (60 mg total) by mouth daily 90 capsule 0    esomeprazole (NexIUM) 40 MG capsule Take 1 capsule (40 mg total) by mouth daily 90 capsule 0    fluticasone (FLONASE) 50 mcg/act nasal spray 1 spray into each nostril daily 1 Bottle 2    fluticasone-salmeterol (Advair Diskus) 500-50 mcg/dose inhaler Inhale 1 puff 2 (two) times a day 1 Inhaler 5    furosemide (LASIX) 40 mg tablet Take 1 tablet (40 mg total) by mouth daily 90 tablet 0    gabapentin (NEURONTIN) 300 mg capsule Take 1 capsule (300 mg total) by mouth 2 (two) times a day 180 capsule 0    ipratropium (ATROVENT) 0 06 % nasal spray 2 sprays into each nostril 4 (four) times a day as needed for rhinitis 15 mL 3    naproxen sodium (ALEVE) 220 MG tablet Take 1 tablet (220 mg total) by mouth every 12 (twelve) hours as needed for mild pain 120 tablet 0    potassium chloride (Klor-Con) 10 mEq tablet Take 1 tablet (10 mEq total) by mouth daily as needed (when pt takes lasix) 90 tablet 0    QUEtiapine (SEROquel) 50 mg tablet 1 tab by mouth in the AM; 2 tabs by mouth at bedtime 270 tablet 0    colestipol (COLESTID) 1 g tablet Take 1 tablet (1 g total) by mouth 2 (two) times a day (Patient not taking: Reported on 2/17/2021) 180 tablet 0    ergocalciferol (VITAMIN D2) 50,000 units Take 1 capsule (50,000 Units total) by mouth once a week 4 capsule 2    linaCLOtide (Linzess) 145 MCG CAPS Take 1 capsule (145 mcg total) by mouth daily (Patient not taking: Reported on 2/17/2021) 30 capsule 3    Na Sulfate-K Sulfate-Mg Sulf 17 5-3 13-1 6 GM/177ML SOLN Take 2 Bottles by mouth once for 1 dose 2 Bottle 0    varenicline (Chantix Continuing Month Yuriy) 1 mg tablet Take 1 tablet (1 mg total) by mouth 2 (two) times a day 60 tablet 2     No current facility-administered medications for this visit  Allergies   Allergen Reactions    Erythromycin Throat Swelling    Morphine Delirium       Review of Systems   HENT: Positive for congestion and postnasal drip  Respiratory: Positive for cough (chronic) and shortness of breath (chronic)  Gastrointestinal: Positive for abdominal pain and constipation  Vitals:    02/17/21 1313   BP: 117/76   Pulse: 66   Weight: 88 5 kg (195 lb)   Height: 5' (1 524 m)         I spent 20 minutes with patient today in which greater than 50% of the time was spent in counseling/coordination of care regarding asthma    VIRTUAL VISIT DISCLAIMER    Chicayazmin Valdemar acknowledges that she has consented to an online visit or consultation  She understands that the online visit is based solely on information provided by her, and that, in the absence of a face-to-face physical evaluation by the physician, the diagnosis she receives is both limited and provisional in terms of accuracy and completeness   This is not intended to replace a full medical face-to-face evaluation by the physician  Ben Collazo understands and accepts these terms

## 2021-02-17 NOTE — ASSESSMENT & PLAN NOTE
Unfortunately, patient had to cancel her last CPAP titration study  We will attempt to reschedule this now for her  In the meantime continue supplemental oxygen during all hours of sleep  She knows this is not an ideal treatment for obstructive sleep apnea

## 2021-02-23 ENCOUNTER — TELEPHONE (OUTPATIENT)
Dept: GASTROENTEROLOGY | Facility: CLINIC | Age: 66
End: 2021-02-23

## 2021-02-23 NOTE — TELEPHONE ENCOUNTER
Called patient to reschedule follow up 3/16 due to schedule change  She prefers to contact our office to reschedule

## 2021-04-01 ENCOUNTER — OFFICE VISIT (OUTPATIENT)
Dept: FAMILY MEDICINE CLINIC | Facility: CLINIC | Age: 66
End: 2021-04-01
Payer: COMMERCIAL

## 2021-04-01 VITALS
WEIGHT: 191.2 LBS | BODY MASS INDEX: 37.54 KG/M2 | OXYGEN SATURATION: 94 % | SYSTOLIC BLOOD PRESSURE: 138 MMHG | HEART RATE: 89 BPM | DIASTOLIC BLOOD PRESSURE: 80 MMHG | HEIGHT: 60 IN | TEMPERATURE: 98.9 F

## 2021-04-01 DIAGNOSIS — Z13.29 SCREENING FOR THYROID DISORDER: ICD-10-CM

## 2021-04-01 DIAGNOSIS — Z13.0 SCREENING FOR DEFICIENCY ANEMIA: ICD-10-CM

## 2021-04-01 DIAGNOSIS — F33.9 DEPRESSION, RECURRENT (HCC): ICD-10-CM

## 2021-04-01 DIAGNOSIS — E55.9 VITAMIN D DEFICIENCY: ICD-10-CM

## 2021-04-01 DIAGNOSIS — Z76.89 ENCOUNTER TO ESTABLISH CARE WITH NEW DOCTOR: Primary | ICD-10-CM

## 2021-04-01 DIAGNOSIS — Z13.220 SCREENING FOR HYPERLIPIDEMIA: ICD-10-CM

## 2021-04-01 DIAGNOSIS — E66.01 OBESITY, MORBID (HCC): ICD-10-CM

## 2021-04-01 DIAGNOSIS — R73.9 BLOOD GLUCOSE ELEVATED: ICD-10-CM

## 2021-04-01 DIAGNOSIS — M51.26 HERNIATION OF LUMBAR INTERVERTEBRAL DISC: ICD-10-CM

## 2021-04-01 DIAGNOSIS — J96.11 CHRONIC RESPIRATORY FAILURE WITH HYPOXIA (HCC): ICD-10-CM

## 2021-04-01 DIAGNOSIS — M51.36 DDD (DEGENERATIVE DISC DISEASE), LUMBAR: ICD-10-CM

## 2021-04-01 DIAGNOSIS — G47.33 SEVERE OBSTRUCTIVE SLEEP APNEA: ICD-10-CM

## 2021-04-01 DIAGNOSIS — J44.1 COPD EXACERBATION (HCC): ICD-10-CM

## 2021-04-01 DIAGNOSIS — R73.09 ELEVATED HEMOGLOBIN A1C: ICD-10-CM

## 2021-04-01 DIAGNOSIS — J45.40 MODERATE PERSISTENT ASTHMA WITHOUT COMPLICATION: ICD-10-CM

## 2021-04-01 PROBLEM — J96.21 ACUTE ON CHRONIC RESPIRATORY FAILURE WITH HYPOXIA (HCC): Status: RESOLVED | Noted: 2020-03-01 | Resolved: 2021-04-01

## 2021-04-01 PROBLEM — J10.1 INFLUENZA A: Status: RESOLVED | Noted: 2020-02-29 | Resolved: 2021-04-01

## 2021-04-01 PROBLEM — J44.0 CHRONIC OBSTRUCTIVE PULMONARY DISEASE WITH ACUTE LOWER RESPIRATORY INFECTION (HCC): Status: RESOLVED | Noted: 2017-10-25 | Resolved: 2021-04-01

## 2021-04-01 PROBLEM — A41.3 SEPSIS DUE TO HAEMOPHILUS INFLUENZAE WITH ACUTE HYPOXIC RESPIRATORY FAILURE WITHOUT SEPTIC SHOCK (HCC): Status: RESOLVED | Noted: 2020-02-29 | Resolved: 2021-04-01

## 2021-04-01 PROBLEM — J11.00 PNEUMONIA OF RIGHT LOWER LOBE DUE TO INFLUENZA A VIRUS: Status: RESOLVED | Noted: 2020-02-29 | Resolved: 2021-04-01

## 2021-04-01 PROBLEM — R65.20 SEPSIS DUE TO HAEMOPHILUS INFLUENZAE WITH ACUTE HYPOXIC RESPIRATORY FAILURE WITHOUT SEPTIC SHOCK (HCC): Status: RESOLVED | Noted: 2020-02-29 | Resolved: 2021-04-01

## 2021-04-01 PROBLEM — J96.01 SEPSIS DUE TO HAEMOPHILUS INFLUENZAE WITH ACUTE HYPOXIC RESPIRATORY FAILURE WITHOUT SEPTIC SHOCK (HCC): Status: RESOLVED | Noted: 2020-02-29 | Resolved: 2021-04-01

## 2021-04-01 PROCEDURE — 99214 OFFICE O/P EST MOD 30 MIN: CPT | Performed by: NURSE PRACTITIONER

## 2021-04-01 RX ORDER — CHOLECALCIFEROL (VITAMIN D3) 125 MCG
2000 CAPSULE ORAL DAILY
Qty: 90 TABLET | Refills: 3 | Status: SHIPPED | OUTPATIENT
Start: 2021-04-01 | End: 2022-02-10 | Stop reason: SDUPTHER

## 2021-04-01 NOTE — PATIENT INSTRUCTIONS
Nutrition Guidelines for People with COPD   AMBULATORY CARE:   What you need to know about nutrition and COPD:  COPD increases your risk for malnutrition  Malnutrition occurs when you do not get enough calories or nutrients to keep you healthy  Nutrients include protein, fat, carbohydrates, vitamins, and minerals  Malnutrition can increase your risk for lung infections and other health problems  Call your doctor or dietitian if:   · You are losing weight without trying  · You have trouble swallowing  · You have questions or concerns about your condition or care  How COPD increases your risk for malnutrition:  You may have increased calorie needs because COPD causes your body to work harder to breathe  COPD also causes symptoms that can make it hard for you to eat enough healthy foods  Symptoms include shortness of breath, coughing, chest discomfort, and fatigue  You may not have enough energy to shop or prepare meals  You may feel breathless while eating, have trouble swallowing, feel full quickly, or feel bloated  How to create a healthy eating plan:  A dietitian or other healthcare provider may help you create the plan  Tell him or her if you have another medical condition and need to follow a special diet  The following are part of a general healthy eating plan:  · Vegetables  can be fresh, canned in low-sodium (salt) water, or frozen  Eat a variety of dark green, red, and orange vegetables  Limit vegetables that cause gas or make you feel bloated  Low-sodium vegetable juice can also be part of your plan  · Fruits  can be fresh, canned in 100% juice, frozen, or dried  Whole fruit juice can also be included  · Whole grains  include whole-wheat bread, wheat pasta, brown rice, and oatmeal  At least half of the grains you eat should be whole grains  · Protein foods  can be fish, beef, pork, or turkey or chicken without skin   Other protein foods include eggs and egg substitutes, beans, peas, soy products (such as tofu), nuts, and seeds  · Dairy products  include milk, yogurt, cheese, and cottage cheese  · Limit sodium (salt)  Too much sodium may cause your body to retain (hold) water  This can make it harder to breathe  Use less table salt with meals  Limit foods that are high in sodium  Examples include snack or packaged foods, and cured or smoked meats, such as hot dogs and sausage  Frozen meals, canned vegetables, and condiments (mustard, soy sauce, and pickles) may also be high in sodium  Tips to follow during an exacerbation:  An exacerbation of COPD is when your symptoms get much worse very quickly  It may be even more difficult to get enough nutrients during an exacerbation  You may also lose weight during this time  Below are some tips that may help:  · Eat small meals throughout the day  You may be able to eat more healthy foods if you eat 5 to 6 small meals instead of 3 large meals  This will prevent you from getting full too quickly  Feeling too full after meals can cause shortness of breath  Eat slowly and chew your food well  · Choose soft foods that are easy to chew  Examples include soups, scrambled eggs, pasta, pudding, cooked fruits and vegetables, yogurt, and fish  · Drink more liquid as directed  Liquid will help keep your mucus thin and easier to cough up  Mucus that stays in your lungs can make it harder to breathe when an exacerbation starts  Ask how much to drink each day and which liquids are best for you  Liquids with extra calories can help prevent weight loss during an exacerbation  Examples include milkshakes made with whole milk or with added protein powder  Tips to follow after an exacerbation:  You may need extra calories and nutrients if you lost weight during an exacerbation  Below are some tips that may help you regain weight:  · Add extra protein to foods  by adding cheese to soups, mashed potatoes, or omelets   Add milk powder, protein powder, or evaporated milk to cereals and desserts  · Add extra calories to foods  by adding butter, margarine, sugar, or jam     © Copyright [address] 2020 Information is for End User's use only and may not be sold, redistributed or otherwise used for commercial purposes  All illustrations and images included in CareNotes® are the copyrighted property of [employer]  or [address] [place] [address]  The above information is an  only  It is not intended as medical advice for individual conditions or treatments  Talk to your doctor, nurse or pharmacist before following any medical regimen to see if it is safe and effective for you

## 2021-04-01 NOTE — PROGRESS NOTES
Assessment/Plan:    Problem List Items Addressed This Visit     COPD exacerbation ([facility] )    DDD (degenerative disc disease), lumbar    Herniation of lumbar intervertebral disc    Moderate persistent asthma without complication    Chronic respiratory failure with hypoxia ([facility] )    Severe obstructive sleep apnea    Depression, recurrent (HCC)    Obesity, morbid ([facility] )      Other Visit Diagnoses     Encounter to establish care with new doctor    -  Primary    Screening for thyroid disorder        Relevant Orders    TSH, 3rd generation with Free T4 reflex    TSH, 3rd generation with Free T4 reflex    Elevated hemoglobin A1c        Relevant Orders    HEMOGLOBIN A1C W/ EAG ESTIMATION    Vitamin D deficiency        Relevant Medications    Cholecalciferol (Vitamin D3) 50 MCG (2000 UT) TABS    Other Relevant Orders    Vitamin D 25 hydroxy    Screening for deficiency anemia        Relevant Orders    CBC and differential    Screening for hyperlipidemia        Relevant Orders    Lipid Panel with Direct LDL reflex    Blood glucose elevated        Relevant Orders    Comprehensive metabolic panel           Diagnoses and all orders for this visit:    Encounter to establish care with new doctor    Screening for thyroid disorder  -     TSH, 3rd generation with Free T4 reflex; Future  -     TSH, 3rd generation with Free T4 reflex; Future    Elevated hemoglobin A1c  -     HEMOGLOBIN A1C W/ EAG ESTIMATION; Future    COPD exacerbation (HCC)    Moderate persistent asthma without complication    Severe obstructive sleep apnea    DDD (degenerative disc disease), lumbar    Herniation of lumbar intervertebral disc    Depression, recurrent (HCC)    Chronic respiratory failure with hypoxia (HCC)    Obesity, morbid (HCC)    Vitamin D deficiency  -     Cholecalciferol (Vitamin D3) 50 MCG (2000 UT) TABS; Take 1 tablet (2,000 Units total) by mouth daily  -     Vitamin D 25 hydroxy;  Future    Screening for deficiency anemia  -     CBC and differential; Future    Screening for hyperlipidemia  -     Lipid Panel with Direct LDL reflex; Future    Blood glucose elevated  -     Comprehensive metabolic panel; Future        No problem-specific Assessment & Plan notes found for this encounter  Subjective:      Patient ID: Sol Paul is a 60 y o  female  Sol Paul presents today to establish care at this office and routine visit  Previous PCP Lisa WILSON  Significant Hx of DM2, GERD, LS DDD and disc herniation, COPD, anxiety with depression  She sees Meryle Petite for GI and [employer] for Pulmonology  Pt doing well overall, offers no acute complaints today  Hyperlipidemia  This is a chronic problem  Exacerbating diseases include obesity  She has no history of chronic renal disease, diabetes, hypothyroidism, liver disease or nephrotic syndrome  Factors aggravating her hyperlipidemia include smoking  Pertinent negatives include no chest pain, focal sensory loss, focal weakness, leg pain, myalgias or shortness of breath  Current antihyperlipidemic treatment includes statins  Risk factors for coronary artery disease include dyslipidemia, hypertension, obesity and post-menopausal    Depression  This is a chronic problem  Associated symptoms include coughing  Pertinent negatives include no abdominal pain, anorexia, arthralgias, change in bowel habit, chest pain, chills, congestion, diaphoresis, fatigue, fever, headaches, joint swelling, myalgias, nausea, neck pain, numbness, rash, sore throat, swollen glands, urinary symptoms, vertigo, visual change, vomiting or weakness  Treatments tried: cymbalta, seroquel  The treatment provided moderate relief  Breathing Problem  She complains of cough and difficulty breathing  There is no shortness of breath  The cough is non-productive   Pertinent negatives include no appetite change, chest pain, dyspnea on exertion, ear congestion, ear pain, fever, headaches, heartburn, malaise/fatigue, myalgias, nasal congestion, orthopnea, PND, postnasal drip, rhinorrhea, sneezing, sore throat, sweats, trouble swallowing or weight loss  Her symptoms are aggravated by exercise and change in weather  Her symptoms are not alleviated by beta-agonist and steroid inhaler  Her past medical history is significant for bronchitis, COPD and pneumonia  Heartburn  She complains of coughing  She reports no abdominal pain, no chest pain, no heartburn, no nausea or no sore throat  This is a chronic problem  Pertinent negatives include no anemia, fatigue, melena, muscle weakness, orthopnea or weight loss  She has tried a PPI for the symptoms  The treatment provided moderate relief  The following portions of the patient's history were reviewed and updated as appropriate:   She has a past medical history of Acute on chronic respiratory failure with hypoxia ([facility] ) (3/1/2020), Anxiety, Chronic obstructive pulmonary disease with acute lower respiratory infection ([facility] ) (10/25/2017), COPD (chronic obstructive pulmonary disease) ([place] [address] ), Depression, Diverticulitis, GERD (gastroesophageal reflux disease), Influenza A (2/29/2020), Pneumonia of right lower lobe due to influenza A virus (2/29/2020), 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[place] [address] ), Psychiatric disorder, Sepsis due to Haemophilus influenzae with acute hypoxic respiratory failure without septic shock ([place] [address] ) (2/29/2020), Shortness of breath, and Sleep apnea  ,  does not have any pertinent problems on file  ,   has a past surgical history that includes Appendectomy; Laparoscopic tubal ligation (Bilateral); Inguinal hernia repair (Right); Hiatal hernia repair; Uvulopalatopharyngoplasty; Rotator cuff repair (Right); and Cholecystectomy  ,  family history includes Breast cancer (age of onset: 28) in her maternal grandmother; No Known Problems in her daughter, father, maternal aunt, mother, paternal grandmother, and sister  ,   reports that she has been smoking cigarettes   She has been smoking about 0 50 packs per day  She has never used smokeless tobacco  She reports current alcohol use  She reports current drug use  Drug: Marijuana  ,  is allergic to erythromycin and morphine     Current Outpatient Medications   Medication Sig Dispense Refill    albuterol (Ventolin HFA) 90 mcg/act inhaler Inhale 2 puffs every 6 (six) hours as needed for wheezing 18 g 5    atorvastatin (LIPITOR) 20 mg tablet Take 2 tablets (40 mg total) by mouth daily 180 tablet 0    colestipol (COLESTID) 1 g tablet Take 1 tablet (1 g total) by mouth 2 (two) times a day 180 tablet 0    cyclobenzaprine (FLEXERIL) 10 mg tablet Take 1 tablet (10 mg total) by mouth daily 90 tablet 0    dicyclomine (BENTYL) 20 mg tablet Take 1 tablet (20 mg total) by mouth 3 (three) times a day as needed (as needed) 270 tablet 0    DULoxetine (CYMBALTA) 30 mg delayed release capsule Take 1 capsule (30 mg total) by mouth daily 90 capsule 0    DULoxetine (CYMBALTA) 60 mg delayed release capsule Take 1 capsule (60 mg total) by mouth daily 90 capsule 0    esomeprazole (NexIUM) 40 MG capsule Take 1 capsule (40 mg total) by mouth daily 90 capsule 0    fluticasone (FLONASE) 50 mcg/act nasal spray 1 spray into each nostril daily 1 Bottle 2    fluticasone-salmeterol (Advair Diskus) 500-50 mcg/dose inhaler Inhale 1 puff 2 (two) times a day 1 Inhaler 5    furosemide (LASIX) 40 mg tablet Take 1 tablet (40 mg total) by mouth daily 90 tablet 0    gabapentin (NEURONTIN) 300 mg capsule Take 1 capsule (300 mg total) by mouth 2 (two) times a day 180 capsule 0    ipratropium (ATROVENT) 0 06 % nasal spray 2 sprays into each nostril 4 (four) times a day as needed for rhinitis 15 mL 3    potassium chloride (Klor-Con) 10 mEq tablet Take 1 tablet (10 mEq total) by mouth daily as needed (when pt takes lasix) 90 tablet 0    QUEtiapine (SEROquel) 50 mg tablet 1 tab by mouth in the AM; 2 tabs by mouth at bedtime 270 tablet 0    varenicline (Chantix Continuing Month Yuriy) 1 mg tablet Take 1 tablet (1 mg total) by mouth 2 (two) times a day 60 tablet 2    Cholecalciferol (Vitamin D3) 50 MCG (2000 UT) TABS Take 1 tablet (2,000 Units total) by mouth daily 90 tablet 3    Na Sulfate-K Sulfate-Mg Sulf 17 5-3 13-1 6 GM/177ML SOLN Take 2 Bottles by mouth once for 1 dose 2 Bottle 0    naproxen sodium (ALEVE) 220 MG tablet Take 1 tablet (220 mg total) by mouth every 12 (twelve) hours as needed for mild pain 120 tablet 0     No current facility-administered medications for this visit  Review of Systems   Constitutional: Negative  Negative for appetite change, chills, diaphoresis, fatigue, fever, malaise/fatigue and weight loss  HENT: Negative  Negative for congestion, ear pain, postnasal drip, rhinorrhea, sneezing, sore throat and trouble swallowing  Eyes: Negative  Respiratory: Positive for cough  Negative for shortness of breath  Cardiovascular: Negative  Negative for chest pain, dyspnea on exertion and PND  Gastrointestinal: Negative  Negative for abdominal pain, anorexia, change in bowel habit, heartburn, melena, nausea and vomiting  Endocrine: Negative  Genitourinary: Negative  Musculoskeletal: Negative for arthralgias, joint swelling, myalgias, muscle weakness and neck pain  Skin: Negative  Negative for rash  Allergic/Immunologic: Negative  Neurological: Negative  Negative for vertigo, focal weakness, weakness, numbness and headaches  Hematological: Negative  Psychiatric/Behavioral: Positive for depression  Objective:  Vitals:    04/01/21 1208   BP: 138/80   BP Location: Left arm   Patient Position: Sitting   Cuff Size: Large   Pulse: 89   Temp: 98 9 °F (37 2 °C)   TempSrc: Tympanic   SpO2: 94%   Weight: 86 7 kg (191 lb 3 2 oz)   Height: 5' (1 524 m)     Body mass index is 37 34 kg/m²  BMI Counseling: Body mass index is 37 34 kg/m²   The BMI is above normal  Nutrition recommendations include decreasing portion sizes, limiting drinks that contain sugar, moderation in carbohydrate intake and reducing intake of cholesterol  Exercise recommendations include exercising 3-5 times per week  No pharmacotherapy was ordered  Tobacco Cessation Counseling: Tobacco cessation counseling was provided  The patient is sincerely urged to quit consumption of tobacco  She is not ready to quit tobacco  Medication options and side effects of medication discussed  Patient refused medication  She was prescribed chantix and quit smoking  She gained 30 lbs and started smoking  Physical Exam  Vitals signs and nursing note reviewed  Constitutional:       Appearance: Normal appearance  She is well-developed  She is obese  HENT:      Head: Normocephalic and atraumatic  Right Ear: Tympanic membrane, ear canal and external ear normal       Left Ear: Tympanic membrane, ear canal and external ear normal       Nose: Nose normal       Mouth/Throat:      Mouth: Mucous membranes are moist       Pharynx: Uvula midline  Eyes:      General: Lids are normal       Conjunctiva/sclera: Conjunctivae normal       Pupils: Pupils are equal, round, and reactive to light  Neck:      Musculoskeletal: Full passive range of motion without pain, normal range of motion and neck supple  Thyroid: No thyroid mass or thyromegaly  Vascular: No JVD  Trachea: Trachea and phonation normal    Cardiovascular:      Rate and Rhythm: Normal rate and regular rhythm  Pulses: Normal pulses  Heart sounds: Normal heart sounds, S1 normal and S2 normal  No murmur  No friction rub  No gallop  Pulmonary:      Effort: Pulmonary effort is normal       Breath sounds: Normal breath sounds  Abdominal:      General: Bowel sounds are normal       Palpations: Abdomen is soft  Tenderness: There is no abdominal tenderness  Genitourinary:     Comments: Deferred   Musculoskeletal: Normal range of motion  Right lower leg: No edema  Left lower leg: No edema  Skin:     General: Skin is warm and dry  Capillary Refill: Capillary refill takes less than 2 seconds  Neurological:      General: No focal deficit present  Mental Status: She is alert and oriented to person, place, and time  Cranial Nerves: Cranial nerves are intact  No cranial nerve deficit  Sensory: Sensation is intact  Motor: Motor function is intact  Coordination: Coordination is intact  Gait: Gait is intact  Deep Tendon Reflexes: Reflexes are normal and symmetric  Psychiatric:         Attention and Perception: Attention and perception normal          Mood and Affect: Mood and affect normal          Speech: Speech normal          Behavior: Behavior normal  Behavior is cooperative  Thought Content:  Thought content normal          Cognition and Memory: Cognition normal          Judgment: Judgment normal

## 2021-04-02 ENCOUNTER — IMMUNIZATIONS (OUTPATIENT)
Dept: FAMILY MEDICINE CLINIC | Facility: HOSPITAL | Age: 66
End: 2021-04-02

## 2021-04-02 DIAGNOSIS — Z23 ENCOUNTER FOR IMMUNIZATION: Primary | ICD-10-CM

## 2021-04-02 PROCEDURE — 0011A SARS-COV-2 / COVID-19 MRNA VACCINE (MODERNA) 100 MCG: CPT

## 2021-04-02 PROCEDURE — 91301 SARS-COV-2 / COVID-19 MRNA VACCINE (MODERNA) 100 MCG: CPT

## 2021-05-04 ENCOUNTER — IMMUNIZATIONS (OUTPATIENT)
Dept: FAMILY MEDICINE CLINIC | Facility: HOSPITAL | Age: 66
End: 2021-05-04

## 2021-05-04 DIAGNOSIS — Z23 ENCOUNTER FOR IMMUNIZATION: Primary | ICD-10-CM

## 2021-05-04 PROCEDURE — 91301 SARS-COV-2 / COVID-19 MRNA VACCINE (MODERNA) 100 MCG: CPT

## 2021-05-04 PROCEDURE — 0012A SARS-COV-2 / COVID-19 MRNA VACCINE (MODERNA) 100 MCG: CPT

## 2021-05-05 DIAGNOSIS — G89.29 OTHER CHRONIC PAIN: ICD-10-CM

## 2021-05-05 DIAGNOSIS — E78.2 MIXED HYPERLIPIDEMIA: ICD-10-CM

## 2021-05-05 DIAGNOSIS — M62.838 MUSCLE SPASM: ICD-10-CM

## 2021-05-05 DIAGNOSIS — F32.A DEPRESSION, UNSPECIFIED DEPRESSION TYPE: ICD-10-CM

## 2021-05-05 DIAGNOSIS — R10.9 ABDOMINAL CRAMPING: ICD-10-CM

## 2021-05-05 DIAGNOSIS — K21.9 GASTROESOPHAGEAL REFLUX DISEASE: ICD-10-CM

## 2021-05-05 DIAGNOSIS — G47.00 INSOMNIA, UNSPECIFIED TYPE: ICD-10-CM

## 2021-05-05 DIAGNOSIS — J45.40 MODERATE PERSISTENT ASTHMA WITHOUT COMPLICATION: ICD-10-CM

## 2021-05-05 RX ORDER — QUETIAPINE FUMARATE 50 MG/1
TABLET, FILM COATED ORAL
Qty: 270 TABLET | Refills: 1 | Status: SHIPPED | OUTPATIENT
Start: 2021-05-05 | End: 2021-11-08 | Stop reason: SDUPTHER

## 2021-05-05 RX ORDER — GABAPENTIN 300 MG/1
300 CAPSULE ORAL 2 TIMES DAILY
Qty: 180 CAPSULE | Refills: 1 | Status: SHIPPED | OUTPATIENT
Start: 2021-05-05 | End: 2021-11-08 | Stop reason: SDUPTHER

## 2021-05-05 RX ORDER — DULOXETIN HYDROCHLORIDE 30 MG/1
30 CAPSULE, DELAYED RELEASE ORAL DAILY
Qty: 90 CAPSULE | Refills: 1 | Status: SHIPPED | OUTPATIENT
Start: 2021-05-05 | End: 2021-11-08 | Stop reason: SDUPTHER

## 2021-05-05 RX ORDER — CYCLOBENZAPRINE HCL 10 MG
10 TABLET ORAL DAILY
Qty: 90 TABLET | Refills: 0 | Status: SHIPPED | OUTPATIENT
Start: 2021-05-05 | End: 2021-08-05 | Stop reason: SDUPTHER

## 2021-05-05 RX ORDER — DULOXETIN HYDROCHLORIDE 60 MG/1
60 CAPSULE, DELAYED RELEASE ORAL DAILY
Qty: 90 CAPSULE | Refills: 1 | Status: SHIPPED | OUTPATIENT
Start: 2021-05-05 | End: 2021-11-08 | Stop reason: SDUPTHER

## 2021-05-05 RX ORDER — ESOMEPRAZOLE MAGNESIUM 40 MG/1
40 CAPSULE, DELAYED RELEASE ORAL DAILY
Qty: 90 CAPSULE | Refills: 1 | Status: SHIPPED | OUTPATIENT
Start: 2021-05-05 | End: 2021-11-08 | Stop reason: SDUPTHER

## 2021-05-05 RX ORDER — IPRATROPIUM BROMIDE 42 UG/1
2 SPRAY, METERED NASAL 4 TIMES DAILY PRN
Qty: 15 ML | Refills: 3 | Status: SHIPPED | OUTPATIENT
Start: 2021-05-05 | End: 2022-02-10 | Stop reason: ALTCHOICE

## 2021-05-05 RX ORDER — DICYCLOMINE HCL 20 MG
20 TABLET ORAL 3 TIMES DAILY PRN
Qty: 270 TABLET | Refills: 1 | Status: SHIPPED | OUTPATIENT
Start: 2021-05-05 | End: 2022-05-09 | Stop reason: SDUPTHER

## 2021-06-14 DIAGNOSIS — E78.2 MIXED HYPERLIPIDEMIA: ICD-10-CM

## 2021-06-14 RX ORDER — ATORVASTATIN CALCIUM 40 MG/1
TABLET, FILM COATED ORAL
Qty: 90 TABLET | Refills: 0 | OUTPATIENT
Start: 2021-06-14

## 2021-07-19 ENCOUNTER — RA CDI HCC (OUTPATIENT)
Dept: OTHER | Facility: HOSPITAL | Age: 66
End: 2021-07-19

## 2021-07-20 NOTE — PROGRESS NOTES
[facility]  coding opportunities          Chart reviewed, no opportunity found: CHART REVIEWED, NO OPPORTUNITY FOUND                     Patients insurance company: [employer]

## 2021-08-02 ENCOUNTER — APPOINTMENT (OUTPATIENT)
Dept: LAB | Facility: CLINIC | Age: 66
End: 2021-08-02
Payer: COMMERCIAL

## 2021-08-02 DIAGNOSIS — E55.9 VITAMIN D DEFICIENCY: ICD-10-CM

## 2021-08-02 DIAGNOSIS — Z13.0 SCREENING FOR DEFICIENCY ANEMIA: ICD-10-CM

## 2021-08-02 DIAGNOSIS — Z13.29 SCREENING FOR THYROID DISORDER: ICD-10-CM

## 2021-08-02 DIAGNOSIS — R73.9 BLOOD GLUCOSE ELEVATED: ICD-10-CM

## 2021-08-02 DIAGNOSIS — Z13.220 SCREENING FOR HYPERLIPIDEMIA: ICD-10-CM

## 2021-08-02 LAB
25(OH)D3 SERPL-MCNC: 20.5 NG/ML (ref 30–100)
ALBUMIN SERPL BCP-MCNC: 3.7 G/DL (ref 3.5–5)
ALP SERPL-CCNC: 94 U/L (ref 46–116)
ALT SERPL W P-5'-P-CCNC: 25 U/L (ref 12–78)
ANION GAP SERPL CALCULATED.3IONS-SCNC: 4 MMOL/L (ref 4–13)
AST SERPL W P-5'-P-CCNC: 14 U/L (ref 5–45)
BASOPHILS # BLD AUTO: 0.07 THOUSANDS/ΜL (ref 0–0.1)
BASOPHILS NFR BLD AUTO: 1 % (ref 0–1)
BILIRUB SERPL-MCNC: 0.38 MG/DL (ref 0.2–1)
BUN SERPL-MCNC: 12 MG/DL (ref 5–25)
CALCIUM SERPL-MCNC: 9.3 MG/DL (ref 8.3–10.1)
CHLORIDE SERPL-SCNC: 107 MMOL/L (ref 100–108)
CHOLEST SERPL-MCNC: 188 MG/DL (ref 50–200)
CO2 SERPL-SCNC: 30 MMOL/L (ref 21–32)
CREAT SERPL-MCNC: 0.5 MG/DL (ref 0.6–1.3)
EOSINOPHIL # BLD AUTO: 0.16 THOUSAND/ΜL (ref 0–0.61)
EOSINOPHIL NFR BLD AUTO: 2 % (ref 0–6)
ERYTHROCYTE [DISTWIDTH] IN BLOOD BY AUTOMATED COUNT: 14.6 % (ref 11.6–15.1)
GFR SERPL CREATININE-BSD FRML MDRD: 102 ML/MIN/1.73SQ M
GLUCOSE P FAST SERPL-MCNC: 108 MG/DL (ref 65–99)
HCT VFR BLD AUTO: 42.9 % (ref 34.8–46.1)
HDLC SERPL-MCNC: 45 MG/DL
HGB BLD-MCNC: 14.1 G/DL (ref 11.5–15.4)
IMM GRANULOCYTES # BLD AUTO: 0.02 THOUSAND/UL (ref 0–0.2)
IMM GRANULOCYTES NFR BLD AUTO: 0 % (ref 0–2)
LDLC SERPL CALC-MCNC: 109 MG/DL (ref 0–100)
LYMPHOCYTES # BLD AUTO: 2.3 THOUSANDS/ΜL (ref 0.6–4.47)
LYMPHOCYTES NFR BLD AUTO: 34 % (ref 14–44)
MCH RBC QN AUTO: 31.4 PG (ref 26.8–34.3)
MCHC RBC AUTO-ENTMCNC: 32.9 G/DL (ref 31.4–37.4)
MCV RBC AUTO: 96 FL (ref 82–98)
MONOCYTES # BLD AUTO: 0.46 THOUSAND/ΜL (ref 0.17–1.22)
MONOCYTES NFR BLD AUTO: 7 % (ref 4–12)
NEUTROPHILS # BLD AUTO: 3.84 THOUSANDS/ΜL (ref 1.85–7.62)
NEUTS SEG NFR BLD AUTO: 56 % (ref 43–75)
NRBC BLD AUTO-RTO: 0 /100 WBCS
PLATELET # BLD AUTO: 273 THOUSANDS/UL (ref 149–390)
PMV BLD AUTO: 12.2 FL (ref 8.9–12.7)
POTASSIUM SERPL-SCNC: 3.7 MMOL/L (ref 3.5–5.3)
PROT SERPL-MCNC: 6.9 G/DL (ref 6.4–8.2)
RBC # BLD AUTO: 4.49 MILLION/UL (ref 3.81–5.12)
SODIUM SERPL-SCNC: 141 MMOL/L (ref 136–145)
TRIGL SERPL-MCNC: 168 MG/DL
TSH SERPL DL<=0.05 MIU/L-ACNC: 2.25 UIU/ML (ref 0.36–3.74)
WBC # BLD AUTO: 6.85 THOUSAND/UL (ref 4.31–10.16)

## 2021-08-02 PROCEDURE — 80061 LIPID PANEL: CPT

## 2021-08-02 PROCEDURE — 82306 VITAMIN D 25 HYDROXY: CPT

## 2021-08-02 PROCEDURE — 80053 COMPREHEN METABOLIC PANEL: CPT

## 2021-08-02 PROCEDURE — 84443 ASSAY THYROID STIM HORMONE: CPT

## 2021-08-02 PROCEDURE — 85025 COMPLETE CBC W/AUTO DIFF WBC: CPT

## 2021-08-05 ENCOUNTER — OFFICE VISIT (OUTPATIENT)
Dept: FAMILY MEDICINE CLINIC | Facility: CLINIC | Age: 66
End: 2021-08-05
Payer: COMMERCIAL

## 2021-08-05 VITALS
SYSTOLIC BLOOD PRESSURE: 118 MMHG | DIASTOLIC BLOOD PRESSURE: 78 MMHG | TEMPERATURE: 98.8 F | WEIGHT: 175 LBS | BODY MASS INDEX: 34.36 KG/M2 | OXYGEN SATURATION: 93 % | HEIGHT: 60 IN | HEART RATE: 95 BPM

## 2021-08-05 DIAGNOSIS — T50.2X5A DIURETIC-INDUCED HYPOKALEMIA: ICD-10-CM

## 2021-08-05 DIAGNOSIS — J45.40 MODERATE PERSISTENT ASTHMA WITHOUT COMPLICATION: ICD-10-CM

## 2021-08-05 DIAGNOSIS — M51.26 HERNIATION OF LUMBAR INTERVERTEBRAL DISC: ICD-10-CM

## 2021-08-05 DIAGNOSIS — R60.9 EDEMA, UNSPECIFIED TYPE: ICD-10-CM

## 2021-08-05 DIAGNOSIS — M51.36 DDD (DEGENERATIVE DISC DISEASE), LUMBAR: ICD-10-CM

## 2021-08-05 DIAGNOSIS — J44.1 COPD EXACERBATION (HCC): ICD-10-CM

## 2021-08-05 DIAGNOSIS — Z00.00 WELCOME TO MEDICARE PREVENTIVE VISIT: Primary | ICD-10-CM

## 2021-08-05 DIAGNOSIS — F33.9 DEPRESSION, RECURRENT (HCC): ICD-10-CM

## 2021-08-05 DIAGNOSIS — E78.2 MIXED HYPERLIPIDEMIA: ICD-10-CM

## 2021-08-05 DIAGNOSIS — G47.33 SEVERE OBSTRUCTIVE SLEEP APNEA: ICD-10-CM

## 2021-08-05 DIAGNOSIS — Z13.31 POSITIVE DEPRESSION SCREENING: ICD-10-CM

## 2021-08-05 DIAGNOSIS — R10.9 ABDOMINAL CRAMPING: ICD-10-CM

## 2021-08-05 DIAGNOSIS — E87.6 DIURETIC-INDUCED HYPOKALEMIA: ICD-10-CM

## 2021-08-05 DIAGNOSIS — K21.9 GASTROESOPHAGEAL REFLUX DISEASE WITHOUT ESOPHAGITIS: ICD-10-CM

## 2021-08-05 DIAGNOSIS — M62.838 MUSCLE SPASM: ICD-10-CM

## 2021-08-05 PROCEDURE — 4004F PT TOBACCO SCREEN RCVD TLK: CPT | Performed by: NURSE PRACTITIONER

## 2021-08-05 PROCEDURE — 3725F SCREEN DEPRESSION PERFORMED: CPT | Performed by: NURSE PRACTITIONER

## 2021-08-05 PROCEDURE — 3288F FALL RISK ASSESSMENT DOCD: CPT | Performed by: NURSE PRACTITIONER

## 2021-08-05 PROCEDURE — G0402 INITIAL PREVENTIVE EXAM: HCPCS | Performed by: NURSE PRACTITIONER

## 2021-08-05 PROCEDURE — 3008F BODY MASS INDEX DOCD: CPT | Performed by: NURSE PRACTITIONER

## 2021-08-05 RX ORDER — CYCLOBENZAPRINE HCL 10 MG
10 TABLET ORAL DAILY
Qty: 90 TABLET | Refills: 0 | Status: SHIPPED | OUTPATIENT
Start: 2021-08-05 | End: 2021-11-08 | Stop reason: SDUPTHER

## 2021-08-05 RX ORDER — FUROSEMIDE 40 MG/1
40 TABLET ORAL DAILY PRN
Qty: 90 TABLET | Refills: 0 | Status: SHIPPED | OUTPATIENT
Start: 2021-08-05 | End: 2022-05-09 | Stop reason: SDUPTHER

## 2021-08-05 RX ORDER — POTASSIUM CHLORIDE 750 MG/1
10 TABLET, FILM COATED, EXTENDED RELEASE ORAL DAILY PRN
Qty: 90 TABLET | Refills: 0 | Status: SHIPPED | OUTPATIENT
Start: 2021-08-05 | End: 2022-04-01

## 2021-08-05 RX ORDER — ATORVASTATIN CALCIUM 20 MG/1
40 TABLET, FILM COATED ORAL DAILY
Qty: 180 TABLET | Refills: 0 | Status: SHIPPED | OUTPATIENT
Start: 2021-08-05 | End: 2021-12-09

## 2021-08-05 RX ORDER — MONTELUKAST SODIUM 4 MG/1
1 TABLET, CHEWABLE ORAL 2 TIMES DAILY
Qty: 180 TABLET | Refills: 0 | Status: SHIPPED | OUTPATIENT
Start: 2021-08-05 | End: 2021-11-08 | Stop reason: SDUPTHER

## 2021-08-05 NOTE — PROGRESS NOTES
Assessment and Plan:     Problem List Items Addressed This Visit     COPD exacerbation ([facility] )    DDD (degenerative disc disease), lumbar    Herniation of lumbar intervertebral disc    Gastroesophageal reflux disease without esophagitis    Moderate persistent asthma without complication    Severe obstructive sleep apnea    Depression, recurrent ([facility] )      Other Visit Diagnoses     Welcome to Medicare preventive visit    -  Primary    Abdominal cramping        Relevant Medications    colestipol (COLESTID) 1 g tablet    Diuretic-induced hypokalemia        Relevant Medications    potassium chloride (Klor-Con) 10 mEq tablet    Edema, unspecified type        Relevant Medications    furosemide (LASIX) 40 mg tablet    Mixed hyperlipidemia        Relevant Medications    colestipol (COLESTID) 1 g tablet    atorvastatin (LIPITOR) 20 mg tablet    Muscle spasm        Relevant Medications    cyclobenzaprine (FLEXERIL) 10 mg tablet    Positive depression screening            BMI Counseling: Body mass index is 34 18 kg/m²  The BMI is above normal  Nutrition recommendations include decreasing portion sizes, consuming healthier snacks, limiting drinks that contain sugar, moderation in carbohydrate intake and reducing intake of cholesterol  Exercise recommendations include exercising 3-5 times per week  No pharmacotherapy was ordered  Depression Screening and Follow-up Plan: Patient's depression screening was positive with a PHQ-2 score of 2  Their PHQ-9 score was 7  Continue regular follow-up with their mental health provider who is managing their mental health condition(s)  Tobacco Cessation Counseling: Tobacco cessation counseling was provided  The patient is sincerely urged to quit consumption of tobacco  She is not ready to quit tobacco      Preventive health issues were discussed with patient, and age appropriate screening tests were ordered as noted in patient's After Visit Summary    Personalized health advice and appropriate referrals for health education or preventive services given if needed, as noted in patient's After Visit Summary  History of Present Illness:     Patient presents for Medicare Annual Wellness visit    Patient Care Team:  Sana Knight as PCP - General (Internal Medicine)  Angela Rivas DO as PCP - [address] [place] [address] (RTE)      Review of Systems:    Review of Systems   Constitutional: Negative  HENT: Negative  Eyes: Negative  Respiratory: Negative  Cardiovascular: Negative  Gastrointestinal: Negative  Endocrine: Negative  Genitourinary: Negative  Musculoskeletal: Negative  Skin: Negative  Allergic/Immunologic: Negative  Neurological: Negative  Hematological: Negative  Psychiatric/Behavioral: Negative           Problem List:     Patient Active Problem List   Diagnosis    Anxiety and depression    Chronic back pain    Chronic bronchitis (HCC)    Chronic diarrhea    COPD exacerbation ([address] )    DDD (degenerative disc disease), lumbar    Herniation of lumbar intervertebral disc    Gastric ulcer    Gastroesophageal reflux disease without esophagitis    HCAP (healthcare-associated pneumonia)    Hypokalemia    Abnormal CT of the chest    Moderate persistent asthma without complication    Cigarette nicotine dependence without complication    Chronic respiratory failure with hypoxia (HCC)    Severe obstructive sleep apnea    Seasonal allergic rhinitis    Dysphagia    Right lower quadrant abdominal pain    Constipation    Depression, recurrent ([address] )    Adult BMI 37 0-37 9 kg/sq m      Past Medical and Surgical History:     Past Medical History:   Diagnosis Date    Acute on chronic respiratory failure with hypoxia ([address] ) 3/1/2020    Anxiety     Chronic obstructive pulmonary disease with acute lower respiratory infection ([address] ) 10/25/2017    COPD (chronic obstructive pulmonary disease) (HCC)     Depression     Diverticulitis     GERD (gastroesophageal reflux disease)     Influenza A 2/29/2020    Pneumonia of right lower lobe due to influenza A virus 2/29/2020    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[facility] )     Psychiatric disorder     Sepsis due to Haemophilus influenzae with acute hypoxic respiratory failure without septic shock ([facility] ) 2/29/2020    Shortness of breath     Sleep apnea      Past Surgical History:   Procedure Laterality Date    APPENDECTOMY      CHOLECYSTECTOMY      partial x2 per pt    HIATAL HERNIA REPAIR      INGUINAL HERNIA REPAIR Right     LAPAROSCOPIC TUBAL LIGATION Bilateral     ROTATOR CUFF REPAIR Right     UVULOPALATOPHARYNGOPLASTY        Family History:     Family History   Problem Relation Age of Onset    No Known Problems Mother     No Known Problems Father     No Known Problems Sister     No Known Problems Daughter     Breast cancer Maternal Grandmother 28    No Known Problems Paternal Grandmother     No Known Problems Maternal Aunt       Social History:     Social History     Socioeconomic History    Marital status:      Spouse name: None    Number of children: None    Years of education: None    Highest education level: None   Occupational History    None   Tobacco Use    Smoking status: Current Every Day Smoker     Packs/day: 1 00     Types: Cigarettes    Smokeless tobacco: Never Used   Vaping Use    Vaping Use: Every day    Substances: THC, Flavoring   Substance and Sexual Activity    Alcohol use: Yes    Drug use: Yes     Types: Marijuana     Comment: daily in e-pen form    Sexual activity: None   Other Topics Concern    None   Social History Narrative    None     Social Determinants of Health     Financial Resource Strain:     Difficulty of Paying Living Expenses:    Food Insecurity:     Worried About Running Out of Food in the Last Year:     Ran Out of Food in the Last Year:    Transportation Needs:     Lack of Transportation (Medical):      Lack of Transportation (Non-Medical): Physical Activity:     Days of Exercise per Week:     Minutes of Exercise per Session:    Stress:     Feeling of Stress :    Social Connections:     Frequency of Communication with Friends and Family:     Frequency of Social Gatherings with Friends and Family:     Attends Yarsanism Services:     Active Member of Clubs or Organizations:     Attends Club or Organization Meetings:     Marital Status:    Intimate Partner Violence:     Fear of Current or Ex-Partner:     Emotionally Abused:     Physically Abused:     Sexually Abused:       Medications and Allergies:     Current Outpatient Medications   Medication Sig Dispense Refill    albuterol (Ventolin HFA) 90 mcg/act inhaler Inhale 2 puffs every 6 (six) hours as needed for wheezing 18 g 5    atorvastatin (LIPITOR) 20 mg tablet Take 2 tablets (40 mg total) by mouth daily 180 tablet 0    Cholecalciferol (Vitamin D3) 50 MCG (2000 UT) TABS Take 1 tablet (2,000 Units total) by mouth daily 90 tablet 3    colestipol (COLESTID) 1 g tablet Take 1 tablet (1 g total) by mouth 2 (two) times a day 180 tablet 0    cyclobenzaprine (FLEXERIL) 10 mg tablet Take 1 tablet (10 mg total) by mouth daily 90 tablet 0    dicyclomine (BENTYL) 20 mg tablet Take 1 tablet (20 mg total) by mouth 3 (three) times a day as needed (as needed) 270 tablet 1    DULoxetine (CYMBALTA) 30 mg delayed release capsule Take 1 capsule (30 mg total) by mouth daily 90 capsule 1    DULoxetine (CYMBALTA) 60 mg delayed release capsule Take 1 capsule (60 mg total) by mouth daily 90 capsule 1    esomeprazole (NexIUM) 40 MG capsule Take 1 capsule (40 mg total) by mouth daily 90 capsule 1    fluticasone (FLONASE) 50 mcg/act nasal spray 1 spray into each nostril daily 1 Bottle 2    fluticasone-salmeterol (Advair Diskus) 500-50 mcg/dose inhaler Inhale 1 puff 2 (two) times a day 1 Inhaler 5    furosemide (LASIX) 40 mg tablet Take 1 tablet (40 mg total) by mouth daily as needed (As needed for edema) 90 tablet 0    gabapentin (NEURONTIN) 300 mg capsule Take 1 capsule (300 mg total) by mouth 2 (two) times a day 180 capsule 1    ipratropium (ATROVENT) 0 06 % nasal spray 2 sprays into each nostril 4 (four) times a day as needed for rhinitis 15 mL 3    potassium chloride (Klor-Con) 10 mEq tablet Take 1 tablet (10 mEq total) by mouth daily as needed (when pt takes lasix) 90 tablet 0    QUEtiapine (SEROquel) 50 mg tablet 1 tab by mouth in the AM; 2 tabs by mouth at bedtime 270 tablet 1    naproxen sodium (ALEVE) 220 MG tablet Take 1 tablet (220 mg total) by mouth every 12 (twelve) hours as needed for mild pain 120 tablet 0     No current facility-administered medications for this visit  Allergies   Allergen Reactions    Erythromycin Throat Swelling    Morphine Delirium      Immunizations:     Immunization History   Administered Date(s) Administered    INFLUENZA 11/30/2016, 11/14/2018    Influenza, recombinant, quadrivalent,injectable, preservative free 03/03/2020    Pneumococcal Conjugate 13-Valent 08/29/2016    Pneumococcal Polysaccharide PPV23 04/10/2017    SARS-CoV-2 / COVID-19 mRNA IM ([employer]) 04/02/2021, 05/04/2021      Health Maintenance:         Topic Date Due    Breast Cancer Screening: Mammogram  02/11/2022    Colorectal Cancer Screening  09/23/2025    HIV Screening  Completed    Hepatitis C Screening  Completed         Topic Date Due    DTaP,Tdap,and Td Vaccines (1 - Tdap) Never done    Influenza Vaccine (1) 09/01/2021      Medicare Health Risk Assessment:     /78 (BP Location: Left arm, Patient Position: Sitting, Cuff Size: Large)   Pulse 95   Temp 98 8 °F (37 1 °C) (Tympanic)   Ht 5' (1 524 m)   Wt 79 4 kg (175 lb)   SpO2 93%   BMI 34 18 kg/m²      Carlynn Baumgarten is here for her Welcome to Medicare visit  Health Risk Assessment:   Patient rates overall health as good  Patient feels that their physical health rating is slightly better   Patient is satisfied with their life  Richard Pall was rated as same  Hearing was rated as same  Patient feels that their emotional and mental health rating is slightly better  Patients states they are sometimes angry  Patient states they are often unusually tired/fatigued  Pain experienced in the last 7 days has been a lot  Patient's pain rating has been 10/10  Patient states that she has experienced weight loss or gain in last 6 months  Depression Screening:   PHQ-2 Score: 2  PHQ-9 Score: 7      Fall Risk Screening: In the past year, patient has experienced: no history of falling in past year      Urinary Incontinence Screening:   Patient has not leaked urine accidently in the last six months  Home Safety:  Patient has trouble with stairs inside or outside of their home  Patient has working smoke alarms and has working carbon monoxide detector  Home safety hazards include: none  Nutrition:   Current diet is Regular  Medications:   Patient is not currently taking any over-the-counter supplements  Patient is able to manage medications  Activities of Daily Living (ADLs)/Instrumental Activities of Daily Living (IADLs):   Walk and transfer into and out of bed and chair?: Yes  Dress and groom yourself?: Yes    Bathe or shower yourself?: Yes    Feed yourself?  Yes  Do your laundry/housekeeping?: Yes  Manage your money, pay your bills and track your expenses?: Yes  Make your own meals?: Yes    Do your own shopping?: Yes    Previous Hospitalizations:   Any hospitalizations or ED visits within the last 12 months?: No      PREVENTIVE SCREENINGS      Cardiovascular Screening:    General: Screening Not Indicated and History Lipid Disorder      Diabetes Screening:     General: Screening Current      Colorectal Cancer Screening:     General: Screening Current      Breast Cancer Screening:     General: Screening Current      Cervical Cancer Screening:    General: Screening Not Indicated      Lung Cancer Screening:     General: Screening Not Indicated      Hepatitis C Screening:    General: Screening Current    Screening, Brief Intervention, and Referral to Treatment (SBIRT)    Screening      Single Item Drug Screening:  How often have you used an illegal drug (including marijuana) or a prescription medication for non-medical reasons in the past year? never    Single Item Drug Screen Score: 0  Interpretation: Negative screen for possible drug use disorder      Physical Exam:    Physical Exam  Vitals and nursing note reviewed  Constitutional:       General: She is not in acute distress  Appearance: Normal appearance  She is well-developed  She is obese  HENT:      Head: Normocephalic and atraumatic  Eyes:      Conjunctiva/sclera: Conjunctivae normal    Cardiovascular:      Rate and Rhythm: Normal rate and regular rhythm  Heart sounds: No murmur heard  Pulmonary:      Effort: Pulmonary effort is normal  No respiratory distress  Breath sounds: Normal breath sounds  Abdominal:      Palpations: Abdomen is soft  Tenderness: There is no abdominal tenderness  Musculoskeletal:      Cervical back: Normal range of motion and neck supple  Skin:     General: Skin is warm and dry  Capillary Refill: Capillary refill takes less than 2 seconds  Neurological:      General: No focal deficit present  Mental Status: She is alert and oriented to person, place, and time     Psychiatric:         Mood and Affect: Mood normal          Appointment on 08/02/2021   Component Date Value Ref Range Status    Cholesterol 08/02/2021 188  50 - 200 mg/dL Final    Cholesterol:       Desirable         <200 mg/dl       Borderline         200-239 mg/dl       High              >239           Triglycerides 08/02/2021 168* <=150 mg/dL Final    Triglyceride:     Normal          <150 mg/dl     Borderline High 150-199 mg/dl     High            200-499 mg/dl        Very High       >499 mg/dl    Specimen collection should occur prior to N-Acetylcysteine or Metamizole administration due to the potential for falsely depressed results   HDL, Direct 08/02/2021 45  >=40 mg/dL Final    HDL Cholesterol:       Low     <41 mg/dL  Specimen collection should occur prior to Metamizole administration due to the potential for falsley depressed results   LDL Calculated 08/02/2021 109* 0 - 100 mg/dL Final    LDL Cholesterol:     Optimal           <100 mg/dl     Near Optimal      100-129 mg/dl     Above Optimal       Borderline High 130-159 mg/dl       High            160-189 mg/dl       Very High       >189 mg/dl         This screening LDL is a calculated result  It does not have the accuracy of the Direct Measured LDL in the monitoring of patients with hyperlipidemia and/or statin therapy  Direct Measure LDL (BEA813) must be ordered separately in these patients      WBC 08/02/2021 6 85  4 31 - 10 16 Thousand/uL Final    RBC 08/02/2021 4 49  3 81 - 5 12 Million/uL Final    Hemoglobin 08/02/2021 14 1  11 5 - 15 4 g/dL Final    Hematocrit 08/02/2021 42 9  34 8 - 46 1 % Final    MCV 08/02/2021 96  82 - 98 fL Final    MCH 08/02/2021 31 4  26 8 - 34 3 pg Final    MCHC 08/02/2021 32 9  31 4 - 37 4 g/dL Final    RDW 08/02/2021 14 6  11 6 - 15 1 % Final    MPV 08/02/2021 12 2  8 9 - 12 7 fL Final    Platelets 20/47/8951 273  149 - 390 Thousands/uL Final    nRBC 08/02/2021 0  /100 WBCs Final    Neutrophils Relative 08/02/2021 56  43 - 75 % Final    Immat GRANS % 08/02/2021 0  0 - 2 % Final    Lymphocytes Relative 08/02/2021 34  14 - 44 % Final    Monocytes Relative 08/02/2021 7  4 - 12 % Final    Eosinophils Relative 08/02/2021 2  0 - 6 % Final    Basophils Relative 08/02/2021 1  0 - 1 % Final    Neutrophils Absolute 08/02/2021 3 84  1 85 - 7 62 Thousands/µL Final    Immature Grans Absolute 08/02/2021 0 02  0 00 - 0 20 Thousand/uL Final    Lymphocytes Absolute 08/02/2021 2 30  0 60 - 4 47 Thousands/µL Final    Monocytes Absolute 08/02/2021 0 46  0 17 - 1 22 Thousand/µL Final    Eosinophils Absolute 08/02/2021 0 16  0 00 - 0 61 Thousand/µL Final    Basophils Absolute 08/02/2021 0 07  0 00 - 0 10 Thousands/µL Final    Sodium 08/02/2021 141  136 - 145 mmol/L Final    Potassium 08/02/2021 3 7  3 5 - 5 3 mmol/L Final    Chloride 08/02/2021 107  100 - 108 mmol/L Final    CO2 08/02/2021 30  21 - 32 mmol/L Final    ANION GAP 08/02/2021 4  4 - 13 mmol/L Final    BUN 08/02/2021 12  5 - 25 mg/dL Final    Creatinine 08/02/2021 0 50* 0 60 - 1 30 mg/dL Final    Standardized to IDMS reference method    Glucose, Fasting 08/02/2021 108* 65 - 99 mg/dL Final    Specimen collection should occur prior to Sulfasalazine administration due to the potential for falsely depressed results  Specimen collection should occur prior to Sulfapyridine administration due to the potential for falsely elevated results   Calcium 08/02/2021 9 3  8 3 - 10 1 mg/dL Final    AST 08/02/2021 14  5 - 45 U/L Final    Specimen collection should occur prior to Sulfasalazine administration due to the potential for falsely depressed results   ALT 08/02/2021 25  12 - 78 U/L Final    Specimen collection should occur prior to Sulfasalazine and/or Sulfapyridine administration due to the potential for falsely depressed results   Alkaline Phosphatase 08/02/2021 94  46 - 116 U/L Final    Total Protein 08/02/2021 6 9  6 4 - 8 2 g/dL Final    Albumin 08/02/2021 3 7  3 5 - 5 0 g/dL Final    Total Bilirubin 08/02/2021 0 38  0 20 - 1 00 mg/dL Final    Use of this assay is not recommended for patients undergoing treatment with eltrombopag due to the potential for falsely elevated results      eGFR 08/02/2021 102  ml/min/1 73sq m Final    Vit D, 25-Hydroxy 08/02/2021 20 5* 30 0 - 100 0 ng/mL Final    TSH 3RD GENERATON 08/02/2021 2 250  0 358 - 3 740 uIU/mL Final    The recommended reference ranges for TSH during pregnancy are as follows:   First trimester 0 1 to 2 5 uIU/mL   Second trimester  0 2 to 3 0 uIU/mL   Third trimester 0 3 to 3 0 uIU/m    Note: Normal ranges may not apply to patients who are transgender, non-binary, or whose legal sex, sex at birth, and gender identity differ  Transcribe Orders on 08/02/2021   Component Date Value Ref Range Status    Hemoglobin A1C 08/02/2021 5 6  Normal 3 8-5 6%; PreDiabetic 5 7-6 4%;  Diabetic >=6 5%; Glycemic control for adults with diabetes <7 0% % Final    EAG 08/02/2021 114  mg/dl Final     KATIE Christie

## 2021-08-05 NOTE — PATIENT INSTRUCTIONS
Medicare Preventive Visit Patient Instructions  Thank you for completing your Welcome to Medicare Visit or Medicare Annual Wellness Visit today  Your next wellness visit will be due in one year (8/6/2022)  The screening/preventive services that you may require over the next 5-10 years are detailed below  Some tests may not apply to you based off risk factors and/or age  Screening tests ordered at today's visit but not completed yet may show as past due  Also, please note that scanned in results may not display below  Preventive Screenings:  Service Recommendations Previous Testing/Comments   Colorectal Cancer Screening  * Colonoscopy    * Fecal Occult Blood Test (FOBT)/Fecal Immunochemical Test (FIT)  * Fecal DNA/Cologuard Test  * Flexible Sigmoidoscopy Age: 54-65 years old   Colonoscopy: every 10 years (may be performed more frequently if at higher risk)  OR  FOBT/FIT: every 1 year  OR  Cologuard: every 3 years  OR  Sigmoidoscopy: every 5 years  Screening may be recommended earlier than age 48 if at higher risk for colorectal cancer  Also, an individualized decision between you and your healthcare provider will decide whether screening between the ages of 74-80 would be appropriate  Colonoscopy: 09/23/2020  FOBT/FIT: Not on file  Cologuard: Not on file  Sigmoidoscopy: Not on file    Screening Current     Breast Cancer Screening Age: 36 years old  Frequency: every 1-2 years  Not required if history of left and right mastectomy Mammogram: 02/11/2020    Screening Current   Cervical Cancer Screening Between the ages of 21-29, pap smear recommended once every 3 years  Between the ages of 33-67, can perform pap smear with HPV co-testing every 5 years     Recommendations may differ for women with a history of total hysterectomy, cervical cancer, or abnormal pap smears in past  Pap Smear: Not on file    Screening Not Indicated   Hepatitis C Screening Once for adults born between 1945 and 1965  More frequently in patients at high risk for Hepatitis C Hep C Antibody: 02/11/2020    Screening Current   Diabetes Screening 1-2 times per year if you're at risk for diabetes or have pre-diabetes Fasting glucose: 108 mg/dL   A1C: 5 6 %    Screening Current   Cholesterol Screening Once every 5 years if you don't have a lipid disorder  May order more often based on risk factors  Lipid panel: 08/02/2021    Screening Not Indicated  History Lipid Disorder     Other Preventive Screenings Covered by Medicare:  1  Abdominal Aortic Aneurysm (AAA) Screening: covered once if your at risk  You're considered to be at risk if you have a family history of AAA  2  Lung Cancer Screening: covers low dose CT scan once per year if you meet all of the following conditions: (1) Age 50-69; (2) No signs or symptoms of lung cancer; (3) Current smoker or have quit smoking within the last 15 years; (4) You have a tobacco smoking history of at least 30 pack years (packs per day multiplied by number of years you smoked); (5) You get a written order from a healthcare provider  3  Glaucoma Screening: covered annually if you're considered high risk: (1) You have diabetes OR (2) Family history of glaucoma OR (3)  aged 48 and older OR (3)  American aged 72 and older  3  Osteoporosis Screening: covered every 2 years if you meet one of the following conditions: (1) You're estrogen deficient and at risk for osteoporosis based off medical history and other findings; (2) Have a vertebral abnormality; (3) On glucocorticoid therapy for more than 3 months; (4) Have primary hyperparathyroidism; (5) On osteoporosis medications and need to assess response to drug therapy  · Last bone density test (DXA Scan): Not on file  5  HIV Screening: covered annually if you're between the age of 12-76  Also covered annually if you are younger than 13 and older than 72 with risk factors for HIV infection   For pregnant patients, it is covered up to 3 times per pregnancy  Immunizations:  Immunization Recommendations   Influenza Vaccine Annual influenza vaccination during flu season is recommended for all persons aged >= 6 months who do not have contraindications   Pneumococcal Vaccine (Prevnar and Pneumovax)  * Prevnar = PCV13  * Pneumovax = PPSV23   Adults 25-60 years old: 1-3 doses may be recommended based on certain risk factors  Adults 72 years old: Prevnar (PCV13) vaccine recommended followed by Pneumovax (PPSV23) vaccine  If already received PPSV23 since turning 65, then PCV13 recommended at least one year after PPSV23 dose  Hepatitis B Vaccine 3 dose series if at intermediate or high risk (ex: diabetes, end stage renal disease, liver disease)   Tetanus (Td) Vaccine - COST NOT COVERED BY MEDICARE PART B Following completion of primary series, a booster dose should be given every 10 years to maintain immunity against tetanus  Td may also be given as tetanus wound prophylaxis  Tdap Vaccine - COST NOT COVERED BY MEDICARE PART B Recommended at least once for all adults  For pregnant patients, recommended with each pregnancy  Shingles Vaccine (Shingrix) - COST NOT COVERED BY MEDICARE PART B  2 shot series recommended in those aged 48 and above     Health Maintenance Due:      Topic Date Due    Breast Cancer Screening: Mammogram  02/11/2022    Colorectal Cancer Screening  09/23/2025    HIV Screening  Completed    Hepatitis C Screening  Completed     Immunizations Due:      Topic Date Due    DTaP,Tdap,and Td Vaccines (1 - Tdap) Never done    Influenza Vaccine (1) 09/01/2021     Advance Directives   What are advance directives? Advance directives are legal documents that state your wishes and plans for medical care  These plans are made ahead of time in case you lose your ability to make decisions for yourself  Advance directives can apply to any medical decision, such as the treatments you want, and if you want to donate organs     What are the types of advance directives? There are many types of advance directives, and each state has rules about how to use them  You may choose a combination of any of the following:  · Living will: This is a written record of the treatment you want  You can also choose which treatments you do not want, which to limit, and which to stop at a certain time  This includes surgery, medicine, IV fluid, and tube feedings  · Durable power of  for healthcare [facility]): This is a written record that states who you want to make healthcare choices for you when you are unable to make them for yourself  This person, called a proxy, is usually a family member or a friend  You may choose more than 1 proxy  · Do not resuscitate (DNR) order:  A DNR order is used in case your heart stops beating or you stop breathing  It is a request not to have certain forms of treatment, such as CPR  A DNR order may be included in other types of advance directives  · Medical directive: This covers the care that you want if you are in a coma, near death, or unable to make decisions for yourself  You can list the treatments you want for each condition  Treatment may include pain medicine, surgery, blood transfusions, dialysis, IV or tube feedings, and a ventilator (breathing machine)  · Values history: This document has questions about your views, beliefs, and how you feel and think about life  This information can help others choose the care that you would choose  Why are advance directives important? An advance directive helps you control your care  Although spoken wishes may be used, it is better to have your wishes written down  Spoken wishes can be misunderstood, or not followed  Treatments may be given even if you do not want them  An advance directive may make it easier for your family to make difficult choices about your care     Cigarette Smoking and Your Health   Risks to your health if you smoke:  Nicotine and other chemicals found in tobacco damage every cell in your body  Even if you are a light smoker, you have an increased risk for cancer, heart disease, and lung disease  If you are pregnant or have diabetes, smoking increases your risk for complications  Benefits to your health if you stop smoking:   · You decrease respiratory symptoms such as coughing, wheezing, and shortness of breath  · You reduce your risk for cancers of the lung, mouth, throat, kidney, bladder, pancreas, stomach, and cervix  If you already have cancer, you increase the benefits of chemotherapy  You also reduce your risk for cancer returning or a second cancer from developing  · You reduce your risk for heart disease, blood clots, heart attack, and stroke  · You reduce your risk for lung infections, and diseases such as pneumonia, asthma, chronic bronchitis, and emphysema  · Your circulation improves  More oxygen can be delivered to your body  If you have diabetes, you lower your risk for complications, such as kidney, artery, and eye diseases  You also lower your risk for nerve damage  Nerve damage can lead to amputations, poor vision, and blindness  · You improve your body's ability to heal and to fight infections  For more information and support to stop smoking:   · [employer]  Phone: 1- 013 - 530-5668  Web Address: www [facility]  Weight Management   Why it is important to manage your weight:  Being overweight increases your risk of health conditions such as heart disease, high blood pressure, type 2 diabetes, and certain types of cancer  It can also increase your risk for osteoarthritis, sleep apnea, and other respiratory problems  Aim for a slow, steady weight loss  Even a small amount of weight loss can lower your risk of health problems  How to lose weight safely:  A safe and healthy way to lose weight is to eat fewer calories and get regular exercise   You can lose up about 1 pound a week by decreasing the number of calories you eat by 500 calories each day  Healthy meal plan for weight management:  A healthy meal plan includes a variety of foods, contains fewer calories, and helps you stay healthy  A healthy meal plan includes the following:  · Eat whole-grain foods more often  A healthy meal plan should contain fiber  Fiber is the part of grains, fruits, and vegetables that is not broken down by your body  Whole-grain foods are healthy and provide extra fiber in your diet  Some examples of whole-grain foods are whole-wheat breads and pastas, oatmeal, brown rice, and bulgur  · Eat a variety of vegetables every day  Include dark, leafy greens such as spinach, kale, alfredo greens, and mustard greens  Eat yellow and orange vegetables such as carrots, sweet potatoes, and winter squash  · Eat a variety of fruits every day  Choose fresh or canned fruit (canned in its own juice or light syrup) instead of juice  Fruit juice has very little or no fiber  · Eat low-fat dairy foods  Drink fat-free (skim) milk or 1% milk  Eat fat-free yogurt and low-fat cottage cheese  Try low-fat cheeses such as mozzarella and other reduced-fat cheeses  · Choose meat and other protein foods that are low in fat  Choose beans or other legumes such as split peas or lentils  Choose fish, skinless poultry (chicken or turkey), or lean cuts of red meat (beef or pork)  Before you cook meat or poultry, cut off any visible fat  · Use less fat and oil  Try baking foods instead of frying them  Add less fat, such as margarine, sour cream, regular salad dressing and mayonnaise to foods  Eat fewer high-fat foods  Some examples of high-fat foods include french fries, doughnuts, ice cream, and cakes  · Eat fewer sweets  Limit foods and drinks that are high in sugar  This includes candy, cookies, regular soda, and sweetened drinks  Exercise:  Exercise at least 30 minutes per day on most days of the week  Some examples of exercise include walking, biking, dancing, and swimming  You can also fit in more physical activity by taking the stairs instead of the elevator or parking farther away from stores  Ask your healthcare provider about the best exercise plan for you  © Copyright [employer] 2018 Information is for End User's use only and may not be sold, redistributed or otherwise used for commercial purposes   All illustrations and images included in CareNotes® are the copyrighted property of [employer]  or [address] [place] [address] [employer]

## 2021-09-20 ENCOUNTER — IMMUNIZATIONS (OUTPATIENT)
Dept: FAMILY MEDICINE CLINIC | Facility: CLINIC | Age: 66
End: 2021-09-20
Payer: COMMERCIAL

## 2021-09-20 DIAGNOSIS — Z23 ENCOUNTER FOR IMMUNIZATION: Primary | ICD-10-CM

## 2021-09-20 PROCEDURE — G0008 ADMIN INFLUENZA VIRUS VAC: HCPCS

## 2021-09-20 PROCEDURE — 90662 IIV NO PRSV INCREASED AG IM: CPT

## 2021-11-08 DIAGNOSIS — K21.9 GASTROESOPHAGEAL REFLUX DISEASE: ICD-10-CM

## 2021-11-08 DIAGNOSIS — M62.838 MUSCLE SPASM: ICD-10-CM

## 2021-11-08 DIAGNOSIS — F32.A DEPRESSION, UNSPECIFIED DEPRESSION TYPE: ICD-10-CM

## 2021-11-08 DIAGNOSIS — R10.9 ABDOMINAL CRAMPING: ICD-10-CM

## 2021-11-08 DIAGNOSIS — G47.00 INSOMNIA, UNSPECIFIED TYPE: ICD-10-CM

## 2021-11-08 DIAGNOSIS — G89.29 OTHER CHRONIC PAIN: ICD-10-CM

## 2021-11-08 RX ORDER — DULOXETIN HYDROCHLORIDE 60 MG/1
60 CAPSULE, DELAYED RELEASE ORAL DAILY
Qty: 90 CAPSULE | Refills: 1 | Status: SHIPPED | OUTPATIENT
Start: 2021-11-08 | End: 2022-02-10 | Stop reason: SDUPTHER

## 2021-11-08 RX ORDER — MONTELUKAST SODIUM 4 MG/1
1 TABLET, CHEWABLE ORAL 2 TIMES DAILY
Qty: 180 TABLET | Refills: 0 | Status: SHIPPED | OUTPATIENT
Start: 2021-11-08 | End: 2022-05-09 | Stop reason: SDUPTHER

## 2021-11-08 RX ORDER — ESOMEPRAZOLE MAGNESIUM 40 MG/1
40 CAPSULE, DELAYED RELEASE ORAL DAILY
Qty: 90 CAPSULE | Refills: 1 | Status: SHIPPED | OUTPATIENT
Start: 2021-11-08 | End: 2022-05-09 | Stop reason: SDUPTHER

## 2021-11-08 RX ORDER — QUETIAPINE FUMARATE 50 MG/1
TABLET, FILM COATED ORAL
Qty: 270 TABLET | Refills: 1 | Status: SHIPPED | OUTPATIENT
Start: 2021-11-08 | End: 2022-05-10

## 2021-11-08 RX ORDER — DULOXETIN HYDROCHLORIDE 30 MG/1
30 CAPSULE, DELAYED RELEASE ORAL DAILY
Qty: 90 CAPSULE | Refills: 1 | Status: SHIPPED | OUTPATIENT
Start: 2021-11-08 | End: 2022-02-10 | Stop reason: SDUPTHER

## 2021-11-08 RX ORDER — GABAPENTIN 300 MG/1
300 CAPSULE ORAL 2 TIMES DAILY
Qty: 180 CAPSULE | Refills: 1 | Status: SHIPPED | OUTPATIENT
Start: 2021-11-08 | End: 2022-05-19 | Stop reason: SDUPTHER

## 2021-11-08 RX ORDER — CYCLOBENZAPRINE HCL 10 MG
10 TABLET ORAL DAILY
Qty: 90 TABLET | Refills: 0 | Status: SHIPPED | OUTPATIENT
Start: 2021-11-08 | End: 2022-02-10 | Stop reason: SDUPTHER

## 2021-12-09 DIAGNOSIS — E78.2 MIXED HYPERLIPIDEMIA: ICD-10-CM

## 2021-12-09 RX ORDER — ATORVASTATIN CALCIUM 40 MG/1
TABLET, FILM COATED ORAL
Qty: 90 TABLET | Refills: 0 | Status: SHIPPED | OUTPATIENT
Start: 2021-12-09 | End: 2022-03-10 | Stop reason: SDUPTHER

## 2022-02-10 ENCOUNTER — OFFICE VISIT (OUTPATIENT)
Dept: FAMILY MEDICINE CLINIC | Facility: CLINIC | Age: 67
End: 2022-02-10
Payer: COMMERCIAL

## 2022-02-10 VITALS
TEMPERATURE: 98.7 F | HEART RATE: 85 BPM | HEIGHT: 60 IN | SYSTOLIC BLOOD PRESSURE: 162 MMHG | OXYGEN SATURATION: 95 % | DIASTOLIC BLOOD PRESSURE: 100 MMHG | BODY MASS INDEX: 34.95 KG/M2 | WEIGHT: 178 LBS

## 2022-02-10 DIAGNOSIS — Z13.1 SCREENING FOR DIABETES MELLITUS: ICD-10-CM

## 2022-02-10 DIAGNOSIS — F33.9 DEPRESSION, RECURRENT (HCC): ICD-10-CM

## 2022-02-10 DIAGNOSIS — J96.11 CHRONIC RESPIRATORY FAILURE WITH HYPOXIA (HCC): ICD-10-CM

## 2022-02-10 DIAGNOSIS — Z13.0 SCREENING FOR DEFICIENCY ANEMIA: ICD-10-CM

## 2022-02-10 DIAGNOSIS — Z12.31 ENCOUNTER FOR SCREENING MAMMOGRAM FOR BREAST CANCER: ICD-10-CM

## 2022-02-10 DIAGNOSIS — E87.6 HYPOKALEMIA: ICD-10-CM

## 2022-02-10 DIAGNOSIS — F32.A DEPRESSION, UNSPECIFIED DEPRESSION TYPE: ICD-10-CM

## 2022-02-10 DIAGNOSIS — J44.9 CHRONIC OBSTRUCTIVE PULMONARY DISEASE, UNSPECIFIED COPD TYPE (HCC): ICD-10-CM

## 2022-02-10 DIAGNOSIS — E55.9 VITAMIN D DEFICIENCY: ICD-10-CM

## 2022-02-10 DIAGNOSIS — J45.40 MODERATE PERSISTENT ASTHMA WITHOUT COMPLICATION: ICD-10-CM

## 2022-02-10 DIAGNOSIS — I10 PRIMARY HYPERTENSION: ICD-10-CM

## 2022-02-10 DIAGNOSIS — G47.00 INSOMNIA, UNSPECIFIED TYPE: ICD-10-CM

## 2022-02-10 DIAGNOSIS — J30.2 SEASONAL ALLERGIC RHINITIS, UNSPECIFIED TRIGGER: ICD-10-CM

## 2022-02-10 DIAGNOSIS — G89.29 OTHER CHRONIC PAIN: ICD-10-CM

## 2022-02-10 DIAGNOSIS — Z13.820 SCREENING FOR OSTEOPOROSIS: Primary | ICD-10-CM

## 2022-02-10 DIAGNOSIS — M62.838 MUSCLE SPASM: ICD-10-CM

## 2022-02-10 DIAGNOSIS — E66.01 OBESITY, MORBID (HCC): ICD-10-CM

## 2022-02-10 DIAGNOSIS — Z13.29 SCREENING FOR THYROID DISORDER: ICD-10-CM

## 2022-02-10 DIAGNOSIS — E78.2 MIXED HYPERLIPIDEMIA: ICD-10-CM

## 2022-02-10 PROCEDURE — 4004F PT TOBACCO SCREEN RCVD TLK: CPT | Performed by: NURSE PRACTITIONER

## 2022-02-10 PROCEDURE — 99215 OFFICE O/P EST HI 40 MIN: CPT | Performed by: NURSE PRACTITIONER

## 2022-02-10 PROCEDURE — 1160F RVW MEDS BY RX/DR IN RCRD: CPT | Performed by: NURSE PRACTITIONER

## 2022-02-10 PROCEDURE — 3008F BODY MASS INDEX DOCD: CPT | Performed by: NURSE PRACTITIONER

## 2022-02-10 RX ORDER — IRBESARTAN 75 MG/1
75 TABLET ORAL
Qty: 30 TABLET | Refills: 1 | Status: SHIPPED | OUTPATIENT
Start: 2022-02-10 | End: 2022-03-10 | Stop reason: SDUPTHER

## 2022-02-10 RX ORDER — DULOXETIN HYDROCHLORIDE 60 MG/1
60 CAPSULE, DELAYED RELEASE ORAL DAILY
Qty: 90 CAPSULE | Refills: 1 | Status: SHIPPED | OUTPATIENT
Start: 2022-02-10 | End: 2022-08-10 | Stop reason: SDUPTHER

## 2022-02-10 RX ORDER — FLUTICASONE PROPIONATE 50 MCG
1 SPRAY, SUSPENSION (ML) NASAL DAILY
Qty: 16 G | Refills: 5 | Status: SHIPPED | OUTPATIENT
Start: 2022-02-10

## 2022-02-10 RX ORDER — DULOXETIN HYDROCHLORIDE 30 MG/1
30 CAPSULE, DELAYED RELEASE ORAL DAILY
Qty: 90 CAPSULE | Refills: 1 | Status: SHIPPED | OUTPATIENT
Start: 2022-02-10 | End: 2022-08-10 | Stop reason: SDUPTHER

## 2022-02-10 RX ORDER — IPRATROPIUM BROMIDE 42 UG/1
2 SPRAY, METERED NASAL 4 TIMES DAILY PRN
Qty: 15 ML | Refills: 3 | Status: CANCELLED | OUTPATIENT
Start: 2022-02-10

## 2022-02-10 RX ORDER — CHOLECALCIFEROL (VITAMIN D3) 125 MCG
2000 CAPSULE ORAL DAILY
Qty: 90 TABLET | Refills: 3 | Status: SHIPPED | OUTPATIENT
Start: 2022-02-10

## 2022-02-10 RX ORDER — CYCLOBENZAPRINE HCL 10 MG
10 TABLET ORAL DAILY
Qty: 90 TABLET | Refills: 0 | Status: SHIPPED | OUTPATIENT
Start: 2022-02-10 | End: 2022-05-09 | Stop reason: SDUPTHER

## 2022-02-10 NOTE — PROGRESS NOTES
Assessment/Plan:    Problem List Items Addressed This Visit     Hypokalemia    Relevant Orders    Comprehensive metabolic panel    Moderate persistent asthma without complication    Relevant Medications    fluticasone-salmeterol (Advair Diskus) 500-50 mcg/dose inhaler    Chronic respiratory failure with hypoxia (HCC)    Seasonal allergic rhinitis    Relevant Medications    fluticasone (FLONASE) 50 mcg/act nasal spray    Depression, recurrent (HCC)    Relevant Medications    DULoxetine (CYMBALTA) 30 mg delayed release capsule    DULoxetine (CYMBALTA) 60 mg delayed release capsule    Adult BMI 37 0-37 9 kg/sq m    Obesity, morbid ([address] )    Primary hypertension    Relevant Medications    irbesartan (AVAPRO) 75 mg tablet      Other Visit Diagnoses     Screening for osteoporosis    -  Primary    Relevant Orders    DXA bone density spine hip and pelvis    Mixed hyperlipidemia        Relevant Orders    Lipid Panel with Direct LDL reflex    Screening for deficiency anemia        Relevant Orders    CBC and differential    Screening for diabetes mellitus        Relevant Orders    Comprehensive metabolic panel    Screening for thyroid disorder        Relevant Orders    TSH, 3rd generation with Free T4 reflex    Encounter for screening mammogram for breast cancer        Relevant Orders    Mammo screening bilateral w 3d & cad    Depression, unspecified depression type        Relevant Medications    DULoxetine (CYMBALTA) 30 mg delayed release capsule    DULoxetine (CYMBALTA) 60 mg delayed release capsule    Vitamin D deficiency        Relevant Medications    Cholecalciferol (Vitamin D3) 50 MCG (2000 UT) TABS    Chronic obstructive pulmonary disease, unspecified COPD type (HCC)        Relevant Medications    fluticasone (FLONASE) 50 mcg/act nasal spray    fluticasone-salmeterol (Advair Diskus) 500-50 mcg/dose inhaler    Muscle spasm        Relevant Medications    cyclobenzaprine (FLEXERIL) 10 mg tablet    Other chronic pain Insomnia, unspecified type               Diagnoses and all orders for this visit:    Screening for osteoporosis  -     DXA bone density spine hip and pelvis; Future    Hypokalemia  -     Comprehensive metabolic panel; Future    Adult BMI 37 0-37 9 kg/sq m    Mixed hyperlipidemia  -     Lipid Panel with Direct LDL reflex; Future    Screening for deficiency anemia  -     CBC and differential; Future    Screening for diabetes mellitus  -     Comprehensive metabolic panel; Future    Screening for thyroid disorder  -     TSH, 3rd generation with Free T4 reflex; Future    Encounter for screening mammogram for breast cancer  -     Mammo screening bilateral w 3d & cad; Future    Moderate persistent asthma without complication    Depression, unspecified depression type  -     DULoxetine (CYMBALTA) 30 mg delayed release capsule; Take 1 capsule (30 mg total) by mouth daily  -     DULoxetine (CYMBALTA) 60 mg delayed release capsule; Take 1 capsule (60 mg total) by mouth daily    Vitamin D deficiency  -     Cholecalciferol (Vitamin D3) 50 MCG (2000 UT) TABS; Take 1 tablet (2,000 Units total) by mouth daily    Seasonal allergic rhinitis, unspecified trigger  -     fluticasone (FLONASE) 50 mcg/act nasal spray; 1 spray into each nostril daily    Chronic obstructive pulmonary disease, unspecified COPD type (HCC)  -     fluticasone-salmeterol (Advair Diskus) 500-50 mcg/dose inhaler; Inhale 1 puff 2 (two) times a day    Muscle spasm  -     cyclobenzaprine (FLEXERIL) 10 mg tablet; Take 1 tablet (10 mg total) by mouth daily    Other chronic pain    Insomnia, unspecified type    Obesity, morbid (HCC)    Depression, recurrent (HCC)    Chronic respiratory failure with hypoxia (HCC)    Primary hypertension  -     irbesartan (AVAPRO) 75 mg tablet; Take 1 tablet (75 mg total) by mouth daily at bedtime        No problem-specific Assessment & Plan notes found for this encounter          Subjective:      Patient ID: Melissa Reddy is a 73 y o  female  Pt with PMHx of obesity, moderate asthma, GERD, DDD and herniation of lumbar spine, COPD and chronic bronchitis 2/2 cigarette smoking, and anxiety and depression present for a 6 month routine F/U  Depression  This is a chronic problem  Associated symptoms include headaches  Pertinent negatives include no chest pain or neck pain  Treatments tried: seroquel, cymbalta  The treatment provided moderate relief  Anxiety  Presents for follow-up visit  Symptoms include insomnia, muscle tension and nervous/anxious behavior  Patient reports no chest pain, palpitations or shortness of breath  The quality of sleep is fair  Her past medical history is significant for asthma  Breathing Problem  She complains of difficulty breathing  There is no shortness of breath  This is a chronic problem  Associated symptoms include headaches  Pertinent negatives include no chest pain or PND  Her symptoms are aggravated by exposure to fumes, exposure to smoke, change in weather and strenuous activity  Her symptoms are alleviated by steroid inhaler, ipratropium, beta-agonist and rest  She reports significant improvement on treatment  Risk factors for lung disease include smoking/tobacco exposure  Her past medical history is significant for asthma and COPD  Heartburn  She reports no chest pain  This is a chronic problem  The heartburn is located in the substernum  The heartburn does not wake her from sleep  The heartburn does not limit her activity  The heartburn doesn't change with position  The symptoms are aggravated by certain foods  Risk factors include obesity and lack of exercise  She has tried a PPI for the symptoms  The treatment provided significant relief  Hypertension  This is a new problem  The problem is uncontrolled  Associated symptoms include anxiety and headaches   Pertinent negatives include no blurred vision, chest pain, neck pain, orthopnea, palpitations, peripheral edema, PND or shortness of breath  There are no associated agents to hypertension  Risk factors for coronary artery disease include obesity, post-menopausal state, sedentary lifestyle and smoking/tobacco exposure  Past treatments include angiotensin blockers (starting today)  Compliance problems include exercise and diet  The following portions of the patient's history were reviewed and updated as appropriate:   She has a past medical history of Acute on chronic respiratory failure with hypoxia ([facility] ) (3/1/2020), Anxiety, Chronic obstructive pulmonary disease with acute lower respiratory infection ([facility] ) (10/25/2017), COPD (chronic obstructive pulmonary disease) ([place] [address] ), Depression, Diverticulitis, GERD (gastroesophageal reflux disease), Influenza A (2/29/2020), Pneumonia of right lower lobe due to influenza A virus (2/29/2020), 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[address] ), Primary hypertension (2/10/2022), Psychiatric disorder, Sepsis due to Haemophilus influenzae with acute hypoxic respiratory failure without septic shock ([facility] ) (2/29/2020), Shortness of breath, and Sleep apnea  ,  does not have any pertinent problems on file  ,   has a past surgical history that includes Appendectomy; Laparoscopic tubal ligation (Bilateral); Inguinal hernia repair (Right); Hiatal hernia repair; Uvulopalatopharyngoplasty; Rotator cuff repair (Right); and Cholecystectomy  ,  family history includes Breast cancer (age of onset: 28) in her maternal grandmother; No Known Problems in her daughter, father, maternal aunt, mother, paternal grandmother, and sister  ,   reports that she has been smoking cigarettes  She has been smoking about 1 00 pack per day  She has never used smokeless tobacco  She reports current alcohol use  She reports current drug use  Drug: Marijuana  ,  is allergic to erythromycin and morphine     Current Outpatient Medications   Medication Sig Dispense Refill    albuterol (Ventolin HFA) 90 mcg/act inhaler Inhale 2 puffs every 6 (six) hours as needed for wheezing 18 g 5    atorvastatin (LIPITOR) 40 mg tablet TAKE 1 TABLET DAILY  90 tablet 0    Cholecalciferol (Vitamin D3) 50 MCG (2000 UT) TABS Take 1 tablet (2,000 Units total) by mouth daily 90 tablet 3    colestipol (COLESTID) 1 g tablet Take 1 tablet (1 g total) by mouth 2 (two) times a day 180 tablet 0    cyclobenzaprine (FLEXERIL) 10 mg tablet Take 1 tablet (10 mg total) by mouth daily 90 tablet 0    dicyclomine (BENTYL) 20 mg tablet Take 1 tablet (20 mg total) by mouth 3 (three) times a day as needed (as needed) 270 tablet 1    DULoxetine (CYMBALTA) 30 mg delayed release capsule Take 1 capsule (30 mg total) by mouth daily 90 capsule 1    DULoxetine (CYMBALTA) 60 mg delayed release capsule Take 1 capsule (60 mg total) by mouth daily 90 capsule 1    esomeprazole (NexIUM) 40 MG capsule Take 1 capsule (40 mg total) by mouth daily 90 capsule 1    fluticasone (FLONASE) 50 mcg/act nasal spray 1 spray into each nostril daily 16 g 5    fluticasone-salmeterol (Advair Diskus) 500-50 mcg/dose inhaler Inhale 1 puff 2 (two) times a day 60 blister 5    furosemide (LASIX) 40 mg tablet Take 1 tablet (40 mg total) by mouth daily as needed (As needed for edema) 90 tablet 0    gabapentin (NEURONTIN) 300 mg capsule Take 1 capsule (300 mg total) by mouth 2 (two) times a day 180 capsule 1    potassium chloride (Klor-Con) 10 mEq tablet Take 1 tablet (10 mEq total) by mouth daily as needed (when pt takes lasix) 90 tablet 0    QUEtiapine (SEROquel) 50 mg tablet 1 tab by mouth in the AM; 2 tabs by mouth at bedtime 270 tablet 1    irbesartan (AVAPRO) 75 mg tablet Take 1 tablet (75 mg total) by mouth daily at bedtime 30 tablet 1    naproxen sodium (ALEVE) 220 MG tablet Take 1 tablet (220 mg total) by mouth every 12 (twelve) hours as needed for mild pain 120 tablet 0     No current facility-administered medications for this visit  BMI Counseling: There is no height or weight on file to calculate BMI   The BMI is above normal  Nutrition recommendations include decreasing portion sizes, consuming healthier snacks, limiting drinks that contain sugar, moderation in carbohydrate intake and reducing intake of cholesterol  Exercise recommendations include exercising 3-5 times per week  No pharmacotherapy was ordered  Rationale for BMI follow-up plan is due to patient being overweight or obese  Tobacco Cessation Counseling: Tobacco cessation counseling was provided  The patient is sincerely urged to quit consumption of tobacco  She is not ready to quit tobacco          Review of Systems   HENT: Negative  Eyes: Negative  Negative for blurred vision  Respiratory: Negative  Negative for shortness of breath  Cardiovascular: Negative  Negative for chest pain, palpitations, orthopnea and PND  Gastrointestinal: Negative  Endocrine: Negative  Musculoskeletal: Negative  Negative for neck pain  Skin: Negative  Allergic/Immunologic: Negative  Neurological: Positive for headaches  Hematological: Negative  Psychiatric/Behavioral: Positive for depression  The patient is nervous/anxious and has insomnia  Objective:  Vitals:    02/10/22 1111   BP: 162/100   BP Location: Left arm   Patient Position: Sitting   Cuff Size: Standard   Pulse: 85   Temp: 98 7 °F (37 1 °C)   TempSrc: Tympanic   SpO2: 95%   Weight: 80 7 kg (178 lb)   Height: 5' (1 524 m)     Body mass index is 34 76 kg/m²  Physical Exam  Vitals and nursing note reviewed  Constitutional:       Appearance: Normal appearance  She is well-developed  She is obese  Interventions: Face mask in place  HENT:      Head: Normocephalic and atraumatic  Right Ear: Tympanic membrane, ear canal and external ear normal       Left Ear: Tympanic membrane, ear canal and external ear normal       Nose: Nose normal       Mouth/Throat:      Mouth: Mucous membranes are moist       Pharynx: Uvula midline     Eyes:      General: Lids are normal  Conjunctiva/sclera: Conjunctivae normal       Pupils: Pupils are equal, round, and reactive to light  Neck:      Thyroid: No thyroid mass or thyromegaly  Vascular: No JVD  Trachea: Trachea and phonation normal    Cardiovascular:      Rate and Rhythm: Normal rate and regular rhythm  Pulses: Normal pulses  Heart sounds: Normal heart sounds, S1 normal and S2 normal  No murmur heard  No friction rub  No gallop  Pulmonary:      Effort: Pulmonary effort is normal       Breath sounds: Normal breath sounds  Abdominal:      General: Bowel sounds are normal       Palpations: Abdomen is soft  Tenderness: There is no abdominal tenderness  Genitourinary:     Comments: Deferred   Musculoskeletal:         General: Normal range of motion  Cervical back: Full passive range of motion without pain, normal range of motion and neck supple  Right lower leg: No edema  Left lower leg: No edema  Lymphadenopathy:      Head:      Right side of head: No submental, submandibular, tonsillar, preauricular, posterior auricular or occipital adenopathy  Left side of head: No submental, submandibular, tonsillar, preauricular, posterior auricular or occipital adenopathy  Cervical: No cervical adenopathy  Skin:     General: Skin is warm and dry  Capillary Refill: Capillary refill takes less than 2 seconds  Neurological:      General: No focal deficit present  Mental Status: She is alert and oriented to person, place, and time  Cranial Nerves: Cranial nerves are intact  No cranial nerve deficit  Sensory: Sensation is intact  Motor: Motor function is intact  Coordination: Coordination is intact  Gait: Gait is intact  Deep Tendon Reflexes: Reflexes are normal and symmetric     Psychiatric:         Attention and Perception: Attention and perception normal          Mood and Affect: Mood and affect normal          Speech: Speech normal          Behavior: Behavior normal  Behavior is cooperative  Thought Content:  Thought content normal          Cognition and Memory: Cognition normal          Judgment: Judgment normal

## 2022-02-10 NOTE — PATIENT INSTRUCTIONS
Blood Pressure Diary    Check blood pressures at home and keep a log  Bring log to your follow up or call if the average home BP is greater than 878 systolic and or 90 diastolic before your follow up  Jay Gillis   Tues  Wed Thurs Fri Sat  COMMENTS                                                                            Keep a log of your blood pressure readings at home  Please take blood pressure at same time of day  Please record date and time blood pressure was taken  Make sure to take your blood pressure when you are seated and resting for about 3 minutes  Please call office if Blood Pressure is <110/<60  You can send these to me via [employer] or by calling the office  This will help me manage your blood pressure better  Hypertension   AMBULATORY CARE:   Hypertension  is high blood pressure (BP)  Your BP is the force of your blood moving against the walls of your arteries  Normal BP is less than 120/80  Prehypertension is between 120/80 and 139/89  Hypertension is 140/90 or higher  Hypertension causes your BP to get so high that your heart has to work much harder than normal  This can damage your heart  You can control hypertension with a healthy lifestyle or medicines  A controlled blood pressure helps protect your organs, such as your heart, lungs, brain, and kidneys  Common symptoms include the following:   · Headache     · Blurred vision     · Chest pain     · Dizziness or weakness     · Trouble breathing    · Nosebleeds  Call 911 for any of the following:   · You have discomfort in your chest that feels like squeezing, pressure, fullness, or pain  · You become confused or have difficulty speaking  · You suddenly feel lightheaded or have trouble breathing  · You have pain or discomfort in your back, neck, jaw, stomach, or arm  Seek care immediately if:   · You have a severe headache or vision loss  · You have weakness in an arm or leg    Contact your healthcare provider if: · You feel faint, dizzy, confused, or drowsy  · You have been taking your BP medicine and your BP is still higher than your healthcare provider says it should be  · You have questions or concerns about your condition or care  Treatment for hypertension  may include medicine to lower your BP and lower your cholesterol level  A low cholesterol level helps prevent heart disease and makes it easier to control your blood pressure  You may also need to make lifestyle changes  Take your medicine exactly as directed  Manage hypertension:  Talk with your healthcare provider about these and other ways to manage hypertension:  · Check your BP at home  Sit and rest for 5 minutes before you take your BP  Extend your arm and support it on a flat surface  Your arm should be at the same level as your heart  Follow the directions that came with your BP monitor  If possible, take at least 2 BP readings each time  Take your BP at least twice a day at the same times each day, such as morning and evening  Keep a record of your BP readings and bring it to your follow-up visits  Ask your healthcare provider what your BP should be  · Limit sodium (salt) as directed  Too much sodium can affect your fluid balance  Check labels to find low-sodium or no-salt-added foods  Some low-sodium foods use potassium salts for flavor  Too much potassium can also cause health problems  Your healthcare provider will tell you how much sodium and potassium are safe for you to have in a day  He or she may recommend that you limit sodium to 2,300 mg a day  · Follow the meal plan recommended by your healthcare provider  A dietitian or your provider can give you more information on low-sodium plans or the DASH (Dietary Approaches to Stop Hypertension) eating plan  The DASH plan is low in sodium, unhealthy fats, and total fat  It is high in potassium, calcium, and fiber  · Exercise to maintain a healthy weight  Exercise at least 30 minutes per day, on most days of the week  This will help decrease your blood pressure  Ask your healthcare provider about the best exercise plan for you  · Decrease stress  This may help lower your BP  Learn ways to relax, such as deep breathing or listening to music  · Limit alcohol  Women should limit alcohol to 1 drink a day  Men should limit alcohol to 2 drinks a day  A drink of alcohol is 12 ounces of beer, 5 ounces of wine, or 1½ ounces of liquor  · Do not smoke  Nicotine and other chemicals in cigarettes and cigars can increase your BP and also cause lung damage  Ask your healthcare provider for information if you currently smoke and need help to quit  E-cigarettes or smokeless tobacco still contain nicotine  Talk to your healthcare provider before you use these products  · Manage any other health conditions you have  Health conditions such as diabetes can increase your risk for hypertension  Follow your healthcare provider's instructions and take all your medicines as directed  Follow up with your healthcare provider as directed: You will need to return to have your BP checked and to have other lab tests done  Write down your questions so you remember to ask them during your visits  © 2017 [address]  Information is for End User's use only and may not be sold, redistributed or otherwise used for commercial purposes  All illustrations and images included in CareNotes® are the copyrighted property of [employer] M [employer] Inc  or Garry Geller  The above information is an  only  It is not intended as medical advice for individual conditions or treatments  Talk to your doctor, nurse or pharmacist before following any medical regimen to see if it is safe and effective for you  DASH Eating Plan   AMBULATORY CARE:   The DASH (Dietary Approaches to Stop Hypertension) Eating Plan  is designed to help prevent or lower high blood pressure   It can also help to lower LDL (bad) cholesterol and decrease your risk for heart disease  The plan is low in sodium, sugar, unhealthy fats, and total fat  It is high in potassium, calcium, magnesium, and fiber  These nutrients are added when you eat more fruits, vegetables, and whole grains  Your sodium limit each day: Your dietitian will tell you how much sodium is safe for you to have each day  People with high blood pressure should have no more than 1,500 to 2,300 mg of sodium in a day  A teaspoon (tsp) of salt has 2,300 mg of sodium  This may seem like a difficult goal, but small changes to the foods you eat can make a big difference  Your healthcare provider or dietitian can help you create a meal plan that follows your sodium limit  How to limit sodium:   · Read food labels  Food labels can help you choose foods that are low in sodium  The amount of sodium is listed in milligrams (mg)  The % Daily Value (DV) column tells you how much of your daily needs are met by 1 serving of the food for each nutrient listed  Choose foods that have less than 5% of the DV of sodium  These foods are considered low in sodium  Foods that have 20% or more of the DV of sodium are considered high in sodium  Avoid foods that have more than 300 mg of sodium in each serving  Choose foods that say low-sodium, reduced-sodium, or no salt added on the food label  · Avoid salt  Do not salt food at the table, and add very little salt to foods during cooking  Use herbs and spices, such as onions, garlic, and salt-free seasonings to add flavor to foods  Try lemon or lime juice or vinegar to give foods a tart flavor  Use hot peppers or a small amount of hot pepper sauce to add a spicy flavor to foods  · Ask about salt substitutes  Ask your healthcare provider if you may use salt substitutes  Some salt substitutes have ingredients that can be harmful if you have certain health conditions      · Choose foods carefully at restaurants  Meals from restaurants, especially fast food restaurants, are often high in sodium  Some restaurants have nutrition information that tells you the amount of sodium in their foods  Ask to have your food prepared with less, or no salt  What you need to know about fats:   · Include healthy fats  Examples are unsaturated fats and omega-3 fatty acids  Unsaturated fats are found in soybean, canola, olive, or sunflower oil, and liquid and soft tub margarines  Omega-3 fatty acids are found in fatty fish, such as salmon, tuna, mackerel, and sardines  It is also found in flaxseed oil and ground flaxseed  · Avoid unhealthy fats  Do not eat unhealthy fats, such as saturated fats and trans fats  Saturated fats are found in foods that contain fat from animals  Examples are fatty meats, whole milk, butter, cream, and other dairy foods  It is also found in shortening, stick margarine, palm oil, and coconut oil  Trans fats are found in fried foods, crackers, chips, and baked goods made with margarine or shortening  Foods to include: With the DASH eating plan, you need to eat a certain number of servings from each food group  This will help you get enough of certain nutrients and limit others  The amount of servings you should eat depends on how many calories you need  Your dietitian can tell you how many calories you need  The number of servings listed next to the food groups below are for people who need about 2,000 calories each day    · Grains:  6 to 8 servings (3 of these servings should be whole-grain foods)    ¨ 1 slice of whole-grain bread     ¨ 1 ounce of dry cereal    ¨ ½ cup of cooked cereal, pasta, or brown rice    · Vegetables and fruits:  4 to 5 servings of fruits and 4 to 5 servings of vegetables    ¨ 1 medium fruit    ¨ ½ cup of frozen, canned (no added salt), or chopped fresh vegetables     ¨ ½ cup of fresh, frozen, dried, or canned fruit (canned in light syrup or fruit juice)    ¨ ½ cup of vegetable or fruit juice    · Dairy:  2 to 3 servings    ¨ 1 cup of nonfat (skim) or 1% milk    ¨ 1½ ounces of fat-free or low-fat cheese    ¨ 6 ounces of nonfat or low-fat yogurt    · Lean meat, poultry, and fish:  6 ounces or less    [employer] (chicken, turkey) with no skin    ¨ Fish (especially fatty fish, such as salmon, fresh tuna, or mackerel)    ¨ Lean beef and pork (loin, round, extra lean hamburger)    ¨ Egg whites and egg substitutes    · Nuts, seeds, and legumes:  4 to 5 servings each week    ¨ ½ cup of cooked beans and peas    ¨ 1½ ounces of unsalted nuts    ¨ 2 tablespoons of peanut butter or seeds    · Sweets and added sugars:  5 or less each week    ¨ 1 tablespoon of sugar, jelly, or jam    ¨ ½ cup of sorbet or gelatin    ¨ 1 cup of lemonade    · Fats:  2 to 3 servings each week    ¨ 1 teaspoon of soft margarine or vegetable oil    ¨ 1 tablespoon of mayonnaise    ¨ 2 tablespoons of salad dressing  Foods to avoid:   · Grains:      [employer], such as doughnuts, pastries, cookies, and biscuits (high in fat and sugar)    ¨ Mixes for cornbread and biscuits, packaged foods, such as bread stuffing, rice and pasta mixes, macaroni and cheese, and instant cereals (high in sodium)    · Fruits and vegetables:      ¨ Regular, canned vegetables (high in sodium)    ¨ Sauerkraut, pickled vegetables, and other foods prepared in brine (high in sodium)    ¨ Fried vegetables or vegetables in butter or high-fat sauces    ¨ Fruit in cream or butter sauce (high in fat)    · Dairy:      ¨ Whole milk, 2% milk, and cream (high in fat)    ¨ Regular cheese and processed cheese (high in fat and sodium)    · Meats and protein foods:      ¨ Smoked or cured meat, such as corned beef, herrera, ham, hot dogs, and sausage (high in fat and sodium)    ¨ Canned beans and canned meats or spreads, such as potted meats, sardines, anchovies, and imitation seafood (high in sodium)    ¨ Deli or lunch meats, such as bologna, ham, turkey, and roast beef (high in sodium)    ¨ High-fat meat (T-bone steak, regular hamburger, and ribs)    ¨ Whole eggs and egg yolks (high in fat)    · Other:      ¨ Seasonings made with salt, such as garlic salt, celery salt, onion salt, seasoned salt, meat tenderizers, and monosodium glutamate (MSG)    ¨ Miso soup and canned or dried soup mixes (high in sodium)    ¨ Regular soy sauce, barbecue sauce, teriyaki sauce, steak sauce, Worcestershire sauce, and most flavored vinegars (high in sodium)    ¨ Regular condiments, such as mustard, ketchup, and salad dressings (high in sodium)    ¨ Gravy and sauces, such as Gautam or cheese sauces (high in sodium and fat)    ¨ Drinks high in sugar, such as soda or fruit drinks    [employer] foods, such as salted chips, popcorn, pretzels, pork rinds, salted crackers, and salted nuts    ¨ Frozen foods, such as dinners, entrees, vegetables with sauces, and breaded meats (high in sodium)  Other guidelines to follow:   · Maintain a healthy weight  Your risk for heart disease is higher if you are overweight  Your healthcare provider may suggest that you lose weight if you are overweight  You can lose weight by eating fewer calories and foods that have added sugars and fat  The DASH meal plan can help you do this  Decrease calories by eating smaller portions at each meal and fewer snacks  Ask your healthcare provider for more information about how to lose weight  · Exercise regularly  Regular exercise can help you reach or maintain a healthy weight  Regular exercise can also help decrease your blood pressure and improve your cholesterol levels  Get 30 minutes or more of moderate exercise each day of the week  To lose weight, get at least 60 minutes of exercise  Talk to your healthcare provider about the best exercise program for you  · Limit alcohol  Women should limit alcohol to 1 drink a day  Men should limit alcohol to 2 drinks a day   A drink of alcohol is 12 ounces of beer, 5 ounces of wine, or 1½ ounces of liquor  © 2017 [address] Information is for End User's use only and may not be sold, redistributed or otherwise used for commercial purposes  All illustrations and images included in CareNotes® are the copyrighted property of A D [employer] M [employer] Inc  or Garry Geller  The above information is an  only  It is not intended as medical advice for individual conditions or treatments  Talk to your doctor, nurse or pharmacist before following any medical regimen to see if it is safe and effective for you  Low-Sodium Diet   AMBULATORY CARE:   A low-sodium diet  limits foods that are high in sodium (salt)  You will need to follow a low-sodium diet if you have high blood pressure, kidney disease, or heart failure  You may also need to follow this diet if you have a condition that is causing your body to retain (hold) extra fluid  You may need to limit the amount of sodium you eat in a day to 1,500 to 2,000 mg  Ask your healthcare provider how much sodium you can have each day  How to use food labels to choose foods that are low in sodium:  Read food labels to find the amount of sodium they contain  The amount of sodium is listed in milligrams (mg)  The % Daily Value (DV) column tells you how much of your daily needs are met by 1 serving of the food for each nutrient listed  Choose foods that have less than 5% of the DV of sodium  These foods are considered low in sodium  Foods that have 20% or more of the DV of sodium are considered high in sodium  Some food labels may also list any of the following terms that tell you about the sodium content in the food:  · Sodium-free:  Less than 5 mg in each serving    · Very low sodium:  35 mg of sodium or less in each serving    · Low sodium:  140 mg of sodium or less in each serving    · Reduced sodium:   At least 25% less sodium in each serving than the regular type    · Light in sodium:  50% less sodium in each serving    · Unsalted or no added salt:  No extra salt is added during processing (the food may still contain sodium)       Foods to avoid:  Salty foods are high in sodium  You should avoid the following:  · Processed foods:      ? Mixes for cornbread, biscuits, cake, and pudding     ? Instant foods, such as potatoes, cereals, noodles, and rice     ? Packaged foods, such as bread stuffing, rice and pasta mixes, snack dip mixes, and macaroni and cheese     ? Canned foods, such as canned vegetables, soups, broths, sauces, and vegetable or tomato juice    ? Snack foods, such as salted chips, popcorn, pretzels, pork rinds, salted crackers, and salted nuts    ? Frozen foods, such as dinners, entrees, vegetables with sauces, and breaded meats    ? Sauerkraut, pickled vegetables, and other foods prepared in brine    · Meats and cheeses:      ? Smoked or cured meat, such as corned beef, herrera, ham, hot dogs, and sausage    ? Canned meats or spreads, such as potted meats, sardines, anchovies, and imitation seafood    ? Deli or lunch meats, such as bologna, ham, turkey, and roast beef    ? Processed cheese, such as American cheese and cheese spreads    · Condiments, sauces, and seasonings:      ? Salt (¼ teaspoon of salt contains 575 mg of sodium)    ? Seasonings made with salt, such as garlic salt, celery salt, onion salt, and seasoned salt    ? Regular soy sauce, barbecue sauce, teriyaki sauce, steak sauce, Worcestershire sauce, and most flavored vinegars    ? Canned gravy and mixes     ? Regular condiments, such as mustard, ketchup, and salad dressings    ? Pickles and olives    ? Meat tenderizers and monosodium glutamate (MSG)    Foods to include:  Read the food label to find the exact amount of sodium in each serving  · Bread and cereal:  Try to choose breads with less than 80 mg of sodium per serving  ? Bread, roll, mary, tortilla, or unsalted crackers      ? Ready-to-eat cereals with less than 5% DV of sodium (examples include shredded wheat and puffed rice)    ? Pasta    · Vegetables and fruits:      ? Unsalted fresh, frozen, or canned vegetables    ? Fresh, frozen, or canned fruits    ? Fruit juice    · Dairy:  One serving has about 150 mg of sodium  ? Milk, all types    ? Yogurt    ? Hard cheese, such as cheddar, Swiss, [employer], or mozzarella    · Meat and other protein foods:  Some raw meats may have added sodium  ? Plain meats, fish, and poultry     ? Eggs    · Other foods:      ? Homemade pudding    ? Unsalted nuts, popcorn, or pretzels    ? Unsalted butter or margarine    Ways to decrease sodium:   · Add spices and herbs to foods instead of salt during cooking  Use salt-free seasonings to add flavor to foods  Examples include onion powder, garlic powder, basil, driscoll powder, paprika, and parsley  Try lemon or lime juice or vinegar to give foods a tart flavor  Use hot peppers, pepper, or cayenne pepper to add a spicy flavor  · Do not keep a salt shaker at your kitchen table  This may help keep you from adding salt to food at the table  A teaspoon of salt has 2,300 mg of sodium  It may take time to get used to enjoying the natural flavor of food instead of adding salt  Talk to your healthcare provider before you use salt substitutes  Some salt substitutes have a high amount of potassium and need to be avoided if you have kidney disease  · Choose low-sodium foods at restaurants  Meals from restaurants are often high in sodium  Some restaurants have nutrition information on the menu that tells you the amount of sodium in their foods  If possible, ask for your food to be prepared with less, or no salt  · Shop for unsalted or low-sodium foods and snacks at the grocery store  Examples include unsalted or low-sodium broths, soups, and canned vegetables  Choose fresh or frozen vegetables instead  Choose unsalted nuts or seeds or fresh fruits or vegetables as snacks   Read food labels and choose salt-free, very low-sodium, or low-sodium foods  © Copyright [address] 2021 Information is for End User's use only and may not be sold, redistributed or otherwise used for commercial purposes  All illustrations and images included in CareNotes® are the copyrighted property of A D A M [employer] Inc  or Bradly Palacio  The above information is an  only  It is not intended as medical advice for individual conditions or treatments  Talk to your doctor, nurse or pharmacist before following any medical regimen to see if it is safe and effective for you

## 2022-02-17 ENCOUNTER — HOSPITAL ENCOUNTER (OUTPATIENT)
Dept: MAMMOGRAPHY | Facility: HOSPITAL | Age: 67
Discharge: HOME | End: 2022-02-17
Payer: COMMERCIAL

## 2022-02-17 ENCOUNTER — HOSPITAL ENCOUNTER (OUTPATIENT)
Dept: BONE DENSITY | Facility: HOSPITAL | Age: 67
Discharge: HOME | End: 2022-02-17
Payer: COMMERCIAL

## 2022-02-17 VITALS — WEIGHT: 178 LBS | BODY MASS INDEX: 34.95 KG/M2 | HEIGHT: 60 IN

## 2022-02-17 DIAGNOSIS — Z13.820 SCREENING FOR OSTEOPOROSIS: ICD-10-CM

## 2022-02-17 DIAGNOSIS — Z12.31 ENCOUNTER FOR SCREENING MAMMOGRAM FOR BREAST CANCER: ICD-10-CM

## 2022-02-17 PROCEDURE — 77067 SCR MAMMO BI INCL CAD: CPT

## 2022-02-17 PROCEDURE — 77080 DXA BONE DENSITY AXIAL: CPT

## 2022-02-17 PROCEDURE — 77063 BREAST TOMOSYNTHESIS BI: CPT

## 2022-02-25 ENCOUNTER — HOSPITAL ENCOUNTER (EMERGENCY)
Facility: HOSPITAL | Age: 67
Discharge: HOME | End: 2022-02-26
Attending: INTERNAL MEDICINE | Admitting: EMERGENCY MEDICINE
Payer: COMMERCIAL

## 2022-02-25 ENCOUNTER — APPOINTMENT (EMERGENCY)
Dept: RADIOLOGY | Facility: HOSPITAL | Age: 67
End: 2022-02-25
Payer: COMMERCIAL

## 2022-02-25 DIAGNOSIS — K52.9 ENTERITIS: Primary | ICD-10-CM

## 2022-02-25 DIAGNOSIS — R10.9 ABDOMINAL PAIN: ICD-10-CM

## 2022-02-25 PROCEDURE — 71045 X-RAY EXAM CHEST 1 VIEW: CPT

## 2022-02-25 PROCEDURE — 93005 ELECTROCARDIOGRAM TRACING: CPT

## 2022-02-25 PROCEDURE — 99284 EMERGENCY DEPT VISIT MOD MDM: CPT | Performed by: INTERNAL MEDICINE

## 2022-02-25 PROCEDURE — 99285 EMERGENCY DEPT VISIT HI MDM: CPT

## 2022-02-25 RX ORDER — ONDANSETRON 2 MG/ML
4 INJECTION INTRAMUSCULAR; INTRAVENOUS ONCE
Status: COMPLETED | OUTPATIENT
Start: 2022-02-25 | End: 2022-02-26

## 2022-02-25 RX ORDER — SODIUM CHLORIDE 9 MG/ML
125 INJECTION, SOLUTION INTRAVENOUS CONTINUOUS
Status: DISCONTINUED | OUTPATIENT
Start: 2022-02-25 | End: 2022-02-26 | Stop reason: HOSPADM

## 2022-02-26 ENCOUNTER — APPOINTMENT (EMERGENCY)
Dept: CT IMAGING | Facility: HOSPITAL | Age: 67
End: 2022-02-26
Payer: COMMERCIAL

## 2022-02-26 VITALS
RESPIRATION RATE: 18 BRPM | BODY MASS INDEX: 35.74 KG/M2 | SYSTOLIC BLOOD PRESSURE: 170 MMHG | HEART RATE: 85 BPM | OXYGEN SATURATION: 93 % | TEMPERATURE: 98.2 F | DIASTOLIC BLOOD PRESSURE: 70 MMHG | WEIGHT: 183 LBS

## 2022-02-26 LAB
2HR DELTA HS TROPONIN: 0 NG/L
ALBUMIN SERPL BCP-MCNC: 4 G/DL (ref 3.5–5)
ALP SERPL-CCNC: 88 U/L (ref 34–104)
ALT SERPL W P-5'-P-CCNC: 16 U/L (ref 7–52)
ANION GAP SERPL CALCULATED.3IONS-SCNC: 7 MMOL/L (ref 4–13)
AST SERPL W P-5'-P-CCNC: 17 U/L (ref 13–39)
BASOPHILS # BLD AUTO: 0.02 THOUSANDS/ΜL (ref 0–0.1)
BASOPHILS NFR BLD AUTO: 0 % (ref 0–1)
BILIRUB DIRECT SERPL-MCNC: 0.14 MG/DL (ref 0–0.2)
BILIRUB SERPL-MCNC: 0.75 MG/DL (ref 0.2–1)
BILIRUB UR QL STRIP: NEGATIVE
BUN SERPL-MCNC: 10 MG/DL (ref 5–25)
CALCIUM SERPL-MCNC: 9 MG/DL (ref 8.4–10.2)
CARDIAC TROPONIN I PNL SERPL HS: 6 NG/L
CARDIAC TROPONIN I PNL SERPL HS: 6 NG/L
CHLORIDE SERPL-SCNC: 103 MMOL/L (ref 96–108)
CLARITY UR: CLEAR
CO2 SERPL-SCNC: 25 MMOL/L (ref 21–32)
COLOR UR: YELLOW
CREAT SERPL-MCNC: 0.51 MG/DL (ref 0.6–1.3)
EOSINOPHIL # BLD AUTO: 0.04 THOUSAND/ΜL (ref 0–0.61)
EOSINOPHIL NFR BLD AUTO: 0 % (ref 0–6)
ERYTHROCYTE [DISTWIDTH] IN BLOOD BY AUTOMATED COUNT: 14.1 % (ref 11.6–15.1)
FLUAV RNA RESP QL NAA+PROBE: NEGATIVE
FLUBV RNA RESP QL NAA+PROBE: NEGATIVE
GFR SERPL CREATININE-BSD FRML MDRD: 101 ML/MIN/1.73SQ M
GLUCOSE SERPL-MCNC: 121 MG/DL (ref 65–140)
GLUCOSE UR STRIP-MCNC: NEGATIVE MG/DL
HCT VFR BLD AUTO: 37.9 % (ref 34.8–46.1)
HGB BLD-MCNC: 13 G/DL (ref 11.5–15.4)
HGB UR QL STRIP.AUTO: NEGATIVE
IMM GRANULOCYTES # BLD AUTO: 0.02 THOUSAND/UL (ref 0–0.2)
IMM GRANULOCYTES NFR BLD AUTO: 0 % (ref 0–2)
KETONES UR STRIP-MCNC: NEGATIVE MG/DL
LEUKOCYTE ESTERASE UR QL STRIP: NEGATIVE
LIPASE SERPL-CCNC: <6 U/L (ref 11–82)
LYMPHOCYTES # BLD AUTO: 0.99 THOUSANDS/ΜL (ref 0.6–4.47)
LYMPHOCYTES NFR BLD AUTO: 8 % (ref 14–44)
MCH RBC QN AUTO: 31.8 PG (ref 26.8–34.3)
MCHC RBC AUTO-ENTMCNC: 34.3 G/DL (ref 31.4–37.4)
MCV RBC AUTO: 93 FL (ref 82–98)
MONOCYTES # BLD AUTO: 0.61 THOUSAND/ΜL (ref 0.17–1.22)
MONOCYTES NFR BLD AUTO: 5 % (ref 4–12)
NEUTROPHILS # BLD AUTO: 10.41 THOUSANDS/ΜL (ref 1.85–7.62)
NEUTS SEG NFR BLD AUTO: 87 % (ref 43–75)
NITRITE UR QL STRIP: NEGATIVE
NRBC BLD AUTO-RTO: 0 /100 WBCS
PH UR STRIP.AUTO: 5.5 [PH]
PLATELET # BLD AUTO: 250 THOUSANDS/UL (ref 149–390)
PMV BLD AUTO: 11 FL (ref 8.9–12.7)
POTASSIUM SERPL-SCNC: 3.2 MMOL/L (ref 3.5–5.3)
PROT SERPL-MCNC: 6.8 G/DL (ref 6.4–8.4)
PROT UR STRIP-MCNC: NEGATIVE MG/DL
RBC # BLD AUTO: 4.09 MILLION/UL (ref 3.81–5.12)
RSV RNA RESP QL NAA+PROBE: NEGATIVE
SARS-COV-2 RNA RESP QL NAA+PROBE: NEGATIVE
SODIUM SERPL-SCNC: 135 MMOL/L (ref 135–147)
SP GR UR STRIP.AUTO: 1.01 (ref 1–1.03)
UROBILINOGEN UR QL STRIP.AUTO: 1 E.U./DL
WBC # BLD AUTO: 12.09 THOUSAND/UL (ref 4.31–10.16)

## 2022-02-26 PROCEDURE — 0241U HB NFCT DS VIR RESP RNA 4 TRGT: CPT | Performed by: INTERNAL MEDICINE

## 2022-02-26 PROCEDURE — 36415 COLL VENOUS BLD VENIPUNCTURE: CPT | Performed by: INTERNAL MEDICINE

## 2022-02-26 PROCEDURE — 84484 ASSAY OF TROPONIN QUANT: CPT | Performed by: INTERNAL MEDICINE

## 2022-02-26 PROCEDURE — 80053 COMPREHEN METABOLIC PANEL: CPT | Performed by: INTERNAL MEDICINE

## 2022-02-26 PROCEDURE — 93005 ELECTROCARDIOGRAM TRACING: CPT

## 2022-02-26 PROCEDURE — 81003 URINALYSIS AUTO W/O SCOPE: CPT | Performed by: INTERNAL MEDICINE

## 2022-02-26 PROCEDURE — 74177 CT ABD & PELVIS W/CONTRAST: CPT

## 2022-02-26 PROCEDURE — 96361 HYDRATE IV INFUSION ADD-ON: CPT

## 2022-02-26 PROCEDURE — 83690 ASSAY OF LIPASE: CPT | Performed by: INTERNAL MEDICINE

## 2022-02-26 PROCEDURE — 82248 BILIRUBIN DIRECT: CPT | Performed by: INTERNAL MEDICINE

## 2022-02-26 PROCEDURE — G1004 CDSM NDSC: HCPCS

## 2022-02-26 PROCEDURE — 85025 COMPLETE CBC W/AUTO DIFF WBC: CPT | Performed by: INTERNAL MEDICINE

## 2022-02-26 PROCEDURE — 96374 THER/PROPH/DIAG INJ IV PUSH: CPT

## 2022-02-26 RX ORDER — ONDANSETRON 4 MG/1
4 TABLET, FILM COATED ORAL EVERY 6 HOURS
Qty: 12 TABLET | Refills: 0 | Status: SHIPPED | OUTPATIENT
Start: 2022-02-26 | End: 2022-08-10 | Stop reason: SDUPTHER

## 2022-02-26 RX ORDER — POTASSIUM CHLORIDE 20 MEQ/1
20 TABLET, EXTENDED RELEASE ORAL ONCE
Status: COMPLETED | OUTPATIENT
Start: 2022-02-26 | End: 2022-02-26

## 2022-02-26 RX ORDER — KETOROLAC TROMETHAMINE 30 MG/ML
15 INJECTION, SOLUTION INTRAMUSCULAR; INTRAVENOUS ONCE
Status: COMPLETED | OUTPATIENT
Start: 2022-02-26 | End: 2022-02-26

## 2022-02-26 RX ADMIN — SODIUM CHLORIDE 125 ML/HR: 0.9 INJECTION, SOLUTION INTRAVENOUS at 00:05

## 2022-02-26 RX ADMIN — KETOROLAC TROMETHAMINE 15 MG: 30 INJECTION, SOLUTION INTRAMUSCULAR at 01:26

## 2022-02-26 RX ADMIN — IOHEXOL 100 ML: 350 INJECTION, SOLUTION INTRAVENOUS at 00:56

## 2022-02-26 RX ADMIN — ONDANSETRON 4 MG: 2 INJECTION INTRAMUSCULAR; INTRAVENOUS at 00:06

## 2022-02-26 RX ADMIN — POTASSIUM CHLORIDE 20 MEQ: 1500 TABLET, EXTENDED RELEASE ORAL at 03:04

## 2022-02-26 NOTE — ED NOTES
Dr Rochelle Pretty made aware that the IV in left hand will not support patient receiving IV contrast for abdominal CT scan  He will look for an IV via ultrasound       Angeles Zaragoza RN  02/26/22 1040

## 2022-02-26 NOTE — ED NOTES
Per Dr Turner Faster patient may be off monitor till she is discharged to home  Waiting for results of potassium to return prior to discharge  Patient given PO fluid to assist with getting the urine        Digna Stokes RN  02/26/22 4785

## 2022-02-26 NOTE — ED PROVIDER NOTES
History  Chief Complaint   Patient presents with    Abdominal Pain     Mid-epigastric pain that radiates to her back, took Aleve earlier today for pain  27-year-old female arrives via EMS complaining of abdominal pain since  this morning  Patient states the pain is been persistent since this morning waxes and wanes but has never gone away  Patient has had 3 episodes of vomiting, and some diarrhea  She denies fever chills rigors  The pain appears to be positional and reproducible  Patient has a past history significant for hypertension, hyperlipidemia metabolic syndrome obesity and COPD  Patient smokes 1 pack per day has a bout of 40-45 pack-year history of smoking  Patient also on medical marijuana for anxiety  She denies any urinary symptoms frequency urgency dysuria pyuria hematuria  Patient's pain increases with movement  Was unable to eat today felt nauseous when trying to eat vomited 3 times  Patient has a past surgical history significant for cholecystectomy, appendectomy, tubal ligation, patient has a history of 2 partial colectomies for diverticulitis  Her 1st partial colectomy in  her 2nd was in         Patient received Moderna vaccine x2 she did not have a booster  Patient states she also received the flu shot her last Moderna shot was in May of 2021  Prior to Admission Medications   Prescriptions Last Dose Informant Patient Reported? Taking?    Cholecalciferol (Vitamin D3) 50 MCG ( UT) TABS   No No   Sig: Take 1 tablet (2,000 Units total) by mouth daily   DULoxetine (CYMBALTA) 30 mg delayed release capsule   No No   Sig: Take 1 capsule (30 mg total) by mouth daily   DULoxetine (CYMBALTA) 60 mg delayed release capsule   No No   Sig: Take 1 capsule (60 mg total) by mouth daily   QUEtiapine (SEROquel) 50 mg tablet   No No   Si tab by mouth in the AM; 2 tabs by mouth at bedtime   albuterol (Ventolin HFA) 90 mcg/act inhaler   No No   Sig: Inhale 2 puffs every 6 (six) hours as needed for wheezing   alendronate (Fosamax) 70 mg tablet   No No   Sig: Take 1 tablet (70 mg total) by mouth every 7 days   atorvastatin (LIPITOR) 40 mg tablet   No No   Sig: TAKE 1 TABLET DAILY     calcium carbonate (OS-PRETTY) 600 MG tablet   No No   Sig: Take 1 tablet (600 mg total) by mouth 2 (two) times a day with meals   colestipol (COLESTID) 1 g tablet   No No   Sig: Take 1 tablet (1 g total) by mouth 2 (two) times a day   cyclobenzaprine (FLEXERIL) 10 mg tablet   No No   Sig: Take 1 tablet (10 mg total) by mouth daily   dicyclomine (BENTYL) 20 mg tablet   No No   Sig: Take 1 tablet (20 mg total) by mouth 3 (three) times a day as needed (as needed)   esomeprazole (NexIUM) 40 MG capsule   No No   Sig: Take 1 capsule (40 mg total) by mouth daily   fluticasone (FLONASE) 50 mcg/act nasal spray   No No   Si spray into each nostril daily   fluticasone-salmeterol (Advair Diskus) 500-50 mcg/dose inhaler   No No   Sig: Inhale 1 puff 2 (two) times a day   furosemide (LASIX) 40 mg tablet   No No   Sig: Take 1 tablet (40 mg total) by mouth daily as needed (As needed for edema)   gabapentin (NEURONTIN) 300 mg capsule   No No   Sig: Take 1 capsule (300 mg total) by mouth 2 (two) times a day   irbesartan (AVAPRO) 75 mg tablet   No No   Sig: Take 1 tablet (75 mg total) by mouth daily at bedtime   naproxen sodium (ALEVE) 220 MG tablet   No No   Sig: Take 1 tablet (220 mg total) by mouth every 12 (twelve) hours as needed for mild pain   potassium chloride (Klor-Con) 10 mEq tablet   No No   Sig: Take 1 tablet (10 mEq total) by mouth daily as needed (when pt takes lasix)      Facility-Administered Medications: None       Past Medical History:   Diagnosis Date    Acute on chronic respiratory failure with hypoxia ([facility]) 3/1/2020    Anxiety     Chronic obstructive pulmonary disease with acute lower respiratory infection ([facility] [address] ) 10/25/2017    COPD (chronic obstructive pulmonary disease) ([facility])     Depression  Diverticulitis     GERD (gastroesophageal reflux disease)     Influenza A 2/29/2020    Pneumonia of right lower lobe due to influenza A virus 2/29/2020    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[facility] )     Primary hypertension 2/10/2022    Psychiatric disorder     Sepsis due to Haemophilus influenzae with acute hypoxic respiratory failure without septic shock ([facility] ) 2/29/2020    Shortness of breath     Sleep apnea        Past Surgical History:   Procedure Laterality Date    APPENDECTOMY      CHOLECYSTECTOMY      partial x2 per pt    HIATAL HERNIA REPAIR      INGUINAL HERNIA REPAIR Right     LAPAROSCOPIC TUBAL LIGATION Bilateral     ROTATOR CUFF REPAIR Right     UVULOPALATOPHARYNGOPLASTY         Family History   Problem Relation Age of Onset    No Known Problems Mother     No Known Problems Father     No Known Problems Sister     No Known Problems Daughter     Breast cancer Maternal Grandmother 28    No Known Problems Paternal Grandmother     No Known Problems Maternal Aunt      I have reviewed and agree with the history as documented  E-Cigarette/Vaping    E-Cigarette Use Current Every Day User      E-Cigarette/Vaping Substances    Nicotine No     THC Yes     CBD No     Flavoring Yes     Other No     Unknown No      Social History     Tobacco Use    Smoking status: Current Every Day Smoker     Packs/day: 1 00     Types: Cigarettes    Smokeless tobacco: Never Used   Vaping Use    Vaping Use: Every day    Substances: THC, Flavoring   Substance Use Topics    Alcohol use: Yes     Comment: [email or web address]    Drug use: Yes     Types: Marijuana     Comment: daily in e-pen form       Review of Systems   Constitutional: Negative  HENT: Negative  Eyes: Negative  Respiratory: Negative  Cardiovascular: Negative  Gastrointestinal: Positive for abdominal pain, nausea and vomiting  Endocrine: Negative  Genitourinary: Negative  Musculoskeletal: Negative  Skin: Negative      Neurological: Negative  Hematological: Negative  Psychiatric/Behavioral: Negative  Physical Exam  Physical Exam  Vitals and nursing note reviewed  Constitutional:       General: She is not in acute distress  Appearance: She is obese  She is not ill-appearing, toxic-appearing or diaphoretic  HENT:      Head: Normocephalic and atraumatic  Mouth/Throat:      Mouth: Mucous membranes are moist    Eyes:      General: No scleral icterus  Extraocular Movements: Extraocular movements intact  Cardiovascular:      Rate and Rhythm: Normal rate and regular rhythm  Heart sounds: Normal heart sounds  Pulmonary:      Effort: Pulmonary effort is normal       Breath sounds: Rhonchi present  No wheezing or rales  Abdominal:      General: Abdomen is flat  Bowel sounds are normal  There is no distension or abdominal bruit  There are no signs of injury  Palpations: Abdomen is soft  Tenderness: There is abdominal tenderness in the right upper quadrant and epigastric area  There is right CVA tenderness and left CVA tenderness  There is no guarding or rebound  Negative signs include Grant's sign, Rovsing's sign, McBurney's sign and psoas sign  Hernia: No hernia is present  Skin:     General: Skin is warm and dry  Capillary Refill: Capillary refill takes less than 2 seconds  Neurological:      General: No focal deficit present  Mental Status: She is alert  Psychiatric:         Mood and Affect: Mood normal          Behavior: Behavior normal          Vital Signs  ED Triage Vitals   Temp Pulse Resp BP SpO2   -- -- -- -- --      Temp src Heart Rate Source Patient Position - Orthostatic VS BP Location FiO2 (%)   -- -- -- -- --      Pain Score       --           There were no vitals filed for this visit        Visual Acuity      ED Medications  Medications - No data to display    Diagnostic Studies  Results Reviewed     None                 No orders to display              Procedures  ECG 12 Lead Documentation Only    Date/Time: 2/25/2022 11:30 PM  Performed by: Raúl Cummings MD  Authorized by: Raúl Cummings MD     Indications / Diagnosis:  Abdominal pain  ECG reviewed by me, the ED Provider: yes    Patient location:  ED  Interpretation:     Interpretation: normal    Rate:     ECG rate:  90-95    ECG rate assessment: normal    Rhythm:     Rhythm: sinus rhythm    Ectopy:     Ectopy: none    QRS:     QRS axis:  Normal  Conduction:     Conduction: normal    ST segments:     ST segments:  Normal  T waves:     T waves: normal               ED Course  ED Course as of 02/25/22 2340   Fri Feb 25, 2022   2339 Discussed case in sign patient out to Dr Pedro Brown  MDM    Disposition  Final diagnoses:   None     ED Disposition     None      Follow-up Information    None         Patient's Medications   Discharge Prescriptions    No medications on file       No discharge procedures on file      PDMP Review       Value Time User    PDMP Reviewed  Yes 6/18/2020 10:13 AM Patricia Ahumada          ED Provider  Electronically Signed by           Raúl Cummings MD  02/27/22 3348

## 2022-02-26 NOTE — ED NOTES
Patient voided a small amount, not enough for urine sample, will wait and she will void some more       Yolanda Ramos RN  02/26/22 2612

## 2022-02-27 NOTE — ED CARE HANDOFF
Emergency Department Sign Out Note        Sign out and transfer of care from Dr Woods  See Separate Emergency Department note  The patient, Pradeep Kendrick, was evaluated by the previous provider for abdominal pain and back pain  Workup Completed:  History and physical exam    ED Course / Workup Pending (followup): Abdominal lab work, cardiac workup, CT abdomen pelvis                                  ED Course as of 02/27/22 0156   Fri Feb 25, 2022   2335 Hx HTN, hyperlipidemia, 2 partial colectomies, fever      Procedures  MDM  Number of Diagnoses or Management Options  Abdominal pain  Enteritis  Diagnosis management comments: Patient with positional back pain and abdominal pain with associated diarrhea  Enteritis on CT imaging  Otherwise extensive workup is negative  Patient handling p o  prior to discharge  Strict return precautions discussed          Disposition  Final diagnoses:   Enteritis   Abdominal pain     Time reflects when diagnosis was documented in both MDM as applicable and the Disposition within this note     Time User Action Codes Description Comment    2/26/2022  3:20 AM Ruthann Moreno Add [K52 9] Enteritis     2/26/2022  3:21 AM Ruthann Moreno Add [R10 9] Abdominal pain       ED Disposition     ED Disposition Condition Date/Time Comment    Discharge Stable Sat Feb 26, 2022  3:20 AM Pradeep Kendrick discharge to home/self care              Follow-up Information     Follow up With Specialties Details Why Contact Info Additional [address] Emergency Department Emergency Medicine  If symptoms worsen [address] [phone] [facility] Emergency Department, [address] [place] [address] Rohith Peguero, 200 [facility] [address] [facility] Schedule an appointment as soon as possible for a visit   [address] [phone]  [facility] [address], 201 [facility], [address] [place], [place], [address], [phone]        Discharge Medication List as of 2/26/2022  3:22 AM      START taking these medications    Details   ondansetron (ZOFRAN) 4 mg tablet Take 1 tablet (4 mg total) by mouth every 6 (six) hours, Starting Sat 2/26/2022, Normal         CONTINUE these medications which have NOT CHANGED    Details   albuterol (Ventolin HFA) 90 mcg/act inhaler Inhale 2 puffs every 6 (six) hours as needed for wheezing, Starting Mon 8/17/2020, Normal      alendronate (Fosamax) 70 mg tablet Take 1 tablet (70 mg total) by mouth every 7 days, Starting Fri 2/18/2022, Normal      atorvastatin (LIPITOR) 40 mg tablet TAKE 1 TABLET DAILY , Normal      calcium carbonate (OS-PRETTY) 600 MG tablet Take 1 tablet (600 mg total) by mouth 2 (two) times a day with meals, Starting Fri 2/18/2022, Normal      Cholecalciferol (Vitamin D3) 50 MCG (2000 UT) TABS Take 1 tablet (2,000 Units total) by mouth daily, Starting Thu 2/10/2022, Normal      colestipol (COLESTID) 1 g tablet Take 1 tablet (1 g total) by mouth 2 (two) times a day, Starting Mon 11/8/2021, Normal      cyclobenzaprine (FLEXERIL) 10 mg tablet Take 1 tablet (10 mg total) by mouth daily, Starting Thu 2/10/2022, Normal      dicyclomine (BENTYL) 20 mg tablet Take 1 tablet (20 mg total) by mouth 3 (three) times a day as needed (as needed), Starting Wed 5/5/2021, Normal      !! DULoxetine (CYMBALTA) 30 mg delayed release capsule Take 1 capsule (30 mg total) by mouth daily, Starting Thu 2/10/2022, Normal      !!  DULoxetine (CYMBALTA) 60 mg delayed release capsule Take 1 capsule (60 mg total) by mouth daily, Starting Thu 2/10/2022, Normal      esomeprazole (NexIUM) 40 MG capsule Take 1 capsule (40 mg total) by mouth daily, Starting Mon 11/8/2021, Normal      fluticasone (FLONASE) 50 mcg/act nasal spray 1 spray into each nostril daily, Starting Thu 2/10/2022, Normal      fluticasone-salmeterol (Advair Diskus) 500-50 mcg/dose inhaler Inhale 1 puff 2 (two) times a day, Starting Thu 2/10/2022, Normal      furosemide (LASIX) 40 mg tablet Take 1 tablet (40 mg total) by mouth daily as needed (As needed for edema), Starting Thu 8/5/2021, Normal      gabapentin (NEURONTIN) 300 mg capsule Take 1 capsule (300 mg total) by mouth 2 (two) times a day, Starting Mon 11/8/2021, Normal      irbesartan (AVAPRO) 75 mg tablet Take 1 tablet (75 mg total) by mouth daily at bedtime, Starting Thu 2/10/2022, Normal      naproxen sodium (ALEVE) 220 MG tablet Take 1 tablet (220 mg total) by mouth every 12 (twelve) hours as needed for mild pain, Starting Thu 2/6/2020, Until Wed 2/17/2021, Normal      potassium chloride (Klor-Con) 10 mEq tablet Take 1 tablet (10 mEq total) by mouth daily as needed (when pt takes lasix), Starting Thu 8/5/2021, Normal      QUEtiapine (SEROquel) 50 mg tablet 1 tab by mouth in the AM; 2 tabs by mouth at bedtime, Normal       !! - Potential duplicate medications found  Please discuss with provider  No discharge procedures on file         ED Provider  Electronically Signed by     Susana Zhou MD  02/27/22 8440

## 2022-02-28 LAB
ATRIAL RATE: 90 BPM
ATRIAL RATE: 92 BPM
P AXIS: 57 DEGREES
P AXIS: 58 DEGREES
PR INTERVAL: 136 MS
PR INTERVAL: 138 MS
QRS AXIS: 20 DEGREES
QRS AXIS: 20 DEGREES
QRSD INTERVAL: 80 MS
QRSD INTERVAL: 84 MS
QT INTERVAL: 366 MS
QT INTERVAL: 378 MS
QTC INTERVAL: 447 MS
QTC INTERVAL: 467 MS
T WAVE AXIS: 29 DEGREES
T WAVE AXIS: 60 DEGREES
VENTRICULAR RATE: 90 BPM
VENTRICULAR RATE: 92 BPM

## 2022-02-28 PROCEDURE — 93010 ELECTROCARDIOGRAM REPORT: CPT | Performed by: INTERNAL MEDICINE

## 2022-03-03 ENCOUNTER — RA CDI HCC (OUTPATIENT)
Dept: OTHER | Facility: HOSPITAL | Age: 67
End: 2022-03-03

## 2022-03-03 NOTE — PROGRESS NOTES
[facility]  coding opportunities       Chart reviewed, no opportunity found: CHART REVIEWED, NO OPPORTUNITY FOUND                        Patients insurance company: [employer]

## 2022-03-10 ENCOUNTER — OFFICE VISIT (OUTPATIENT)
Dept: FAMILY MEDICINE CLINIC | Facility: CLINIC | Age: 67
End: 2022-03-10
Payer: COMMERCIAL

## 2022-03-10 VITALS
SYSTOLIC BLOOD PRESSURE: 128 MMHG | DIASTOLIC BLOOD PRESSURE: 82 MMHG | BODY MASS INDEX: 35.34 KG/M2 | HEART RATE: 89 BPM | TEMPERATURE: 97.9 F | OXYGEN SATURATION: 91 % | WEIGHT: 180 LBS | HEIGHT: 60 IN

## 2022-03-10 DIAGNOSIS — E78.2 MIXED HYPERLIPIDEMIA: ICD-10-CM

## 2022-03-10 DIAGNOSIS — I10 PRIMARY HYPERTENSION: Primary | ICD-10-CM

## 2022-03-10 PROCEDURE — 3008F BODY MASS INDEX DOCD: CPT | Performed by: NURSE PRACTITIONER

## 2022-03-10 PROCEDURE — 4004F PT TOBACCO SCREEN RCVD TLK: CPT | Performed by: NURSE PRACTITIONER

## 2022-03-10 PROCEDURE — 99214 OFFICE O/P EST MOD 30 MIN: CPT | Performed by: NURSE PRACTITIONER

## 2022-03-10 PROCEDURE — 1160F RVW MEDS BY RX/DR IN RCRD: CPT | Performed by: NURSE PRACTITIONER

## 2022-03-10 PROCEDURE — 3079F DIAST BP 80-89 MM HG: CPT | Performed by: NURSE PRACTITIONER

## 2022-03-10 PROCEDURE — 3074F SYST BP LT 130 MM HG: CPT | Performed by: NURSE PRACTITIONER

## 2022-03-10 RX ORDER — ATORVASTATIN CALCIUM 40 MG/1
40 TABLET, FILM COATED ORAL DAILY
Qty: 90 TABLET | Refills: 1 | Status: SHIPPED | OUTPATIENT
Start: 2022-03-10

## 2022-03-10 RX ORDER — IRBESARTAN 75 MG/1
75 TABLET ORAL
Qty: 90 TABLET | Refills: 1 | Status: SHIPPED | OUTPATIENT
Start: 2022-03-10

## 2022-03-10 NOTE — PROGRESS NOTES
Assessment/Plan:    Problem List Items Addressed This Visit     Primary hypertension - Primary    Relevant Medications    irbesartan (AVAPRO) 75 mg tablet      Other Visit Diagnoses     Mixed hyperlipidemia        Relevant Medications    atorvastatin (LIPITOR) 40 mg tablet           Diagnoses and all orders for this visit:    Primary hypertension  -     irbesartan (AVAPRO) 75 mg tablet; Take 1 tablet (75 mg total) by mouth daily at bedtime    Mixed hyperlipidemia  -     atorvastatin (LIPITOR) 40 mg tablet; Take 1 tablet (40 mg total) by mouth daily        No problem-specific Assessment & Plan notes found for this encounter  Subjective:      Patient ID: Shadia Dior is a 73 y o  female  Pt was started on irbesartan for elevatd BP  BP at home trending down and BP is well controlled  Pt HA have resolved  Hypertension  This is a chronic problem  The problem is controlled  There are no associated agents to hypertension  Risk factors for coronary artery disease include post-menopausal state  Past treatments include angiotensin blockers, diuretics and calcium channel blockers  The current treatment provides significant improvement  Compliance problems include exercise  Hyperlipidemia  This is a chronic problem  The problem is controlled  Exacerbating diseases include obesity  There are no known factors aggravating her hyperlipidemia  Current antihyperlipidemic treatment includes diet change  The current treatment provides significant improvement of lipids   Risk factors for coronary artery disease include dyslipidemia, hypertension, obesity and post-menopausal        The following portions of the patient's history were reviewed and updated as appropriate:   She has a past medical history of Acute on chronic respiratory failure with hypoxia ([facility] [address] ) (3/1/2020), Anxiety, Chronic obstructive pulmonary disease with acute lower respiratory infection ([facility] [address] ) (10/25/2017), COPD (chronic obstructive pulmonary disease) ([facility] ), Depression, Diverticulitis, GERD (gastroesophageal reflux disease), Influenza A (2/29/2020), Pneumonia of right lower lobe due to influenza A virus (2/29/2020), 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[facility] ), Primary hypertension (2/10/2022), Psychiatric disorder, Sepsis due to Haemophilus influenzae with acute hypoxic respiratory failure without septic shock ([place] [address] ) (2/29/2020), Shortness of breath, and Sleep apnea  ,  does not have any pertinent problems on file  ,   has a past surgical history that includes Appendectomy; Laparoscopic tubal ligation (Bilateral); Inguinal hernia repair (Right); Hiatal hernia repair; Uvulopalatopharyngoplasty; Rotator cuff repair (Right); and Cholecystectomy  ,  family history includes Breast cancer (age of onset: 28) in her maternal grandmother; No Known Problems in her daughter, father, maternal aunt, mother, paternal grandmother, and sister  ,   reports that she has been smoking cigarettes  She has been smoking about 1 00 pack per day  She has never used smokeless tobacco  She reports current alcohol use  She reports current drug use  Drug: Marijuana  ,  is allergic to erythromycin and morphine     Current Outpatient Medications   Medication Sig Dispense Refill    albuterol (Ventolin HFA) 90 mcg/act inhaler Inhale 2 puffs every 6 (six) hours as needed for wheezing 18 g 5    alendronate (Fosamax) 70 mg tablet Take 1 tablet (70 mg total) by mouth every 7 days 12 tablet 3    atorvastatin (LIPITOR) 40 mg tablet Take 1 tablet (40 mg total) by mouth daily 90 tablet 1    calcium carbonate (OS-PRETTY) 600 MG tablet Take 1 tablet (600 mg total) by mouth 2 (two) times a day with meals 180 tablet 3    Cholecalciferol (Vitamin D3) 50 MCG (2000 UT) TABS Take 1 tablet (2,000 Units total) by mouth daily 90 tablet 3    colestipol (COLESTID) 1 g tablet Take 1 tablet (1 g total) by mouth 2 (two) times a day 180 tablet 0    cyclobenzaprine (FLEXERIL) 10 mg tablet Take 1 tablet (10 mg total) by mouth daily 90 tablet 0    dicyclomine (BENTYL) 20 mg tablet Take 1 tablet (20 mg total) by mouth 3 (three) times a day as needed (as needed) 270 tablet 1    DULoxetine (CYMBALTA) 30 mg delayed release capsule Take 1 capsule (30 mg total) by mouth daily 90 capsule 1    DULoxetine (CYMBALTA) 60 mg delayed release capsule Take 1 capsule (60 mg total) by mouth daily 90 capsule 1    esomeprazole (NexIUM) 40 MG capsule Take 1 capsule (40 mg total) by mouth daily 90 capsule 1    fluticasone (FLONASE) 50 mcg/act nasal spray 1 spray into each nostril daily 16 g 5    fluticasone-salmeterol (Advair Diskus) 500-50 mcg/dose inhaler Inhale 1 puff 2 (two) times a day 60 blister 5    furosemide (LASIX) 40 mg tablet Take 1 tablet (40 mg total) by mouth daily as needed (As needed for edema) 90 tablet 0    gabapentin (NEURONTIN) 300 mg capsule Take 1 capsule (300 mg total) by mouth 2 (two) times a day 180 capsule 1    irbesartan (AVAPRO) 75 mg tablet Take 1 tablet (75 mg total) by mouth daily at bedtime 90 tablet 1    ondansetron (ZOFRAN) 4 mg tablet Take 1 tablet (4 mg total) by mouth every 6 (six) hours 12 tablet 0    potassium chloride (Klor-Con) 10 mEq tablet Take 1 tablet (10 mEq total) by mouth daily as needed (when pt takes lasix) 90 tablet 0    QUEtiapine (SEROquel) 50 mg tablet 1 tab by mouth in the AM; 2 tabs by mouth at bedtime 270 tablet 1    naproxen sodium (ALEVE) 220 MG tablet Take 1 tablet (220 mg total) by mouth every 12 (twelve) hours as needed for mild pain 120 tablet 0     No current facility-administered medications for this visit  Review of Systems   Constitutional: Negative  HENT: Negative  Eyes: Negative  Respiratory: Negative  Cardiovascular: Negative  Gastrointestinal: Negative  Endocrine: Negative  Genitourinary: Negative  Musculoskeletal: Negative  Skin: Negative  Allergic/Immunologic: Negative  Neurological: Negative  Hematological: Negative  Psychiatric/Behavioral: Negative  Objective:  Vitals:    03/10/22 1203   BP: 128/82   BP Location: Left arm   Patient Position: Sitting   Cuff Size: Standard   Pulse: 89   Temp: 97 9 °F (36 6 °C)   TempSrc: Tympanic   SpO2: 91%   Weight: 81 6 kg (180 lb)   Height: 5' (1 524 m)     Body mass index is 35 15 kg/m²  Physical Exam  Vitals and nursing note reviewed  Constitutional:       Appearance: Normal appearance  She is well-developed  She is obese  Interventions: Face mask in place  HENT:      Head: Normocephalic and atraumatic  Right Ear: Tympanic membrane, ear canal and external ear normal       Left Ear: Tympanic membrane, ear canal and external ear normal       Nose: Nose normal       Mouth/Throat:      Mouth: Mucous membranes are moist       Pharynx: Uvula midline  Eyes:      General: Lids are normal       Conjunctiva/sclera: Conjunctivae normal       Pupils: Pupils are equal, round, and reactive to light  Neck:      Thyroid: No thyroid mass or thyromegaly  Vascular: No JVD  Trachea: Trachea and phonation normal    Cardiovascular:      Rate and Rhythm: Normal rate and regular rhythm  Pulses: Normal pulses  Heart sounds: Normal heart sounds, S1 normal and S2 normal  No murmur heard  No friction rub  No gallop  Pulmonary:      Effort: Pulmonary effort is normal       Breath sounds: Normal breath sounds  Abdominal:      General: Bowel sounds are normal       Palpations: Abdomen is soft  Tenderness: There is no abdominal tenderness  Genitourinary:     Comments: Deferred   Musculoskeletal:         General: Normal range of motion  Cervical back: Full passive range of motion without pain, normal range of motion and neck supple  Right lower leg: No edema  Left lower leg: No edema  Lymphadenopathy:      Head:      Right side of head: No submental, submandibular, tonsillar, preauricular, posterior auricular or occipital adenopathy  Left side of head: No submental, submandibular, tonsillar, preauricular, posterior auricular or occipital adenopathy  Cervical: No cervical adenopathy  Skin:     General: Skin is warm and dry  Capillary Refill: Capillary refill takes less than 2 seconds  Neurological:      General: No focal deficit present  Mental Status: She is alert and oriented to person, place, and time  Cranial Nerves: Cranial nerves are intact  No cranial nerve deficit  Sensory: Sensation is intact  Motor: Motor function is intact  Coordination: Coordination is intact  Gait: Gait is intact  Deep Tendon Reflexes: Reflexes are normal and symmetric  Psychiatric:         Attention and Perception: Attention and perception normal          Mood and Affect: Mood and affect normal          Speech: Speech normal          Behavior: Behavior normal  Behavior is cooperative  Thought Content:  Thought content normal          Cognition and Memory: Cognition normal          Judgment: Judgment normal

## 2022-05-09 DIAGNOSIS — G47.00 INSOMNIA, UNSPECIFIED TYPE: ICD-10-CM

## 2022-05-09 DIAGNOSIS — R10.9 ABDOMINAL CRAMPING: ICD-10-CM

## 2022-05-09 DIAGNOSIS — R60.9 EDEMA, UNSPECIFIED TYPE: ICD-10-CM

## 2022-05-09 DIAGNOSIS — M62.838 MUSCLE SPASM: ICD-10-CM

## 2022-05-09 DIAGNOSIS — K21.9 GASTROESOPHAGEAL REFLUX DISEASE: ICD-10-CM

## 2022-05-10 RX ORDER — ESOMEPRAZOLE MAGNESIUM 40 MG/1
40 CAPSULE, DELAYED RELEASE ORAL DAILY
Qty: 90 CAPSULE | Refills: 1 | Status: SHIPPED | OUTPATIENT
Start: 2022-05-10

## 2022-05-10 RX ORDER — DICYCLOMINE HCL 20 MG
20 TABLET ORAL 3 TIMES DAILY PRN
Qty: 270 TABLET | Refills: 1 | Status: SHIPPED | OUTPATIENT
Start: 2022-05-10 | End: 2022-08-10 | Stop reason: SDUPTHER

## 2022-05-10 RX ORDER — CYCLOBENZAPRINE HCL 10 MG
10 TABLET ORAL DAILY
Qty: 90 TABLET | Refills: 0 | Status: SHIPPED | OUTPATIENT
Start: 2022-05-10

## 2022-05-10 RX ORDER — FUROSEMIDE 40 MG/1
40 TABLET ORAL DAILY PRN
Qty: 90 TABLET | Refills: 0 | Status: SHIPPED | OUTPATIENT
Start: 2022-05-10

## 2022-05-10 RX ORDER — MONTELUKAST SODIUM 4 MG/1
1 TABLET, CHEWABLE ORAL 2 TIMES DAILY
Qty: 180 TABLET | Refills: 0 | Status: SHIPPED | OUTPATIENT
Start: 2022-05-10 | End: 2022-08-08

## 2022-05-10 RX ORDER — QUETIAPINE FUMARATE 50 MG/1
TABLET, FILM COATED ORAL
Qty: 270 TABLET | Refills: 0 | Status: SHIPPED | OUTPATIENT
Start: 2022-05-10

## 2022-05-19 DIAGNOSIS — G89.29 OTHER CHRONIC PAIN: ICD-10-CM

## 2022-05-19 RX ORDER — GABAPENTIN 300 MG/1
300 CAPSULE ORAL 2 TIMES DAILY
Qty: 60 CAPSULE | Refills: 0 | Status: SHIPPED | OUTPATIENT
Start: 2022-05-19 | End: 2022-05-20 | Stop reason: SDUPTHER

## 2022-05-20 DIAGNOSIS — G89.29 OTHER CHRONIC PAIN: ICD-10-CM

## 2022-05-20 RX ORDER — GABAPENTIN 300 MG/1
300 CAPSULE ORAL 2 TIMES DAILY
Qty: 180 CAPSULE | Refills: 1 | Status: SHIPPED | OUTPATIENT
Start: 2022-05-20 | End: 2022-08-10 | Stop reason: SDUPTHER

## 2022-07-28 DIAGNOSIS — J45.40 MODERATE PERSISTENT ASTHMA WITHOUT COMPLICATION: ICD-10-CM

## 2022-07-28 RX ORDER — ALBUTEROL SULFATE 90 UG/1
AEROSOL, METERED RESPIRATORY (INHALATION)
Qty: 8.5 G | Refills: 0 | Status: SHIPPED | OUTPATIENT
Start: 2022-07-28

## 2022-08-10 DIAGNOSIS — G47.00 INSOMNIA, UNSPECIFIED TYPE: ICD-10-CM

## 2022-08-10 DIAGNOSIS — R10.9 ABDOMINAL CRAMPING: ICD-10-CM

## 2022-08-10 DIAGNOSIS — M81.0 AGE-RELATED OSTEOPOROSIS WITHOUT CURRENT PATHOLOGICAL FRACTURE: ICD-10-CM

## 2022-08-10 DIAGNOSIS — E55.9 VITAMIN D DEFICIENCY: ICD-10-CM

## 2022-08-10 DIAGNOSIS — K52.9 ENTERITIS: ICD-10-CM

## 2022-08-10 DIAGNOSIS — E78.2 MIXED HYPERLIPIDEMIA: ICD-10-CM

## 2022-08-10 DIAGNOSIS — I10 PRIMARY HYPERTENSION: ICD-10-CM

## 2022-08-10 DIAGNOSIS — G89.29 OTHER CHRONIC PAIN: ICD-10-CM

## 2022-08-10 DIAGNOSIS — F32.A DEPRESSION, UNSPECIFIED DEPRESSION TYPE: ICD-10-CM

## 2022-08-10 RX ORDER — DULOXETIN HYDROCHLORIDE 60 MG/1
60 CAPSULE, DELAYED RELEASE ORAL DAILY
Qty: 90 CAPSULE | Refills: 1 | Status: SHIPPED | OUTPATIENT
Start: 2022-08-10

## 2022-08-10 RX ORDER — DICYCLOMINE HCL 20 MG
20 TABLET ORAL 3 TIMES DAILY PRN
Qty: 270 TABLET | Refills: 1 | Status: SHIPPED | OUTPATIENT
Start: 2022-08-10

## 2022-08-10 RX ORDER — ONDANSETRON 4 MG/1
4 TABLET, FILM COATED ORAL EVERY 6 HOURS
Qty: 12 TABLET | Refills: 0 | Status: SHIPPED | OUTPATIENT
Start: 2022-08-10

## 2022-08-10 RX ORDER — DULOXETIN HYDROCHLORIDE 30 MG/1
30 CAPSULE, DELAYED RELEASE ORAL DAILY
Qty: 90 CAPSULE | Refills: 1 | Status: SHIPPED | OUTPATIENT
Start: 2022-08-10

## 2022-08-10 RX ORDER — PHENOL 1.4 %
600 AEROSOL, SPRAY (ML) MUCOUS MEMBRANE 2 TIMES DAILY WITH MEALS
Qty: 180 TABLET | Refills: 3 | Status: SHIPPED | OUTPATIENT
Start: 2022-08-10

## 2022-08-11 ENCOUNTER — APPOINTMENT (OUTPATIENT)
Dept: LAB | Facility: CLINIC | Age: 67
End: 2022-08-11
Payer: COMMERCIAL

## 2022-08-11 DIAGNOSIS — E78.2 MIXED HYPERLIPIDEMIA: ICD-10-CM

## 2022-08-11 DIAGNOSIS — E87.6 HYPOKALEMIA: ICD-10-CM

## 2022-08-11 DIAGNOSIS — Z13.0 SCREENING FOR DEFICIENCY ANEMIA: ICD-10-CM

## 2022-08-11 DIAGNOSIS — Z13.29 SCREENING FOR THYROID DISORDER: ICD-10-CM

## 2022-08-11 DIAGNOSIS — Z13.1 SCREENING FOR DIABETES MELLITUS: ICD-10-CM

## 2022-08-11 LAB
ALBUMIN SERPL BCP-MCNC: 3.6 G/DL (ref 3.5–5)
ALP SERPL-CCNC: 85 U/L (ref 46–116)
ALT SERPL W P-5'-P-CCNC: 25 U/L (ref 12–78)
ANION GAP SERPL CALCULATED.3IONS-SCNC: 4 MMOL/L (ref 4–13)
AST SERPL W P-5'-P-CCNC: 19 U/L (ref 5–45)
BASOPHILS # BLD AUTO: 0.08 THOUSANDS/ΜL (ref 0–0.1)
BASOPHILS NFR BLD AUTO: 1 % (ref 0–1)
BILIRUB SERPL-MCNC: 0.55 MG/DL (ref 0.2–1)
BUN SERPL-MCNC: 16 MG/DL (ref 5–25)
CALCIUM SERPL-MCNC: 9.1 MG/DL (ref 8.3–10.1)
CHLORIDE SERPL-SCNC: 108 MMOL/L (ref 96–108)
CHOLEST SERPL-MCNC: 128 MG/DL
CO2 SERPL-SCNC: 26 MMOL/L (ref 21–32)
CREAT SERPL-MCNC: 0.61 MG/DL (ref 0.6–1.3)
EOSINOPHIL # BLD AUTO: 0.16 THOUSAND/ΜL (ref 0–0.61)
EOSINOPHIL NFR BLD AUTO: 2 % (ref 0–6)
ERYTHROCYTE [DISTWIDTH] IN BLOOD BY AUTOMATED COUNT: 15.8 % (ref 11.6–15.1)
GFR SERPL CREATININE-BSD FRML MDRD: 94 ML/MIN/1.73SQ M
GLUCOSE P FAST SERPL-MCNC: 102 MG/DL (ref 65–99)
HCT VFR BLD AUTO: 41 % (ref 34.8–46.1)
HDLC SERPL-MCNC: 44 MG/DL
HGB BLD-MCNC: 13.3 G/DL (ref 11.5–15.4)
IMM GRANULOCYTES # BLD AUTO: 0.03 THOUSAND/UL (ref 0–0.2)
IMM GRANULOCYTES NFR BLD AUTO: 0 % (ref 0–2)
LDLC SERPL CALC-MCNC: 58 MG/DL (ref 0–100)
LYMPHOCYTES # BLD AUTO: 2.09 THOUSANDS/ΜL (ref 0.6–4.47)
LYMPHOCYTES NFR BLD AUTO: 27 % (ref 14–44)
MCH RBC QN AUTO: 31 PG (ref 26.8–34.3)
MCHC RBC AUTO-ENTMCNC: 32.4 G/DL (ref 31.4–37.4)
MCV RBC AUTO: 96 FL (ref 82–98)
MONOCYTES # BLD AUTO: 0.55 THOUSAND/ΜL (ref 0.17–1.22)
MONOCYTES NFR BLD AUTO: 7 % (ref 4–12)
NEUTROPHILS # BLD AUTO: 4.75 THOUSANDS/ΜL (ref 1.85–7.62)
NEUTS SEG NFR BLD AUTO: 63 % (ref 43–75)
NRBC BLD AUTO-RTO: 0 /100 WBCS
PLATELET # BLD AUTO: 269 THOUSANDS/UL (ref 149–390)
PMV BLD AUTO: 12.1 FL (ref 8.9–12.7)
POTASSIUM SERPL-SCNC: 3.9 MMOL/L (ref 3.5–5.3)
PROT SERPL-MCNC: 6.9 G/DL (ref 6.4–8.4)
RBC # BLD AUTO: 4.29 MILLION/UL (ref 3.81–5.12)
SODIUM SERPL-SCNC: 138 MMOL/L (ref 135–147)
TRIGL SERPL-MCNC: 130 MG/DL
TSH SERPL DL<=0.05 MIU/L-ACNC: 2.81 UIU/ML (ref 0.45–4.5)
WBC # BLD AUTO: 7.66 THOUSAND/UL (ref 4.31–10.16)

## 2022-08-11 PROCEDURE — 36415 COLL VENOUS BLD VENIPUNCTURE: CPT

## 2022-08-11 PROCEDURE — 80053 COMPREHEN METABOLIC PANEL: CPT

## 2022-08-11 PROCEDURE — 85025 COMPLETE CBC W/AUTO DIFF WBC: CPT

## 2022-08-11 PROCEDURE — 84443 ASSAY THYROID STIM HORMONE: CPT

## 2022-08-11 PROCEDURE — 80061 LIPID PANEL: CPT

## 2022-08-15 RX ORDER — QUETIAPINE FUMARATE 50 MG/1
TABLET, FILM COATED ORAL
Qty: 270 TABLET | Refills: 0 | Status: SHIPPED | OUTPATIENT
Start: 2022-08-15

## 2022-08-15 RX ORDER — GABAPENTIN 300 MG/1
300 CAPSULE ORAL 2 TIMES DAILY
Qty: 180 CAPSULE | Refills: 1 | Status: SHIPPED | OUTPATIENT
Start: 2022-08-15

## 2022-08-15 RX ORDER — MONTELUKAST SODIUM 4 MG/1
1 TABLET, CHEWABLE ORAL 2 TIMES DAILY
Qty: 180 TABLET | Refills: 0 | Status: SHIPPED | OUTPATIENT
Start: 2022-08-15 | End: 2022-11-13

## 2022-08-15 RX ORDER — CHOLECALCIFEROL (VITAMIN D3) 125 MCG
2000 CAPSULE ORAL DAILY
Qty: 90 TABLET | Refills: 3 | Status: SHIPPED | OUTPATIENT
Start: 2022-08-15

## 2022-08-15 RX ORDER — ATORVASTATIN CALCIUM 40 MG/1
40 TABLET, FILM COATED ORAL DAILY
Qty: 90 TABLET | Refills: 1 | Status: SHIPPED | OUTPATIENT
Start: 2022-08-15

## 2022-08-15 RX ORDER — IRBESARTAN 75 MG/1
75 TABLET ORAL
Qty: 90 TABLET | Refills: 1 | Status: SHIPPED | OUTPATIENT
Start: 2022-08-15

## 2022-08-18 ENCOUNTER — OFFICE VISIT (OUTPATIENT)
Dept: FAMILY MEDICINE CLINIC | Facility: CLINIC | Age: 67
End: 2022-08-18
Payer: COMMERCIAL

## 2022-08-18 VITALS
SYSTOLIC BLOOD PRESSURE: 138 MMHG | DIASTOLIC BLOOD PRESSURE: 84 MMHG | OXYGEN SATURATION: 94 % | BODY MASS INDEX: 29.25 KG/M2 | WEIGHT: 149 LBS | TEMPERATURE: 97.8 F | HEIGHT: 60 IN | HEART RATE: 90 BPM

## 2022-08-18 DIAGNOSIS — J32.0 LEFT MAXILLARY SINUSITIS: ICD-10-CM

## 2022-08-18 DIAGNOSIS — K21.9 GASTROESOPHAGEAL REFLUX DISEASE WITHOUT ESOPHAGITIS: ICD-10-CM

## 2022-08-18 DIAGNOSIS — Z00.00 ENCOUNTER FOR MEDICARE ANNUAL WELLNESS EXAM: Primary | ICD-10-CM

## 2022-08-18 DIAGNOSIS — J30.89 ENVIRONMENTAL AND SEASONAL ALLERGIES: ICD-10-CM

## 2022-08-18 DIAGNOSIS — J44.9 CHRONIC OBSTRUCTIVE PULMONARY DISEASE, UNSPECIFIED COPD TYPE (HCC): ICD-10-CM

## 2022-08-18 DIAGNOSIS — J96.11 CHRONIC RESPIRATORY FAILURE WITH HYPOXIA (HCC): ICD-10-CM

## 2022-08-18 DIAGNOSIS — F33.9 DEPRESSION, RECURRENT (HCC): ICD-10-CM

## 2022-08-18 DIAGNOSIS — E66.01 OBESITY, MORBID (HCC): ICD-10-CM

## 2022-08-18 DIAGNOSIS — Z12.31 ENCOUNTER FOR SCREENING MAMMOGRAM FOR MALIGNANT NEOPLASM OF BREAST: ICD-10-CM

## 2022-08-18 DIAGNOSIS — R06.7 SNEEZING: ICD-10-CM

## 2022-08-18 DIAGNOSIS — J45.40 MODERATE PERSISTENT ASTHMA WITHOUT COMPLICATION: ICD-10-CM

## 2022-08-18 DIAGNOSIS — I10 PRIMARY HYPERTENSION: ICD-10-CM

## 2022-08-18 PROCEDURE — 1125F AMNT PAIN NOTED PAIN PRSNT: CPT | Performed by: NURSE PRACTITIONER

## 2022-08-18 PROCEDURE — 1090F PRES/ABSN URINE INCON ASSESS: CPT | Performed by: NURSE PRACTITIONER

## 2022-08-18 PROCEDURE — 1003F LEVEL OF ACTIVITY ASSESS: CPT | Performed by: NURSE PRACTITIONER

## 2022-08-18 PROCEDURE — 3725F SCREEN DEPRESSION PERFORMED: CPT | Performed by: NURSE PRACTITIONER

## 2022-08-18 PROCEDURE — G0439 PPPS, SUBSEQ VISIT: HCPCS | Performed by: NURSE PRACTITIONER

## 2022-08-18 PROCEDURE — 3075F SYST BP GE 130 - 139MM HG: CPT | Performed by: NURSE PRACTITIONER

## 2022-08-18 PROCEDURE — 3079F DIAST BP 80-89 MM HG: CPT | Performed by: NURSE PRACTITIONER

## 2022-08-18 PROCEDURE — 1170F FXNL STATUS ASSESSED: CPT | Performed by: NURSE PRACTITIONER

## 2022-08-18 PROCEDURE — 1160F RVW MEDS BY RX/DR IN RCRD: CPT | Performed by: NURSE PRACTITIONER

## 2022-08-18 PROCEDURE — 3288F FALL RISK ASSESSMENT DOCD: CPT | Performed by: NURSE PRACTITIONER

## 2022-08-18 RX ORDER — FLUTICASONE PROPIONATE 50 MCG
2 SPRAY, SUSPENSION (ML) NASAL 2 TIMES DAILY
Qty: 16 G | Refills: 0 | Status: SHIPPED | OUTPATIENT
Start: 2022-08-18

## 2022-08-18 RX ORDER — PREDNISONE 10 MG/1
TABLET ORAL
Qty: 30 TABLET | Refills: 0 | Status: SHIPPED | OUTPATIENT
Start: 2022-08-18

## 2022-08-18 RX ORDER — LORATADINE 10 MG/1
10 TABLET ORAL DAILY
Qty: 30 TABLET | Refills: 0 | Status: SHIPPED | OUTPATIENT
Start: 2022-08-18

## 2022-08-18 NOTE — PATIENT INSTRUCTIONS
Medicare Preventive Visit Patient Instructions  Thank you for completing your Welcome to Medicare Visit or Medicare Annual Wellness Visit today  Your next wellness visit will be due in one year (8/19/2023)  The screening/preventive services that you may require over the next 5-10 years are detailed below  Some tests may not apply to you based off risk factors and/or age  Screening tests ordered at today's visit but not completed yet may show as past due  Also, please note that scanned in results may not display below  Preventive Screenings:  Service Recommendations Previous Testing/Comments   Colorectal Cancer Screening  * Colonoscopy    * Fecal Occult Blood Test (FOBT)/Fecal Immunochemical Test (FIT)  * Fecal DNA/Cologuard Test  * Flexible Sigmoidoscopy Age: 39-70 years old   Colonoscopy: every 10 years (may be performed more frequently if at higher risk)  OR  FOBT/FIT: every 1 year  OR  Cologuard: every 3 years  OR  Sigmoidoscopy: every 5 years  Screening may be recommended earlier than age 39 if at higher risk for colorectal cancer  Also, an individualized decision between you and your healthcare provider will decide whether screening between the ages of 74-80 would be appropriate  Colonoscopy: 09/23/2020  FOBT/FIT: Not on file  Cologuard: Not on file  Sigmoidoscopy: Not on file    Screening Current     Breast Cancer Screening Age: 36 years old  Frequency: every 1-2 years  Not required if history of left and right mastectomy Mammogram: 02/17/2022    Screening Current   Cervical Cancer Screening Between the ages of 21-29, pap smear recommended once every 3 years  Between the ages of 33-67, can perform pap smear with HPV co-testing every 5 years     Recommendations may differ for women with a history of total hysterectomy, cervical cancer, or abnormal pap smears in past  Pap Smear: Not on file    Screening Not Indicated   Hepatitis C Screening Once for adults born between 1945 and 1965  More frequently in patients at high risk for Hepatitis C Hep C Antibody: 02/11/2020    Screening Current   Diabetes Screening 1-2 times per year if you're at risk for diabetes or have pre-diabetes Fasting glucose: 102 mg/dL (8/11/2022)  A1C: 5 6 % (8/2/2021)  Screening Current   Cholesterol Screening Once every 5 years if you don't have a lipid disorder  May order more often based on risk factors  Lipid panel: 08/11/2022    Screening Not Indicated  History Lipid Disorder     Other Preventive Screenings Covered by Medicare:  1  Abdominal Aortic Aneurysm (AAA) Screening: covered once if your at risk  You're considered to be at risk if you have a family history of AAA  2  Lung Cancer Screening: covers low dose CT scan once per year if you meet all of the following conditions: (1) Age 50-69; (2) No signs or symptoms of lung cancer; (3) Current smoker or have quit smoking within the last 15 years; (4) You have a tobacco smoking history of at least 20 pack years (packs per day multiplied by number of years you smoked); (5) You get a written order from a healthcare provider  3  Glaucoma Screening: covered annually if you're considered high risk: (1) You have diabetes OR (2) Family history of glaucoma OR (3)  aged 48 and older OR (3)  American aged 72 and older  3  Osteoporosis Screening: covered every 2 years if you meet one of the following conditions: (1) You're estrogen deficient and at risk for osteoporosis based off medical history and other findings; (2) Have a vertebral abnormality; (3) On glucocorticoid therapy for more than 3 months; (4) Have primary hyperparathyroidism; (5) On osteoporosis medications and need to assess response to drug therapy  · Last bone density test (DXA Scan): 02/17/2022   5  HIV Screening: covered annually if you're between the age of 15-65  Also covered annually if you are younger than 13 and older than 72 with risk factors for HIV infection   For pregnant patients, it is covered up to 3 times per pregnancy  Immunizations:  Immunization Recommendations   Influenza Vaccine Annual influenza vaccination during flu season is recommended for all persons aged >= 6 months who do not have contraindications   Pneumococcal Vaccine   * Pneumococcal conjugate vaccine = PCV13 (Prevnar 13), PCV15 (Vaxneuvance), PCV20 (Prevnar 20)  * Pneumococcal polysaccharide vaccine = PPSV23 (Pneumovax) Adults 25-60 years old: 1-3 doses may be recommended based on certain risk factors  Adults 72 years old: 1-2 doses may be recommended based off what pneumonia vaccine you previously received   Hepatitis B Vaccine 3 dose series if at intermediate or high risk (ex: diabetes, end stage renal disease, liver disease)   Tetanus (Td) Vaccine - COST NOT COVERED BY MEDICARE PART B Following completion of primary series, a booster dose should be given every 10 years to maintain immunity against tetanus  Td may also be given as tetanus wound prophylaxis  Tdap Vaccine - COST NOT COVERED BY MEDICARE PART B Recommended at least once for all adults  For pregnant patients, recommended with each pregnancy  Shingles Vaccine (Shingrix) - COST NOT COVERED BY MEDICARE PART B  2 shot series recommended in those aged 48 and above     Health Maintenance Due:      Topic Date Due    Breast Cancer Screening: Mammogram  02/17/2024    Colorectal Cancer Screening  09/23/2025    Hepatitis C Screening  Completed     Immunizations Due:      Topic Date Due    COVID-19 Vaccine (3 - Booster for Moderna series) 10/04/2021    Pneumococcal Vaccine: 65+ Years (3 - PPSV23 or PCV20) 04/10/2022    Influenza Vaccine (1) 09/01/2022     Advance Directives   What are advance directives? Advance directives are legal documents that state your wishes and plans for medical care  These plans are made ahead of time in case you lose your ability to make decisions for yourself   Advance directives can apply to any medical decision, such as the treatments you want, and if you want to donate organs  What are the types of advance directives? There are many types of advance directives, and each state has rules about how to use them  You may choose a combination of any of the following:  · Living will: This is a written record of the treatment you want  You can also choose which treatments you do not want, which to limit, and which to stop at a certain time  This includes surgery, medicine, IV fluid, and tube feedings  · Durable power of  for healthcare [facility]): This is a written record that states who you want to make healthcare choices for you when you are unable to make them for yourself  This person, called a proxy, is usually a family member or a friend  You may choose more than 1 proxy  · Do not resuscitate (DNR) order:  A DNR order is used in case your heart stops beating or you stop breathing  It is a request not to have certain forms of treatment, such as CPR  A DNR order may be included in other types of advance directives  · Medical directive: This covers the care that you want if you are in a coma, near death, or unable to make decisions for yourself  You can list the treatments you want for each condition  Treatment may include pain medicine, surgery, blood transfusions, dialysis, IV or tube feedings, and a ventilator (breathing machine)  · Values history: This document has questions about your views, beliefs, and how you feel and think about life  This information can help others choose the care that you would choose  Why are advance directives important? An advance directive helps you control your care  Although spoken wishes may be used, it is better to have your wishes written down  Spoken wishes can be misunderstood, or not followed  Treatments may be given even if you do not want them  An advance directive may make it easier for your family to make difficult choices about your care     Urinary Incontinence   Urinary incontinence (UI) is when you lose control of your bladder  UI develops because your bladder cannot store or empty urine properly  The 3 most common types of UI are stress incontinence, urge incontinence, or both  Medicines:   · May be given to help strengthen your bladder control  Report any side effects of medication to your healthcare provider  Do pelvic muscle exercises often:  Your pelvic muscles help you stop urinating  Squeeze these muscles tight for 5 seconds, then relax for 5 seconds  Gradually work up to squeezing for 10 seconds  Do 3 sets of 15 repetitions a day, or as directed  This will help strengthen your pelvic muscles and improve bladder control  Train your bladder:  Go to the bathroom at set times, such as every 2 hours, even if you do not feel the urge to go  You can also try to hold your urine when you feel the urge to go  For example, hold your urine for 5 minutes when you feel the urge to go  As that becomes easier, hold your urine for 10 minutes  Self-care:   · Keep a UI record  Write down how often you leak urine and how much you leak  Make a note of what you were doing when you leaked urine  · Drink liquids as directed  You may need to limit the amount of liquid you drink to help control your urine leakage  Do not drink any liquid right before you go to bed  Limit or do not have drinks that contain caffeine or alcohol  · Prevent constipation  Eat a variety of high-fiber foods  Good examples are high-fiber cereals, beans, vegetables, and whole-grain breads  Walking is the best way to trigger your intestines to have a bowel movement  · Exercise regularly and maintain a healthy weight  Weight loss and exercise will decrease pressure on your bladder and help you control your leakage  · Use a catheter as directed  to help empty your bladder  A catheter is a tiny, plastic tube that is put into your bladder to drain your urine  · Go to behavior therapy as directed    Behavior therapy may be used to help you learn to control your urge to urinate  Cigarette Smoking and Your Health   Risks to your health if you smoke:  Nicotine and other chemicals found in tobacco damage every cell in your body  Even if you are a light smoker, you have an increased risk for cancer, heart disease, and lung disease  If you are pregnant or have diabetes, smoking increases your risk for complications  Benefits to your health if you stop smoking:   · You decrease respiratory symptoms such as coughing, wheezing, and shortness of breath  · You reduce your risk for cancers of the lung, mouth, throat, kidney, bladder, pancreas, stomach, and cervix  If you already have cancer, you increase the benefits of chemotherapy  You also reduce your risk for cancer returning or a second cancer from developing  · You reduce your risk for heart disease, blood clots, heart attack, and stroke  · You reduce your risk for lung infections, and diseases such as pneumonia, asthma, chronic bronchitis, and emphysema  · Your circulation improves  More oxygen can be delivered to your body  If you have diabetes, you lower your risk for complications, such as kidney, artery, and eye diseases  You also lower your risk for nerve damage  Nerve damage can lead to amputations, poor vision, and blindness  · You improve your body's ability to heal and to fight infections  For more information and support to stop smoking:   · [place]  Phone: 4- 867 - [phone]  Web Address: www Friend.ly  Weight Management   Why it is important to manage your weight:  Being overweight increases your risk of health conditions such as heart disease, high blood pressure, type 2 diabetes, and certain types of cancer  It can also increase your risk for osteoarthritis, sleep apnea, and other respiratory problems  Aim for a slow, steady weight loss  Even a small amount of weight loss can lower your risk of health problems    How to lose weight safely:  A safe and healthy way to lose weight is to eat fewer calories and get regular exercise  You can lose up about 1 pound a week by decreasing the number of calories you eat by 500 calories each day  Healthy meal plan for weight management:  A healthy meal plan includes a variety of foods, contains fewer calories, and helps you stay healthy  A healthy meal plan includes the following:  · Eat whole-grain foods more often  A healthy meal plan should contain fiber  Fiber is the part of grains, fruits, and vegetables that is not broken down by your body  Whole-grain foods are healthy and provide extra fiber in your diet  Some examples of whole-grain foods are whole-wheat breads and pastas, oatmeal, brown rice, and bulgur  · Eat a variety of vegetables every day  Include dark, leafy greens such as spinach, kale, alfredo greens, and mustard greens  Eat yellow and orange vegetables such as carrots, sweet potatoes, and winter squash  · Eat a variety of fruits every day  Choose fresh or canned fruit (canned in its own juice or light syrup) instead of juice  Fruit juice has very little or no fiber  · Eat low-fat dairy foods  Drink fat-free (skim) milk or 1% milk  Eat fat-free yogurt and low-fat cottage cheese  Try low-fat cheeses such as mozzarella and other reduced-fat cheeses  · Choose meat and other protein foods that are low in fat  Choose beans or other legumes such as split peas or lentils  Choose fish, skinless poultry (chicken or turkey), or lean cuts of red meat (beef or pork)  Before you cook meat or poultry, cut off any visible fat  · Use less fat and oil  Try baking foods instead of frying them  Add less fat, such as margarine, sour cream, regular salad dressing and mayonnaise to foods  Eat fewer high-fat foods  Some examples of high-fat foods include french fries, doughnuts, ice cream, and cakes  · Eat fewer sweets  Limit foods and drinks that are high in sugar   This includes candy, cookies, regular soda, and sweetened drinks  Exercise:  Exercise at least 30 minutes per day on most days of the week  Some examples of exercise include walking, biking, dancing, and swimming  You can also fit in more physical activity by taking the stairs instead of the elevator or parking farther away from stores  Ask your healthcare provider about the best exercise plan for you  © Copyright [employer] 2018 Information is for End User's use only and may not be sold, redistributed or otherwise used for commercial purposes   All illustrations and images included in CareNotes® are the copyrighted property of [employer]  or [address] [place] [address] [employer]

## 2022-08-18 NOTE — PROGRESS NOTES
Assessment and Plan:     Problem List Items Addressed This Visit     Gastroesophageal reflux disease without esophagitis    Moderate persistent asthma without complication    Chronic respiratory failure with hypoxia (HCC)    Depression, recurrent (HCC)    Obesity, morbid ([address] )    Primary hypertension      Other Visit Diagnoses     Encounter for Medicare annual wellness exam    -  Primary    Environmental and seasonal allergies        Relevant Orders    [place] Allergy Panel, Adult    Sneezing        Relevant Medications    fluticasone (FLONASE) 50 mcg/act nasal spray    Misc Natural Product Nasal (Nasal Cleanse Rinse Mix) PACK    sodium chloride (OCEAN) 0 65 % nasal spray    loratadine (Claritin) 10 mg tablet    predniSONE 10 mg tablet    Other Relevant Orders    [place] Allergy Panel, Adult    Chronic obstructive pulmonary disease, unspecified COPD type (HCC)        Relevant Medications    fluticasone (FLONASE) 50 mcg/act nasal spray    Misc Natural Product Nasal (Nasal Cleanse Rinse Mix) PACK    sodium chloride (OCEAN) 0 65 % nasal spray    loratadine (Claritin) 10 mg tablet    predniSONE 10 mg tablet    Left maxillary sinusitis        Relevant Medications    fluticasone (FLONASE) 50 mcg/act nasal spray    Misc Natural Product Nasal (Nasal Cleanse Rinse Mix) PACK    sodium chloride (OCEAN) 0 65 % nasal spray    loratadine (Claritin) 10 mg tablet    predniSONE 10 mg tablet    Encounter for screening mammogram for malignant neoplasm of breast        Relevant Orders    Mammo screening bilateral w 3d & cad           Preventive health issues were discussed with patient, and age appropriate screening tests were ordered as noted in patient's After Visit Summary  Personalized health advice and appropriate referrals for health education or preventive services given if needed, as noted in patient's After Visit Summary       History of Present Illness:     Patient presents for a Medicare Wellness Visit    Sinus Problem  This is a chronic problem  There has been no fever  She is experiencing no pain  Associated symptoms include congestion, coughing, sinus pressure and sneezing  Treatments tried: nasal rinse and steroid sprays  The treatment provided mild relief  Hypertension  This is a chronic problem  The problem is controlled  There are no associated agents to hypertension  Risk factors for coronary artery disease include dyslipidemia, post-menopausal state and smoking/tobacco exposure  Past treatments include diuretics and angiotensin blockers  The current treatment provides moderate improvement  There are no compliance problems  There is no history of angina or CAD/MI  There is no history of chronic renal disease or a thyroid problem  Depression  This is a chronic problem  Associated symptoms include congestion and coughing  Treatments tried: duloxetine, quetiapine,  The treatment provided moderate relief  Heartburn  She complains of coughing  This is a chronic problem  There are no known risk factors  She has tried a PPI for the symptoms  The treatment provided significant relief  Patient Care Team:  Nilda Diaz as PCP - General (Internal Medicine)  Curtis Perez DO as PCP - [address] [place] [address] (RTE)     Review of Systems:     Review of Systems   HENT: Positive for congestion, sinus pressure and sneezing  Respiratory: Positive for cough  Psychiatric/Behavioral: Positive for depression  All other systems reviewed and are negative         Problem List:     Patient Active Problem List   Diagnosis    Anxiety and depression    Chronic back pain    Chronic bronchitis (HCC)    Chronic diarrhea    COPD exacerbation [facility] )    DDD (degenerative disc disease), lumbar    Herniation of lumbar intervertebral disc    Gastric ulcer    Gastroesophageal reflux disease without esophagitis    HCAP (healthcare-associated pneumonia)    Hypokalemia    Abnormal CT of the chest    Moderate persistent asthma without complication    Cigarette nicotine dependence without complication    [facility] failure [facility] hypoxia ([facility]    Severe obstructive sleep apnea    Seasonal allergic rhinitis    Dysphagia    Right lower quadrant abdominal pain    Constipation    Depression, recurrent ([facility])    Obesity, morbid ([place] [address] )    Primary hypertension      Past Medical and Surgical History:     Past Medical History:   Diagnosis Date    Acute on chronic respiratory failure with hypoxia ([facility] ) 3/1/2020    Adult BMI 37 0-37 9 kg/sq m 4/1/2021    Anxiety     Chronic obstructive pulmonary disease with acute lower respiratory infection ([facility] ) 10/25/2017    COPD (chronic obstructive pulmonary disease) ([facility])     Depression     Diverticulitis     GERD (gastroesophageal reflux disease)     Influenza A 2/29/2020    Pneumonia of right lower lobe due to influenza A virus 2/29/2020    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[address] )     Primary hypertension 2/10/2022    Psychiatric disorder     Sepsis due to Haemophilus influenzae with acute hypoxic respiratory failure without septic shock ([place] [address] ) 2/29/2020    Shortness of breath     Sleep apnea      Past Surgical History:   Procedure Laterality Date    APPENDECTOMY      CHOLECYSTECTOMY      partial x2 per pt    HIATAL HERNIA REPAIR      INGUINAL HERNIA REPAIR Right     LAPAROSCOPIC TUBAL LIGATION Bilateral     ROTATOR CUFF REPAIR Right     UVULOPALATOPHARYNGOPLASTY        Family History:     Family History   Problem Relation Age of Onset    No Known Problems Mother         passed away from [facility] on 8/8/22    No Known Problems Father     No Known Problems Sister     No Known Problems Daughter     Breast cancer Maternal Grandmother 28    No Known Problems Paternal Grandmother     No Known Problems Maternal Aunt       Social History:     Social History     Socioeconomic History    Marital status:      Spouse name: None    Number of children: None    Years of education: None    Highest education level: None   Occupational History    None   Tobacco Use    Smoking status: Current Every Day Smoker     Packs/day: 1 00     Types: Cigarettes    Smokeless tobacco: Never Used   Vaping Use    Vaping Use: Every day    Substances: THC, Flavoring   Substance and Sexual Activity    Alcohol use: Yes     Comment: rarely    Drug use: Yes     Types: Marijuana     Comment: daily in e-pen form    Sexual activity: None   Other Topics Concern    None   Social History Narrative    None     Social Determinants of Health     Financial Resource Strain: Not on file   Food Insecurity: Not on file   Transportation Needs: Not on file   Physical Activity: Not on file   Stress: Not on file   Social Connections: Not on file   Intimate Partner Violence: Not on file   Housing Stability: Not on file      Medications and Allergies:     Current Outpatient Medications   Medication Sig Dispense Refill    albuterol (PROVENTIL HFA,VENTOLIN HFA) 90 mcg/act inhaler INHALE 2 PUFFS EVERY 6 HOURS AS NEEDED FOR WHEEZING 8 5 g 0    atorvastatin (LIPITOR) 40 mg tablet Take 1 tablet (40 mg total) by mouth daily 90 tablet 1    calcium carbonate (OS-PRETTY) 600 MG tablet Take 1 tablet (600 mg total) by mouth 2 (two) times a day with meals 180 tablet 3    Cholecalciferol (Vitamin D3) 50 MCG (2000 UT) TABS Take 1 tablet (2,000 Units total) by mouth daily 90 tablet 3    colestipol (COLESTID) 1 g tablet Take 1 tablet (1 g total) by mouth 2 (two) times a day 180 tablet 0    cyclobenzaprine (FLEXERIL) 10 mg tablet Take 1 tablet (10 mg total) by mouth daily 90 tablet 0    dicyclomine (BENTYL) 20 mg tablet Take 1 tablet (20 mg total) by mouth 3 (three) times a day as needed (as needed) 270 tablet 1    DULoxetine (CYMBALTA) 30 mg delayed release capsule Take 1 capsule (30 mg total) by mouth daily 90 capsule 1    DULoxetine (CYMBALTA) 60 mg delayed release capsule Take 1 capsule (60 mg total) by mouth daily 90 capsule 1    esomeprazole (NexIUM) 40 MG capsule Take 1 capsule (40 mg total) by mouth daily 90 capsule 1    fluticasone (FLONASE) 50 mcg/act nasal spray 2 sprays into each nostril 2 (two) times a day 16 g 0    fluticasone-salmeterol (Advair Diskus) 500-50 mcg/dose inhaler Inhale 1 puff 2 (two) times a day 60 blister 5    furosemide (LASIX) 40 mg tablet Take 1 tablet (40 mg total) by mouth daily as needed (As needed for edema) 90 tablet 0    gabapentin (NEURONTIN) 300 mg capsule Take 1 capsule (300 mg total) by mouth 2 (two) times a day 180 capsule 1    irbesartan (AVAPRO) 75 mg tablet Take 1 tablet (75 mg total) by mouth daily at bedtime 90 tablet 1    loratadine (Claritin) 10 mg tablet Take 1 tablet (10 mg total) by mouth daily 30 tablet 0    Misc Natural Product Nasal (Nasal Cleanse Rinse Mix) PACK 1 spray into each nostril 2 (two) times a day as needed (nasal irriagtion) for up to 7 days 1 each 0    Naproxen Sodium (ALEVE PO) Take 2 tablets by mouth every morning      ondansetron (ZOFRAN) 4 mg tablet Take 1 tablet (4 mg total) by mouth every 6 (six) hours 12 tablet 0    potassium chloride (Klor-Con) 10 mEq tablet TAKE 1 TABLET DAILY AS NEEDED (WHEN TAKE LASIX) 90 tablet 1    predniSONE 10 mg tablet 4 tablets for 3 days, 3 tablets for 3 days, 2 tablets for 3 days, 1 tablet for 3 days, then D/C  30 tablet 0    QUEtiapine (SEROquel) 50 mg tablet TAKE 1 TABLET IN THE MORNING AND 2 TABLETS AT BEDTIME    270 tablet 0    sodium chloride (OCEAN) 0 65 % nasal spray 2 sprays into each nostril as needed for congestion or rhinitis 88 mL 0    alendronate (Fosamax) 70 mg tablet Take 1 tablet (70 mg total) by mouth every 7 days (Patient not taking: Reported on 8/18/2022) 12 tablet 3     No current facility-administered medications for this visit       Allergies   Allergen Reactions    Erythromycin Throat Swelling    Morphine Delirium      Immunizations:     Immunization History   Administered Date(s) Administered    COVID-19 MODERNA VACC 0 5 ML IM 04/02/2021, 05/04/2021    INFLUENZA 11/30/2016, 11/14/2018    Influenza, high dose seasonal 0 7 mL 09/20/2021    Influenza, recombinant, quadrivalent,injectable, preservative free 03/03/2020    Pneumococcal Conjugate 13-Valent 08/29/2016    Pneumococcal Polysaccharide PPV23 04/10/2017      Health Maintenance:         Topic Date Due    Breast Cancer Screening: Mammogram  02/17/2024    Colorectal Cancer Screening  09/23/2025    Hepatitis C Screening  Completed         Topic Date Due    COVID-19 Vaccine (3 - Booster for Moderna series) 10/04/2021    Pneumococcal Vaccine: 65+ Years (3 - PPSV23 or PCV20) 04/10/2022    Influenza Vaccine (1) 09/01/2022      Medicare Screening Tests and Risk Assessments:     Lauro Moss is here for her Subsequent Wellness visit  Last Medicare Wellness visit information reviewed, patient interviewed and updates made to the record today  Health Risk Assessment:   Patient rates overall health as good  Patient feels that their physical health rating is slightly better  Patient is satisfied with their life  Eyesight was rated as same  Hearing was rated as same  Patient feels that their emotional and mental health rating is slightly better  Patients states they are never, rarely angry  Patient states they are often unusually tired/fatigued  Pain experienced in the last 7 days has been a lot  Patient's pain rating has been 7/10  Patient states that she has experienced no weight loss or gain in last 6 months  Depression Screening:   PHQ-9 Score: 6      Fall Risk Screening: In the past year, patient has experienced: no history of falling in past year      Urinary Incontinence Screening:   Patient has leaked urine accidently in the last six months  Home Safety:  Patient does not have trouble with stairs inside or outside of their home  Patient has working smoke alarms and has working carbon monoxide detector   Home safety hazards include: none  Nutrition:   Current diet is Regular and No Added Salt  Medications:   Patient is currently taking over-the-counter supplements  OTC medications include: see medication list  Patient is able to manage medications  Activities of Daily Living (ADLs)/Instrumental Activities of Daily Living (IADLs):   Walk and transfer into and out of bed and chair?: Yes  Dress and groom yourself?: Yes    Bathe or shower yourself?: Yes    Feed yourself? Yes  Do your laundry/housekeeping?: Yes  Manage your money, pay your bills and track your expenses?: Yes  Make your own meals?: Yes    Do your own shopping?: Yes    Previous Hospitalizations:   Any hospitalizations or ED visits within the last 12 months?: No      Advance Care Planning:   Living will: Yes    Durable POA for healthcare:  Yes    Advanced directive: Yes    Advanced directive counseling given: Yes    Five wishes given: Yes    Patient declined ACP directive: No    End of Life Decisions reviewed with patient: Yes    Provider agrees with end of life decisions: Yes      Cognitive Screening:   Provider or family/friend/caregiver concerned regarding cognition?: No    PREVENTIVE SCREENINGS      Cardiovascular Screening:    General: Screening Not Indicated and History Lipid Disorder      Diabetes Screening:     General: Screening Current      Colorectal Cancer Screening:     General: Screening Current      Breast Cancer Screening:     General: Screening Current      Cervical Cancer Screening:    General: Screening Not Indicated      Osteoporosis Screening:    General: Screening Not Indicated and History Osteoporosis      Lung Cancer Screening:     General: Screening Not Indicated      Hepatitis C Screening:    General: Screening Current    Screening, Brief Intervention, and Referral to Treatment (SBIRT)    Screening      Single Item Drug Screening:  How often have you used an illegal drug (including marijuana) or a prescription medication for non-medical reasons in the past year? never    Single Item Drug Screen Score: 0  Interpretation: Negative screen for possible drug use disorder    Other Counseling Topics:   Car/seat belt/driving safety, skin self-exam, sunscreen and regular weightbearing exercise and calcium and vitamin D intake  No exam data present     Physical Exam:     /84 (BP Location: Left arm, Patient Position: Sitting, Cuff Size: Adult)   Pulse 90   Temp 97 8 °F (36 6 °C) (Tympanic)   Ht 5' (1 524 m)   Wt 67 6 kg (149 lb)   SpO2 94%   BMI 29 10 kg/m²     Physical Exam  Vitals and nursing note reviewed  Constitutional:       Appearance: Normal appearance  She is well-developed  HENT:      Head: Normocephalic and atraumatic  Right Ear: Tympanic membrane, ear canal and external ear normal       Left Ear: Tympanic membrane, ear canal and external ear normal       Nose: Nose normal       Mouth/Throat:      Mouth: Mucous membranes are moist    Eyes:      General: Lids are normal  Vision grossly intact  Conjunctiva/sclera: Conjunctivae normal       Pupils: Pupils are equal, round, and reactive to light  Cardiovascular:      Rate and Rhythm: Normal rate and regular rhythm  Pulses: Normal pulses  Heart sounds: Normal heart sounds, S1 normal and S2 normal      No friction rub  No gallop  No S3 sounds  Pulmonary:      Effort: Pulmonary effort is normal       Breath sounds: Normal breath sounds  Abdominal:      General: Bowel sounds are normal       Palpations: Abdomen is soft  Tenderness: There is no abdominal tenderness  Genitourinary:     Comments: Deferred  Musculoskeletal:         General: Normal range of motion  Cervical back: Normal range of motion and neck supple  Right lower leg: No edema  Left lower leg: No edema  Lymphadenopathy:      Cervical: No cervical adenopathy  Skin:     General: Skin is warm and dry  Capillary Refill: Capillary refill takes less than 2 seconds  Neurological:      General: No focal deficit present  Mental Status: She is alert and oriented to person, place, and time  Motor: Motor function is intact  Gait: Gait is intact  Psychiatric:         Attention and Perception: Attention and perception normal          Mood and Affect: Mood and affect normal          Speech: Speech normal          Behavior: Behavior normal  Behavior is cooperative  Thought Content: Thought content normal          Cognition and Memory: Cognition and memory normal          Judgment: Judgment normal           Appointment on 08/11/2022   Component Date Value Ref Range Status    Cholesterol 08/11/2022 128  See Comment mg/dL Final    Cholesterol:         Pediatric <18 Years        Desirable          <170 mg/dL      Borderline High    170-199 mg/dL      High               >=200 mg/dL        Adult >=18 Years            Desirable         <200 mg/dL      Borderline High   200-239 mg/dL      High              >239 mg/dL      Triglycerides 08/11/2022 130  See Comment mg/dL Final    Triglyceride:     0-9Y            <75mg/dL     10Y-17Y         <90 mg/dL       >=18Y     Normal          <150 mg/dL     Borderline High 150-199 mg/dL     High            200-499 mg/dL        Very High       >499 mg/dL    Specimen collection should occur prior to N-Acetylcysteine or Metamizole administration due to the potential for falsely depressed results   HDL, Direct 08/11/2022 44 (A) >=50 mg/dL Final    Specimen collection should occur prior to Metamizole administration due to the potential for falsley depressed results   LDL Calculated 08/11/2022 58  0 - 100 mg/dL Final    LDL Cholesterol:     Optimal           <100 mg/dl     Near Optimal      100-129 mg/dl     Above Optimal       Borderline High 130-159 mg/dl       High            160-189 mg/dl       Very High       >189 mg/dl         This screening LDL is a calculated result     It does not have the accuracy of the Direct Measured LDL in the monitoring of patients with hyperlipidemia and/or statin therapy  Direct Measure LDL (JNP958) must be ordered separately in these patients   WBC 08/11/2022 7 66  4 31 - 10 16 Thousand/uL Final    RBC 08/11/2022 4 29  3 81 - 5 12 Million/uL Final    Hemoglobin 08/11/2022 13 3  11 5 - 15 4 g/dL Final    Hematocrit 08/11/2022 41 0  34 8 - 46 1 % Final    MCV 08/11/2022 96  82 - 98 fL Final    MCH 08/11/2022 31 0  26 8 - 34 3 pg Final    MCHC 08/11/2022 32 4  31 4 - 37 4 g/dL Final    RDW 08/11/2022 15 8 (A) 11 6 - 15 1 % Final    MPV 08/11/2022 12 1  8 9 - 12 7 fL Final    Platelets 76/96/4390 269  149 - 390 Thousands/uL Final    nRBC 08/11/2022 0  /100 WBCs Final    Neutrophils Relative 08/11/2022 63  43 - 75 % Final    Immat GRANS % 08/11/2022 0  0 - 2 % Final    Lymphocytes Relative 08/11/2022 27  14 - 44 % Final    Monocytes Relative 08/11/2022 7  4 - 12 % Final    Eosinophils Relative 08/11/2022 2  0 - 6 % Final    Basophils Relative 08/11/2022 1  0 - 1 % Final    Neutrophils Absolute 08/11/2022 4 75  1 85 - 7 62 Thousands/µL Final    Immature Grans Absolute 08/11/2022 0 03  0 00 - 0 20 Thousand/uL Final    Lymphocytes Absolute 08/11/2022 2 09  0 60 - 4 47 Thousands/µL Final    Monocytes Absolute 08/11/2022 0 55  0 17 - 1 22 Thousand/µL Final    Eosinophils Absolute 08/11/2022 0 16  0 00 - 0 61 Thousand/µL Final    Basophils Absolute 08/11/2022 0 08  0 00 - 0 10 Thousands/µL Final    Sodium 08/11/2022 138  135 - 147 mmol/L Final    Potassium 08/11/2022 3 9  3 5 - 5 3 mmol/L Final    Slightly Hemolyzed;  Results May be Affected    Chloride 08/11/2022 108  96 - 108 mmol/L Final    CO2 08/11/2022 26  21 - 32 mmol/L Final    ANION GAP 08/11/2022 4  4 - 13 mmol/L Final    BUN 08/11/2022 16  5 - 25 mg/dL Final    Creatinine 08/11/2022 0 61  0 60 - 1 30 mg/dL Final    Standardized to IDMS reference method    Glucose, Fasting 08/11/2022 102 (A) 65 - 99 mg/dL Final Specimen collection should occur prior to Sulfasalazine administration due to the potential for falsely depressed results  Specimen collection should occur prior to Sulfapyridine administration due to the potential for falsely elevated results   Calcium 08/11/2022 9 1  8 3 - 10 1 mg/dL Final    AST 08/11/2022 19  5 - 45 U/L Final    Slightly Hemolyzed; Results May be Affected  Specimen collection should occur prior to Sulfasalazine administration due to the potential for falsely depressed results   ALT 08/11/2022 25  12 - 78 U/L Final    Specimen collection should occur prior to Sulfasalazine and/or Sulfapyridine administration due to the potential for falsely depressed results   Alkaline Phosphatase 08/11/2022 85  46 - 116 U/L Final    Total Protein 08/11/2022 6 9  6 4 - 8 4 g/dL Final    Albumin 08/11/2022 3 6  3 5 - 5 0 g/dL Final    Total Bilirubin 08/11/2022 0 55  0 20 - 1 00 mg/dL Final    Use of this assay is not recommended for patients undergoing treatment with eltrombopag due to the potential for falsely elevated results   eGFR 08/11/2022 94  ml/min/1 73sq m Final    TSH 3RD GENERATON 08/11/2022 2 810  0 450 - 4 500 uIU/mL Final    The recommended reference ranges for TSH during pregnancy are as follows:   First trimester 0 1 to 2 5 uIU/mL   Second trimester  0 2 to 3 0 uIU/mL   Third trimester 0 3 to 3 0 uIU/m    Note: Normal ranges may not apply to patients who are transgender, non-binary, or whose legal sex, sex at birth, and gender identity differ  Adult TSH (3rd generation) reference range follows the recommended guidelines of the American Thy[employer] Association, January, 2020  I have reviewed all the lab results  There are some abnormalities that are not critical to the patient's health, I have discussed these in person at this office appointment      Mikael Nobles

## 2022-11-10 DIAGNOSIS — I10 PRIMARY HYPERTENSION: ICD-10-CM

## 2022-11-10 DIAGNOSIS — K21.9 GASTROESOPHAGEAL REFLUX DISEASE: ICD-10-CM

## 2022-11-10 DIAGNOSIS — K52.9 ENTERITIS: ICD-10-CM

## 2022-11-10 DIAGNOSIS — G47.00 INSOMNIA, UNSPECIFIED TYPE: ICD-10-CM

## 2022-11-10 DIAGNOSIS — G89.29 OTHER CHRONIC PAIN: ICD-10-CM

## 2022-11-10 DIAGNOSIS — M62.838 MUSCLE SPASM: ICD-10-CM

## 2022-11-10 RX ORDER — IRBESARTAN 75 MG/1
75 TABLET ORAL
Qty: 90 TABLET | Refills: 1 | Status: SHIPPED | OUTPATIENT
Start: 2022-11-10

## 2022-11-10 RX ORDER — ESOMEPRAZOLE MAGNESIUM 40 MG/1
40 CAPSULE, DELAYED RELEASE ORAL DAILY
Qty: 90 CAPSULE | Refills: 1 | Status: SHIPPED | OUTPATIENT
Start: 2022-11-10

## 2022-11-10 RX ORDER — QUETIAPINE FUMARATE 50 MG/1
TABLET, FILM COATED ORAL
Qty: 270 TABLET | Refills: 0 | Status: SHIPPED | OUTPATIENT
Start: 2022-11-10

## 2022-11-10 RX ORDER — GABAPENTIN 300 MG/1
300 CAPSULE ORAL 2 TIMES DAILY
Qty: 180 CAPSULE | Refills: 1 | Status: SHIPPED | OUTPATIENT
Start: 2022-11-10

## 2022-11-10 RX ORDER — CYCLOBENZAPRINE HCL 10 MG
10 TABLET ORAL DAILY
Qty: 90 TABLET | Refills: 0 | Status: SHIPPED | OUTPATIENT
Start: 2022-11-10

## 2022-11-10 RX ORDER — ONDANSETRON 4 MG/1
4 TABLET, FILM COATED ORAL EVERY 6 HOURS
Qty: 12 TABLET | Refills: 0 | Status: SHIPPED | OUTPATIENT
Start: 2022-11-10

## 2023-02-10 DIAGNOSIS — F32.A DEPRESSION, UNSPECIFIED DEPRESSION TYPE: ICD-10-CM

## 2023-02-10 DIAGNOSIS — G89.29 OTHER CHRONIC PAIN: ICD-10-CM

## 2023-02-10 DIAGNOSIS — J45.40 MODERATE PERSISTENT ASTHMA WITHOUT COMPLICATION: ICD-10-CM

## 2023-02-10 DIAGNOSIS — G47.00 INSOMNIA, UNSPECIFIED TYPE: ICD-10-CM

## 2023-02-10 DIAGNOSIS — I10 PRIMARY HYPERTENSION: ICD-10-CM

## 2023-02-10 DIAGNOSIS — M62.838 MUSCLE SPASM: ICD-10-CM

## 2023-02-10 DIAGNOSIS — K52.9 ENTERITIS: ICD-10-CM

## 2023-02-11 RX ORDER — IRBESARTAN 75 MG/1
75 TABLET ORAL
Qty: 90 TABLET | Refills: 1 | Status: SHIPPED | OUTPATIENT
Start: 2023-02-11

## 2023-02-11 RX ORDER — DULOXETIN HYDROCHLORIDE 30 MG/1
30 CAPSULE, DELAYED RELEASE ORAL DAILY
Qty: 90 CAPSULE | Refills: 1 | Status: SHIPPED | OUTPATIENT
Start: 2023-02-11

## 2023-02-11 RX ORDER — ONDANSETRON 4 MG/1
4 TABLET, FILM COATED ORAL EVERY 6 HOURS
Qty: 12 TABLET | Refills: 0 | Status: SHIPPED | OUTPATIENT
Start: 2023-02-11

## 2023-02-11 RX ORDER — DULOXETIN HYDROCHLORIDE 60 MG/1
60 CAPSULE, DELAYED RELEASE ORAL DAILY
Qty: 90 CAPSULE | Refills: 1 | Status: SHIPPED | OUTPATIENT
Start: 2023-02-11

## 2023-02-11 RX ORDER — GABAPENTIN 300 MG/1
300 CAPSULE ORAL 2 TIMES DAILY
Qty: 180 CAPSULE | Refills: 1 | Status: SHIPPED | OUTPATIENT
Start: 2023-02-11

## 2023-02-13 RX ORDER — ALBUTEROL SULFATE 90 UG/1
2 AEROSOL, METERED RESPIRATORY (INHALATION) EVERY 6 HOURS PRN
Qty: 8.5 G | Refills: 3 | Status: SHIPPED | OUTPATIENT
Start: 2023-02-13

## 2023-02-13 RX ORDER — QUETIAPINE FUMARATE 50 MG/1
TABLET, FILM COATED ORAL
Qty: 270 TABLET | Refills: 0 | Status: SHIPPED | OUTPATIENT
Start: 2023-02-13

## 2023-02-13 RX ORDER — CYCLOBENZAPRINE HCL 10 MG
10 TABLET ORAL DAILY
Qty: 90 TABLET | Refills: 0 | Status: SHIPPED | OUTPATIENT
Start: 2023-02-13

## 2023-02-16 ENCOUNTER — RA CDI HCC (OUTPATIENT)
Dept: OTHER | Facility: HOSPITAL | Age: 68
End: 2023-02-16

## 2023-02-16 NOTE — PROGRESS NOTES
[facility]  coding opportunities       Chart reviewed, no opportunity found:   [address]        Patients Insurance     Medicare Insurance: [employer] Advantage

## 2023-02-17 DIAGNOSIS — K21.9 GASTROESOPHAGEAL REFLUX DISEASE: ICD-10-CM

## 2023-02-17 DIAGNOSIS — R10.9 ABDOMINAL CRAMPING: ICD-10-CM

## 2023-02-17 RX ORDER — MONTELUKAST SODIUM 4 MG/1
1 TABLET, CHEWABLE ORAL 2 TIMES DAILY
Qty: 180 TABLET | Refills: 1 | Status: SHIPPED | OUTPATIENT
Start: 2023-02-17 | End: 2023-05-18

## 2023-02-17 RX ORDER — ESOMEPRAZOLE MAGNESIUM 40 MG/1
40 CAPSULE, DELAYED RELEASE ORAL DAILY
Qty: 90 CAPSULE | Refills: 1 | Status: SHIPPED | OUTPATIENT
Start: 2023-02-17

## 2023-04-27 ENCOUNTER — OFFICE VISIT (OUTPATIENT)
Dept: FAMILY MEDICINE CLINIC | Facility: CLINIC | Age: 68
End: 2023-04-27

## 2023-04-27 VITALS
DIASTOLIC BLOOD PRESSURE: 76 MMHG | OXYGEN SATURATION: 93 % | HEIGHT: 60 IN | HEART RATE: 114 BPM | TEMPERATURE: 98.1 F | WEIGHT: 159 LBS | BODY MASS INDEX: 31.22 KG/M2 | SYSTOLIC BLOOD PRESSURE: 134 MMHG

## 2023-04-27 DIAGNOSIS — I10 PRIMARY HYPERTENSION: ICD-10-CM

## 2023-04-27 DIAGNOSIS — E87.6 HYPOKALEMIA: Primary | ICD-10-CM

## 2023-04-27 DIAGNOSIS — K21.9 GASTROESOPHAGEAL REFLUX DISEASE WITHOUT ESOPHAGITIS: ICD-10-CM

## 2023-04-27 DIAGNOSIS — E78.2 MIXED HYPERLIPIDEMIA: ICD-10-CM

## 2023-04-27 DIAGNOSIS — Z13.0 SCREENING FOR DEFICIENCY ANEMIA: ICD-10-CM

## 2023-04-27 DIAGNOSIS — J45.40 MODERATE PERSISTENT ASTHMA WITHOUT COMPLICATION: ICD-10-CM

## 2023-04-27 DIAGNOSIS — F33.9 DEPRESSION, RECURRENT (HCC): ICD-10-CM

## 2023-04-27 DIAGNOSIS — E66.2 CLASS 1 OBESITY WITH ALVEOLAR HYPOVENTILATION WITHOUT SERIOUS COMORBIDITY WITH BODY MASS INDEX (BMI) OF 31.0 TO 31.9 IN ADULT (HCC): ICD-10-CM

## 2023-04-27 DIAGNOSIS — J44.9 CHRONIC OBSTRUCTIVE PULMONARY DISEASE, UNSPECIFIED COPD TYPE (HCC): ICD-10-CM

## 2023-04-27 DIAGNOSIS — J96.11 CHRONIC RESPIRATORY FAILURE WITH HYPOXIA (HCC): ICD-10-CM

## 2023-04-27 DIAGNOSIS — R32 URINARY INCONTINENCE, UNSPECIFIED TYPE: ICD-10-CM

## 2023-04-27 DIAGNOSIS — Z13.1 SCREENING FOR DIABETES MELLITUS: ICD-10-CM

## 2023-04-27 DIAGNOSIS — N30.00 ACUTE CYSTITIS WITHOUT HEMATURIA: ICD-10-CM

## 2023-04-27 DIAGNOSIS — Z13.29 SCREENING FOR THYROID DISORDER: ICD-10-CM

## 2023-04-27 DIAGNOSIS — R10.9 ABDOMINAL CRAMPING: ICD-10-CM

## 2023-04-27 DIAGNOSIS — Z23 ENCOUNTER FOR IMMUNIZATION: ICD-10-CM

## 2023-04-27 PROBLEM — E66.811 CLASS 1 OBESITY WITH ALVEOLAR HYPOVENTILATION WITHOUT SERIOUS COMORBIDITY WITH BODY MASS INDEX (BMI) OF 31.0 TO 31.9 IN ADULT (HCC): Status: ACTIVE | Noted: 2022-02-10

## 2023-04-27 LAB
BACTERIA UR QL AUTO: ABNORMAL /HPF
BILIRUB UR QL STRIP: NEGATIVE
CAOX CRY URNS QL MICRO: ABNORMAL /HPF
CLARITY UR: ABNORMAL
COLOR UR: YELLOW
GLUCOSE UR STRIP-MCNC: NEGATIVE MG/DL
HGB UR QL STRIP.AUTO: NEGATIVE
KETONES UR STRIP-MCNC: NEGATIVE MG/DL
LEUKOCYTE ESTERASE UR QL STRIP: ABNORMAL
MUCOUS THREADS UR QL AUTO: ABNORMAL
NITRITE UR QL STRIP: NEGATIVE
NON-SQ EPI CELLS URNS QL MICRO: ABNORMAL /HPF
PH UR STRIP.AUTO: 6 [PH]
PROT UR STRIP-MCNC: ABNORMAL MG/DL
RBC #/AREA URNS AUTO: ABNORMAL /HPF
SL AMB  POCT GLUCOSE, UA: ABNORMAL
SL AMB LEUKOCYTE ESTERASE,UA: NEGATIVE
SL AMB POCT BILIRUBIN,UA: NEGATIVE
SL AMB POCT BLOOD,UA: NEGATIVE
SL AMB POCT CLARITY,UA: ABNORMAL
SL AMB POCT COLOR,UA: YELLOW
SL AMB POCT KETONES,UA: ABNORMAL
SL AMB POCT NITRITE,UA: POSITIVE
SL AMB POCT PH,UA: 5
SL AMB POCT SPECIFIC GRAVITY,UA: 1.03
SL AMB POCT URINE PROTEIN: ABNORMAL
SL AMB POCT UROBILINOGEN: NORMAL
SP GR UR STRIP.AUTO: 1.02 (ref 1–1.03)
UROBILINOGEN UR STRIP-ACNC: <2 MG/DL
WBC #/AREA URNS AUTO: ABNORMAL /HPF

## 2023-04-27 RX ORDER — CIPROFLOXACIN 500 MG/1
500 TABLET, FILM COATED ORAL EVERY 12 HOURS SCHEDULED
Qty: 14 TABLET | Refills: 0 | Status: SHIPPED | OUTPATIENT
Start: 2023-04-27 | End: 2023-05-04

## 2023-04-27 RX ORDER — DICYCLOMINE HCL 20 MG
20 TABLET ORAL 3 TIMES DAILY PRN
Qty: 270 TABLET | Refills: 1 | Status: SHIPPED | OUTPATIENT
Start: 2023-04-27

## 2023-04-27 RX ORDER — OXYBUTYNIN CHLORIDE 5 MG/1
5 TABLET, EXTENDED RELEASE ORAL DAILY
Qty: 30 TABLET | Refills: 1 | Status: CANCELLED | OUTPATIENT
Start: 2023-04-27

## 2023-04-27 NOTE — PROGRESS NOTES
Assessment/Plan:    Problem List Items Addressed This Visit    None       There are no diagnoses linked to this encounter  No problem-specific Assessment & Plan notes found for this encounter  Subjective:      Patient ID: Roxie Owusu is a 78 y o  female  Presents for a routine 6 month visit  Patient complains of incontinence  She does have incontinence 2-3 nights per week despite changing p o  fluids after dinner, and Kegel exercises  Patient does endorse having an intermittent sensation to void however when she stands she reports incontinency  Patient states that she has been wearing adult briefs for the last 3 years  Patient had 3 children first pregnancy was with twins  Urine dip today shows that she has a UTI which we will treat  She will F/IU in 1 week to recheck urine and re-evaluate incontinence  May consider ditropan and/or urology consult  Hypertension  This is a chronic problem  The problem is controlled  Associated symptoms include anxiety  Pertinent negatives include no blurred vision, chest pain, neck pain, orthopnea, palpitations, peripheral edema, PND or shortness of breath  There are no associated agents to hypertension  Risk factors for coronary artery disease include post-menopausal state  Past treatments include angiotensin blockers and diuretics  The current treatment provides moderate improvement  There are no compliance problems  There is no history of angina, CAD/MI or heart failure  There is no history of chronic renal disease, hyperaldosteronism, hyperparathyroidism, a hypertension causing med, renovascular disease or a thyroid problem  Depression  This is a chronic problem  Pertinent negatives include no chest pain, fatigue or neck pain  Treatments tried: cymbalta, seroquel  The treatment provided moderate relief  Heartburn  She reports no chest pain  This is a chronic problem  Nothing aggravates the symptoms   Pertinent negatives include no anemia, fatigue, melena, muscle weakness, orthopnea or weight loss  There are no known risk factors  She has tried a PPI for the symptoms  The treatment provided moderate relief  Breathing Problem  She complains of difficulty breathing  There is no shortness of breath  This is a chronic problem  Pertinent negatives include no chest pain, orthopnea, PND or weight loss  Her symptoms are aggravated by URI, exposure to fumes, pollen and change in weather  Her symptoms are alleviated by beta-agonist and steroid inhaler  She reports significant improvement on treatment  Her past medical history is significant for asthma and COPD  The following portions of the patient's history were reviewed and updated as appropriate:   She has a past medical history of Acute on chronic respiratory failure with hypoxia ([facility] ) (3/1/2020), Adult BMI 37 0-37 9 kg/sq m (4/1/2021), Anxiety, Chronic obstructive pulmonary disease with acute lower respiratory infection ([facility] ) (10/25/2017), COPD (chronic obstructive pulmonary disease) ([place] [address] ), Depression, Diverticulitis, GERD (gastroesophageal reflux disease), Influenza A (2/29/2020), Pneumonia of right lower lobe due to influenza A virus (2/29/2020), 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[place] [address] ), Primary hypertension (2/10/2022), Psychiatric disorder, Sepsis due to Haemophilus influenzae with acute hypoxic respiratory failure without septic shock ([facility] ) (2/29/2020), Shortness of breath, and Sleep apnea  ,  does not have any pertinent problems on file  ,   has a past surgical history that includes Appendectomy; Laparoscopic tubal ligation (Bilateral); Inguinal hernia repair (Right); Hiatal hernia repair; Uvulopalatopharyngoplasty; Rotator cuff repair (Right); and Cholecystectomy  ,  family history includes Breast cancer (age of onset: 28) in her maternal grandmother; No Known Problems in her daughter, father, maternal aunt, mother, paternal grandmother, and sister  ,   reports that she has been smoking cigarettes   She has been smoking an average of 1 pack per day  She has never used smokeless tobacco  She reports current alcohol use  She reports current drug use  Drug: Marijuana  ,  is allergic to erythromycin and morphine     Current Outpatient Medications   Medication Sig Dispense Refill   • colestipol (COLESTID) 1 g tablet Take 1 tablet (1 g total) by mouth 2 (two) times a day 180 tablet 1   • esomeprazole (NexIUM) 40 MG capsule Take 1 capsule (40 mg total) by mouth daily 90 capsule 1   • albuterol (PROVENTIL HFA,VENTOLIN HFA) 90 mcg/act inhaler Inhale 2 puffs every 6 (six) hours as needed for wheezing or shortness of breath 8 5 g 3   • alendronate (Fosamax) 70 mg tablet Take 1 tablet (70 mg total) by mouth every 7 days (Patient not taking: Reported on 8/18/2022) 12 tablet 3   • atorvastatin (LIPITOR) 40 mg tablet Take 1 tablet (40 mg total) by mouth daily 90 tablet 1   • calcium carbonate (OS-PRETTY) 600 MG tablet Take 1 tablet (600 mg total) by mouth 2 (two) times a day with meals 180 tablet 3   • Cholecalciferol (Vitamin D3) 50 MCG (2000 UT) TABS Take 1 tablet (2,000 Units total) by mouth daily 90 tablet 3   • cyclobenzaprine (FLEXERIL) 10 mg tablet Take 1 tablet (10 mg total) by mouth daily 90 tablet 0   • dicyclomine (BENTYL) 20 mg tablet Take 1 tablet (20 mg total) by mouth 3 (three) times a day as needed (as needed) 270 tablet 1   • DULoxetine (CYMBALTA) 30 mg delayed release capsule Take 1 capsule (30 mg total) by mouth daily 90 capsule 1   • DULoxetine (CYMBALTA) 60 mg delayed release capsule Take 1 capsule (60 mg total) by mouth daily 90 capsule 1   • fluticasone (FLONASE) 50 mcg/act nasal spray 2 sprays into each nostril 2 (two) times a day 16 g 0   • fluticasone-salmeterol (Advair Diskus) 500-50 mcg/dose inhaler Inhale 1 puff 2 (two) times a day 60 blister 5   • furosemide (LASIX) 40 mg tablet Take 1 tablet (40 mg total) by mouth daily as needed (As needed for edema) 90 tablet 0   • gabapentin (NEURONTIN) 300 mg capsule Take 1 capsule (300 mg total) by mouth 2 (two) times a day 180 capsule 1   • irbesartan (AVAPRO) 75 mg tablet Take 1 tablet (75 mg total) by mouth daily at bedtime 90 tablet 1   • loratadine (Claritin) 10 mg tablet Take 1 tablet (10 mg total) by mouth daily 30 tablet 0   • Naproxen Sodium (ALEVE PO) Take 2 tablets by mouth every morning     • ondansetron (ZOFRAN) 4 mg tablet Take 1 tablet (4 mg total) by mouth every 6 (six) hours 12 tablet 0   • potassium chloride (Klor-Con) 10 mEq tablet TAKE 1 TABLET DAILY AS NEEDED (WHEN TAKE LASIX) 90 tablet 1   • predniSONE 10 mg tablet 4 tablets for 3 days, 3 tablets for 3 days, 2 tablets for 3 days, 1 tablet for 3 days, then D/C  30 tablet 0   • QUEtiapine (SEROquel) 50 mg tablet TAKE 1 TABLET IN THE MORNING AND 2 TABLETS AT BEDTIME    270 tablet 0   • sodium chloride (OCEAN) 0 65 % nasal spray 2 sprays into each nostril as needed for congestion or rhinitis 88 mL 0     No current facility-administered medications for this visit  BMI Counseling: There is no height or weight on file to calculate BMI  The BMI is above normal  Nutrition recommendations include decreasing portion sizes, encouraging healthy choices of fruits and vegetables, decreasing fast food intake, consuming healthier snacks, limiting drinks that contain sugar, moderation in carbohydrate intake, increasing intake of lean protein, reducing intake of saturated and trans fat and reducing intake of cholesterol  Exercise recommendations include exercising 3-5 times per week  No pharmacotherapy was ordered  Rationale for BMI follow-up plan is due to patient being overweight or obese  Review of Systems   Constitutional: Negative for fatigue and weight loss  Eyes: Negative for blurred vision  Respiratory: Negative for shortness of breath  Cardiovascular: Negative for chest pain, palpitations, orthopnea and PND  Gastrointestinal: Negative for melena     Genitourinary:        Incontinent Musculoskeletal: Negative for muscle weakness and neck pain  Psychiatric/Behavioral: Positive for depression  All other systems reviewed and are negative  Objective: There were no vitals filed for this visit  There is no height or weight on file to calculate BMI  Physical Exam  Vitals and nursing note reviewed  Constitutional:       Appearance: Normal appearance  She is well-developed  HENT:      Head: Normocephalic and atraumatic  Right Ear: Tympanic membrane, ear canal and external ear normal       Left Ear: Tympanic membrane, ear canal and external ear normal       Nose: Nose normal       Mouth/Throat:      Mouth: Mucous membranes are moist       Pharynx: Uvula midline  Eyes:      General: Lids are normal       Conjunctiva/sclera: Conjunctivae normal       Pupils: Pupils are equal, round, and reactive to light  Neck:      Thyroid: No thyroid mass or thyromegaly  Vascular: No JVD  Trachea: Trachea and phonation normal    Cardiovascular:      Rate and Rhythm: Normal rate and regular rhythm  Pulses: Normal pulses  Heart sounds: Normal heart sounds, S1 normal and S2 normal  No murmur heard  No friction rub  No gallop  Pulmonary:      Effort: Pulmonary effort is normal       Breath sounds: Normal breath sounds  Abdominal:      General: Bowel sounds are normal       Palpations: Abdomen is soft  Tenderness: There is no abdominal tenderness  Genitourinary:     Comments: Deferred   Musculoskeletal:         General: Normal range of motion  Cervical back: Full passive range of motion without pain, normal range of motion and neck supple  Right lower leg: No edema  Left lower leg: No edema  Lymphadenopathy:      Head:      Right side of head: No submental, submandibular, tonsillar, preauricular, posterior auricular or occipital adenopathy        Left side of head: No submental, submandibular, tonsillar, preauricular, posterior auricular or "occipital adenopathy  Cervical: No cervical adenopathy  Skin:     General: Skin is warm and dry  Capillary Refill: Capillary refill takes less than 2 seconds  Neurological:      General: No focal deficit present  Mental Status: She is alert and oriented to person, place, and time  Cranial Nerves: No cranial nerve deficit  Sensory: Sensation is intact  Motor: Motor function is intact  Coordination: Coordination is intact  Gait: Gait is intact  Deep Tendon Reflexes: Reflexes are normal and symmetric  Psychiatric:         Attention and Perception: Attention and perception normal          Mood and Affect: Mood and affect normal          Speech: Speech normal          Behavior: Behavior normal  Behavior is cooperative  Thought Content: Thought content normal          Cognition and Memory: Cognition normal          Judgment: Judgment normal            Portions of the record may have been created with voice recognition software  Occasional wrong word or \"sound a like\" substitutions may have occurred due to the inherent limitations of voice recognition software  Read the chart carefully and recognize, using context, where substitutions have occurred  Contact me with any questions    "

## 2023-04-29 LAB — BACTERIA UR CULT: ABNORMAL

## 2023-05-04 ENCOUNTER — OFFICE VISIT (OUTPATIENT)
Dept: FAMILY MEDICINE CLINIC | Facility: CLINIC | Age: 68
End: 2023-05-04

## 2023-05-04 VITALS
TEMPERATURE: 97.5 F | WEIGHT: 160 LBS | OXYGEN SATURATION: 95 % | DIASTOLIC BLOOD PRESSURE: 88 MMHG | SYSTOLIC BLOOD PRESSURE: 158 MMHG | BODY MASS INDEX: 31.41 KG/M2 | HEIGHT: 60 IN | HEART RATE: 87 BPM

## 2023-05-04 DIAGNOSIS — R39.9 UTI SYMPTOMS: Primary | ICD-10-CM

## 2023-05-04 DIAGNOSIS — R32 URINARY INCONTINENCE, UNSPECIFIED TYPE: ICD-10-CM

## 2023-05-04 LAB
SL AMB  POCT GLUCOSE, UA: NEGATIVE
SL AMB LEUKOCYTE ESTERASE,UA: NEGATIVE
SL AMB POCT BILIRUBIN,UA: NEGATIVE
SL AMB POCT BLOOD,UA: NEGATIVE
SL AMB POCT CLARITY,UA: CLEAR
SL AMB POCT COLOR,UA: YELLOW
SL AMB POCT KETONES,UA: NEGATIVE
SL AMB POCT NITRITE,UA: NEGATIVE
SL AMB POCT PH,UA: 5
SL AMB POCT SPECIFIC GRAVITY,UA: 1.01
SL AMB POCT URINE PROTEIN: NORMAL
SL AMB POCT UROBILINOGEN: NORMAL

## 2023-05-04 RX ORDER — OXYBUTYNIN CHLORIDE 5 MG/1
5 TABLET ORAL 3 TIMES DAILY
Qty: 90 TABLET | Refills: 0 | Status: SHIPPED | OUTPATIENT
Start: 2023-05-04

## 2023-05-04 NOTE — PROGRESS NOTES
Assessment/Plan:    Problem List Items Addressed This Visit    None  Visit Diagnoses     UTI symptoms    -  Primary    follow up UA no UTI    Relevant Orders    POCT urine dip (Completed)    Urinary incontinence, unspecified type        Relevant Medications    oxybutynin (DITROPAN) 5 mg tablet    Other Relevant Orders    Ambulatory Referral to Urology           Diagnoses and all orders for this visit:    UTI symptoms  Comments:  follow up UA no UTI  Orders:  -     POCT urine dip    Urinary incontinence, unspecified type  -     Cancel: Ambulatory Referral to Urology; Future  -     Ambulatory Referral to Urology; Future  -     oxybutynin (DITROPAN) 5 mg tablet; Take 1 tablet (5 mg total) by mouth 3 (three) times a day        No problem-specific Assessment & Plan notes found for this encounter  Subjective:      Patient ID: Doris Padron is a 78 y o  female  Patient presents for 1 week follow-up for incontinence  Last visit urinalysis showed that patient had a UTI  She was treated with antibiotics, today's urine dip was negative for UTI  However patient continues to have incontinence 2-3 nights per week despite changing p o  fluids after dinner, and Kegel exercises  Patient does endorse having an intermittent sensation to void however when she stands she reports incontinency  Patient states that she has been wearing adult briefs for the last 3 years  Patient had 3 children first pregnancy was with twins         The following portions of the patient's history were reviewed and updated as appropriate:   She has a past medical history of Acute on chronic respiratory failure with hypoxia ([address] ) (3/1/2020), Adult BMI 37 0-37 9 kg/sq m (4/1/2021), Anxiety, Chronic obstructive pulmonary disease with acute lower respiratory infection ([address] ) (10/25/2017), COPD (chronic obstructive pulmonary disease) ([address] [address] ), Depression, Diverticulitis, GERD (gastroesophageal reflux disease), Influenza A (2/29/2020), Pneumonia of right lower lobe due to influenza A virus (2/29/2020), 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[facility] ), Primary hypertension (2/10/2022), Psychiatric disorder, Sepsis due to Haemophilus influenzae with acute hypoxic respiratory failure without septic shock ([facility] ) (2/29/2020), Shortness of breath, and Sleep apnea  ,  does not have any pertinent problems on file  ,   has a past surgical history that includes Appendectomy; Laparoscopic tubal ligation (Bilateral); Inguinal hernia repair (Right); Hiatal hernia repair; Uvulopalatopharyngoplasty; Rotator cuff repair (Right); and Cholecystectomy  ,  family history includes Breast cancer (age of onset: 28) in her maternal grandmother; No Known Problems in her daughter, father, maternal aunt, mother, paternal grandmother, and sister  ,   reports that she has been smoking cigarettes  She has been smoking an average of 1 pack per day  She has never used smokeless tobacco  She reports current alcohol use  She reports current drug use  Drug: Marijuana  ,  is allergic to erythromycin and morphine     Current Outpatient Medications   Medication Sig Dispense Refill    albuterol (PROVENTIL HFA,VENTOLIN HFA) 90 mcg/act inhaler Inhale 2 puffs every 6 (six) hours as needed for wheezing or shortness of breath 8 5 g 3    atorvastatin (LIPITOR) 40 mg tablet Take 1 tablet (40 mg total) by mouth daily 90 tablet 1    calcium carbonate (OS-PRETTY) 600 MG tablet Take 1 tablet (600 mg total) by mouth 2 (two) times a day with meals 180 tablet 3    ciprofloxacin (CIPRO) 500 mg tablet Take 1 tablet (500 mg total) by mouth every 12 (twelve) hours for 7 days 14 tablet 0    colestipol (COLESTID) 1 g tablet Take 1 tablet (1 g total) by mouth 2 (two) times a day 180 tablet 1    cyclobenzaprine (FLEXERIL) 10 mg tablet Take 1 tablet (10 mg total) by mouth daily 90 tablet 0    dicyclomine (BENTYL) 20 mg tablet Take 1 tablet (20 mg total) by mouth 3 (three) times a day as needed (as needed) 270 tablet 1    DULoxetine (CYMBALTA) 30 mg delayed release capsule Take 1 capsule (30 mg total) by mouth daily 90 capsule 1    DULoxetine (CYMBALTA) 60 mg delayed release capsule Take 1 capsule (60 mg total) by mouth daily 90 capsule 1    esomeprazole (NexIUM) 40 MG capsule Take 1 capsule (40 mg total) by mouth daily 90 capsule 1    fluticasone-salmeterol (Advair Diskus) 500-50 mcg/dose inhaler Inhale 1 puff 2 (two) times a day 60 blister 5    gabapentin (NEURONTIN) 300 mg capsule Take 1 capsule (300 mg total) by mouth 2 (two) times a day 180 capsule 1    irbesartan (AVAPRO) 75 mg tablet Take 1 tablet (75 mg total) by mouth daily at bedtime 90 tablet 1    Naproxen Sodium (ALEVE PO) Take 2 tablets by mouth every morning      ondansetron (ZOFRAN) 4 mg tablet Take 1 tablet (4 mg total) by mouth every 6 (six) hours 12 tablet 0    oxybutynin (DITROPAN) 5 mg tablet Take 1 tablet (5 mg total) by mouth 3 (three) times a day 90 tablet 0    potassium chloride (Klor-Con) 10 mEq tablet TAKE 1 TABLET DAILY AS NEEDED (WHEN TAKE LASIX) 90 tablet 1    QUEtiapine (SEROquel) 50 mg tablet TAKE 1 TABLET IN THE MORNING AND 2 TABLETS AT BEDTIME    270 tablet 0    sodium chloride (OCEAN) 0 65 % nasal spray 2 sprays into each nostril as needed for congestion or rhinitis 88 mL 0    alendronate (Fosamax) 70 mg tablet Take 1 tablet (70 mg total) by mouth every 7 days (Patient not taking: Reported on 8/18/2022) 12 tablet 3    Cholecalciferol (Vitamin D3) 50 MCG (2000 UT) TABS Take 1 tablet (2,000 Units total) by mouth daily (Patient not taking: Reported on 4/27/2023) 90 tablet 3    fluticasone (FLONASE) 50 mcg/act nasal spray 2 sprays into each nostril 2 (two) times a day (Patient not taking: Reported on 4/27/2023) 16 g 0    furosemide (LASIX) 40 mg tablet Take 1 tablet (40 mg total) by mouth daily as needed (As needed for edema) (Patient not taking: Reported on 4/27/2023) 90 tablet 0    loratadine (Claritin) 10 mg tablet Take 1 tablet (10 mg total) by "mouth daily (Patient not taking: Reported on 4/27/2023) 30 tablet 0     No current facility-administered medications for this visit  Tobacco Cessation Counseling: The patient is sincerely urged to quit consumption of tobacco  She is not ready to quit tobacco            Review of Systems   Genitourinary:        Incontinent   All other systems reviewed and are negative  Objective:  Vitals:    05/04/23 0940   BP: 158/88   BP Location: Left arm   Patient Position: Sitting   Cuff Size: Adult   Pulse: 87   Temp: 97 5 °F (36 4 °C)   TempSrc: Tympanic   SpO2: 95%   Weight: 72 6 kg (160 lb)   Height: 5' (1 524 m)     Body mass index is 31 25 kg/m²  Physical Exam  Vitals and nursing note reviewed  Constitutional:       Appearance: She is obese  Cardiovascular:      Rate and Rhythm: Normal rate  Pulses: Normal pulses  Pulmonary:      Effort: Pulmonary effort is normal    Skin:     Capillary Refill: Capillary refill takes less than 2 seconds  Neurological:      General: No focal deficit present  Mental Status: She is alert  Psychiatric:         Mood and Affect: Mood normal            Portions of the record may have been created with voice recognition software  Occasional wrong word or \"sound a like\" substitutions may have occurred due to the inherent limitations of voice recognition software  Read the chart carefully and recognize, using context, where substitutions have occurred  Contact me with any questions    "

## 2023-05-26 ENCOUNTER — OFFICE VISIT (OUTPATIENT)
Dept: FAMILY MEDICINE CLINIC | Facility: CLINIC | Age: 68
End: 2023-05-26

## 2023-05-26 VITALS
TEMPERATURE: 98.4 F | DIASTOLIC BLOOD PRESSURE: 88 MMHG | HEART RATE: 87 BPM | SYSTOLIC BLOOD PRESSURE: 136 MMHG | WEIGHT: 152 LBS | OXYGEN SATURATION: 95 % | BODY MASS INDEX: 29.84 KG/M2 | HEIGHT: 60 IN

## 2023-05-26 DIAGNOSIS — G47.00 INSOMNIA, UNSPECIFIED TYPE: ICD-10-CM

## 2023-05-26 DIAGNOSIS — R32 URINARY INCONTINENCE, UNSPECIFIED TYPE: ICD-10-CM

## 2023-05-26 DIAGNOSIS — E78.2 MIXED HYPERLIPIDEMIA: ICD-10-CM

## 2023-05-26 DIAGNOSIS — G89.29 OTHER CHRONIC PAIN: ICD-10-CM

## 2023-05-26 DIAGNOSIS — K21.9 GASTROESOPHAGEAL REFLUX DISEASE: ICD-10-CM

## 2023-05-26 DIAGNOSIS — E66.2 CLASS 1 OBESITY WITH ALVEOLAR HYPOVENTILATION WITHOUT SERIOUS COMORBIDITY WITH BODY MASS INDEX (BMI) OF 31.0 TO 31.9 IN ADULT (HCC): ICD-10-CM

## 2023-05-26 DIAGNOSIS — M62.838 MUSCLE SPASM: ICD-10-CM

## 2023-05-26 DIAGNOSIS — K52.9 ENTERITIS: ICD-10-CM

## 2023-05-26 RX ORDER — ONDANSETRON 4 MG/1
4 TABLET, FILM COATED ORAL EVERY 6 HOURS
Qty: 12 TABLET | Refills: 0 | Status: SHIPPED | OUTPATIENT
Start: 2023-05-26

## 2023-05-26 RX ORDER — GABAPENTIN 300 MG/1
300 CAPSULE ORAL 2 TIMES DAILY
Qty: 180 CAPSULE | Refills: 1 | Status: SHIPPED | OUTPATIENT
Start: 2023-05-26

## 2023-05-26 RX ORDER — QUETIAPINE FUMARATE 50 MG/1
TABLET, FILM COATED ORAL
Qty: 270 TABLET | Refills: 0 | Status: SHIPPED | OUTPATIENT
Start: 2023-05-26

## 2023-05-26 RX ORDER — OXYBUTYNIN CHLORIDE 5 MG/1
5 TABLET ORAL 3 TIMES DAILY
Qty: 90 TABLET | Refills: 4 | Status: SHIPPED | OUTPATIENT
Start: 2023-05-26

## 2023-05-26 RX ORDER — CYCLOBENZAPRINE HCL 10 MG
10 TABLET ORAL DAILY
Qty: 90 TABLET | Refills: 0 | Status: SHIPPED | OUTPATIENT
Start: 2023-05-26

## 2023-05-26 RX ORDER — ESOMEPRAZOLE MAGNESIUM 40 MG/1
40 CAPSULE, DELAYED RELEASE ORAL DAILY
Qty: 90 CAPSULE | Refills: 1 | Status: SHIPPED | OUTPATIENT
Start: 2023-05-26

## 2023-05-26 RX ORDER — ATORVASTATIN CALCIUM 40 MG/1
40 TABLET, FILM COATED ORAL DAILY
Qty: 90 TABLET | Refills: 1 | Status: SHIPPED | OUTPATIENT
Start: 2023-05-26

## 2023-05-26 NOTE — PROGRESS NOTES
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 with body mass index (BMI) of 31 0 to 31 9 in [facility])   Other Visit Diagnoses     Mixed hyperlipidemia        Relevant Medications    atorvastatin (LIPITOR) 40 mg tablet    Other chronic pain        Relevant Medications    gabapentin (NEURONTIN) 300 mg capsule    Gastroesophageal reflux disease        Relevant Medications    esomeprazole (NexIUM) 40 MG capsule    Muscle spasm        Relevant Medications    cyclobenzaprine (FLEXERIL) 10 mg tablet    Enteritis        Relevant Medications    ondansetron (ZOFRAN) 4 mg tablet    Insomnia, unspecified type        Relevant Medications    QUEtiapine (SEROquel) 50 mg tablet    Urinary incontinence, unspecified type        Relevant Medications    oxybutynin (DITROPAN) 5 mg tablet           Diagnoses and all orders for this visit:    Mixed hyperlipidemia  -     atorvastatin (LIPITOR) 40 mg tablet; Take 1 tablet (40 mg total) by mouth daily    Other chronic pain  -     gabapentin (NEURONTIN) 300 mg capsule; Take 1 capsule (300 mg total) by mouth 2 (two) times a day    Gastroesophageal reflux disease  -     esomeprazole (NexIUM) 40 MG capsule; Take 1 capsule (40 mg total) by mouth daily    Muscle spasm  -     cyclobenzaprine (FLEXERIL) 10 mg tablet; Take 1 tablet (10 mg total) by mouth daily    Enteritis  -     ondansetron (ZOFRAN) 4 mg tablet; Take 1 tablet (4 mg total) by mouth every 6 (six) hours    Insomnia, unspecified type  -     QUEtiapine (SEROquel) 50 mg tablet; TAKE 1 TABLET IN THE MORNING AND 2 TABLETS AT BEDTIME    Urinary incontinence, unspecified type  -     oxybutynin (DITROPAN) 5 mg tablet;  Take 1 tablet (5 mg total) by mouth 3 (three) times a day    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 with body mass index (BMI) of 31 0 to 31 9 in [facility])        No problem-specific Assessment & Plan notes found for this encounter  Subjective:      Patient ID: La Castillo is a 78 y o  female  Patient presents for a 3-week follow-up for incontinence  Patient mainly aware in late April that she was having incontinence 2-3 times per night despite changing her oral fluids after dinner and doing Kegel exercises  At the time it was found the patient was positive for a UTI for which I treated  This did not improve the patient's symptoms  Patient was started on Ditropan 5 mg for which that she reports improvement of symptoms  She denies any urgency or incontinency since starting ditropan  Heartburn  She reports no chest pain  This is a chronic problem  The symptoms are aggravated by certain foods  Pertinent negatives include no anemia, fatigue, melena, muscle weakness, orthopnea or weight loss  Risk factors include obesity  She has tried a PPI and a diet change for the symptoms  The treatment provided significant relief  Hyperlipidemia  This is a chronic problem  Exacerbating diseases include obesity  She has no history of chronic renal disease, diabetes, hypothyroidism, liver disease or nephrotic syndrome  There are no known factors aggravating her hyperlipidemia  Pertinent negatives include no chest pain, focal sensory loss, focal weakness, leg pain, myalgias or shortness of breath  Current antihyperlipidemic treatment includes statins and diet change  There are no compliance problems  Risk factors for coronary artery disease include dyslipidemia, hypertension, post-menopausal, obesity and stress  Hypertension  This is a chronic problem  The problem is controlled  Pertinent negatives include no anxiety, blurred vision, chest pain, neck pain, orthopnea, palpitations, peripheral edema, PND or shortness of breath  There are no associated agents to hypertension  Risk factors for coronary artery disease include dyslipidemia, obesity and post-menopausal state   Past treatments include diuretics and angiotensin blockers  The current treatment provides moderate improvement  There are no compliance problems  There is no history of chronic renal disease, hyperaldosteronism, hyperparathyroidism, a hypertension causing med, renovascular disease or a thyroid problem  The following portions of the patient's history were reviewed and updated as appropriate:   She has a past medical history of Acute on chronic respiratory failure with hypoxia ([facility] ) (3/1/2020), Adult BMI 37 0-37 9 kg/sq m (4/1/2021), Anxiety, Chronic obstructive pulmonary disease with acute lower respiratory infection ([facility] ) (10/25/2017), COPD (chronic obstructive pulmonary disease) ([place] [address] ), Depression, Diverticulitis, GERD (gastroesophageal reflux disease), Influenza A (2/29/2020), Pneumonia of right lower lobe due to influenza A virus (2/29/2020), 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[place] [address] ), Primary hypertension (2/10/2022), Psychiatric disorder, Sepsis due to Haemophilus influenzae with acute hypoxic respiratory failure without septic shock ([facility] ) (2/29/2020), Shortness of breath, and Sleep apnea  ,  does not have any pertinent problems on file  ,   has a past surgical history that includes Appendectomy; Laparoscopic tubal ligation (Bilateral); Inguinal hernia repair (Right); Hiatal hernia repair; Uvulopalatopharyngoplasty; Rotator cuff repair (Right); and Cholecystectomy  ,  family history includes Breast cancer (age of onset: 28) in her maternal grandmother; No Known Problems in her daughter, father, maternal aunt, mother, paternal grandmother, and sister  ,   reports that she has been smoking cigarettes  She has been smoking an average of 1 pack per day  She has never used smokeless tobacco  She reports current alcohol use  She reports current drug use  Drug: Marijuana  ,  is allergic to erythromycin and morphine     Current Outpatient Medications   Medication Sig Dispense Refill   • albuterol (PROVENTIL HFA,VENTOLIN HFA) 90 mcg/act inhaler Inhale 2 puffs every 6 (six) hours as needed for wheezing or shortness of breath 8 5 g 3   • atorvastatin (LIPITOR) 40 mg tablet Take 1 tablet (40 mg total) by mouth daily 90 tablet 1   • calcium carbonate (OS-PRETTY) 600 MG tablet Take 1 tablet (600 mg total) by mouth 2 (two) times a day with meals 180 tablet 3   • cyclobenzaprine (FLEXERIL) 10 mg tablet Take 1 tablet (10 mg total) by mouth daily 90 tablet 0   • dicyclomine (BENTYL) 20 mg tablet Take 1 tablet (20 mg total) by mouth 3 (three) times a day as needed (as needed) 270 tablet 1   • DULoxetine (CYMBALTA) 30 mg delayed release capsule TAKE 1 CAPSULE DAILY 90 capsule 3   • DULoxetine (CYMBALTA) 60 mg delayed release capsule TAKE 1 CAPSULE DAILY 90 capsule 3   • esomeprazole (NexIUM) 40 MG capsule Take 1 capsule (40 mg total) by mouth daily 90 capsule 1   • fluticasone (FLONASE) 50 mcg/act nasal spray 2 sprays into each nostril 2 (two) times a day 16 g 0   • fluticasone-salmeterol (Advair Diskus) 500-50 mcg/dose inhaler Inhale 1 puff 2 (two) times a day 60 blister 5   • furosemide (LASIX) 40 mg tablet Take 1 tablet (40 mg total) by mouth daily as needed (As needed for edema) 90 tablet 0   • gabapentin (NEURONTIN) 300 mg capsule Take 1 capsule (300 mg total) by mouth 2 (two) times a day 180 capsule 1   • irbesartan (AVAPRO) 75 mg tablet TAKE 1 TABLET DAILY AT BEDTIME 90 tablet 1   • Naproxen Sodium (ALEVE PO) Take 2 tablets by mouth every morning     • ondansetron (ZOFRAN) 4 mg tablet Take 1 tablet (4 mg total) by mouth every 6 (six) hours 12 tablet 0   • oxybutynin (DITROPAN) 5 mg tablet Take 1 tablet (5 mg total) by mouth 3 (three) times a day 90 tablet 4   • potassium chloride (Klor-Con) 10 mEq tablet TAKE 1 TABLET DAILY AS NEEDED (WHEN TAKE LASIX) 90 tablet 1   • QUEtiapine (SEROquel) 50 mg tablet TAKE 1 TABLET IN THE MORNING AND 2 TABLETS AT BEDTIME    270 tablet 0   • sodium chloride (OCEAN) 0 65 % nasal spray 2 sprays into each nostril as needed for congestion or rhinitis 88 mL 0   • alendronate (Fosamax) 70 mg tablet Take 1 tablet (70 mg total) by mouth every 7 days (Patient not taking: Reported on 8/18/2022) 12 tablet 3   • Cholecalciferol (Vitamin D3) 50 MCG (2000 UT) TABS Take 1 tablet (2,000 Units total) by mouth daily (Patient not taking: Reported on 4/27/2023) 90 tablet 3   • colestipol (COLESTID) 1 g tablet Take 1 tablet (1 g total) by mouth 2 (two) times a day (Patient not taking: Reported on 5/26/2023) 180 tablet 1   • loratadine (Claritin) 10 mg tablet Take 1 tablet (10 mg total) by mouth daily (Patient not taking: Reported on 4/27/2023) 30 tablet 0     No current facility-administered medications for this visit  Falls Plan of Care: balance, strength, and gait training instructions were provided  Medications that increase falls were reviewed  Assessed feet and footwear  Urinary Incontinence Plan of Care: counseling topics discussed: practice Kegel (pelvic floor strengthening) exercises, use restroom every 2 hours, limit alcohol, caffeine, spicy foods, and acidic foods, limiting fluid intake 3-4 hours before bed, weight loss, preventing constipation and bladder retraining  Review of Systems   Constitutional: Negative for fatigue and weight loss  Eyes: Negative for blurred vision  Respiratory: Negative for shortness of breath  Cardiovascular: Negative for chest pain, palpitations, orthopnea and PND  Gastrointestinal: Negative for melena  Musculoskeletal: Negative for myalgias, muscle weakness and neck pain  Neurological: Negative for focal weakness  All other systems reviewed and are negative  Objective:  Vitals:    05/26/23 1046 05/26/23 1112   BP: 156/88 136/88   BP Location: Left arm    Patient Position: Sitting    Cuff Size: Adult    Pulse: 87    Temp: 98 4 °F (36 9 °C)    TempSrc: Tympanic    SpO2: 95%    Weight: 68 9 kg (152 lb)    Height: 5' (1 524 m)      Body mass index is 29 69 kg/m²       Physical Exam  Vitals and "nursing note reviewed  HENT:      Head: Normocephalic  Eyes:      Pupils: Pupils are equal, round, and reactive to light  Cardiovascular:      Rate and Rhythm: Normal rate  Pulses: Normal pulses  Pulmonary:      Effort: Pulmonary effort is normal    Skin:     Capillary Refill: Capillary refill takes less than 2 seconds  Neurological:      General: No focal deficit present  Psychiatric:         Mood and Affect: Mood normal            Portions of the record may have been created with voice recognition software  Occasional wrong word or \"sound a like\" substitutions may have occurred due to the inherent limitations of voice recognition software  Read the chart carefully and recognize, using context, where substitutions have occurred  Contact me with any questions    "

## 2023-05-26 NOTE — LETTER
May 26, 2023     Patient: Nichole Gao  YOB: 1955  Date of Visit: 5/26/2023      To Whom it May Concern:    Nichole Gao is under my professional care  Caitlin Gins has a H/O Diverticulitis and Depression  If you have any questions or concerns, please don't hesitate to call           Sincerely,          Vevelyn Schwab, CRNP        CC: No Recipients

## 2023-08-11 DIAGNOSIS — M62.838 MUSCLE SPASM: ICD-10-CM

## 2023-08-11 DIAGNOSIS — G47.00 INSOMNIA, UNSPECIFIED TYPE: ICD-10-CM

## 2023-08-11 DIAGNOSIS — I10 PRIMARY HYPERTENSION: ICD-10-CM

## 2023-08-11 DIAGNOSIS — F32.A DEPRESSION, UNSPECIFIED DEPRESSION TYPE: ICD-10-CM

## 2023-08-11 DIAGNOSIS — K52.9 ENTERITIS: ICD-10-CM

## 2023-08-11 RX ORDER — QUETIAPINE FUMARATE 50 MG/1
TABLET, FILM COATED ORAL
Qty: 270 TABLET | Refills: 1 | Status: SHIPPED | OUTPATIENT
Start: 2023-08-11

## 2023-08-11 RX ORDER — QUETIAPINE FUMARATE 50 MG/1
TABLET, FILM COATED ORAL
Qty: 270 TABLET | Refills: 0 | OUTPATIENT
Start: 2023-08-11

## 2023-08-11 RX ORDER — CYCLOBENZAPRINE HCL 10 MG
10 TABLET ORAL DAILY
Qty: 90 TABLET | Refills: 0 | OUTPATIENT
Start: 2023-08-11

## 2023-08-11 RX ORDER — DULOXETIN HYDROCHLORIDE 60 MG/1
60 CAPSULE, DELAYED RELEASE ORAL DAILY
Qty: 90 CAPSULE | Refills: 3 | Status: SHIPPED | OUTPATIENT
Start: 2023-08-11

## 2023-08-11 RX ORDER — DULOXETIN HYDROCHLORIDE 30 MG/1
30 CAPSULE, DELAYED RELEASE ORAL DAILY
Qty: 90 CAPSULE | Refills: 3 | Status: SHIPPED | OUTPATIENT
Start: 2023-08-11

## 2023-08-11 RX ORDER — CYCLOBENZAPRINE HCL 10 MG
10 TABLET ORAL DAILY
Qty: 90 TABLET | Refills: 0 | Status: SHIPPED | OUTPATIENT
Start: 2023-08-11

## 2023-08-11 RX ORDER — IRBESARTAN 75 MG/1
75 TABLET ORAL
Qty: 30 TABLET | Refills: 0 | Status: SHIPPED | OUTPATIENT
Start: 2023-08-11

## 2023-08-11 RX ORDER — ONDANSETRON 4 MG/1
TABLET, FILM COATED ORAL
Qty: 12 TABLET | Refills: 0 | OUTPATIENT
Start: 2023-08-11

## 2023-08-11 RX ORDER — ONDANSETRON 4 MG/1
4 TABLET, FILM COATED ORAL EVERY 6 HOURS
Qty: 12 TABLET | Refills: 0 | Status: SHIPPED | OUTPATIENT
Start: 2023-08-11

## 2023-11-09 DIAGNOSIS — G47.00 INSOMNIA, UNSPECIFIED TYPE: ICD-10-CM

## 2023-11-10 RX ORDER — QUETIAPINE FUMARATE 50 MG/1
TABLET, FILM COATED ORAL
Qty: 270 TABLET | Refills: 1 | Status: SHIPPED | OUTPATIENT
Start: 2023-11-10

## 2023-11-21 DIAGNOSIS — J45.40 MODERATE PERSISTENT ASTHMA WITHOUT COMPLICATION: ICD-10-CM

## 2023-11-21 DIAGNOSIS — I10 PRIMARY HYPERTENSION: ICD-10-CM

## 2023-11-21 DIAGNOSIS — G89.29 OTHER CHRONIC PAIN: ICD-10-CM

## 2023-11-21 DIAGNOSIS — K21.9 GASTROESOPHAGEAL REFLUX DISEASE: ICD-10-CM

## 2023-11-21 DIAGNOSIS — E78.2 MIXED HYPERLIPIDEMIA: ICD-10-CM

## 2023-11-21 DIAGNOSIS — M62.838 MUSCLE SPASM: ICD-10-CM

## 2023-11-21 DIAGNOSIS — K52.9 ENTERITIS: ICD-10-CM

## 2023-11-21 DIAGNOSIS — R10.9 ABDOMINAL CRAMPING: ICD-10-CM

## 2023-11-21 RX ORDER — ONDANSETRON 4 MG/1
4 TABLET, FILM COATED ORAL EVERY 6 HOURS
Qty: 12 TABLET | Refills: 0 | Status: SHIPPED | OUTPATIENT
Start: 2023-11-21

## 2023-11-21 RX ORDER — ESOMEPRAZOLE MAGNESIUM 40 MG/1
40 CAPSULE, DELAYED RELEASE ORAL DAILY
Qty: 90 CAPSULE | Refills: 1 | Status: SHIPPED | OUTPATIENT
Start: 2023-11-21

## 2023-11-21 RX ORDER — DICYCLOMINE HCL 20 MG
20 TABLET ORAL 3 TIMES DAILY PRN
Qty: 270 TABLET | Refills: 1 | Status: SHIPPED | OUTPATIENT
Start: 2023-11-21

## 2023-11-21 RX ORDER — GABAPENTIN 300 MG/1
300 CAPSULE ORAL 2 TIMES DAILY
Qty: 180 CAPSULE | Refills: 1 | Status: SHIPPED | OUTPATIENT
Start: 2023-11-21

## 2023-11-21 RX ORDER — CYCLOBENZAPRINE HCL 10 MG
10 TABLET ORAL DAILY
Qty: 90 TABLET | Refills: 0 | Status: SHIPPED | OUTPATIENT
Start: 2023-11-21

## 2023-11-21 RX ORDER — IRBESARTAN 75 MG/1
75 TABLET ORAL
Qty: 30 TABLET | Refills: 0 | Status: SHIPPED | OUTPATIENT
Start: 2023-11-21

## 2023-11-21 RX ORDER — ATORVASTATIN CALCIUM 40 MG/1
40 TABLET, FILM COATED ORAL DAILY
Qty: 90 TABLET | Refills: 1 | Status: SHIPPED | OUTPATIENT
Start: 2023-11-21

## 2023-11-21 RX ORDER — ALBUTEROL SULFATE 90 UG/1
2 AEROSOL, METERED RESPIRATORY (INHALATION) EVERY 6 HOURS PRN
Qty: 8.5 G | Refills: 3 | Status: SHIPPED | OUTPATIENT
Start: 2023-11-21

## 2023-11-21 NOTE — TELEPHONE ENCOUNTER
Pt called requesting refills on her meds. I scheduled her for her awv and she is aware that bw is due. Pt states she can not come in sooner due to transportation issues. Pt states she is completely out of her meds.

## 2023-11-29 ENCOUNTER — OFFICE VISIT (OUTPATIENT)
Dept: FAMILY MEDICINE CLINIC | Facility: CLINIC | Age: 68
End: 2023-11-29
Payer: COMMERCIAL

## 2023-11-29 ENCOUNTER — APPOINTMENT (OUTPATIENT)
Dept: LAB | Facility: CLINIC | Age: 68
End: 2023-11-29
Payer: COMMERCIAL

## 2023-11-29 VITALS
HEIGHT: 60 IN | TEMPERATURE: 98.2 F | DIASTOLIC BLOOD PRESSURE: 96 MMHG | BODY MASS INDEX: 30.67 KG/M2 | HEART RATE: 84 BPM | OXYGEN SATURATION: 94 % | SYSTOLIC BLOOD PRESSURE: 152 MMHG | WEIGHT: 156.2 LBS

## 2023-11-29 DIAGNOSIS — F41.9 ANXIETY AND DEPRESSION: ICD-10-CM

## 2023-11-29 DIAGNOSIS — M81.0 AGE-RELATED OSTEOPOROSIS WITHOUT CURRENT PATHOLOGICAL FRACTURE: ICD-10-CM

## 2023-11-29 DIAGNOSIS — I10 PRIMARY HYPERTENSION: ICD-10-CM

## 2023-11-29 DIAGNOSIS — Z23 ENCOUNTER FOR IMMUNIZATION: ICD-10-CM

## 2023-11-29 DIAGNOSIS — J42 CHRONIC BRONCHITIS, UNSPECIFIED CHRONIC BRONCHITIS TYPE (HCC): ICD-10-CM

## 2023-11-29 DIAGNOSIS — E87.6 HYPOKALEMIA: ICD-10-CM

## 2023-11-29 DIAGNOSIS — E78.2 MIXED HYPERLIPIDEMIA: ICD-10-CM

## 2023-11-29 DIAGNOSIS — Z12.31 ENCOUNTER FOR SCREENING MAMMOGRAM FOR MALIGNANT NEOPLASM OF BREAST: ICD-10-CM

## 2023-11-29 DIAGNOSIS — F32.A ANXIETY AND DEPRESSION: ICD-10-CM

## 2023-11-29 DIAGNOSIS — E66.2 CLASS 1 OBESITY WITH ALVEOLAR HYPOVENTILATION WITHOUT SERIOUS COMORBIDITY WITH BODY MASS INDEX (BMI) OF 31.0 TO 31.9 IN ADULT: ICD-10-CM

## 2023-11-29 DIAGNOSIS — R32 URINARY INCONTINENCE, UNSPECIFIED TYPE: ICD-10-CM

## 2023-11-29 DIAGNOSIS — Z13.31 DEPRESSION SCREENING NEGATIVE: ICD-10-CM

## 2023-11-29 DIAGNOSIS — F33.9 DEPRESSION, RECURRENT (HCC): ICD-10-CM

## 2023-11-29 DIAGNOSIS — Z13.1 SCREENING FOR DIABETES MELLITUS: ICD-10-CM

## 2023-11-29 DIAGNOSIS — Z13.29 SCREENING FOR THYROID DISORDER: ICD-10-CM

## 2023-11-29 DIAGNOSIS — Z00.00 ENCOUNTER FOR MEDICARE ANNUAL WELLNESS EXAM: Primary | ICD-10-CM

## 2023-11-29 DIAGNOSIS — J44.9 CHRONIC OBSTRUCTIVE PULMONARY DISEASE, UNSPECIFIED COPD TYPE (HCC): ICD-10-CM

## 2023-11-29 DIAGNOSIS — Z13.0 SCREENING FOR DEFICIENCY ANEMIA: ICD-10-CM

## 2023-11-29 LAB
ALBUMIN SERPL BCP-MCNC: 4.2 G/DL (ref 3.5–5)
ALP SERPL-CCNC: 84 U/L (ref 34–104)
ALT SERPL W P-5'-P-CCNC: 18 U/L (ref 7–52)
ANION GAP SERPL CALCULATED.3IONS-SCNC: 8 MMOL/L
AST SERPL W P-5'-P-CCNC: 15 U/L (ref 13–39)
BASOPHILS # BLD AUTO: 0.08 THOUSANDS/ÂΜL (ref 0–0.1)
BASOPHILS NFR BLD AUTO: 1 % (ref 0–1)
BILIRUB SERPL-MCNC: 0.47 MG/DL (ref 0.2–1)
BUN SERPL-MCNC: 8 MG/DL (ref 5–25)
CALCIUM SERPL-MCNC: 8.7 MG/DL (ref 8.4–10.2)
CHLORIDE SERPL-SCNC: 107 MMOL/L (ref 96–108)
CHOLEST SERPL-MCNC: 151 MG/DL
CO2 SERPL-SCNC: 30 MMOL/L (ref 21–32)
CREAT SERPL-MCNC: 0.53 MG/DL (ref 0.6–1.3)
EOSINOPHIL # BLD AUTO: 0.37 THOUSAND/ÂΜL (ref 0–0.61)
EOSINOPHIL NFR BLD AUTO: 5 % (ref 0–6)
ERYTHROCYTE [DISTWIDTH] IN BLOOD BY AUTOMATED COUNT: 15.4 % (ref 11.6–15.1)
GFR SERPL CREATININE-BSD FRML MDRD: 98 ML/MIN/1.73SQ M
GLUCOSE P FAST SERPL-MCNC: 98 MG/DL (ref 65–99)
HCT VFR BLD AUTO: 38.2 % (ref 34.8–46.1)
HDLC SERPL-MCNC: 57 MG/DL
HGB BLD-MCNC: 12.5 G/DL (ref 11.5–15.4)
IMM GRANULOCYTES # BLD AUTO: 0.04 THOUSAND/UL (ref 0–0.2)
IMM GRANULOCYTES NFR BLD AUTO: 1 % (ref 0–2)
LDLC SERPL CALC-MCNC: 76 MG/DL (ref 0–100)
LYMPHOCYTES # BLD AUTO: 2.54 THOUSANDS/ÂΜL (ref 0.6–4.47)
LYMPHOCYTES NFR BLD AUTO: 32 % (ref 14–44)
MCH RBC QN AUTO: 31.6 PG (ref 26.8–34.3)
MCHC RBC AUTO-ENTMCNC: 32.7 G/DL (ref 31.4–37.4)
MCV RBC AUTO: 97 FL (ref 82–98)
MONOCYTES # BLD AUTO: 0.5 THOUSAND/ÂΜL (ref 0.17–1.22)
MONOCYTES NFR BLD AUTO: 6 % (ref 4–12)
NEUTROPHILS # BLD AUTO: 4.54 THOUSANDS/ÂΜL (ref 1.85–7.62)
NEUTS SEG NFR BLD AUTO: 55 % (ref 43–75)
NONHDLC SERPL-MCNC: 94 MG/DL
NRBC BLD AUTO-RTO: 0 /100 WBCS
PLATELET # BLD AUTO: 340 THOUSANDS/UL (ref 149–390)
PMV BLD AUTO: 11.1 FL (ref 8.9–12.7)
POTASSIUM SERPL-SCNC: 3.6 MMOL/L (ref 3.5–5.3)
PROT SERPL-MCNC: 6.6 G/DL (ref 6.4–8.4)
RBC # BLD AUTO: 3.95 MILLION/UL (ref 3.81–5.12)
SODIUM SERPL-SCNC: 145 MMOL/L (ref 135–147)
TRIGL SERPL-MCNC: 90 MG/DL
TSH SERPL DL<=0.05 MIU/L-ACNC: 3.94 UIU/ML (ref 0.45–4.5)
WBC # BLD AUTO: 8.07 THOUSAND/UL (ref 4.31–10.16)

## 2023-11-29 PROCEDURE — 80053 COMPREHEN METABOLIC PANEL: CPT

## 2023-11-29 PROCEDURE — G0439 PPPS, SUBSEQ VISIT: HCPCS | Performed by: NURSE PRACTITIONER

## 2023-11-29 PROCEDURE — 84443 ASSAY THYROID STIM HORMONE: CPT

## 2023-11-29 PROCEDURE — 80061 LIPID PANEL: CPT

## 2023-11-29 PROCEDURE — 90662 IIV NO PRSV INCREASED AG IM: CPT

## 2023-11-29 PROCEDURE — G0008 ADMIN INFLUENZA VIRUS VAC: HCPCS

## 2023-11-29 PROCEDURE — 36415 COLL VENOUS BLD VENIPUNCTURE: CPT

## 2023-11-29 PROCEDURE — 85025 COMPLETE CBC W/AUTO DIFF WBC: CPT

## 2023-11-29 RX ORDER — OXYBUTYNIN CHLORIDE 5 MG/1
5 TABLET ORAL 3 TIMES DAILY
Qty: 90 TABLET | Refills: 5 | Status: SHIPPED | OUTPATIENT
Start: 2023-11-29

## 2023-11-29 RX ORDER — ALENDRONATE SODIUM 70 MG/1
70 TABLET ORAL
Qty: 12 TABLET | Refills: 3 | Status: SHIPPED | OUTPATIENT
Start: 2023-11-29

## 2023-11-29 RX ORDER — FLUTICASONE PROPIONATE AND SALMETEROL 500; 50 UG/1; UG/1
1 POWDER RESPIRATORY (INHALATION) 2 TIMES DAILY
Qty: 60 BLISTER | Refills: 10 | Status: SHIPPED | OUTPATIENT
Start: 2023-11-29

## 2023-11-29 NOTE — PATIENT INSTRUCTIONS
Medicare Preventive Visit Patient Instructions  Thank you for completing your Welcome to Medicare Visit or Medicare Annual Wellness Visit today. Your next wellness visit will be due in one year (11/29/2024). The screening/preventive services that you may require over the next 5-10 years are detailed below. Some tests may not apply to you based off risk factors and/or age. Screening tests ordered at today's visit but not completed yet may show as past due. Also, please note that scanned in results may not display below. Preventive Screenings:  Service Recommendations Previous Testing/Comments   Colorectal Cancer Screening  * Colonoscopy    * Fecal Occult Blood Test (FOBT)/Fecal Immunochemical Test (FIT)  * Fecal DNA/Cologuard Test  * Flexible Sigmoidoscopy Age: 43-73 years old   Colonoscopy: every 10 years (may be performed more frequently if at higher risk)  OR  FOBT/FIT: every 1 year  OR  Cologuard: every 3 years  OR  Sigmoidoscopy: every 5 years  Screening may be recommended earlier than age 39 if at higher risk for colorectal cancer. Also, an individualized decision between you and your healthcare provider will decide whether screening between the ages of 77-80 would be appropriate. Colonoscopy: 09/23/2020  FOBT/FIT: Not on file  Cologuard: Not on file  Sigmoidoscopy: Not on file          Breast Cancer Screening Age: 36 years old  Frequency: every 1-2 years  Not required if history of left and right mastectomy Mammogram: 02/17/2022        Cervical Cancer Screening Between the ages of 21-29, pap smear recommended once every 3 years. Between the ages of 32-69, can perform pap smear with HPV co-testing every 5 years.    Recommendations may differ for women with a history of total hysterectomy, cervical cancer, or abnormal pap smears in past. Pap Smear: Not on file        Hepatitis C Screening Once for adults born between [address] [place] [address]  More frequently in patients at high risk for Hepatitis C Hep C Antibody: 02/11/2020        Diabetes Screening 1-2 times per year if you're at risk for diabetes or have pre-diabetes Fasting glucose: 102 mg/dL (8/11/2022)  A1C: 5.6 % (8/2/2021)      Cholesterol Screening Once every 5 years if you don't have a lipid disorder. May order more often based on risk factors. Lipid panel: 08/11/2022          Other Preventive Screenings Covered by Medicare:  Abdominal Aortic Aneurysm (AAA) Screening: covered once if your at risk. You're considered to be at risk if you have a family history of AAA. Lung Cancer Screening: covers low dose CT scan once per year if you meet all of the following conditions: (1) Age 48-67; (2) No signs or symptoms of lung cancer; (3) Current smoker or have quit smoking within the last 15 years; (4) You have a tobacco smoking history of at least 20 pack years (packs per day multiplied by number of years you smoked); (5) You get a written order from a healthcare provider. Glaucoma Screening: covered annually if you're considered high risk: (1) You have diabetes OR (2) Family history of glaucoma OR (3)  aged 48 and older OR (3)  American aged 72 and older  Osteoporosis Screening: covered every 2 years if you meet one of the following conditions: (1) You're estrogen deficient and at risk for osteoporosis based off medical history and other findings; (2) Have a vertebral abnormality; (3) On glucocorticoid therapy for more than 3 months; (4) Have primary hyperparathyroidism; (5) On osteoporosis medications and need to assess response to drug therapy. Last bone density test (DXA Scan): 02/17/2022. HIV Screening: covered annually if you're between the age of 14-79. Also covered annually if you are younger than 13 and older than 72 with risk factors for HIV infection. For pregnant patients, it is covered up to 3 times per pregnancy.     Immunizations:  Immunization Recommendations   Influenza Vaccine Annual influenza vaccination during flu season is recommended for all persons aged >= 6 months who do not have contraindications   Pneumococcal Vaccine   * Pneumococcal conjugate vaccine = PCV13 (Prevnar 13), PCV15 (Vaxneuvance), PCV20 (Prevnar 20)  * Pneumococcal polysaccharide vaccine = PPSV23 (Pneumovax) Adults 94-18 yo with certain risk factors or if 69+ yo  If never received any pneumonia vaccine: recommend Prevnar 20 (PCV20)  Give PCV20 if previously received 1 dose of PCV13 or PPSV23   Hepatitis B Vaccine 3 dose series if at intermediate or high risk (ex: diabetes, end stage renal disease, liver disease)   Respiratory syncytial virus (RSV) Vaccine - COVERED BY MEDICARE PART D  * RSVPreF3 (Arexvy) CDC recommends that adults 61years of age and older may receive a single dose of RSV vaccine using shared clinical decision-making (SCDM)   Tetanus (Td) Vaccine - COST NOT COVERED BY MEDICARE PART B Following completion of primary series, a booster dose should be given every 10 years to maintain immunity against tetanus. Td may also be given as tetanus wound prophylaxis. Tdap Vaccine - COST NOT COVERED BY MEDICARE PART B Recommended at least once for all adults. For pregnant patients, recommended with each pregnancy. Shingles Vaccine (Shingrix) - COST NOT COVERED BY MEDICARE PART B  2 shot series recommended in those 19 years and older who have or will have weakened immune systems or those 50 years and older     Health Maintenance Due:      Topic Date Due   • Breast Cancer Screening: Mammogram  02/17/2024   • Colorectal Cancer Screening  09/23/2025   • Hepatitis C Screening  Completed     Immunizations Due:      Topic Date Due   • COVID-19 Vaccine (3 - Moderna series) 06/29/2021   • Influenza Vaccine (1) 09/01/2023     Advance Directives   What are advance directives? Advance directives are legal documents that state your wishes and plans for medical care. These plans are made ahead of time in case you lose your ability to make decisions for yourself.  Advance directives can apply to any medical decision, such as the treatments you want, and if you want to donate organs. What are the types of advance directives? There are many types of advance directives, and each state has rules about how to use them. You may choose a combination of any of the following:  Living will: This is a written record of the treatment you want. You can also choose which treatments you do not want, which to limit, and which to stop at a certain time. This includes surgery, medicine, IV fluid, and tube feedings. Durable power of  for [facility]): This is a written record that states who you want to make healthcare choices for you when you are unable to make them for yourself. This person, called a proxy, is usually a family member or a friend. You may choose more than 1 proxy. Do not resuscitate (DNR) order:  A DNR order is used in case your heart stops beating or you stop breathing. It is a request not to have certain forms of treatment, such as CPR. A DNR order may be included in other types of advance directives. Medical directive: This covers the care that you want if you are in a coma, near death, or unable to make decisions for yourself. You can list the treatments you want for each condition. Treatment may include pain medicine, surgery, blood transfusions, dialysis, IV or tube feedings, and a ventilator (breathing machine). Values history: This document has questions about your views, beliefs, and how you feel and think about life. This information can help others choose the care that you would choose. Why are advance directives important? An advance directive helps you control your care. Although spoken wishes may be used, it is better to have your wishes written down. Spoken wishes can be misunderstood, or not followed. Treatments may be given even if you do not want them.  An advance directive may make it easier for your family to make difficult choices about your care. Cigarette Smoking and Your Health   Risks to your health if you smoke:  Nicotine and other chemicals found in tobacco damage every cell in your body. Even if you are a light smoker, you have an increased risk for cancer, heart disease, and lung disease. If you are pregnant or have diabetes, smoking increases your risk for complications. Benefits to your health if you stop smoking: You decrease respiratory symptoms such as coughing, wheezing, and shortness of breath. You reduce your risk for cancers of the lung, mouth, throat, kidney, bladder, pancreas, stomach, and cervix. If you already have cancer, you increase the benefits of chemotherapy. You also reduce your risk for cancer returning or a second cancer from developing. You reduce your risk for heart disease, blood clots, heart attack, and stroke. You reduce your risk for lung infections, and diseases such as pneumonia, asthma, chronic bronchitis, and emphysema. Your circulation improves. More oxygen can be delivered to your body. If you have diabetes, you lower your risk for complications, such as kidney, artery, and eye diseases. You also lower your risk for nerve damage. Nerve damage can lead to amputations, poor vision, and blindness. You improve your body's ability to heal and to fight infections. For more information and support to stop smoking:   [employer]  Phone: 7- 135 [phone]  Web Address: www.[employer]  Weight Management   Why it is important to manage your weight:  Being overweight increases your risk of health conditions such as heart disease, high blood pressure, type 2 diabetes, and certain types of cancer. It can also increase your risk for osteoarthritis, sleep apnea, and other respiratory problems. Aim for a slow, steady weight loss. Even a small amount of weight loss can lower your risk of health problems.   How to lose weight safely:  A safe and healthy way to lose weight is to eat fewer calories and get regular exercise. You can lose up about 1 pound a week by decreasing the number of calories you eat by 500 calories each day. Healthy meal plan for weight management:  A healthy meal plan includes a variety of foods, contains fewer calories, and helps you stay healthy. A healthy meal plan includes the following:  Eat whole-grain foods more often. A healthy meal plan should contain fiber. Fiber is the part of grains, fruits, and vegetables that is not broken down by your body. Whole-grain foods are healthy and provide extra fiber in your diet. Some examples of whole-grain foods are whole-wheat breads and pastas, oatmeal, brown rice, and bulgur. Eat a variety of vegetables every day. Include dark, leafy greens such as spinach, kale, alfredo greens, and mustard greens. Eat yellow and orange vegetables such as carrots, sweet potatoes, and winter squash. Eat a variety of fruits every day. Choose fresh or canned fruit (canned in its own juice or light syrup) instead of juice. Fruit juice has very little or no fiber. Eat low-fat dairy foods. Drink fat-free (skim) milk or 1% milk. Eat fat-free yogurt and low-fat cottage cheese. Try low-fat cheeses such as mozzarella and other reduced-fat cheeses. Choose meat and other protein foods that are low in fat. Choose beans or other legumes such as split peas or lentils. Choose fish, skinless poultry (chicken or turkey), or lean cuts of red meat (beef or pork). Before you cook meat or poultry, cut off any visible fat. Use less fat and oil. Try baking foods instead of frying them. Add less fat, such as margarine, sour cream, regular salad dressing and mayonnaise to foods. Eat fewer high-fat foods. Some examples of high-fat foods include french fries, doughnuts, ice cream, and cakes. Eat fewer sweets. Limit foods and drinks that are high in sugar. This includes candy, cookies, regular soda, and sweetened drinks.   Exercise:  Exercise at least 30 minutes per day on most days of the week. Some examples of exercise include walking, biking, dancing, and swimming. You can also fit in more physical activity by taking the stairs instead of the elevator or parking farther away from stores. Ask your healthcare provider about the best exercise plan for you. © Copyright [employer] 2018 Information is for End User's use only and may not be sold, redistributed or otherwise used for commercial purposes.  All illustrations and images included in CareNotes® are the copyrighted property of [employer] or  [employer]

## 2023-11-29 NOTE — PROGRESS NOTES
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 with body mass index (BMI) of 31.0 to 31.9 in adult     Primary hypertension   Other Visit Diagnoses     Encounter for Medicare annual wellness exam    -  Primary    Age-related osteoporosis without current pathological fracture        Relevant Medications    alendronate (Fosamax) 70 mg tablet    Other Relevant Orders    DXA bone density spine hip and pelvis    Urinary incontinence, unspecified type        Relevant Medications    oxybutynin (DITROPAN) 5 mg tablet    Encounter for screening mammogram for malignant neoplasm of breast        Relevant Orders    Mammo screening bilateral w 3d & cad    Encounter for immunization        Relevant Orders    influenza vaccine, high-dose, PF 0.7 mL (FLUZONE HIGH-DOSE) (Completed)    Depression screening negative            BMI Counseling: Body mass index is 30.51 kg/m². The BMI is above normal. Nutrition recommendations include decreasing portion sizes, encouraging healthy choices of fruits and vegetables, decreasing fast food intake, consuming healthier snacks, limiting drinks that contain sugar, moderation in carbohydrate intake, increasing intake of lean protein, reducing intake of saturated and trans fat and reducing intake of cholesterol. Exercise recommendations include exercising 3-5 times per week. No pharmacotherapy was ordered. Rationale for BMI follow-up plan is due to patient being overweight or obese.        Preventive health issues were discussed with patient, and age appropriate screening tests were ordered as noted in patient's After Visit Summary. Personalized health advice and appropriate referrals for health education or preventive services given if needed, as noted in patient's After Visit Summary. History of Present Illness:     Patient presents for a Medicare Wellness Visit    I reviewed her medication is noted the patient has not taking her Fosamax. She states that she has no knowledge of this however there has been a dispensing back in February 2022. Patient does have a positive history of osteoporosis based upon 2022 DEXA scan. Patient had her blood work done earlier today will call back with results and recommendations. Patient does not offer any interval or acute issues to discuss. No patient's blood pressure is elevated. She states that she has not been taking her Avapro for the last several weeks as she has run out. She does have a current prescription which she has picked up. Recommend patient come back in 3 weeks for blood pressure check. Heartburn  This is a chronic problem. Pertinent negatives include no anemia, fatigue, melena, muscle weakness, orthopnea or weight loss. There are no known risk factors. She has tried a PPI for the symptoms. The treatment provided significant relief. Asthma  This is a chronic problem. Pertinent negatives include no orthopnea or weight loss. Her symptoms are alleviated by beta-agonist. She reports significant improvement on treatment. Her past medical history is significant for asthma, bronchitis and COPD. Hypertension  This is a chronic problem. The problem is uncontrolled. There are no associated agents to hypertension. Risk factors for coronary artery disease include post-menopausal state and sedentary lifestyle. Past treatments include angiotensin blockers. There is no history of angina, kidney disease or CAD/MI. There is no history of chronic renal disease.       Patient Care Team:  Micheal Gowers as PCP - General (Internal Medicine)  Pedrito Parkinson Meghan Lynn DO as PCP - [address] ([place])     Review of Systems:     Review of Systems   Constitutional:  Negative for fatigue and weight loss. Gastrointestinal:  Negative for melena. Musculoskeletal:  Negative for muscle weakness. All other systems reviewed and are negative.        Problem List:     Patient Active Problem List   Diagnosis   • Anxiety and depression   • Chronic back pain   • Chronic bronchitis (HCC)   • Chronic diarrhea   • Chronic obstructive pulmonary disease ([address])   • DDD (degenerative disc disease), lumbar   • Herniation of lumbar intervertebral disc   • Gastric ulcer   • Gastroesophageal reflux disease without esophagitis   • HCAP (healthcare-associated pneumonia)   • Hypokalemia   • Abnormal CT of the chest   • Moderate persistent asthma without complication   • Cigarette nicotine dependence without complication   • Chronic respiratory failure with hypoxia (HCC)   • Severe obstructive sleep apnea   • Seasonal allergic rhinitis   • Dysphagia   • Right lower quadrant abdominal pain   • Constipation   • Depression, recurrent (HCC)   • 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 with body mass index (BMI) of 31.0 to 31.9 in adult    • Primary hypertension      Past Medical and Surgical History:     Past Medical History:   Diagnosis Date   • Acute on chronic respiratory failure with hypoxia ([address]) 3/1/2020   • Adult BMI 37.0-37.9 kg/sq m 4/1/2021   • Anxiety    • Chronic obstructive pulmonary disease with acute lower respiratory infection ([address]) 10/25/2017   • COPD (chronic obstructive pulmonary disease) ([facility])    • Depression    • Diverticulitis    • GERD (gastroesophageal reflux disease)    • Influenza A 2/29/2020   • Pneumonia of right lower lobe due to influenza A virus 2/29/2020   • 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[address])    • Primary hypertension 2/10/2022   • Psychiatric disorder    • Sepsis due to Haemophilus influenzae with acute hypoxic respiratory failure without septic shock ([address]) 2/29/2020   • Shortness of breath    • Sleep apnea      Past Surgical History:   Procedure Laterality Date   • APPENDECTOMY     • CHOLECYSTECTOMY      partial x2 per pt   • HIATAL HERNIA REPAIR     • INGUINAL HERNIA REPAIR Right    • LAPAROSCOPIC TUBAL LIGATION Bilateral    • ROTATOR CUFF REPAIR Right    • UVULOPALATOPHARYNGOPLASTY        Family History:     Family History   Problem Relation Age of Onset   • No Known Problems Mother         passed away from [facility] on 8/8/22   • No Known Problems Father    • No Known Problems Sister    • No Known Problems Daughter    • Breast cancer Maternal Grandmother 28   • No Known Problems Paternal Grandmother    • No Known Problems Maternal Aunt       Social History:     Social History     Socioeconomic History   • Marital status:      Spouse name: None   • Number of children: None   • Years of education: None   • Highest education level: None   Occupational History   • None   Tobacco Use   • Smoking status: Every Day     Packs/day: 1.00     Types: Cigarettes   • Smokeless tobacco: Never   Vaping Use   • Vaping Use: Every day   • Substances: THC, Flavoring   Substance and Sexual Activity   • Alcohol use: Yes     Comment: rarely   • Drug use: Yes     Types: Marijuana     Comment: daily in e-pen form   • Sexual activity: None   Other Topics Concern   • None   Social History Narrative   • None     Social Determinants of Health     Financial Resource Strain: Low Risk  (11/29/2023)    Overall Financial Resource Strain (CARDIA)    • Difficulty of Paying Living Expenses: Not very hard   Food Insecurity: Not on file   Transportation Needs: Unmet Transportation Needs (11/29/2023)    PRAPARE - Transportation    • Lack of Transportation (Medical):  Yes    • Lack of Transportation (Non-Medical): Yes   Physical Activity: Not on file   Stress: Not on file   Social Connections: Not on file   Intimate Partner Violence: Not on file   Housing Stability: Not on file      Medications and Allergies:     Current Outpatient Medications   Medication Sig Dispense Refill   • albuterol (PROVENTIL HFA,VENTOLIN HFA) 90 mcg/act inhaler Inhale 2 puffs every 6 (six) hours as needed for wheezing or shortness of breath 8.5 g 3   • alendronate (Fosamax) 70 mg tablet Take 1 tablet (70 mg total) by mouth every 7 days 12 tablet 3   • atorvastatin (LIPITOR) 40 mg tablet Take 1 tablet (40 mg total) by mouth daily 90 tablet 1   • calcium carbonate (OS-PRETTY) 600 MG tablet Take 1 tablet (600 mg total) by mouth 2 (two) times a day with meals 180 tablet 3   • cyclobenzaprine (FLEXERIL) 10 mg tablet Take 1 tablet (10 mg total) by mouth daily 90 tablet 0   • dicyclomine (BENTYL) 20 mg tablet Take 1 tablet (20 mg total) by mouth 3 (three) times a day as needed (as needed) 270 tablet 1   • DULoxetine (CYMBALTA) 30 mg delayed release capsule Take 1 capsule (30 mg total) by mouth daily 90 capsule 3   • DULoxetine (CYMBALTA) 60 mg delayed release capsule Take 1 capsule (60 mg total) by mouth daily 90 capsule 3   • esomeprazole (NexIUM) 40 MG capsule Take 1 capsule (40 mg total) by mouth daily 90 capsule 1   • fluticasone (FLONASE) 50 mcg/act nasal spray 2 sprays into each nostril 2 (two) times a day 16 g 0   • Fluticasone-Salmeterol (Advair Diskus) 500-50 mcg/dose inhaler Inhale 1 puff 2 (two) times a day 60 blister 10   • furosemide (LASIX) 40 mg tablet Take 1 tablet (40 mg total) by mouth daily as needed (As needed for edema) 90 tablet 0   • gabapentin (NEURONTIN) 300 mg capsule Take 1 capsule (300 mg total) by mouth 2 (two) times a day 180 capsule 1   • irbesartan (AVAPRO) 75 mg tablet Take 1 tablet (75 mg total) by mouth daily at bedtime 30 tablet 0   • Naproxen Sodium (ALEVE PO) Take 2 tablets by mouth every morning     • ondansetron (ZOFRAN) 4 mg tablet Take 1 tablet (4 mg total) by mouth every 6 (six) hours 12 tablet 0   • oxybutynin (DITROPAN) 5 mg tablet Take 1 tablet (5 mg total) by mouth 3 (three) times a day 90 tablet 5   • potassium chloride (Klor-Con) 10 mEq tablet TAKE 1 TABLET DAILY AS NEEDED (WHEN TAKE LASIX) 90 tablet 1   • QUEtiapine (SEROquel) 50 mg tablet TAKE 1 TABLET IN THE MORNING AND 2 TABLETS AT BEDTIME. ... 270 tablet 1   • Cholecalciferol (Vitamin D3) 50 MCG (2000 UT) TABS Take 1 tablet (2,000 Units total) by mouth daily (Patient not taking: Reported on 4/27/2023) 90 tablet 3   • colestipol (COLESTID) 1 g tablet Take 1 tablet (1 g total) by mouth 2 (two) times a day (Patient not taking: Reported on 5/26/2023) 180 tablet 1   • loratadine (Claritin) 10 mg tablet Take 1 tablet (10 mg total) by mouth daily (Patient not taking: Reported on 4/27/2023) 30 tablet 0   • sodium chloride (OCEAN) 0.65 % nasal spray 2 sprays into each nostril as needed for congestion or rhinitis (Patient not taking: Reported on 11/29/2023) 88 mL 0     No current facility-administered medications for this visit. Allergies   Allergen Reactions   • Erythromycin Throat Swelling   • Morphine Delirium      Immunizations:     Immunization History   Administered Date(s) Administered   • COVID-19 MODERNA VACC 0.5 ML IM 04/02/2021, 05/04/2021   • INFLUENZA 11/30/2016, 11/14/2018   • Influenza, high dose seasonal 0.7 mL 09/20/2021, 11/29/2023   • Influenza, recombinant, quadrivalent,injectable, preservative free 03/03/2020   • Pneumococcal Conjugate 13-Valent 08/29/2016   • Pneumococcal Conjugate Vaccine 20-valent (Pcv20), Polysace 04/27/2023   • Pneumococcal Polysaccharide PPV23 04/10/2017      Health Maintenance:         Topic Date Due   • Breast Cancer Screening: Mammogram  02/17/2024   • Colorectal Cancer Screening  09/23/2025   • Hepatitis C Screening  Completed         Topic Date Due   • COVID-19 Vaccine (3 - Hassel Cassis series) 06/29/2021      Medicare Screening Tests and Risk Assessments:     Clara Holder is here for her Subsequent Wellness visit.  Last Medicare Wellness visit information reviewed, patient interviewed and updates made to the record today. Health Risk Assessment:   Patient rates overall health as fair. Patient feels that their physical health rating is same. Patient is satisfied with their life. Eyesight was rated as same. Hearing was rated as same. Patient feels that their emotional and mental health rating is same. Patients states they are never, rarely angry. Patient states they are never, rarely unusually tired/fatigued. Pain experienced in the last 7 days has been none. Patient states that she has experienced no weight loss or gain in last 6 months. Depression Screening:   PHQ-9 Score: 0      Fall Risk Screening: In the past year, patient has experienced: no history of falling in past year      Urinary Incontinence Screening:   Patient has not leaked urine accidently in the last six months. Home Safety:  Patient does not have trouble with stairs inside or outside of their home. Patient has working smoke alarms and has working carbon monoxide detector. Home safety hazards include: none. Nutrition:   Current diet is Regular and Limited junk food. Medications:   Patient is currently taking over-the-counter supplements. OTC medications include: see medication list. Patient is not able to manage medications. Activities of Daily Living (ADLs)/Instrumental Activities of Daily Living (IADLs):   Walk and transfer into and out of bed and chair?: Yes  Dress and groom yourself?: Yes    Bathe or shower yourself?: Yes    Feed yourself?  Yes  Do your laundry/housekeeping?: Yes  Manage your money, pay your bills and track your expenses?: Yes  Make your own meals?: Yes    Do your own shopping?: Yes    Previous Hospitalizations:   Any hospitalizations or ED visits within the last 12 months?: No      Advance Care Planning:   Living will: Yes    Durable POA for healthcare: No    Advanced directive: Yes      Cognitive Screening:   Provider or family/friend/caregiver concerned regarding cognition?: No    PREVENTIVE SCREENINGS      Cardiovascular Screening:    General: Screening Not Indicated and History Lipid Disorder      Diabetes Screening:     General: Screening Current      Colorectal Cancer Screening:     General: Screening Current      Breast Cancer Screening:     General: Screening Current      Cervical Cancer Screening:    General: Screening Not Indicated      Osteoporosis Screening:    General: Screening Not Indicated and History Osteoporosis      Abdominal Aortic Aneurysm (AAA) Screening:        General: Screening Not Indicated      Lung Cancer Screening:     General: Screening Not Indicated      Hepatitis C Screening:    General: Screening Current    Screening, Brief Intervention, and Referral to Treatment (SBIRT)    Screening  Typical number of drinks in a day: 0  Typical number of drinks in a week: 0  Interpretation: Low risk drinking behavior. Single Item Drug Screening:  How often have you used an illegal drug (including marijuana) or a prescription medication for non-medical reasons in the past year? never    Single Item Drug Screen Score: 0  Interpretation: Negative screen for possible drug use disorder    Review of Current Opioid Use    Opioid Risk Tool (ORT) Interpretation: Complete Opioid Risk Tool (ORT)    Other Counseling Topics:   Car/seat belt/driving safety, skin self-exam, sunscreen and calcium and vitamin D intake and regular weightbearing exercise. No results found. Physical Exam:     /96   Pulse 84   Temp 98.2 °F (36.8 °C) (Tympanic)   Ht 5' (1.524 m)   Wt 70.9 kg (156 lb 3.2 oz)   SpO2 94%   BMI 30.51 kg/m²     Physical Exam  Vitals and nursing note reviewed. Constitutional:       Appearance: Normal appearance. She is well-developed. HENT:      Head: Normocephalic and atraumatic.       Right Ear: Tympanic membrane, ear canal and external ear normal.      Left Ear: Tympanic membrane, ear canal and external ear normal.      Nose: Nose normal. Mouth/Throat:      Mouth: Mucous membranes are moist.   Eyes:      General: Lids are normal. Vision grossly intact. Conjunctiva/sclera: Conjunctivae normal.      Pupils: Pupils are equal, round, and reactive to light. Cardiovascular:      Rate and Rhythm: Normal rate and regular rhythm. Pulses: Normal pulses. Heart sounds: Normal heart sounds, S1 normal and S2 normal.      No friction rub. No gallop. No S3 sounds. Pulmonary:      Effort: Pulmonary effort is normal.      Breath sounds: Normal breath sounds. Abdominal:      General: Bowel sounds are normal.      Palpations: Abdomen is soft. Tenderness: There is no abdominal tenderness. Genitourinary:     Comments: Deferred  Musculoskeletal:         General: Normal range of motion. Cervical back: Normal range of motion and neck supple. Right lower leg: No edema. Left lower leg: No edema. Lymphadenopathy:      Cervical: No cervical adenopathy. Skin:     General: Skin is warm and dry. Capillary Refill: Capillary refill takes less than 2 seconds. Neurological:      General: No focal deficit present. Mental Status: She is alert and oriented to person, place, and time. Motor: Motor function is intact. Gait: Gait is intact. Psychiatric:         Attention and Perception: Attention and perception normal.         Mood and Affect: Mood and affect normal.         Speech: Speech normal.         Behavior: Behavior normal. Behavior is cooperative. Thought Content:  Thought content normal.         Cognition and Memory: Cognition and memory normal.         Judgment: Judgment normal.          KATIE Brown

## 2023-12-08 ENCOUNTER — APPOINTMENT (EMERGENCY)
Dept: RADIOLOGY | Facility: HOSPITAL | Age: 68
End: 2023-12-08
Payer: COMMERCIAL

## 2023-12-08 ENCOUNTER — HOSPITAL ENCOUNTER (EMERGENCY)
Facility: HOSPITAL | Age: 68
Discharge: HOME | End: 2023-12-08
Attending: EMERGENCY MEDICINE
Payer: COMMERCIAL

## 2023-12-08 VITALS
BODY MASS INDEX: 30.63 KG/M2 | SYSTOLIC BLOOD PRESSURE: 120 MMHG | HEIGHT: 60 IN | RESPIRATION RATE: 20 BRPM | WEIGHT: 156 LBS | HEART RATE: 93 BPM | TEMPERATURE: 97.5 F | DIASTOLIC BLOOD PRESSURE: 71 MMHG | OXYGEN SATURATION: 95 %

## 2023-12-08 DIAGNOSIS — N39.0 UTI (URINARY TRACT INFECTION): ICD-10-CM

## 2023-12-08 DIAGNOSIS — R68.83 CHILLS: ICD-10-CM

## 2023-12-08 DIAGNOSIS — R42 LIGHTHEADEDNESS: Primary | ICD-10-CM

## 2023-12-08 LAB
2HR DELTA HS TROPONIN: 3 NG/L
ALBUMIN SERPL BCP-MCNC: 4.4 G/DL (ref 3.5–5)
ALP SERPL-CCNC: 85 U/L (ref 34–104)
ALT SERPL W P-5'-P-CCNC: 10 U/L (ref 7–52)
ANION GAP SERPL CALCULATED.3IONS-SCNC: 8 MMOL/L
AST SERPL W P-5'-P-CCNC: 12 U/L (ref 13–39)
BACTERIA UR QL AUTO: ABNORMAL /HPF
BASOPHILS # BLD AUTO: 0.07 THOUSANDS/ÂΜL (ref 0–0.1)
BASOPHILS NFR BLD AUTO: 1 % (ref 0–1)
BILIRUB SERPL-MCNC: 0.54 MG/DL (ref 0.2–1)
BILIRUB UR QL STRIP: NEGATIVE
BUN SERPL-MCNC: 19 MG/DL (ref 5–25)
CALCIUM SERPL-MCNC: 9.5 MG/DL (ref 8.4–10.2)
CARDIAC TROPONIN I PNL SERPL HS: 10 NG/L
CARDIAC TROPONIN I PNL SERPL HS: 7 NG/L
CHLORIDE SERPL-SCNC: 104 MMOL/L (ref 96–108)
CLARITY UR: ABNORMAL
CO2 SERPL-SCNC: 27 MMOL/L (ref 21–32)
COLOR UR: YELLOW
CREAT SERPL-MCNC: 0.57 MG/DL (ref 0.6–1.3)
EOSINOPHIL # BLD AUTO: 0.13 THOUSAND/ÂΜL (ref 0–0.61)
EOSINOPHIL NFR BLD AUTO: 1 % (ref 0–6)
ERYTHROCYTE [DISTWIDTH] IN BLOOD BY AUTOMATED COUNT: 14.6 % (ref 11.6–15.1)
FLUAV RNA RESP QL NAA+PROBE: NEGATIVE
FLUBV RNA RESP QL NAA+PROBE: NEGATIVE
GFR SERPL CREATININE-BSD FRML MDRD: 96 ML/MIN/1.73SQ M
GLUCOSE SERPL-MCNC: 92 MG/DL (ref 65–140)
GLUCOSE UR STRIP-MCNC: NEGATIVE MG/DL
HCT VFR BLD AUTO: 39.9 % (ref 34.8–46.1)
HGB BLD-MCNC: 13 G/DL (ref 11.5–15.4)
HGB UR QL STRIP.AUTO: NEGATIVE
IMM GRANULOCYTES # BLD AUTO: 0.05 THOUSAND/UL (ref 0–0.2)
IMM GRANULOCYTES NFR BLD AUTO: 1 % (ref 0–2)
INR PPP: 0.95 (ref 0.84–1.19)
KETONES UR STRIP-MCNC: NEGATIVE MG/DL
LACTATE SERPL-SCNC: 1.2 MMOL/L (ref 0.5–2)
LEUKOCYTE ESTERASE UR QL STRIP: NEGATIVE
LYMPHOCYTES # BLD AUTO: 2.25 THOUSANDS/ÂΜL (ref 0.6–4.47)
LYMPHOCYTES NFR BLD AUTO: 21 % (ref 14–44)
MCH RBC QN AUTO: 31.6 PG (ref 26.8–34.3)
MCHC RBC AUTO-ENTMCNC: 32.6 G/DL (ref 31.4–37.4)
MCV RBC AUTO: 97 FL (ref 82–98)
MONOCYTES # BLD AUTO: 0.76 THOUSAND/ÂΜL (ref 0.17–1.22)
MONOCYTES NFR BLD AUTO: 7 % (ref 4–12)
MUCOUS THREADS UR QL AUTO: ABNORMAL
NEUTROPHILS # BLD AUTO: 7.47 THOUSANDS/ÂΜL (ref 1.85–7.62)
NEUTS SEG NFR BLD AUTO: 69 % (ref 43–75)
NITRITE UR QL STRIP: POSITIVE
NON-SQ EPI CELLS URNS QL MICRO: ABNORMAL /HPF
NRBC BLD AUTO-RTO: 0 /100 WBCS
PH UR STRIP.AUTO: 6 [PH]
PLATELET # BLD AUTO: 355 THOUSANDS/UL (ref 149–390)
PMV BLD AUTO: 10.5 FL (ref 8.9–12.7)
POTASSIUM SERPL-SCNC: 3.6 MMOL/L (ref 3.5–5.3)
PROCALCITONIN SERPL-MCNC: <0.05 NG/ML
PROT SERPL-MCNC: 7.2 G/DL (ref 6.4–8.4)
PROT UR STRIP-MCNC: NEGATIVE MG/DL
PROTHROMBIN TIME: 12.9 SECONDS (ref 11.6–14.5)
RBC # BLD AUTO: 4.11 MILLION/UL (ref 3.81–5.12)
RBC #/AREA URNS AUTO: ABNORMAL /HPF
RSV RNA RESP QL NAA+PROBE: NEGATIVE
SARS-COV-2 RNA RESP QL NAA+PROBE: NEGATIVE
SODIUM SERPL-SCNC: 139 MMOL/L (ref 135–147)
SP GR UR STRIP.AUTO: 1.02
UROBILINOGEN UR QL STRIP.AUTO: 0.2 E.U./DL
WBC # BLD AUTO: 10.73 THOUSAND/UL (ref 4.31–10.16)
WBC #/AREA URNS AUTO: ABNORMAL /HPF

## 2023-12-08 PROCEDURE — 99285 EMERGENCY DEPT VISIT HI MDM: CPT

## 2023-12-08 PROCEDURE — 85025 COMPLETE CBC W/AUTO DIFF WBC: CPT

## 2023-12-08 PROCEDURE — 80053 COMPREHEN METABOLIC PANEL: CPT

## 2023-12-08 PROCEDURE — 0241U HB NFCT DS VIR RESP RNA 4 TRGT: CPT

## 2023-12-08 PROCEDURE — 81001 URINALYSIS AUTO W/SCOPE: CPT

## 2023-12-08 PROCEDURE — 84145 PROCALCITONIN (PCT): CPT

## 2023-12-08 PROCEDURE — 99285 EMERGENCY DEPT VISIT HI MDM: CPT | Performed by: EMERGENCY MEDICINE

## 2023-12-08 PROCEDURE — 85610 PROTHROMBIN TIME: CPT

## 2023-12-08 PROCEDURE — 83605 ASSAY OF LACTIC ACID: CPT

## 2023-12-08 PROCEDURE — 36415 COLL VENOUS BLD VENIPUNCTURE: CPT

## 2023-12-08 PROCEDURE — 84484 ASSAY OF TROPONIN QUANT: CPT

## 2023-12-08 PROCEDURE — 71045 X-RAY EXAM CHEST 1 VIEW: CPT

## 2023-12-08 PROCEDURE — 93005 ELECTROCARDIOGRAM TRACING: CPT

## 2023-12-08 RX ORDER — ONDANSETRON 4 MG/1
4 TABLET, ORALLY DISINTEGRATING ORAL ONCE
Status: COMPLETED | OUTPATIENT
Start: 2023-12-08 | End: 2023-12-08

## 2023-12-08 RX ORDER — SODIUM CHLORIDE 9 MG/ML
3 INJECTION INTRAVENOUS
Status: DISCONTINUED | OUTPATIENT
Start: 2023-12-08 | End: 2023-12-08 | Stop reason: HOSPADM

## 2023-12-08 RX ORDER — ONDANSETRON 2 MG/ML
4 INJECTION INTRAMUSCULAR; INTRAVENOUS ONCE
Status: DISCONTINUED | OUTPATIENT
Start: 2023-12-08 | End: 2023-12-08

## 2023-12-08 RX ORDER — MECLIZINE HCL 12.5 MG/1
25 TABLET ORAL ONCE
Status: COMPLETED | OUTPATIENT
Start: 2023-12-08 | End: 2023-12-08

## 2023-12-08 RX ORDER — CEPHALEXIN 500 MG/1
500 CAPSULE ORAL EVERY 6 HOURS SCHEDULED
Qty: 28 CAPSULE | Refills: 0 | Status: SHIPPED | OUTPATIENT
Start: 2023-12-08 | End: 2023-12-15

## 2023-12-08 RX ORDER — CEPHALEXIN 500 MG/1
500 CAPSULE ORAL ONCE
Status: COMPLETED | OUTPATIENT
Start: 2023-12-08 | End: 2023-12-08

## 2023-12-08 RX ADMIN — MECLIZINE 25 MG: 12.5 TABLET ORAL at 13:40

## 2023-12-08 RX ADMIN — ONDANSETRON 4 MG: 4 TABLET, ORALLY DISINTEGRATING ORAL at 13:40

## 2023-12-08 RX ADMIN — CEPHALEXIN 500 MG: 500 CAPSULE ORAL at 15:19

## 2023-12-08 NOTE — ED PROVIDER NOTES
History  Chief Complaint   Patient presents with    Chills    Fever     This is a 30-year-old female with known history of COPD, hypertension, and hyperlipidemia who is now presenting due to concern for episode of chills, shakiness, and lightheadedness today. Patient reports that she was helping a friend to clean his house today when she began to feel "off". She notes that she stood up and was feeling very lightheaded. At the time she was able to sit down and was feeling somewhat better. She decided to go up and take a shower. At that time she began to experience chills while in the shower, then had a subjective fever where she says that she felt very hot but did not take her temperature. She then attempted to get out of the shower but again felt very lightheaded and needed to sit down on the ground. After a time, she said that she was feeling mildly improved but was continuing to feel dizzy (she clarifies to mean lightheaded). Patient is denying any chest pain, shortness of breath, cough different from her baseline cough, new abdominal pain, back pain, or changes in her bowel habits. She does however endorse both dark and "smelly" urine over the past week or so. She states that otherwise she has felt like she is in her completely normal state of health and says that she was eating and drinking normally up until this morning. She is denying any nausea sensation currently. Prior to Admission Medications   Prescriptions Last Dose Informant Patient Reported? Taking?    Cholecalciferol (Vitamin D3) 50 MCG (2000 UT) TABS   No No   Sig: Take 1 tablet (2,000 Units total) by mouth daily   Patient not taking: Reported on 4/27/2023   DULoxetine (CYMBALTA) 30 mg delayed release capsule   No No   Sig: Take 1 capsule (30 mg total) by mouth daily   DULoxetine (CYMBALTA) 60 mg delayed release capsule   No No   Sig: Take 1 capsule (60 mg total) by mouth daily   Fluticasone-Salmeterol (Advair Diskus) 500-50 mcg/dose inhaler   No No   Sig: Inhale 1 puff 2 (two) times a day   Naproxen Sodium (ALEVE PO)   Yes No   Sig: Take 2 tablets by mouth every morning   QUEtiapine (SEROquel) 50 mg tablet   No No   Sig: TAKE 1 TABLET IN THE MORNING AND 2 TABLETS AT BEDTIME. ...   albuterol (PROVENTIL HFA,VENTOLIN HFA) 90 mcg/act inhaler   No No   Sig: Inhale 2 puffs every 6 (six) hours as needed for wheezing or shortness of breath   alendronate (Fosamax) 70 mg tablet   No No   Sig: Take 1 tablet (70 mg total) by mouth every 7 days   atorvastatin (LIPITOR) 40 mg tablet   No No   Sig: Take 1 tablet (40 mg total) by mouth daily   calcium carbonate (OS-PRETTY) 600 MG tablet   No No   Sig: Take 1 tablet (600 mg total) by mouth 2 (two) times a day with meals   colestipol (COLESTID) 1 g tablet   No No   Sig: Take 1 tablet (1 g total) by mouth 2 (two) times a day   Patient not taking: Reported on 2023   cyclobenzaprine (FLEXERIL) 10 mg tablet   No No   Sig: Take 1 tablet (10 mg total) by mouth daily   dicyclomine (BENTYL) 20 mg tablet   No No   Sig: Take 1 tablet (20 mg total) by mouth 3 (three) times a day as needed (as needed)   esomeprazole (NexIUM) 40 MG capsule   No No   Sig: Take 1 capsule (40 mg total) by mouth daily   fluticasone (FLONASE) 50 mcg/act nasal spray   No No   Si sprays into each nostril 2 (two) times a day   furosemide (LASIX) 40 mg tablet   No No   Sig: Take 1 tablet (40 mg total) by mouth daily as needed (As needed for edema)   gabapentin (NEURONTIN) 300 mg capsule   No No   Sig: Take 1 capsule (300 mg total) by mouth 2 (two) times a day   irbesartan (AVAPRO) 75 mg tablet   No No   Sig: Take 1 tablet (75 mg total) by mouth daily at bedtime   loratadine (Claritin) 10 mg tablet   No No   Sig: Take 1 tablet (10 mg total) by mouth daily   Patient not taking: Reported on 2023   ondansetron (ZOFRAN) 4 mg tablet   No No   Sig: Take 1 tablet (4 mg total) by mouth every 6 (six) hours   oxybutynin (DITROPAN) 5 mg tablet   No No Sig: Take 1 tablet (5 mg total) by mouth 3 (three) times a day   potassium chloride (Klor-Con) 10 mEq tablet   No No   Sig: TAKE 1 TABLET DAILY AS NEEDED (WHEN TAKE LASIX)   sodium chloride (OCEAN) 0.65 % nasal spray   No No   Si sprays into each nostril as needed for congestion or rhinitis   Patient not taking: Reported on 2023      Facility-Administered Medications: None       Past Medical History:   Diagnosis Date    Acute on chronic respiratory failure with hypoxia ([address]) 3/1/2020    Adult BMI 37.0-37.9 kg/sq m 2021    Anxiety     Chronic obstructive pulmonary disease with acute lower respiratory infection ([address]) 10/25/2017    COPD (chronic obstructive pulmonary disease) (HCC)     Depression     Diverticulitis     GERD (gastroesophageal reflux disease)     Influenza A 2020    Pneumonia of right lower lobe due to influenza A virus 2020    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[address])     Primary hypertension 2/10/2022    Psychiatric disorder     Sepsis due to Haemophilus influenzae with acute hypoxic respiratory failure without septic shock ([address]) 2020    Shortness of breath     Sleep apnea        Past Surgical History:   Procedure Laterality Date    APPENDECTOMY      CHOLECYSTECTOMY      partial x2 per pt    HIATAL HERNIA REPAIR      INGUINAL HERNIA REPAIR Right     LAPAROSCOPIC TUBAL LIGATION Bilateral     ROTATOR CUFF REPAIR Right     UVULOPALATOPHARYNGOPLASTY         Family History   Problem Relation Age of Onset    No Known Problems Mother         passed away from [facility] on 22    No Known Problems Father     No Known Problems Sister     No Known Problems Daughter     Breast cancer Maternal Grandmother 35    No Known Problems Paternal Grandmother     No Known Problems Maternal Aunt      I have reviewed and agree with the history as documented.     E-Cigarette/Vaping    E-Cigarette Use Current Every Day User     Comments vape      E-Cigarette/Vaping Substances    Nicotine No     THC Yes     CBD No     Flavoring Yes     Other No     Unknown No      Social History     Tobacco Use    Smoking status: Every Day     Packs/day: 1.00     Types: Cigarettes    Smokeless tobacco: Never   Vaping Use    Vaping Use: Every day    Substances: THC, Flavoring   Substance Use Topics    Alcohol use: Yes     Comment: rarely    Drug use: Yes     Types: Marijuana     Comment: daily in e-pen form        Review of Systems   Constitutional:  Positive for chills, fatigue and fever. HENT:  Negative for ear pain and sore throat. Eyes:  Negative for pain and visual disturbance. Respiratory:  Negative for cough and shortness of breath. Cardiovascular:  Negative for chest pain and palpitations. Gastrointestinal:  Negative for abdominal pain and vomiting. Genitourinary:  Negative for dysuria and hematuria. Musculoskeletal:  Negative for arthralgias and back pain. Skin:  Negative for color change and rash. Neurological:  Positive for weakness and light-headedness. Negative for seizures and syncope. All other systems reviewed and are negative. Physical Exam  ED Triage Vitals [12/08/23 1217]   Temperature Pulse Respirations Blood Pressure SpO2   97.5 °F (36.4 °C) 99 20 (!) 174/124 96 %      Temp Source Heart Rate Source Patient Position - Orthostatic VS BP Location FiO2 (%)   Temporal Monitor Lying Left arm --      Pain Score       No Pain             Orthostatic Vital Signs  Vitals:    12/08/23 1217 12/08/23 1430 12/08/23 1520   BP: (!) 174/124 147/72 120/71   Pulse: 99 88 93   Patient Position - Orthostatic VS: Lying Lying Sitting       Physical Exam  Vitals and nursing note reviewed. Constitutional:       General: She is not in acute distress. Appearance: She is well-developed. She is obese. She is not toxic-appearing or diaphoretic. HENT:      Head: Normocephalic and atraumatic. Right Ear: External ear normal.      Left Ear: External ear normal.      Nose: Nose normal. No congestion or rhinorrhea. Mouth/Throat:      Mouth: Mucous membranes are moist.      Pharynx: Oropharynx is clear. No oropharyngeal exudate or posterior oropharyngeal erythema. Eyes:      General: No scleral icterus. Extraocular Movements: Extraocular movements intact. Conjunctiva/sclera: Conjunctivae normal.      Pupils: Pupils are equal, round, and reactive to light. Cardiovascular:      Rate and Rhythm: Normal rate and regular rhythm. Pulses: Normal pulses. Heart sounds: Normal heart sounds. No murmur heard. Pulmonary:      Effort: Pulmonary effort is normal. No respiratory distress. Breath sounds: Normal breath sounds. No wheezing or rhonchi. Abdominal:      General: Abdomen is flat. There is no distension. Palpations: Abdomen is soft. Tenderness: There is no abdominal tenderness. There is no right CVA tenderness, left CVA tenderness or guarding. Musculoskeletal:         General: No swelling, tenderness or deformity. Cervical back: Neck supple. No rigidity. Right lower leg: No edema. Left lower leg: No edema. Lymphadenopathy:      Cervical: No cervical adenopathy. Skin:     General: Skin is warm and dry. Capillary Refill: Capillary refill takes less than 2 seconds. Coloration: Skin is not jaundiced. Findings: No bruising or rash. Neurological:      General: No focal deficit present. Mental Status: She is alert and oriented to person, place, and time. Mental status is at baseline.          ED Medications  Medications   meclizine (ANTIVERT) tablet 25 mg (25 mg Oral Given 12/8/23 1340)   ondansetron (ZOFRAN-ODT) dispersible tablet 4 mg (4 mg Oral Given 12/8/23 1340)   cephalexin (KEFLEX) capsule 500 mg (500 mg Oral Given 12/8/23 1519)       Diagnostic Studies  Results Reviewed       Procedure Component Value Units Date/Time    HS Troponin I 2hr [[id number]]  (Normal) Collected: 12/08/23 1511    Lab Status: Final result Specimen: Blood from Hand, Right Updated: 12/08/23 1540     hs TnI 2hr 10 ng/L      Delta 2hr hsTnI 3 ng/L     Urine Microscopic [[id number]]  (Abnormal) Collected: 12/08/23 1452    Lab Status: Final result Specimen: Urine, Clean Catch Updated: 12/08/23 1527     RBC, UA None Seen /hpf      WBC, UA 0-1 /hpf      Epithelial Cells Occasional /hpf      Bacteria, UA Innumerable /hpf      MUCUS THREADS Occasional    UA w Reflex to Microscopic w Reflex to Culture [[id number]]  (Abnormal) Collected: 12/08/23 1452    Lab Status: Final result Specimen: Urine, Clean Catch Updated: 12/08/23 1500     Color, UA Yellow     Clarity, UA Hazy     Specific Gravity, UA 1.020     pH, UA 6.0     Leukocytes, UA Negative     Nitrite, UA Positive     Protein, UA Negative mg/dl      Glucose, UA Negative mg/dl      Ketones, UA Negative mg/dl      Urobilinogen, UA 0.2 E.U./dl      Bilirubin, UA Negative     Occult Blood, UA Negative    FLU/RSV/COVID - if FLU/RSV clinically relevant [[id number]]  (Normal) Collected: 12/08/23 1408    Lab Status: Final result Specimen: Nares from Nose Updated: 12/08/23 1453     SARS-CoV-2 Negative     INFLUENZA A PCR Negative     INFLUENZA B PCR Negative     RSV PCR Negative    Narrative:      FOR PEDIATRIC PATIENTS - copy/paste COVID Guidelines URL to browser: [email or web address]    SARS-CoV-2 assay is a Nucleic Acid Amplification assay intended for the  qualitative detection of nucleic acid from SARS-CoV-2 in nasopharyngeal  swabs. Results are for the presumptive identification of SARS-CoV-2 RNA. Positive results are indicative of infection with SARS-CoV-2, the virus  causing COVID-19, but do not rule out bacterial infection or co-infection  with other viruses. Laboratories within the [facility] and its  territories are required to report all positive results to the appropriate  public health authorities.  Negative results do not preclude SARS-CoV-2  infection and should not be used as the sole basis for treatment or other  patient management decisions. Negative results must be combined with  clinical observations, patient history, and epidemiological information. This test has not been FDA cleared or approved. This test has been authorized by FDA under an Emergency Use Authorization  (EUA). This test is only authorized for the duration of time the  declaration that circumstances exist justifying the authorization of the  emergency use of an in vitro diagnostic tests for detection of SARS-CoV-2  virus and/or diagnosis of COVID-19 infection under section 564(b)(1) of  the Act, 21 U. S.C. 714ZJP-9(I)(5), unless the authorization is terminated  or revoked sooner. The test has been validated but independent review by FDA  and CLIA is pending. Test performed using [employer]: This RT-PCR assay targets N2,  a region unique to SARS-CoV-2. A conserved region in the E-gene was chosen  for pan-Sarbecovirus detection which includes SARS-CoV-2. According to CMS-2020-01-R, this platform meets the definition of high-throughput technology.     Procalcitonin [[id number]]  (Normal) Collected: 12/08/23 1256    Lab Status: Final result Specimen: Blood from Arm, Left Updated: 12/08/23 1336     Procalcitonin <0.05 ng/ml     HS Troponin 0hr (reflex protocol) [[id number]]  (Normal) Collected: 12/08/23 1256    Lab Status: Final result Specimen: Blood from Arm, Left Updated: 12/08/23 1333     hs TnI 0hr 7 ng/L     Comprehensive metabolic panel [[id number]]  (Abnormal) Collected: 12/08/23 1256    Lab Status: Final result Specimen: Blood from Arm, Left Updated: 12/08/23 1327     Sodium 139 mmol/L      Potassium 3.6 mmol/L      Chloride 104 mmol/L      CO2 27 mmol/L      ANION GAP 8 mmol/L      BUN 19 mg/dL      Creatinine 0.57 mg/dL      Glucose 92 mg/dL      Calcium 9.5 mg/dL      AST 12 U/L      ALT 10 U/L      Alkaline Phosphatase 85 U/L      Total Protein 7.2 g/dL      Albumin 4.4 g/dL      Total Bilirubin 0.54 mg/dL      eGFR 96 ml/min/1.73sq m     Narrative:      [facility] guidelines for Chronic Kidney Disease (CKD):     Stage 1 with normal or high GFR (GFR > 90 mL/min/1.73 square meters)    Stage 2 Mild CKD (GFR = 60-89 mL/min/1.73 square meters)    Stage 3A Moderate CKD (GFR = 45-59 mL/min/1.73 square meters)    Stage 3B Moderate CKD (GFR = 30-44 mL/min/1.73 square meters)    Stage 4 Severe CKD (GFR = 15-29 mL/min/1.73 square meters)    Stage 5 End Stage CKD (GFR <15 mL/min/1.73 square meters)  Note: GFR calculation is accurate only with a steady state creatinine    Lactic acid, plasma (w/reflex if result > 2.0) [[id number]]  (Normal) Collected: 12/08/23 1256    Lab Status: Final result Specimen: Blood from Arm, Left Updated: 12/08/23 1327     LACTIC ACID 1.2 mmol/L     Narrative:      Result may be elevated if tourniquet was used during collection.     Protime-INR [[id number]]  (Normal) Collected: 12/08/23 1256    Lab Status: Final result Specimen: Blood from Arm, Left Updated: 12/08/23 1325     Protime 12.9 seconds      INR 0.95    CBC and differential [[id number]]  (Abnormal) Collected: 12/08/23 1256    Lab Status: Final result Specimen: Blood from Arm, Left Updated: 12/08/23 1309     WBC 10.73 Thousand/uL      RBC 4.11 Million/uL      Hemoglobin 13.0 g/dL      Hematocrit 39.9 %      MCV 97 fL      MCH 31.6 pg      MCHC 32.6 g/dL      RDW 14.6 %      MPV 10.5 fL      Platelets 530 Thousands/uL      nRBC 0 /100 WBCs      Neutrophils Relative 69 %      Immat GRANS % 1 %      Lymphocytes Relative 21 %      Monocytes Relative 7 %      Eosinophils Relative 1 %      Basophils Relative 1 %      Neutrophils Absolute 7.47 Thousands/µL      Immature Grans Absolute 0.05 Thousand/uL      Lymphocytes Absolute 2.25 Thousands/µL      Monocytes Absolute 0.76 Thousand/µL      Eosinophils Absolute 0.13 Thousand/µL      Basophils Absolute 0.07 Thousands/µL                    X-ray chest 1 view portable   ED Interpretation by Samy Gaffney MD (12/08 1410)   No acute cardiopulmonary pathology identified        Final Result by Les Gregorio MD (12/08 1550)      No acute cardiopulmonary disease. Workstation performed: [id number]               Procedures  Procedures  ECG interpreted by me. Date: 12/8/2023. Time: 1252. Rate: 85 bpm.  Axis: Normal.  Rhythm: Regular. There are no ST elevations or depressions evident. There are no T wave flattening's or inversions noted. This is a normal ECG. ED Course  ED Course as of 12/10/23 1506   Fri Dec 08, 2023   1409 hs TnI 0hr: 7  Needs delta     1439 Still awaiting urine sample. Patient has been encouraged on multiple occasions to provide sample. Will trial again, patient may be experiencing urinary retention and will attempt PVR if no sample soon. HEART Risk Score      Flowsheet Row Most Recent Value   Heart Score Risk Calculator    History 0 Filed at: 12/08/2023 1445   ECG 0 Filed at: 12/08/2023 1445   Age 2 Filed at: 12/08/2023 1445   Risk Factors 2 Filed at: 12/08/2023 1445   Troponin 0 Filed at: 12/08/2023 1445   HEART Score 4 Filed at: 12/08/2023 1445                                  Medical Decision Making  Differential diagnosis includes: Acute metabolic disturbance (will screen with metabolic panel), acute renal insufficiency, will screen with CMP. Anemia. Arrhythmia. ACS. COPD exacerbation including pneumonia. Urinary tract infection. Or orthostatic hypotension. Most likely, patient was over exertional today and may have become acutely tremulous and lightheaded. At this time, her symptoms are improving. Will continue to monitor symptoms closely. Will screen urine studies, CBC, and chest x-ray for further evaluation of differential above.   If all is negative, patient will be reassessed closely and if able to ambulate and feels close to her baseline, may return home with close monitoring of symptoms at home and strict return precautions for any chest pain, shortness of breath, lethargy, decrease in urine output, severe flank pain, or other severe symptoms. Reassessment/Disposition: Patient is able to ambulate at baseline and symptoms are likely to be due to COVID-19. Daughter feels that patient is safe at home and therefore will be discharged with close observation at home. Will return for any chest pain, shortness of breath, lethargy, inability to tolerate PO. Amount and/or Complexity of Data Reviewed  Labs: ordered. Decision-making details documented in ED Course. Radiology: ordered and independent interpretation performed. Risk  Prescription drug management. Disposition  Final diagnoses:   Lightheadedness   Chills   UTI (urinary tract infection)     Time reflects when diagnosis was documented in both MDM as applicable and the Disposition within this note       Time User Action Codes Description Comment    12/8/2023  2:49 PM Leafy Peat Add [R42] Lightheadedness     12/8/2023  2:49 PM Poornima Sharpe [R68.83] Chills     12/8/2023  3:06 PM Leafy Peat Add [N39.0] UTI (urinary tract infection)           ED Disposition       ED Disposition   Discharge    Condition   Stable    Date/Time   Fri Dec 8, 2023 [address] discharge to home/self care.                    Follow-up Information       Follow up With Specialties Details Why Contact Info Additional [address] [facility], [address] [facility] Internal Medicine, Nurse Practitioner   [address] 70 [facility] [phone]       2500 [facility] Emergency Department Emergency Medicine Go to  If symptoms worsen, As needed [address] [facility] [address] [place] [id number] [phone] [address] [facility] Emergency Department, [address] [facility], 800 [facility]            Discharge Medication List as of 12/8/2023  3:56 PM        START taking these medications    Details cephalexin (KEFLEX) 500 mg capsule Take 1 capsule (500 mg total) by mouth every 6 (six) hours for 7 days, Starting Fri 12/8/2023, Until Fri 12/15/2023, Normal           CONTINUE these medications which have NOT CHANGED    Details   albuterol (PROVENTIL HFA,VENTOLIN HFA) 90 mcg/act inhaler Inhale 2 puffs every 6 (six) hours as needed for wheezing or shortness of breath, Starting Tue 11/21/2023, Normal      alendronate (Fosamax) 70 mg tablet Take 1 tablet (70 mg total) by mouth every 7 days, Starting Wed 11/29/2023, Normal      atorvastatin (LIPITOR) 40 mg tablet Take 1 tablet (40 mg total) by mouth daily, Starting Tue 11/21/2023, Normal      calcium carbonate (OS-PRETTY) 600 MG tablet Take 1 tablet (600 mg total) by mouth 2 (two) times a day with meals, Starting Wed 8/10/2022, Normal      Cholecalciferol (Vitamin D3) 50 MCG (2000 UT) TABS Take 1 tablet (2,000 Units total) by mouth daily, Starting Mon 8/15/2022, Normal      colestipol (COLESTID) 1 g tablet Take 1 tablet (1 g total) by mouth 2 (two) times a day, Starting Fri 2/17/2023, Until Thu 5/18/2023, Normal      cyclobenzaprine (FLEXERIL) 10 mg tablet Take 1 tablet (10 mg total) by mouth daily, Starting Tue 11/21/2023, Normal      dicyclomine (BENTYL) 20 mg tablet Take 1 tablet (20 mg total) by mouth 3 (three) times a day as needed (as needed), Starting Tue 11/21/2023, Normal      !! DULoxetine (CYMBALTA) 30 mg delayed release capsule Take 1 capsule (30 mg total) by mouth daily, Starting Fri 8/11/2023, Normal      !!  DULoxetine (CYMBALTA) 60 mg delayed release capsule Take 1 capsule (60 mg total) by mouth daily, Starting Fri 8/11/2023, Normal      esomeprazole (NexIUM) 40 MG capsule Take 1 capsule (40 mg total) by mouth daily, Starting Tue 11/21/2023, Normal      fluticasone (FLONASE) 50 mcg/act nasal spray 2 sprays into each nostril 2 (two) times a day, Starting Thu 8/18/2022, Normal      Fluticasone-Salmeterol (Advair Diskus) 500-50 mcg/dose inhaler Inhale 1 puff 2 (two) times a day, Starting Wed 11/29/2023, Normal      furosemide (LASIX) 40 mg tablet Take 1 tablet (40 mg total) by mouth daily as needed (As needed for edema), Starting Tue 5/10/2022, Normal      gabapentin (NEURONTIN) 300 mg capsule Take 1 capsule (300 mg total) by mouth 2 (two) times a day, Starting Tue 11/21/2023, Normal      irbesartan (AVAPRO) 75 mg tablet Take 1 tablet (75 mg total) by mouth daily at bedtime, Starting Tue 11/21/2023, Normal      loratadine (Claritin) 10 mg tablet Take 1 tablet (10 mg total) by mouth daily, Starting Thu 8/18/2022, Normal      Naproxen Sodium (ALEVE PO) Take 2 tablets by mouth every morning, Historical Med      ondansetron (ZOFRAN) 4 mg tablet Take 1 tablet (4 mg total) by mouth every 6 (six) hours, Starting Tue 11/21/2023, Normal      oxybutynin (DITROPAN) 5 mg tablet Take 1 tablet (5 mg total) by mouth 3 (three) times a day, Starting Wed 11/29/2023, Normal      potassium chloride (Klor-Con) 10 mEq tablet TAKE 1 TABLET DAILY AS NEEDED (WHEN TAKE LASIX), Normal      QUEtiapine (SEROquel) 50 mg tablet TAKE 1 TABLET IN THE MORNING AND 2 TABLETS AT BEDTIME. ..., Normal      sodium chloride (OCEAN) 0.65 % nasal spray 2 sprays into each nostril as needed for congestion or rhinitis, Starting Thu 8/18/2022, Normal       !! - Potential duplicate medications found. Please discuss with provider. No discharge procedures on file. PDMP Review         Value Time User    PDMP Reviewed  Yes 6/18/2020 10:13 AM Rebeca Gonzáles, [address] [place] [address]             ED Provider  Attending physically available and evaluated Christianne Starkey. I managed the patient along with the ED Attending.     Electronically Signed by           Connie Chin MD  12/10/23 6563

## 2023-12-08 NOTE — ED ATTENDING ATTESTATION
12/8/2023  IMaru DO, saw and evaluated the patient. I have discussed the patient with the resident/non-physician practitioner and agree with the resident's/non-physician practitioner's findings, Plan of Care, and MDM as documented in the resident's/non-physician practitioner's note, except where noted. All available labs and Radiology studies were reviewed. I was present for key portions of any procedure(s) performed by the resident/non-physician practitioner and I was immediately available to provide assistance. At this point I agree with the current assessment done in the Emergency Department. I have conducted an independent evaluation of this patient a history and physical is as follows:    ED Course  ED Course as of 12/08/23 1627   Fri Dec 08, 2023   1325 EKG interpreted by myself with no T wave inversions, ST depressions or elevations. Intervals normal with normal axis with a heart rate of 85. Chills and subjective fevers for a few days. States at friends house earlier today cleaning floors and then took a shower and felt like she was going to pass out. States she then became sweaty and feverish but didn't take her temperature, states for about 1 week has had foul smelling usine. Denies CP, SOB, N/V, new abdominal pain, known sick contacts, body aches. ROS: Negative unless otherwise stated above. Physical Exam  Vitals and nursing note reviewed. Constitutional:       General: She is not in acute distress. Appearance: She is well-developed. She is not diaphoretic. HENT:      Head: Normocephalic and atraumatic. Right Ear: External ear normal.      Left Ear: External ear normal.      Nose: Nose normal.   Eyes:      General: No scleral icterus. Right eye: No discharge. Left eye: No discharge. Conjunctiva/sclera: Conjunctivae normal.      Pupils: Pupils are equal, round, and reactive to light. Neck:      Vascular: No JVD.       Trachea: No tracheal deviation. Cardiovascular:      Rate and Rhythm: Normal rate and regular rhythm. Heart sounds: Normal heart sounds. No murmur heard. No friction rub. No gallop. Pulmonary:      Effort: Pulmonary effort is normal. No respiratory distress. Breath sounds: Normal breath sounds. No stridor. No wheezing or rales. Abdominal:      General: Bowel sounds are normal. There is no distension. Palpations: Abdomen is soft. There is no mass. Tenderness: There is no abdominal tenderness. There is no guarding. Musculoskeletal:         General: No tenderness or deformity. Normal range of motion. Cervical back: Normal range of motion and neck supple. Skin:     General: Skin is warm and dry. Coloration: Skin is not pale. Findings: No erythema or rash. Neurological:      Mental Status: She is alert and oriented to person, place, and time. Cranial Nerves: No cranial nerve deficit. Sensory: No sensory deficit. Motor: No abnormal muscle tone. Psychiatric:         Behavior: Behavior normal.         Thought Content: Thought content normal.         Judgment: Judgment normal.         HEART score:    History 0=Slightly or non-suspicious   ECG 0=Normal   Age 2= > 65 years   Risk Factors 1= 1 or 2 risk factors   Troponin 0= Less than or equal to 12 ng/L   Total 0                  Cardiac workup along with urinalysis showing UTI, will treat, patient otherwise with no complaints. DC at this time.          Critical Care Time  Procedures

## 2023-12-08 NOTE — ED NOTES
Patient ambulated independently to the bathroom. Patient's gait is steady. Denies dizziness with ambulation.      Paulo De La Garza RN  12/08/23 1522

## 2023-12-08 NOTE — DISCHARGE INSTRUCTIONS
You have a urinary tract infection. I am prescribing you a course of Keflex which should work against your infection. If your urine is growing something unexpected we will call you to change her antibiotic and advise you on next steps. In the meantime if you are feeling significantly worse, then you need to come back to the emergency department immediately. Especially if you are having vomiting, significant lethargy, or are feeling chest pain or shortness of breath.

## 2023-12-11 ENCOUNTER — PATIENT OUTREACH (OUTPATIENT)
Dept: CASE MANAGEMENT | Facility: HOSPITAL | Age: 68
End: 2023-12-11

## 2023-12-11 ENCOUNTER — VBI (OUTPATIENT)
Dept: FAMILY MEDICINE CLINIC | Facility: CLINIC | Age: 68
End: 2023-12-11

## 2023-12-11 DIAGNOSIS — Z71.89 COMPLEX CARE COORDINATION: Primary | ICD-10-CM

## 2023-12-11 NOTE — TELEPHONE ENCOUNTER
12/11/23 11:22 AM    Patient contacted post ED visit, first outreach attempt made. Message was left for patient to return a call to the VBI Department at [facility]: Phone [phone]. Thank you.   Ivis Rodriguez MA  [facility]

## 2023-12-11 NOTE — PROGRESS NOTES
[facility] ED visit referral received for care management. Chart review completed. Pt reported to [place] ED for dizziness and fever 12/08/23. Was found to have UTI and discharged home with keflex. Spoke with Lesa Steele. She states she is doing much better. Has some lightheadedness at times. However denies fever or other symptoms. Confirms she has received keflex and is taking. Reports she was experiencing some confusion and high BP and thought she was having a stroke when she went to the ED. Reports that has resolved. Takes her BP daily and states that they have been good since she has been home. Offered complete med rec. Lesa Steele denies need and states that none of her medications were changed during ED visit and she has all of them and is taking. Confirmed follow up nurse visit with PCP office 12/20. Informed pt to call and schedule regular follow up with PCP, Srinivas Wylie to be seen as nurse will only check BP. Explained care management and continued follow up. Lesa Steele states she does well managing her health and does not need these services at this time. Informed CM is available to her if needed and to let PCP know should needs change. She is appreciative of the call. Referral closed. Note routed to Army Deedee RN CM.

## 2023-12-12 NOTE — TELEPHONE ENCOUNTER
12/12/23 8:37 AM    Patient contacted post ED visit, second outreach attempt made. Message was left for patient to return a call to the VBI Department at [facility]: Phone [phone]. Thank you.   Ivis Rodriguez MA  [facility]

## 2023-12-13 LAB
ATRIAL RATE: 85 BPM
ATRIAL RATE: 88 BPM
P AXIS: 71 DEGREES
P AXIS: 71 DEGREES
PR INTERVAL: 138 MS
PR INTERVAL: 140 MS
QRS AXIS: 43 DEGREES
QRS AXIS: 52 DEGREES
QRSD INTERVAL: 78 MS
QRSD INTERVAL: 82 MS
QT INTERVAL: 386 MS
QT INTERVAL: 388 MS
QTC INTERVAL: 459 MS
QTC INTERVAL: 469 MS
T WAVE AXIS: 68 DEGREES
T WAVE AXIS: 68 DEGREES
VENTRICULAR RATE: 85 BPM
VENTRICULAR RATE: 88 BPM

## 2023-12-13 PROCEDURE — 93010 ELECTROCARDIOGRAM REPORT: CPT | Performed by: INTERNAL MEDICINE

## 2023-12-13 NOTE — TELEPHONE ENCOUNTER
12/13/23 11:24 AM    Patient contacted post ED visit, phone outreaches were unsuccessful; patient does not have MyChart, a [facility] letter has not been sent. Thank you.   Ivis Rodriguez MA  [facility]

## 2023-12-20 ENCOUNTER — TELEPHONE (OUTPATIENT)
Dept: FAMILY MEDICINE CLINIC | Facility: CLINIC | Age: 68
End: 2023-12-20

## 2023-12-20 ENCOUNTER — CLINICAL SUPPORT (OUTPATIENT)
Dept: FAMILY MEDICINE CLINIC | Facility: CLINIC | Age: 68
End: 2023-12-20

## 2023-12-20 VITALS — DIASTOLIC BLOOD PRESSURE: 84 MMHG | SYSTOLIC BLOOD PRESSURE: 138 MMHG

## 2023-12-20 DIAGNOSIS — I10 PRIMARY HYPERTENSION: Primary | ICD-10-CM

## 2023-12-20 NOTE — TELEPHONE ENCOUNTER
Pt was seen today for a NV to check her BP. BP was 138/84. Pt states is been about the same at home. Wants to know if you are making any changes to her BP meds?

## 2024-01-30 DIAGNOSIS — R39.9 UTI SYMPTOMS: Primary | ICD-10-CM

## 2024-02-01 ENCOUNTER — OFFICE VISIT (OUTPATIENT)
Dept: URGENT CARE | Facility: CLINIC | Age: 69
End: 2024-02-01
Payer: COMMERCIAL

## 2024-02-01 ENCOUNTER — APPOINTMENT (OUTPATIENT)
Dept: RADIOLOGY | Facility: CLINIC | Age: 69
End: 2024-02-01
Payer: COMMERCIAL

## 2024-02-01 VITALS
WEIGHT: 145 LBS | HEIGHT: 60 IN | SYSTOLIC BLOOD PRESSURE: 121 MMHG | HEART RATE: 78 BPM | OXYGEN SATURATION: 97 % | BODY MASS INDEX: 28.47 KG/M2 | TEMPERATURE: 98 F | DIASTOLIC BLOOD PRESSURE: 58 MMHG | RESPIRATION RATE: 18 BRPM

## 2024-02-01 DIAGNOSIS — R05.1 ACUTE COUGH: ICD-10-CM

## 2024-02-01 DIAGNOSIS — J06.9 VIRAL URI WITH COUGH: Primary | ICD-10-CM

## 2024-02-01 DIAGNOSIS — Z72.0 TOBACCO USE: ICD-10-CM

## 2024-02-01 DIAGNOSIS — N30.00 ACUTE CYSTITIS WITHOUT HEMATURIA: ICD-10-CM

## 2024-02-01 DIAGNOSIS — R30.0 DYSURIA: ICD-10-CM

## 2024-02-01 LAB
SARS-COV-2 AG UPPER RESP QL IA: NEGATIVE
SL AMB LEUKOCYTE ESTERASE,UA: ABNORMAL
SL AMB POCT BILIRUBIN,UA: ABNORMAL
SL AMB POCT BLOOD,UA: ABNORMAL
SL AMB POCT CLARITY,UA: ABNORMAL
SL AMB POCT COLOR,UA: ABNORMAL
SL AMB POCT KETONES,UA: ABNORMAL
SL AMB POCT NITRITE,UA: ABNORMAL
SL AMB POCT PH,UA: 5
SL AMB POCT SPECIFIC GRAVITY,UA: 1.01
SL AMB POCT URINE PROTEIN: ABNORMAL
SL AMB POCT UROBILINOGEN: 0.2
VALID CONTROL: NORMAL

## 2024-02-01 PROCEDURE — 99214 OFFICE O/P EST MOD 30 MIN: CPT | Performed by: NURSE PRACTITIONER

## 2024-02-01 PROCEDURE — 87186 SC STD MICRODIL/AGAR DIL: CPT | Performed by: NURSE PRACTITIONER

## 2024-02-01 PROCEDURE — 81002 URINALYSIS NONAUTO W/O SCOPE: CPT | Performed by: NURSE PRACTITIONER

## 2024-02-01 PROCEDURE — 87086 URINE CULTURE/COLONY COUNT: CPT | Performed by: NURSE PRACTITIONER

## 2024-02-01 PROCEDURE — 87636 SARSCOV2 & INF A&B AMP PRB: CPT | Performed by: NURSE PRACTITIONER

## 2024-02-01 PROCEDURE — 87811 SARS-COV-2 COVID19 W/OPTIC: CPT | Performed by: NURSE PRACTITIONER

## 2024-02-01 PROCEDURE — 71046 X-RAY EXAM CHEST 2 VIEWS: CPT

## 2024-02-01 PROCEDURE — 87077 CULTURE AEROBIC IDENTIFY: CPT | Performed by: NURSE PRACTITIONER

## 2024-02-01 RX ORDER — CEPHALEXIN 500 MG/1
500 CAPSULE ORAL EVERY 12 HOURS SCHEDULED
Qty: 14 CAPSULE | Refills: 0 | Status: SHIPPED | OUTPATIENT
Start: 2024-02-01 | End: 2024-02-08

## 2024-02-01 NOTE — PROGRESS NOTES
"  [facility]        NAME: Felisha Eldridge is a 68 y.o. female  : 1955    MRN: [id number]  DATE: 2024  TIME: 12:13 PM    Assessment and Plan   Viral URI with cough [J06.9]  1. Viral URI with cough  Covid/Flu- Office Collect Normal    Covid/Flu- Office Collect Normal      2. Acute cystitis without hematuria  cephalexin (KEFLEX) 500 mg capsule      3. Dysuria  POCT urine dip    Urine culture    Urine culture    cephalexin (KEFLEX) 500 mg capsule      4. Acute cough  Poct Covid 19 Rapid Antigen Test    XR chest pa & lateral      5. Tobacco use              Patient Instructions       Follow up with PCP in 3-5 days.  Proceed to  ER if symptoms worsen.    Your urine shows bacteria.  You have been prescribed an antibiotic. You are to take all medication as prescribed.   You are to avoid alcohol and caffeine - they are bladder irritants.  Drink water.  Take tylenol or motrin for pain or fever.    You are to download [facility] mychart for the results in 3-5 days.   You will be notified if the antibiotic needs changed.  Follow up with your PCP in 2-3 days.   Go to the ED if symptoms worsen.     You have been prescribed cephalexin - TAKE ALL THE  MEDICATION AS DIRECTED     You have a covid/flu test pending. Download [facility] mychart for the results in 24-48 hours. You will be notified if abnormal  You are to continue with your albuterol in haler.   Take plain robitussin for cough and congestion.  Stop smoking    GO TO THE EMERGENCY DEPARTMENT IF YOUR SYMPTOMS WORSEN              Chief Complaint     Chief Complaint   Patient presents with    Possible UTI     Burning, frequency and low back pain since December, treated twice for UTI with no improvement    Cold Like Symptoms     Cough, congestion, dyspnea and left ear pain for 5-6 days         History of Present Illness       This is a 68 year old female who is here with dysuria c/o. She states that she had a UTI in 2023 and was \"treated twice\" and still " "has symptoms. She states she has never gotten better.  She states she is having pain with urination and back pain.  She states she has had subjective, fevers, chills, n/v and chronic diarrhea since December.    She states she has been to her PCP \"three times and he has not done anything\".    She also states she just returned from a cruise to [place] and has been coughing since this past Saturday.  She denies taking anything for her symptoms other than using her inhaler for her COPD.  She states she does smoke.  She states she is short of breath.  She has not called her PCP about this.   PMH is listed.         Review of Systems   Review of Systems   Constitutional:  Positive for fever.   HENT:  Positive for congestion.    Eyes: Negative.    Respiratory:  Positive for cough and shortness of breath.    Cardiovascular: Negative.    Gastrointestinal: Negative.    Endocrine: Negative.    Genitourinary:  Positive for dysuria and urgency.   Musculoskeletal:  Positive for back pain.   Skin: Negative.    Allergic/Immunologic: Negative.    Neurological: Negative.    Hematological: Negative.    Psychiatric/Behavioral: Negative.           Current Medications       Current Outpatient Medications:     albuterol (PROVENTIL HFA,VENTOLIN HFA) 90 mcg/act inhaler, Inhale 2 puffs every 6 (six) hours as needed for wheezing or shortness of breath, Disp: 8.5 g, Rfl: 3    atorvastatin (LIPITOR) 40 mg tablet, Take 1 tablet (40 mg total) by mouth daily, Disp: 90 tablet, Rfl: 1    calcium carbonate (OS-PRETTY) 600 MG tablet, Take 1 tablet (600 mg total) by mouth 2 (two) times a day with meals, Disp: 180 tablet, Rfl: 3    cephalexin (KEFLEX) 500 mg capsule, Take 1 capsule (500 mg total) by mouth every 12 (twelve) hours for 7 days, Disp: 14 capsule, Rfl: 0    Cholecalciferol (Vitamin D3) 50 MCG (2000 UT) TABS, Take 1 tablet (2,000 Units total) by mouth daily, Disp: 90 tablet, Rfl: 3    cyclobenzaprine (FLEXERIL) 10 mg tablet, Take 1 tablet (10 mg " total) by mouth daily, Disp: 90 tablet, Rfl: 0    dicyclomine (BENTYL) 20 mg tablet, Take 1 tablet (20 mg total) by mouth 3 (three) times a day as needed (as needed), Disp: 270 tablet, Rfl: 1    DULoxetine (CYMBALTA) 30 mg delayed release capsule, Take 1 capsule (30 mg total) by mouth daily, Disp: 90 capsule, Rfl: 3    DULoxetine (CYMBALTA) 60 mg delayed release capsule, Take 1 capsule (60 mg total) by mouth daily, Disp: 90 capsule, Rfl: 3    esomeprazole (NexIUM) 40 MG capsule, Take 1 capsule (40 mg total) by mouth daily, Disp: 90 capsule, Rfl: 1    fluticasone (FLONASE) 50 mcg/act nasal spray, 2 sprays into each nostril 2 (two) times a day, Disp: 16 g, Rfl: 0    Fluticasone-Salmeterol (Advair Diskus) 500-50 mcg/dose inhaler, Inhale 1 puff 2 (two) times a day, Disp: 60 blister, Rfl: 10    furosemide (LASIX) 40 mg tablet, Take 1 tablet (40 mg total) by mouth daily as needed (As needed for edema), Disp: 90 tablet, Rfl: 0    gabapentin (NEURONTIN) 300 mg capsule, Take 1 capsule (300 mg total) by mouth 2 (two) times a day, Disp: 180 capsule, Rfl: 1    irbesartan (AVAPRO) 75 mg tablet, Take 1 tablet (75 mg total) by mouth daily at bedtime, Disp: 30 tablet, Rfl: 0    Naproxen Sodium (ALEVE PO), Take 2 tablets by mouth every morning, Disp: , Rfl:     oxybutynin (DITROPAN) 5 mg tablet, Take 1 tablet (5 mg total) by mouth 3 (three) times a day, Disp: 90 tablet, Rfl: 5    potassium chloride (Klor-Con) 10 mEq tablet, TAKE 1 TABLET DAILY AS NEEDED (WHEN TAKE LASIX), Disp: 90 tablet, Rfl: 1    QUEtiapine (SEROquel) 50 mg tablet, TAKE 1 TABLET IN THE MORNING AND 2 TABLETS AT BEDTIME...., Disp: 270 tablet, Rfl: 1    sodium chloride (OCEAN) 0.65 % nasal spray, 2 sprays into each nostril as needed for congestion or rhinitis, Disp: 88 mL, Rfl: 0    alendronate (Fosamax) 70 mg tablet, Take 1 tablet (70 mg total) by mouth every 7 days (Patient not taking: Reported on 2/1/2024), Disp: 12 tablet, Rfl: 3    colestipol (COLESTID) 1 g  tablet, Take 1 tablet (1 g total) by mouth 2 (two) times a day (Patient not taking: Reported on 5/26/2023), Disp: 180 tablet, Rfl: 1    loratadine (Claritin) 10 mg tablet, Take 1 tablet (10 mg total) by mouth daily (Patient not taking: Reported on 4/27/2023), Disp: 30 tablet, Rfl: 0    ondansetron (ZOFRAN) 4 mg tablet, Take 1 tablet (4 mg total) by mouth every 6 (six) hours (Patient not taking: Reported on 2/1/2024), Disp: 12 tablet, Rfl: 0    Current Allergies     Allergies as of 02/01/2024 - Reviewed 02/01/2024   Allergen Reaction Noted    Erythromycin Throat Swelling 10/25/2017    Morphine Delirium             The following portions of the patient's history were reviewed and updated as appropriate: allergies, current medications, past family history, past medical history, past social history, past surgical history and problem list.     Past Medical History:   Diagnosis Date    Acute on chronic respiratory failure with hypoxia (HCC) 3/1/2020    Adult BMI 37.0-37.9 kg/sq m 4/1/2021    Anxiety     Chronic obstructive pulmonary disease with acute lower respiratory infection (HCC) 10/25/2017    COPD (chronic obstructive pulmonary disease) (HCC)     Depression     Diverticulitis     GERD (gastroesophageal reflux disease)     Influenza A 2/29/2020    Pneumonia of right lower lobe due to influenza A virus 2/29/2020    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HCC)     Primary hypertension 2/10/2022    Psychiatric disorder     Sepsis due to Haemophilus influenzae with acute hypoxic respiratory failure without septic shock ([facility]) 2/29/2020    Shortness of breath     Sleep apnea        Past Surgical History:   Procedure Laterality Date    APPENDECTOMY      CHOLECYSTECTOMY      partial x2 per pt    HIATAL HERNIA REPAIR      INGUINAL HERNIA REPAIR Right     LAPAROSCOPIC TUBAL LIGATION Bilateral     ROTATOR CUFF REPAIR Right     UVULOPALATOPHARYNGOPLASTY         Family History   Problem Relation Age of Onset    No Known Problems Mother          passed away from COVID on 8/8/22    No Known Problems Father     No Known Problems Sister     No Known Problems Daughter     Breast cancer Maternal Grandmother 35    No Known Problems Paternal Grandmother     No Known Problems Maternal Aunt          Medications have been verified.        Objective   /58   Pulse 78   Temp 98 °F (36.7 °C) (Temporal)   Resp 18   Ht 5' (1.524 m)   Wt 65.8 kg (145 lb)   SpO2 97%   BMI 28.32 kg/m²   No LMP recorded. Patient is postmenopausal.       Physical Exam     Physical Exam  Vitals and nursing note reviewed.   Constitutional:       General: She is not in acute distress.     Appearance: Normal appearance. She is normal weight. She is not ill-appearing, toxic-appearing or diaphoretic.      Comments: Older appearing than stated age   Smells of cigarette smoke    HENT:      Head: Normocephalic and atraumatic.      Right Ear: Tympanic membrane and ear canal normal.      Left Ear: Tympanic membrane and ear canal normal.      Nose: No congestion or rhinorrhea.      Comments: Nose pale      Mouth/Throat:      Mouth: Mucous membranes are moist.      Pharynx: Oropharynx is clear. No oropharyngeal exudate or posterior oropharyngeal erythema.   Eyes:      Extraocular Movements: Extraocular movements intact.      Pupils: Pupils are equal, round, and reactive to light.   Cardiovascular:      Rate and Rhythm: Normal rate and regular rhythm.      Pulses: Normal pulses.      Heart sounds: Normal heart sounds. No murmur heard.  Pulmonary:      Effort: Pulmonary effort is normal. No respiratory distress.      Breath sounds: Normal breath sounds. No stridor. No wheezing, rhonchi or rales.   Chest:      Chest wall: No tenderness.   Abdominal:      General: There is no distension.      Tenderness: There is no abdominal tenderness. There is no right CVA tenderness or left CVA tenderness.   Musculoskeletal:         General: Normal range of motion.      Cervical back: Normal range of motion and  neck supple.   Skin:     General: Skin is warm and dry.      Capillary Refill: Capillary refill takes less than 2 seconds.   Neurological:      General: No focal deficit present.      Mental Status: She is alert and oriented to person, place, and time.   Psychiatric:         Mood and Affect: Mood normal.         Behavior: Behavior normal.         Thought Content: Thought content normal.         Judgment: Judgment normal.             4/27/2023 last ua cx in EMR noted     Urine Culture >100,000 cfu/ml Klebsiella pneumoniae Abnormal       Only resistance to ampicillin   Pt was treated with keflex 12/2023 due to + nitrates in urine however no culture sent.       Poct urine  mod leuks, trace blood  No nitrates    Will send ua cx     Preliminary reading CXR  ? Infiltrate vs scaring left lingula  Waiting on rad read     VSS  /58   Pulse 78   Temp 98 °F (36.7 °C) (Temporal)   Resp 18   Ht 5' (1.524 m)   Wt 65.8 kg (145 lb)   SpO2 97%   BMI 28.32 kg/m²

## 2024-02-01 NOTE — PATIENT INSTRUCTIONS
Your urine shows bacteria.  You have been prescribed an antibiotic. You are to take all medication as prescribed.   You are to avoid alcohol and caffeine - they are bladder irritants.  Drink water.  Take tylenol or motrin for pain or fever.    You are to download [employer] for the results in 3-5 days.   You will be notified if the antibiotic needs changed.  Follow up with your PCP in 2-3 days.   Go to the ED if symptoms worsen.     You have been prescribed cephalexin - TAKE ALL THE  MEDICATION AS DIRECTED     You have a covid/flu test pending. Download [employer] for the results in 24-48 hours. You will be notified if abnormal  You are to continue with your albuterol in haler.   Take plain robitussin for cough and congestion.  Stop smoking    GO TO THE EMERGENCY DEPARTMENT IF YOUR SYMPTOMS WORSEN

## 2024-02-02 LAB
FLUAV RNA RESP QL NAA+PROBE: NEGATIVE
FLUBV RNA RESP QL NAA+PROBE: NEGATIVE
SARS-COV-2 RNA RESP QL NAA+PROBE: POSITIVE

## 2024-02-02 NOTE — RESULT ENCOUNTER NOTE
Pt is + covid.  She states her symptoms started last Saturday.   Your covid is positive.    The Paxlovid treatment for covid guidelines state that treatment is to begin within the first 5 days of symptoms.  Your symptoms have been greater than 5 days.  If you wish to consider; please speak with your family doctor.      This was discussed with pt and she verbalizes understanding.       CXR negative an discussed with pt.       Pt told to call your office.   Please have your office call patient for follow up

## 2024-02-03 LAB — BACTERIA UR CULT: ABNORMAL

## 2024-02-09 DIAGNOSIS — E78.2 MIXED HYPERLIPIDEMIA: ICD-10-CM

## 2024-02-09 DIAGNOSIS — K21.9 GASTROESOPHAGEAL REFLUX DISEASE: ICD-10-CM

## 2024-02-09 DIAGNOSIS — R10.9 ABDOMINAL CRAMPING: ICD-10-CM

## 2024-02-09 DIAGNOSIS — R39.9 UTI SYMPTOMS: Primary | ICD-10-CM

## 2024-02-09 RX ORDER — DICYCLOMINE HCL 20 MG
TABLET ORAL
Qty: 270 TABLET | Refills: 1 | Status: SHIPPED | OUTPATIENT
Start: 2024-02-09

## 2024-02-09 RX ORDER — ATORVASTATIN CALCIUM 40 MG/1
40 TABLET, FILM COATED ORAL DAILY
Qty: 90 TABLET | Refills: 1 | Status: SHIPPED | OUTPATIENT
Start: 2024-02-09

## 2024-02-09 RX ORDER — ESOMEPRAZOLE MAGNESIUM 40 MG/1
40 CAPSULE, DELAYED RELEASE ORAL DAILY
Qty: 90 CAPSULE | Refills: 1 | Status: SHIPPED | OUTPATIENT
Start: 2024-02-09

## 2024-02-09 NOTE — TELEPHONE ENCOUNTER
She wants a urine test done for a UTI it hasn't gotten any better she also wants another test for Covid cause she isn't feeling any better with that either. She wants everything checked the [facility] called her and asked her a bunch of questions cause she was out of the country on a cruise. She is refusing to come here cause its 20 miles away and the last time she was here nobody did anything to her except take her BP. She also said something about having half her meds and not having the other half but didn't specify so Im not sure what to do about that. Also make a note to NOT interrupt her with any questions while she is talking she really doesn't like that

## 2024-02-14 ENCOUNTER — OFFICE VISIT (OUTPATIENT)
Dept: URGENT CARE | Facility: CLINIC | Age: 69
End: 2024-02-14
Payer: COMMERCIAL

## 2024-02-14 VITALS
OXYGEN SATURATION: 95 % | HEIGHT: 60 IN | WEIGHT: 141.6 LBS | DIASTOLIC BLOOD PRESSURE: 78 MMHG | RESPIRATION RATE: 20 BRPM | SYSTOLIC BLOOD PRESSURE: 130 MMHG | BODY MASS INDEX: 27.8 KG/M2 | HEART RATE: 78 BPM | TEMPERATURE: 98 F

## 2024-02-14 DIAGNOSIS — R30.0 DYSURIA: Primary | ICD-10-CM

## 2024-02-14 LAB
SL AMB  POCT GLUCOSE, UA: ABNORMAL
SL AMB LEUKOCYTE ESTERASE,UA: ABNORMAL
SL AMB POCT BILIRUBIN,UA: ABNORMAL
SL AMB POCT BLOOD,UA: ABNORMAL
SL AMB POCT CLARITY,UA: ABNORMAL
SL AMB POCT COLOR,UA: ABNORMAL
SL AMB POCT KETONES,UA: ABNORMAL
SL AMB POCT NITRITE,UA: ABNORMAL
SL AMB POCT PH,UA: 5
SL AMB POCT SPECIFIC GRAVITY,UA: 1.02
SL AMB POCT URINE PROTEIN: ABNORMAL
SL AMB POCT UROBILINOGEN: 0.2

## 2024-02-14 PROCEDURE — 87077 CULTURE AEROBIC IDENTIFY: CPT | Performed by: STUDENT IN AN ORGANIZED HEALTH CARE EDUCATION/TRAINING PROGRAM

## 2024-02-14 PROCEDURE — 81002 URINALYSIS NONAUTO W/O SCOPE: CPT | Performed by: STUDENT IN AN ORGANIZED HEALTH CARE EDUCATION/TRAINING PROGRAM

## 2024-02-14 PROCEDURE — 99213 OFFICE O/P EST LOW 20 MIN: CPT | Performed by: STUDENT IN AN ORGANIZED HEALTH CARE EDUCATION/TRAINING PROGRAM

## 2024-02-14 PROCEDURE — 87186 SC STD MICRODIL/AGAR DIL: CPT | Performed by: STUDENT IN AN ORGANIZED HEALTH CARE EDUCATION/TRAINING PROGRAM

## 2024-02-14 PROCEDURE — 87086 URINE CULTURE/COLONY COUNT: CPT | Performed by: STUDENT IN AN ORGANIZED HEALTH CARE EDUCATION/TRAINING PROGRAM

## 2024-02-14 RX ORDER — SULFAMETHOXAZOLE AND TRIMETHOPRIM 800; 160 MG/1; MG/1
1 TABLET ORAL EVERY 12 HOURS SCHEDULED
Qty: 10 TABLET | Refills: 0 | Status: SHIPPED | OUTPATIENT
Start: 2024-02-14 | End: 2024-02-19

## 2024-02-14 NOTE — PROGRESS NOTES
[facility]        NAME: Felisha Eldridge is a 68 y.o. female  : 1955    MRN: [id number]  DATE: 2024  TIME: 3:27 PM    Assessment and Orders   Dysuria [R30.0]  1. Dysuria  Ambulatory Referral to Urology    POCT urine dip    Urine culture    sulfamethoxazole-trimethoprim (BACTRIM DS) 800-160 mg per tablet            Plan and Discussion      UA positive for leukocytes and blood. New dysuria. Patient states she did finish Keflex course but suprapubic pain is persisting. Will treat with Bactrim and also referred to urology.     Discussed ED precautions including (but not limited to)  Difficultly breathing or shortness of breath  Chest pain  Acutely worsening symptoms.     Risks and benefits discussed. Patient understands and agrees with the plan.     Follow up with PCP.     Chief Complaint     Chief Complaint   Patient presents with    Possible UTI     Tx for UTI with keflex 3 months ago ,symptoms never totally went away, supra pubic pain persists and now burning  started . Per pt did complete keflex but develpoed moth sores with med         History of Present Illness       Has been treated for UTI twice since November but continues to have suprapubic pain. Most recently given course of Keflex at beginning of Feb. She did finish course but notes that this did give her cold sores around her mouth. New dysuria a few days ago. No upper back pain. Getting chills but no fevers. No blood in the urine.         Review of Systems   Review of Systems  As stated above     Current Medications       Current Outpatient Medications:     sulfamethoxazole-trimethoprim (BACTRIM DS) 800-160 mg per tablet, Take 1 tablet by mouth every 12 (twelve) hours for 5 days, Disp: 10 tablet, Rfl: 0    albuterol (PROVENTIL HFA,VENTOLIN HFA) 90 mcg/act inhaler, Inhale 2 puffs every 6 (six) hours as needed for wheezing or shortness of breath, Disp: 8.5 g, Rfl: 3    alendronate (Fosamax) 70 mg tablet, Take 1 tablet (70 mg  total) by mouth every 7 days (Patient not taking: Reported on 2/1/2024), Disp: 12 tablet, Rfl: 3    atorvastatin (LIPITOR) 40 mg tablet, TAKE ONE TABLET DAILY..., Disp: 90 tablet, Rfl: 1    calcium carbonate (OS-PRETTY) 600 MG tablet, Take 1 tablet (600 mg total) by mouth 2 (two) times a day with meals, Disp: 180 tablet, Rfl: 3    Cholecalciferol (Vitamin D3) 50 MCG (2000 UT) TABS, Take 1 tablet (2,000 Units total) by mouth daily, Disp: 90 tablet, Rfl: 3    colestipol (COLESTID) 1 g tablet, Take 1 tablet (1 g total) by mouth 2 (two) times a day (Patient not taking: Reported on 5/26/2023), Disp: 180 tablet, Rfl: 1    cyclobenzaprine (FLEXERIL) 10 mg tablet, Take 1 tablet (10 mg total) by mouth daily, Disp: 90 tablet, Rfl: 0    dicyclomine (BENTYL) 20 mg tablet, TAKE 1 TABLET3 TIMES DAILY AS NEEDED..., Disp: 270 tablet, Rfl: 1    DULoxetine (CYMBALTA) 30 mg delayed release capsule, Take 1 capsule (30 mg total) by mouth daily, Disp: 90 capsule, Rfl: 3    DULoxetine (CYMBALTA) 60 mg delayed release capsule, Take 1 capsule (60 mg total) by mouth daily, Disp: 90 capsule, Rfl: 3    esomeprazole (NexIUM) 40 MG capsule, TAKE 1 CAPSULE DAILY..., Disp: 90 capsule, Rfl: 1    fluticasone (FLONASE) 50 mcg/act nasal spray, 2 sprays into each nostril 2 (two) times a day, Disp: 16 g, Rfl: 0    Fluticasone-Salmeterol (Advair Diskus) 500-50 mcg/dose inhaler, Inhale 1 puff 2 (two) times a day, Disp: 60 blister, Rfl: 10    furosemide (LASIX) 40 mg tablet, Take 1 tablet (40 mg total) by mouth daily as needed (As needed for edema), Disp: 90 tablet, Rfl: 0    gabapentin (NEURONTIN) 300 mg capsule, Take 1 capsule (300 mg total) by mouth 2 (two) times a day, Disp: 180 capsule, Rfl: 1    irbesartan (AVAPRO) 75 mg tablet, Take 1 tablet (75 mg total) by mouth daily at bedtime, Disp: 30 tablet, Rfl: 0    loratadine (Claritin) 10 mg tablet, Take 1 tablet (10 mg total) by mouth daily (Patient not taking: Reported on 4/27/2023), Disp: 30 tablet, Rfl:  0    Naproxen Sodium (ALEVE PO), Take 2 tablets by mouth every morning, Disp: , Rfl:     ondansetron (ZOFRAN) 4 mg tablet, Take 1 tablet (4 mg total) by mouth every 6 (six) hours (Patient not taking: Reported on 2/1/2024), Disp: 12 tablet, Rfl: 0    oxybutynin (DITROPAN) 5 mg tablet, Take 1 tablet (5 mg total) by mouth 3 (three) times a day, Disp: 90 tablet, Rfl: 5    potassium chloride (Klor-Con) 10 mEq tablet, TAKE 1 TABLET DAILY AS NEEDED (WHEN TAKE LASIX), Disp: 90 tablet, Rfl: 1    QUEtiapine (SEROquel) 50 mg tablet, TAKE 1 TABLET IN THE MORNING AND 2 TABLETS AT BEDTIME...., Disp: 270 tablet, Rfl: 1    sodium chloride (OCEAN) 0.65 % nasal spray, 2 sprays into each nostril as needed for congestion or rhinitis, Disp: 88 mL, Rfl: 0    Current Allergies     Allergies as of 02/14/2024 - Reviewed 02/01/2024   Allergen Reaction Noted    Erythromycin Throat Swelling 10/25/2017    Morphine Delirium             The following portions of the patient's history were reviewed and updated as appropriate: allergies, current medications, past family history, past medical history, past social history, past surgical history and problem list.     Past Medical History:   Diagnosis Date    Acute on chronic respiratory failure with hypoxia (HCC) 3/1/2020    Adult BMI 37.0-37.9 kg/sq m 4/1/2021    Anxiety     Chronic obstructive pulmonary disease with acute lower respiratory infection (HCC) 10/25/2017    COPD (chronic obstructive pulmonary disease) (HCC)     Depression     Diverticulitis     GERD (gastroesophageal reflux disease)     Influenza A 2/29/2020    Pneumonia of right lower lobe due to influenza A virus 2/29/2020    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HCC)     Primary hypertension 2/10/2022    Psychiatric disorder     Sepsis due to Haemophilus influenzae with acute hypoxic respiratory failure without septic shock ([facility]) 2/29/2020    Shortness of breath     Sleep apnea        Past Surgical History:   Procedure Laterality Date     APPENDECTOMY      CHOLECYSTECTOMY      partial x2 per pt    HIATAL HERNIA REPAIR      INGUINAL HERNIA REPAIR Right     LAPAROSCOPIC TUBAL LIGATION Bilateral     ROTATOR CUFF REPAIR Right     UVULOPALATOPHARYNGOPLASTY         Family History   Problem Relation Age of Onset    No Known Problems Mother         passed away from COVID on 8/8/22    No Known Problems Father     No Known Problems Sister     No Known Problems Daughter     Breast cancer Maternal Grandmother 35    No Known Problems Paternal Grandmother     No Known Problems Maternal Aunt          Medications have been verified.        Objective   /78   Pulse 78   Temp 98 °F (36.7 °C)   Resp 20   Ht 5' (1.524 m)   Wt 64.2 kg (141 lb 9.6 oz)   SpO2 95%   BMI 27.65 kg/m²   No LMP recorded. Patient is postmenopausal.       Physical Exam     Physical Exam  Constitutional:       General: She is not in acute distress.  Cardiovascular:      Rate and Rhythm: Normal rate.   Pulmonary:      Effort: No respiratory distress.   Abdominal:      Tenderness: There is abdominal tenderness. There is no right CVA tenderness or left CVA tenderness.   Neurological:      General: No focal deficit present.      Mental Status: She is alert and oriented to person, place, and time.   Psychiatric:         Mood and Affect: Mood normal.         Behavior: Behavior normal.               Debora Thapa DO

## 2024-02-18 LAB
BACTERIA UR CULT: ABNORMAL
BACTERIA UR CULT: ABNORMAL

## 2024-02-21 PROBLEM — J18.9 HCAP (HEALTHCARE-ASSOCIATED PNEUMONIA): Status: RESOLVED | Noted: 2020-03-05 | Resolved: 2024-02-21

## 2024-03-04 DIAGNOSIS — I10 PRIMARY HYPERTENSION: ICD-10-CM

## 2024-03-04 DIAGNOSIS — G47.00 INSOMNIA, UNSPECIFIED TYPE: ICD-10-CM

## 2024-03-04 RX ORDER — IRBESARTAN 75 MG/1
75 TABLET ORAL
Qty: 90 TABLET | Refills: 1 | Status: SHIPPED | OUTPATIENT
Start: 2024-03-04

## 2024-03-04 RX ORDER — QUETIAPINE FUMARATE 50 MG/1
TABLET, FILM COATED ORAL
Qty: 270 TABLET | Refills: 1 | Status: SHIPPED | OUTPATIENT
Start: 2024-03-04

## 2024-03-13 DIAGNOSIS — R10.9 ABDOMINAL CRAMPING: ICD-10-CM

## 2024-03-13 RX ORDER — DICYCLOMINE HCL 20 MG
20 TABLET ORAL 3 TIMES DAILY PRN
Qty: 270 TABLET | Refills: 1 | Status: SHIPPED | OUTPATIENT
Start: 2024-03-13

## 2024-05-19 NOTE — ASSESSMENT & PLAN NOTE
She is currently on Nexium 40 mg po daily  Will give formulary alternative protonix 40 mg PO daily negative...

## 2024-05-22 DIAGNOSIS — M62.838 MUSCLE SPASM: ICD-10-CM

## 2024-05-22 DIAGNOSIS — K52.9 ENTERITIS: ICD-10-CM

## 2024-05-22 RX ORDER — ONDANSETRON 4 MG/1
4 TABLET, FILM COATED ORAL EVERY 6 HOURS
Qty: 12 TABLET | Refills: 2 | Status: SHIPPED | OUTPATIENT
Start: 2024-05-22

## 2024-05-22 RX ORDER — CYCLOBENZAPRINE HCL 10 MG
10 TABLET ORAL DAILY
Qty: 90 TABLET | Refills: 0 | Status: SHIPPED | OUTPATIENT
Start: 2024-05-22

## 2024-05-22 NOTE — TELEPHONE ENCOUNTER
Medication: cyclobenzaprine (FLEXERIL) 10 mg tablet     Dose/Frequency: Take 1 tablet (10 mg total) by mouth daily     Quantity:  90 tablet     Pharmacy: [facility] [address]     Office:   [x] PCP/Provider -   [] Speciality/Provider -     Does the patient have enough for 3 days?   [] Yes   [x] No - Send as HP to POD      Medication: ondansetron (ZOFRAN) 4 mg tablet     Dose/Frequency: Take 1 tablet (4 mg total) by mouth every 6 (six) hours     Quantity:     Pharmacy: [facility] - [address]     Office:   [x] PCP/Provider -   [] Speciality/Provider -     Does the patient have enough for 3 days?   [] Yes   [x] No - Send as HP to POD

## 2024-06-04 ENCOUNTER — TELEPHONE (OUTPATIENT)
Age: 69
End: 2024-06-04

## 2024-06-04 NOTE — TELEPHONE ENCOUNTER
Patient is requesting a letter that states she takes life sustaining medications to show to the welfare office. This letter can be faxed ATTN: Felisha to [phone].  Thank you.

## 2024-06-25 ENCOUNTER — TELEPHONE (OUTPATIENT)
Dept: FAMILY MEDICINE CLINIC | Facility: CLINIC | Age: 69
End: 2024-06-25

## 2024-06-25 NOTE — TELEPHONE ENCOUNTER
Tried to call Pt, phone just rang, no voice mail.  Pt will be due for annual wellness at beginning of Dec.

## 2024-08-13 DIAGNOSIS — G47.00 INSOMNIA, UNSPECIFIED TYPE: ICD-10-CM

## 2024-08-13 DIAGNOSIS — I10 PRIMARY HYPERTENSION: ICD-10-CM

## 2024-08-13 DIAGNOSIS — F32.A DEPRESSION, UNSPECIFIED DEPRESSION TYPE: ICD-10-CM

## 2024-08-13 NOTE — TELEPHONE ENCOUNTER
Reason for call:   [x] Refill   [] Prior Auth  [] Other:     Office:   [x] PCP/Provider - KATIE Gaines   [] Specialty/Provider -         Pharmacy: [facility] - [facility] - [address] [place] [address]     Does the patient have enough for 3 days?   [x] Yes   [] No - Send as HP to POD

## 2024-08-14 RX ORDER — QUETIAPINE FUMARATE 50 MG/1
TABLET, FILM COATED ORAL
Qty: 270 TABLET | Refills: 1 | Status: SHIPPED | OUTPATIENT
Start: 2024-08-14

## 2024-08-14 RX ORDER — DULOXETIN HYDROCHLORIDE 60 MG/1
60 CAPSULE, DELAYED RELEASE ORAL DAILY
Qty: 90 CAPSULE | Refills: 0 | Status: SHIPPED | OUTPATIENT
Start: 2024-08-14

## 2024-08-14 RX ORDER — IRBESARTAN 75 MG/1
75 TABLET ORAL
Qty: 100 TABLET | Refills: 1 | Status: SHIPPED | OUTPATIENT
Start: 2024-08-14

## 2024-08-14 RX ORDER — DULOXETIN HYDROCHLORIDE 30 MG/1
30 CAPSULE, DELAYED RELEASE ORAL DAILY
Qty: 90 CAPSULE | Refills: 0 | Status: SHIPPED | OUTPATIENT
Start: 2024-08-14

## 2024-08-22 DIAGNOSIS — M62.838 MUSCLE SPASM: ICD-10-CM

## 2024-08-22 RX ORDER — CYCLOBENZAPRINE HCL 10 MG
10 TABLET ORAL DAILY
Qty: 90 TABLET | Refills: 0 | Status: SHIPPED | OUTPATIENT
Start: 2024-08-22

## 2024-08-22 NOTE — TELEPHONE ENCOUNTER
Reason for call:   [x] Refill   [] Prior Auth  [] Other:     Office:   [x] PCP/Provider -   [] Specialty/Provider -     Medication: cyclobenzaprine (FLEXERIL) 10 mg tablet     Dose/Frequency: Take 1 tablet (10 mg total) by mouth daily     Quantity: 90 tablet     Pharmacy: [facility] - [facility] [address] [phone]     Does the patient have enough for 3 days?   [] Yes   [x] No - Send as HP to POD

## 2024-11-10 NOTE — SOCIAL WORK
Cm met with the patient to evaluate the patients prior function and living situation and any barriers to d/c and form a safe d/c plan  Cm also evaluated the patient for any services in the home or needs for services  Pt resides at home alone in a 3rd floor apartment  Pt is independent with her adls and ambulation  No services and re:DME has 02 she uses prn, nebulizer and cpap  Pt non compliant at home with her cpap  Pt with chronic vaping of marijuana  Pt inquiring about getting medical marijuana (edibles) as she was told she should not vape marijuana  Pt's brother does the driiving  PCP is [facility] Eastman and pharmacy is [facility]   Pt is a 30 day readmission, was recently at [address] [place] [address] from 2/28/20-3/3/20 for influenza pneumonia  She was prescribed antibiotics and Tamiflu but was unable to a  her medication  Pt did not know she had any medications to  and she can not get them because she has no ride to get there  Pt did see her PCP yesterday who sent her to the hospital  Pt now being readmitted due to acute on chronic respiratory failure with hypoxia likely secondary to combination of pneumonia, influenza A and COPD exacerbation  Admissions are related to pt's non compliance  RRT    *See RRT Documentation Record*    Reason the RRT was called: Patient could not see to her left, was confused about her call light \"being in the corner\". Left facial droop. Patient alert and oriented to date and place and time.   Assessment of patient leading up to RRT: Patient was A&Ox3, strength equal bilaterally. Patient conversational, no neuro deficits. She was using an IPAD in the room.  Interventions/Testing: Labetolol 20mg x1. Sent to CT head.  Patient Outcome/Disposition: Alert, CT and possible TNKase  Family Notified: yes  Name of family notified: daughter Kenn

## 2024-11-21 DIAGNOSIS — G47.00 INSOMNIA, UNSPECIFIED TYPE: ICD-10-CM

## 2024-11-21 DIAGNOSIS — F32.A DEPRESSION, UNSPECIFIED DEPRESSION TYPE: ICD-10-CM

## 2024-11-21 NOTE — TELEPHONE ENCOUNTER
Hospitalist Discharge Summary Admit Date:  2019 12:23 AM  
Name:  Pablo Sexton Age:  [de-identified] y.o. 
:  1937 MRN:  [id number] PCP:  Daquan Tse MD 
Treatment Team: Attending Provider: Jaren De La Cruz MD; : Baron Payne; Utilization Review: Concetta Ernst RN; Care Manager: Tristian Song LMSW Problem List for this Hospitalization: 
Hospital Problems as of 2019 Date Reviewed: 3/20/2019 Codes Class Noted - Resolved POA Moderate dementia without behavioral disturbance ICD-10-CM: F03.90 ICD-9-CM: 290.0  2018 - Present Yes Atrial fibrillation (HCC) (Chronic) ICD-10-CM: I48.91 
ICD-9-CM: 427.31  2012 - Present Yes HTN (hypertension) (Chronic) ICD-10-CM: I10 
ICD-9-CM: 401.9  2012 - Present Yes * (Principal) RESOLVED: UTI (urinary tract infection) ICD-10-CM: N39.0 ICD-9-CM: 599.0  2019 - 2019 Yes Hospital Course: Mr. Yaron Lin is an [de-identified] y/o male who was admitted this past  for rhabdomyolsis, fall with orbital hematoma, UTI and hypernatremia. He was discharged to [facility] on 6/10. He then returned home where he lives with his daughter. Re-presented on  with acute confusion suspicious for recurrent UTI. Admitted and started on IV ceftriaxone and IVFs. He improved. His urine culture grew staph aureus so ceftriaxone was stopped and he was monitored off antibiotics. Culture resulted with MRSA on . Blood cultures this admission were negative and patient is clinically improved. He did have a álvarez catheter during his previous admission. I discussed case with ID on call who recommend Bactrim x7 days and advised against chronic antibiotic suppression in this case. Patient will be discharged home where he lives with his daughter. Will resume HH/PT, etc. Needs BMP with PCP follow up within 1 week.  His hospital course was Reason for call:   [x] Refill   [] Prior Auth  [] Other:     Office:   [x] PCP/Provider -   [] Specialty/Provider -     Medication:   DULoxetine (CYMBALTA) 30 mg delayed release/TAKE 1 CAPSULE DAILY     DULoxetine (CYMBALTA) 60 mg delayed release/TAKE 1 CAPSULE DAILY      Quantity: 90    Pharmacy: [facility] - [facility] [address]     Does the patient have enough for 3 days?   [] Yes   [x] No - Send as HP to POD     otherwise unremarkable and he is stable at the time of discharge. Disposition: Home Health Care [facility] Activity: Activity as tolerated Diet: DIET REGULAR Follow up instructions, discharge meds at bottom of this note. Plan was discussed with patient, nursing CM. All questions answered. Patient was stable at time of discharge. Patient will call a physician or return if any concerns. Diagnostic Imaging/Tests:  
Xr Chest [facility] Result Date: 7/29/2019 Portable chest xray  COMPARISON: June 5, 2019 CLINICAL HISTORY: Weakness, fatigue and fever. Question pneumonia. FINDINGS: Heart is enlarged. Mediastinal contour is within normal limits. No pneumothorax, pulmonary edema or large pleural effusion. No definite pulmonary consolidation. Left-sided cardiac pacer and the surrounding bones are stable. IMPRESSION: 1. Underinflated lungs. Mild left basilar opacity, likely atelectasis. No definite pulmonary consolidation. 2. Stable cardiomegaly. Echocardiogram results: No results found for this visit on 07/29/19. Procedures done this admission: * No surgery found * All Micro Results Procedure Component Value Units Date/Time CULTURE, URINE [[id number]]  (Abnormal)  (Susceptibility) Collected:  07/29/19 5590 Order Status:  Completed Specimen:  Cath Urine Updated:  08/01/19 8414 Special Requests: NO SPECIAL REQUESTS Culture result:    
  [id number] COLONIES/mL **METHICILLIN RESISTANT STAPHYLOCOCCUS AUREUS** RESULTS VERIFIED, PHONED TO AND READ BACK BY 
Blossom Carney RN @ 0462 ON 8/1/2019 BY NASIMA 
  
 CULTURE, BLOOD [[id number]] Collected:  07/29/19 0141 Order Status:  Completed Specimen:  Blood Updated:  08/01/19 8245 Special Requests: LEFT HAND Culture result: NO GROWTH 3 DAYS     
 CULTURE, BLOOD [[id number]] Collected:  07/29/19 0142 Order Status:  Completed Specimen:  Blood Updated:  08/01/19 290 Special Requests: LEFT ANTECUBITAL Culture result: NO GROWTH 3 DAYS Labs: Results:  
   
BMP, Mg, Phos Recent Labs  
  07/30/19 
0525   
K 3.6 * CO2 21 AGAP 9 BUN 11  
CREA 0.83 CA 8.8 GLU 75 CBC Recent Labs 08/01/19 
6418 07/30/19 
1643 WBC 5.3 5.1  
RBC 4.31 4.18* HGB 13.2* 12.9*  
HCT 39.5* 39.0*  
* 129* GRANS 58 54 LYMPH 30 31 EOS 3 4 MONOS 8 10 BASOS 0 0 IG 0 0 ANEU 3.1 2.8 ABL 1.6 1.6 LISA 0.2 0.2 ABM 0.4 0.5 ABB 0.0 0.0 AIG 0.0 0.0 LFT Recent Labs  
  07/30/19 
0525 SGOT 20 ALT 12  
AP 97  
TP 6.2* ALB 2.6*  
GLOB 3.6* AGRAT 0.7* Cardiac Testing Lab Results Component Value Date/Time BNP 76 (H) 02/04/2019 09:58 PM  
 BNP 44 09/25/2018 05:35 PM  
 BNP 57 12/31/2017 08:07 AM  
 BNP 40 02/11/2016 03:16 PM  
  08/28/2012 06:15 PM  
  (H) 06/10/2019 05:44 AM  
  (H) 06/09/2019 05:40 AM  
  (H) 06/08/2019 06:35 AM  
 Troponin-I, Qt. 0.66 () 07/29/2019 09:03 AM  
 Troponin-I, Qt. 0.55 () 07/29/2019 05:12 AM  
 Troponin-I, Qt. 0.58 () 07/29/2019 01:41 AM  
  
Coagulation Tests Lab Results Component Value Date/Time Prothrombin time 12.1 (H) 08/28/2012 06:15 PM  
 Prothrombin time 11.8 01/02/2012 10:13 AM  
 INR 1.2 08/28/2012 06:15 PM  
 INR 1.1 01/02/2012 10:13 AM  
 aPTT 31.0 08/28/2012 06:15 PM  
 aPTT 31.3 01/02/2012 10:13 AM  
  
A1c Lab Results Component Value Date/Time Hemoglobin A1c 5.5 08/28/2012 06:15 PM  
  
Lipid Panel Lab Results Component Value Date/Time Cholesterol, total 142 04/17/2017 11:59 AM  
 HDL Cholesterol 76 04/17/2017 11:59 AM  
 LDL, calculated 52 04/17/2017 11:59 AM  
 VLDL, calculated 14 04/17/2017 11:59 AM  
 Triglyceride 69 04/17/2017 11:59 AM  
 CHOL/HDL Ratio 1.9 08/29/2012 01:43 AM  
  
Thyroid Panel Lab Results Component Value Date/Time  TSH 0.587 05/13/2018 01:23 AM  
 TSH 0.588 07/22/2017 10:35 PM  
 T4, Total 7.6 05/12/2016 10:24 AM  
 T4, Total 6.7 02/09/2015 10:30 AM  
 T4, Free 1.4 05/13/2018 01:23 AM  
    
Most Recent UA Lab Results Component Value Date/Time Color YELLOW 07/29/2019 02:37 AM  
 Appearance TURBID 07/29/2019 02:37 AM  
 Specific gravity 1.006 07/29/2019 02:37 AM  
 pH (UA) 6.5 07/29/2019 02:37 AM  
 Protein 30 (A) 07/29/2019 02:37 AM  
 Glucose NEGATIVE  07/29/2019 02:37 AM  
 Ketone NEGATIVE  07/29/2019 02:37 AM  
 Bilirubin NEGATIVE  07/29/2019 02:37 AM  
 Blood LARGE (A) 07/29/2019 02:37 AM  
 Urobilinogen 1.0 07/29/2019 02:37 AM  
 Nitrites NEGATIVE  07/29/2019 02:37 AM  
 Leukocyte Esterase LARGE (A) 07/29/2019 02:37 AM  
 WBC >100 07/29/2019 02:37 AM  
 RBC >100 07/29/2019 02:37 AM  
 Epithelial cells 0-3 07/29/2019 02:37 AM  
 Bacteria 0 07/29/2019 02:37 AM  
 Casts 10-20 07/29/2019 02:37 AM  
 Crystals, urine 0 05/12/2018 10:45 PM  
 Mucus 0 05/12/2018 10:45 PM  
 Other observations RESULTS VERIFIED MANUALLY 07/29/2019 02:37 AM  
  
 
No Known Allergies Immunization History Administered Date(s) Administered  Influenza Vaccine 09/26/2012  Influenza Vaccine (Quad) Mdck Pf 11/30/2017  Influenza Vaccine (Quad) PF 10/24/2016, 09/28/2018  Pneumococcal Vaccine (Unspecified Type) 06/25/2013  TB Skin Test (PPD) Intradermal 07/23/2017, 05/13/2018, 09/25/2018, 06/04/2019, 07/29/2019  Tdap 05/19/2016 All Labs from Last 24 Hrs: 
Recent Results (from the past 24 hour(s)) PLEASE READ & DOCUMENT PPD TEST IN 48 HRS Collection Time: 07/31/19  1:30 PM  
Result Value Ref Range PPD Negative Negative  
 mm Induration 0 0 - 5 mm CBC WITH AUTOMATED DIFF Collection Time: 08/01/19  5:25 AM  
Result Value Ref Range WBC 5.3 4.3 - 11.1 K/uL  
 RBC 4.31 4.23 - 5.6 M/uL  
 HGB 13.2 (L) 13.6 - 17.2 g/dL HCT 39.5 (L) 41.1 - 50.3 % MCV 91.6 79.6 - 97.8 FL  
 MCH 30.6 26.1 - 32.9 PG  
 MCHC 33.4 31.4 - 35.0 g/dL  
 RDW 13.4 11.9 - 14.6 % PLATELET 237 (L) 333 - 450 K/uL  MPV 10.2 9.4 - 12.3 FL  
 ABSOLUTE NRBC 0.00 0.0 - 0.2 K/uL  
 DF AUTOMATED NEUTROPHILS 58 43 - 78 % LYMPHOCYTES 30 13 - 44 % MONOCYTES 8 4.0 - 12.0 % EOSINOPHILS 3 0.5 - 7.8 % BASOPHILS 0 0.0 - 2.0 % IMMATURE GRANULOCYTES 0 0.0 - 5.0 %  
 ABS. NEUTROPHILS 3.1 1.7 - 8.2 K/UL  
 ABS. LYMPHOCYTES 1.6 0.5 - 4.6 K/UL  
 ABS. MONOCYTES 0.4 0.1 - 1.3 K/UL  
 ABS. EOSINOPHILS 0.2 0.0 - 0.8 K/UL  
 ABS. BASOPHILS 0.0 0.0 - 0.2 K/UL  
 ABS. IMM. GRANS. 0.0 0.0 - 0.5 K/UL Current Med List in Hospital:  
Current Facility-Administered Medications Medication Dose Route Frequency  dabigatran etexilate (PRADAXA) capsule 150 mg  150 mg Oral Q12H  
 ARIPiprazole (ABILIFY) tablet 10 mg  10 mg Oral DAILY  aspirin chewable tablet 81 mg  81 mg Oral DAILY  bicalutamide (CASODEX) tablet 50 mg  50 mg Oral DAILY  donepezil (ARICEPT) tablet 5 mg  5 mg Oral QHS  famotidine (PEPCID) tablet 20 mg  20 mg Oral BID  levomilnacipran (FETZIMA) ER capsule 40 mg (Patient Supplied)  40 mg Oral DAILY  metoprolol tartrate (LOPRESSOR) tablet 25 mg  25 mg Oral BID  tamsulosin (FLOMAX) capsule 0.4 mg  0.4 mg Oral QHS  sodium chloride (NS) flush 5-40 mL  5-40 mL IntraVENous Q8H  
 sodium chloride (NS) flush 5-40 mL  5-40 mL IntraVENous PRN  
 acetaminophen (TYLENOL) tablet 650 mg  650 mg Oral Q4H PRN  
 bisacodyl (DULCOLAX) tablet 5 mg  5 mg Oral DAILY PRN  
 LORazepam (ATIVAN) tablet 0.5 mg  0.5 mg Oral BID PRN  
 HYDROcodone-acetaminophen (NORCO) 5-325 mg per tablet 1 Tab  1 Tab Oral Q4H PRN  polyethylene glycol (MIRALAX) packet 17 g  17 g Oral DAILY Discharge Exam: 
Patient Vitals for the past 24 hrs: 
 Temp Pulse Resp BP SpO2  
08/01/19 0717 96.5 °F (35.8 °C) 63 16 110/59 94 % 08/01/19 0307 98 °F (36.7 °C) 60 18 115/53 96 % 08/01/19 0008 98.2 °F (36.8 °C) 63 18 127/72 97 % 07/31/19 2022 98.2 °F (36.8 °C) 66 18 103/46 97 % 07/31/19 2001 97.7 °F (36.5 °C) 65 16 93/58 99 % 07/31/19 1531 98.7 °F (37.1 °C) 60 16 102/50 98 % Oxygen Therapy O2 Sat (%): 94 % (08/01/19 0717) Pulse via Oximetry: 73 beats per minute (07/29/19 0334) O2 Device: Room air (07/31/19 0756) ETCO2 (mmHg): 95 mmHg (07/30/19 0338) Intake/Output Summary (Last 24 hours) at 8/1/2019 1206 Last data filed at 7/31/2019 1736 Gross per 24 hour Intake 480 ml Output  Net 480 ml *Note that automatically entered I/Os may not be accurate; dependent on patient compliance with collection and accurate  by assistants. General:    Well nourished. Alert. Obese. Eyes:   Normal sclerae. Extraocular movements intact. ENT:  Normocephalic, atraumatic. Moist mucous membranes CV:   Regular rate and rhythm. No murmur, rub, or gallop. Lungs:  Clear to auscultation bilaterally. No wheezing, rhonchi, or rales. Abdomen: Soft, nontender, nondistended. Bowel sounds normal.  
Extremities: Warm and dry. No cyanosis or edema. Neurologic: CN II-XII grossly intact. Sensation intact. Skin:     No rashes or jaundice. Psych:  Normal mood and affect. Discharge Info:  
Current Discharge Medication List  
  
START taking these medications Details  
trimethoprim-sulfamethoxazole (BACTRIM DS, SEPTRA DS) 160-800 mg per tablet Take 1 Tab by mouth two (2) times a day for 7 days. Qty: 14 Tab, Refills: 0 CONTINUE these medications which have NOT CHANGED Details  
donepezil (ARICEPT) 5 mg tablet TAKE 1 TABLET BY MOUTH AT BEDTIME Qty: 90 Tab, Refills: 0 Associated Diagnoses: Mild dementia  
  
tamsulosin (FLOMAX) 0.4 mg capsule Take 1 Cap by mouth nightly. Qty: 30 Cap, Refills: 0  
  
metoprolol tartrate (LOPRESSOR) 25 mg tablet Take 1 Tab by mouth two (2) times a day.  
Qty: 60 Tab, Refills: 0  
  
furosemide (LASIX) 40 mg tablet Take 40 mg po daily PRN ONLY for evidence of fluid retention 
Qty: 15 Tab, Refills: 0  
  
polyethylene glycol (MIRALAX) 17 gram/dose powder Take 17 g by mouth daily. Indications: constipation  
  
multivitamin, tx-iron-ca-min (THERA-M W/ IRON) 9 mg iron-400 mcg tab tablet Take 1 Tab by mouth daily. levomilnacipran (FETZIMA) 40 mg ER capsule TAKE 1 CAP BY MOUTH DAILY. Qty: 90 Cap, Refills: 3 Associated Diagnoses: Other depression  
  
bicalutamide (CASODEX) 50 mg tablet Take 1 Tab by mouth daily. Qty: 90 Tab, Refills: 3 Associated Diagnoses: CA of prostate ([address] famotidine (PEPCID) 20 mg tablet Take 1 Tab by mouth two (2) times a day. Qty: 180 Tab, Refills: 3 Associated Diagnoses: Gastroesophageal reflux disease without esophagitis ARIPiprazole (ABILIFY) 10 mg tablet Take 1 Tab by mouth daily. Qty: 90 Tab, Refills: 3 Associated Diagnoses: Major depressive disorder with single episode, in remission ([address] senna-docusate (SENNA LAXATIVE-STOOL SOFTENER) 8.6-50 mg per tablet Take 1 Tab by mouth daily. aspirin 81 mg chewable tablet Take 1 Tab by mouth daily. Indications: prevention of cerebrovascular accident Qty: 30 Tab, Refills: 0  
  
acetaminophen (TYLENOL ARTHRITIS PAIN) 650 mg CR tablet Take 650 mg by mouth every six (6) hours as needed for Pain. STOP taking these medications  
  
 cefpodoxime (VANTIN) 100 mg tablet Comments:  
Reason for Stopping:   
   
 celecoxib (CELEBREX) 200 mg capsule Comments:  
Reason for Stopping: Follow Up Orders: Follow-up Appointments Procedures  FOLLOW UP VISIT Appointment in: Other (Specify) PCP -- 1 week for hospital follow up with BMP prior PCP -- 1 week for hospital follow up with BMP prior Standing Status:   Standing Number of Occurrences:   1 Order Specific Question:   Appointment in Answer: Other (Specify) Follow-up Information Follow up With Specialties Details Why Contact Info Alison Mercer MD Internal Medicine In 1 week Hospital f/u. UTI, MRSA in urine. Bactrim, needs labs. [facility] [id number] [phone] Time spent in patient discharge planning and coordination 35 minutes.  
 
Signed: 
Yani Ceja MD

## 2024-11-22 RX ORDER — QUETIAPINE FUMARATE 50 MG/1
TABLET, FILM COATED ORAL
Qty: 270 TABLET | Refills: 0 | Status: SHIPPED | OUTPATIENT
Start: 2024-11-22

## 2024-11-22 RX ORDER — DULOXETIN HYDROCHLORIDE 30 MG/1
30 CAPSULE, DELAYED RELEASE ORAL DAILY
Qty: 90 CAPSULE | Refills: 0 | Status: SHIPPED | OUTPATIENT
Start: 2024-11-22

## 2024-11-22 RX ORDER — DULOXETIN HYDROCHLORIDE 60 MG/1
60 CAPSULE, DELAYED RELEASE ORAL DAILY
Qty: 90 CAPSULE | Refills: 0 | Status: SHIPPED | OUTPATIENT
Start: 2024-11-22

## 2024-12-17 ENCOUNTER — OFFICE VISIT (OUTPATIENT)
Dept: INTERNAL MEDICINE CLINIC | Facility: CLINIC | Age: 69
End: 2024-12-17
Payer: COMMERCIAL

## 2024-12-17 VITALS
HEIGHT: 60 IN | OXYGEN SATURATION: 94 % | WEIGHT: 147.6 LBS | HEART RATE: 94 BPM | DIASTOLIC BLOOD PRESSURE: 58 MMHG | TEMPERATURE: 98.6 F | BODY MASS INDEX: 28.98 KG/M2 | SYSTOLIC BLOOD PRESSURE: 118 MMHG

## 2024-12-17 DIAGNOSIS — F41.9 ANXIETY AND DEPRESSION: ICD-10-CM

## 2024-12-17 DIAGNOSIS — K03.6 TARTAR DEPOSITS ON TEETH: ICD-10-CM

## 2024-12-17 DIAGNOSIS — Z23 ENCOUNTER FOR IMMUNIZATION: ICD-10-CM

## 2024-12-17 DIAGNOSIS — Z78.9 ELECTRONIC CIGARETTE USE: ICD-10-CM

## 2024-12-17 DIAGNOSIS — J96.11 CHRONIC RESPIRATORY FAILURE WITH HYPOXIA (HCC): ICD-10-CM

## 2024-12-17 DIAGNOSIS — F33.9 DEPRESSION, RECURRENT (HCC): ICD-10-CM

## 2024-12-17 DIAGNOSIS — Z12.31 ENCOUNTER FOR SCREENING MAMMOGRAM FOR BREAST CANCER: ICD-10-CM

## 2024-12-17 DIAGNOSIS — E78.2 MIXED HYPERLIPIDEMIA: ICD-10-CM

## 2024-12-17 DIAGNOSIS — F12.10 TETRAHYDROCANNABINOL (THC) USE DISORDER, MILD, ABUSE: ICD-10-CM

## 2024-12-17 DIAGNOSIS — K21.9 GASTROESOPHAGEAL REFLUX DISEASE WITHOUT ESOPHAGITIS: ICD-10-CM

## 2024-12-17 DIAGNOSIS — G89.29 CHRONIC LOW BACK PAIN, UNSPECIFIED BACK PAIN LATERALITY, UNSPECIFIED WHETHER SCIATICA PRESENT: ICD-10-CM

## 2024-12-17 DIAGNOSIS — E80.1 PORPHYRIA CUTANEA TARDA (HCC): ICD-10-CM

## 2024-12-17 DIAGNOSIS — J44.89 ASTHMA-COPD OVERLAP SYNDROME (HCC): ICD-10-CM

## 2024-12-17 DIAGNOSIS — F32.A ANXIETY AND DEPRESSION: ICD-10-CM

## 2024-12-17 DIAGNOSIS — F17.210 CIGARETTE NICOTINE DEPENDENCE WITHOUT COMPLICATION: ICD-10-CM

## 2024-12-17 DIAGNOSIS — Z13.29 SCREENING FOR THYROID DISORDER: ICD-10-CM

## 2024-12-17 DIAGNOSIS — Z13.220 SCREENING FOR HYPERLIPIDEMIA: ICD-10-CM

## 2024-12-17 DIAGNOSIS — I10 PRIMARY HYPERTENSION: Primary | ICD-10-CM

## 2024-12-17 DIAGNOSIS — G47.33 SEVERE OBSTRUCTIVE SLEEP APNEA: ICD-10-CM

## 2024-12-17 DIAGNOSIS — M54.50 CHRONIC LOW BACK PAIN, UNSPECIFIED BACK PAIN LATERALITY, UNSPECIFIED WHETHER SCIATICA PRESENT: ICD-10-CM

## 2024-12-17 DIAGNOSIS — Z13.0 SCREENING FOR DEFICIENCY ANEMIA: ICD-10-CM

## 2024-12-17 DIAGNOSIS — E28.39 MENOPAUSE OVARIAN FAILURE: ICD-10-CM

## 2024-12-17 DIAGNOSIS — Z13.1 SCREENING FOR DIABETES MELLITUS: ICD-10-CM

## 2024-12-17 DIAGNOSIS — E55.9 HYPOVITAMINOSIS D: ICD-10-CM

## 2024-12-17 PROBLEM — J45.40 MODERATE PERSISTENT ASTHMA WITHOUT COMPLICATION: Status: RESOLVED | Noted: 2020-08-20 | Resolved: 2024-12-17

## 2024-12-17 PROBLEM — R93.89 ABNORMAL CT OF THE CHEST: Status: RESOLVED | Noted: 2020-03-07 | Resolved: 2024-12-17

## 2024-12-17 PROBLEM — E87.6 HYPOKALEMIA: Status: RESOLVED | Noted: 2020-03-06 | Resolved: 2024-12-17

## 2024-12-17 PROBLEM — R10.31 RIGHT LOWER QUADRANT ABDOMINAL PAIN: Status: RESOLVED | Noted: 2020-11-18 | Resolved: 2024-12-17

## 2024-12-17 PROCEDURE — 99204 OFFICE O/P NEW MOD 45 MIN: CPT | Performed by: INTERNAL MEDICINE

## 2024-12-17 NOTE — ASSESSMENT & PLAN NOTE
Depression Screening Follow-up Plan: Patient's depression screening was positive with a PHQ-9 score of 18. Patient assessed for underlying major depression. They have no active suicidal ideations. Brief counseling provided and recommend additional follow-up/re-evaluation next office visit.    Orders:    TSH, 3rd generation; Future    Prolactin; Future

## 2024-12-17 NOTE — ASSESSMENT & PLAN NOTE
Orders:    CBC and differential; Future    Comprehensive metabolic panel; Future    Iron Panel (Includes Ferritin, Iron Sat%, Iron, and TIBC); Future

## 2024-12-17 NOTE — ASSESSMENT & PLAN NOTE
Orders:    Albumin / creatinine urine ratio; Future    CBC and differential; Future    Comprehensive metabolic panel; Future    TSH, 3rd generation; Future

## 2024-12-17 NOTE — PROGRESS NOTES
Name: Felisha Eldridge      : 1955      MRN: [id number]  Encounter Provider: Estevan Booth DO  Encounter Date: 2024   Encounter department: [facility]  :  Assessment & Plan  Encounter for screening mammogram for breast cancer    Orders:    Mammo screening bilateral w 3d and cad; Future    Encounter for immunization    Orders:    influenza vaccine, high-dose, PF 0.5 mL (Fluzone High Dose)    Primary hypertension    Orders:    Albumin / creatinine urine ratio; Future    CBC and differential; Future    Comprehensive metabolic panel; Future    TSH, 3rd generation; Future    Asthma-COPD overlap syndrome (HCC)    Orders:    CBC and differential; Future    Comprehensive metabolic panel; Future    Chronic respiratory failure with hypoxia (HCC)    Orders:    CBC and differential; Future    Comprehensive metabolic panel; Future    Severe obstructive sleep apnea    Orders:    CBC and differential; Future    Gastroesophageal reflux disease without esophagitis         Anxiety and depression  Depression Screening Follow-up Plan: Patient's depression screening was positive with a PHQ-9 score of 18. Patient assessed for underlying major depression. They have no active suicidal ideations. Brief counseling provided and recommend additional follow-up/re-evaluation next office visit.    Orders:    TSH, 3rd generation; Future    Prolactin; Future    Cigarette nicotine dependence without complication         Depression, recurrent (HCC)  Depression Screening Follow-up Plan: Patient's depression screening was positive with a PHQ-9 score of 18. Patient declines further evaluation by mental health professional and/or medications. They have no active suicidal ideations. Brief counseling provided and recommend additional follow-up/re-evaluation at next office visit.         Chronic low back pain, unspecified back pain laterality, unspecified whether sciatica present         Electronic cigarette use          Tetrahydrocannabinol (THC) use disorder, mild, abuse         Tartar deposits on teeth    Orders:    Ambulatory Referral to Otolaryngology; Future    Screening for diabetes mellitus    Orders:    Hemoglobin A1C; Future    Screening for deficiency anemia         Screening for thyroid disorder    Orders:    TSH, 3rd generation; Future    Screening for hyperlipidemia         Mixed hyperlipidemia    Orders:    Comprehensive metabolic panel; Future    Lipid Panel with Direct LDL reflex; Future    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HCC)    Orders:    CBC and differential; Future    Comprehensive metabolic panel; Future    Iron Panel (Includes Ferritin, Iron Sat%, Iron, and TIBC); Future    Hypovitaminosis D    Orders:    Vitamin D 25 hydroxy; Future    Menopause ovarian failure    Orders:    DXA bone density spine hip and pelvis; Future      A/P: Doing ok and will check labs. Discussed BMI and will give info on diet and exercise. Discussed vaccines refuse the flu, but the rest is up to date.  Wean tobacco and will check an alpha one. Stop vaping as well. Keep an appt with an eye doc and DDS.  Discussed CRC will order. Discussed mammo and will order. Discussed dexa and will order.. ?black lizz. Seen by DDS. Will refer to ENT for second opinion.  Continue current treatment and RTC four weeks for f/u labs, lung ca screening, and AWV. .     Depression Screening and Follow-up Plan: Patient's depression screening was positive with a PHQ-9 score of 18.     Tobacco Cessation Counseling: Tobacco cessation counseling was provided. The patient is sincerely urged to quit consumption of tobacco. She is not ready to quit tobacco. Medication options discussed. Side effects of medication not discussed. Patient refused medication.       History of Present Illness     WM, former pt of Mr. Pantoja, presents to establish. PMH includes ACOS, chronic hypoxia, Prophyria, HLD, diverticulosis,  HTN, BANDAR, allergies, GERD, dysphagia, HNP, MDD/SAMIR, tobacco  use and constipation.. PSH of colectomy, appy, choly, hiatal hernia, inguinal hernia, tuba, rotator cuff, and UVP. Daily THC via e cig and daily cig Smoker and denies ETOH. Doing ok and no c/o's, but reports black stuff on he teeth. DDS reportedly said there is no problems.  Remains active w/o difficulty and no falls. Denies depression. No suicidal or violent ideations. 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 No recent travel. No fever, chills, or sweats. No unexplained wt change. Denies CP, SOB, palpitations, edema, orthopnea, or PND. No sz or syncope. No changes in bowel or bladder habits. No trouble swallowing. Appetite and sleep are good. Sees both  a DDS and an eye doctor.  Currently due for labs, vaccines, dexa, CRC, and mammo.  .             Review of Systems   Constitutional:  Negative for activity change, chills, diaphoresis, fatigue and fever.   HENT:  Positive for dental problem.    Eyes:  Negative for visual disturbance.   Respiratory:  Negative for cough, chest tightness, shortness of breath and wheezing.    Cardiovascular:  Negative for chest pain, palpitations and leg swelling.   Gastrointestinal:  Negative for abdominal pain, constipation, diarrhea, nausea and vomiting.   Endocrine: Negative for cold intolerance and heat intolerance.   Genitourinary:  Negative for difficulty urinating, dysuria and frequency.   Musculoskeletal:  Negative for arthralgias, gait problem and myalgias.   Neurological:  Negative for dizziness, seizures, syncope, weakness, light-headedness and headaches.   Psychiatric/Behavioral:  Negative for confusion, dysphoric mood and sleep disturbance. The patient is not nervous/anxious.        Objective   /58   Pulse 94   Temp 98.6 °F (37 °C)   Ht 5' (1.524 m)   Wt 67 kg (147 lb 9.6 oz)   SpO2 94%   BMI 28.83 kg/m²      Physical Exam  Vitals and nursing note reviewed.   Constitutional:       General: She is not in acute distress.     Appearance: Normal appearance.  She is not ill-appearing.   HENT:      Head: Normocephalic and atraumatic.      Mouth/Throat:      Mouth: Mucous membranes are moist.      Comments: Black lizz like substance on the teeth.   Eyes:      Extraocular Movements: Extraocular movements intact.      Conjunctiva/sclera: Conjunctivae normal.      Pupils: Pupils are equal, round, and reactive to light.   Neck:      Vascular: No carotid bruit.   Cardiovascular:      Rate and Rhythm: Normal rate and regular rhythm.      Heart sounds: Normal heart sounds. No murmur heard.  Pulmonary:      Effort: Pulmonary effort is normal. No respiratory distress.      Breath sounds: No wheezing, rhonchi or rales.      Comments: Coarse and decreased BS   Abdominal:      General: Bowel sounds are normal. There is no distension.      Palpations: Abdomen is soft. There is no mass.      Tenderness: There is no abdominal tenderness. There is no right CVA tenderness, left CVA tenderness, guarding or rebound.   Musculoskeletal:         General: No swelling, tenderness or deformity. Normal range of motion.      Cervical back: Neck supple. No tenderness.      Right lower leg: No edema.      Left lower leg: No edema.   Lymphadenopathy:      Cervical: No cervical adenopathy.   Neurological:      General: No focal deficit present.      Mental Status: She is alert and oriented to person, place, and time. Mental status is at baseline.      Cranial Nerves: No cranial nerve deficit.      Motor: No weakness.      Coordination: Coordination normal.      Gait: Gait normal.      Deep Tendon Reflexes: Reflexes normal.   Psychiatric:         Mood and Affect: Mood normal.         Behavior: Behavior normal.         Thought Content: Thought content normal.         Judgment: Judgment normal.           Tobacco Cessation Counseling: Tobacco cessation counseling and education was provided. The patient is sincerely urged to quit consumption of tobacco. She is not ready to quit tobacco. The [facility] health  risks of tobacco consumption were discussed. If she decides to quit, there are a number of helpful adjunctive aids, and she can see me to discuss nicotine replacement therapy, chantix, or bupropion anytime in the future.. I spent 5 minutes on Tobacco Cessation counseling during today's visit.

## 2024-12-17 NOTE — ASSESSMENT & PLAN NOTE
Depression Screening Follow-up Plan: Patient's depression screening was positive with a PHQ-9 score of 18. Patient declines further evaluation by mental health professional and/or medications. They have no active suicidal ideations. Brief counseling provided and recommend additional follow-up/re-evaluation at next office visit.

## 2024-12-17 NOTE — PATIENT INSTRUCTIONS
"Patient Education     Quitting smoking   The Basics   Written by the doctors and editors at [facility]   What are the benefits of quitting smoking? -- Quitting smoking can dramatically improve your health and help you live longer. It lowers your risk of heart disease, lung disease, kidney failure, cancer, infection, stomach problems, and diabetes.  Quitting smoking can also lower your chances of getting osteoporosis, a condition that makes your bones weak.  Quitting is not easy for most people, and it might take several tries to completely quit. But help and support are available. Quitting smoking will improve your health no matter how old you are, even if you have smoked for a long time.  What should I do if I want to quit smoking? -- It's a good idea to start by talking with your doctor or nurse. It is possible to quit on your own, without help. But getting help greatly increases your chances of quitting successfully.  When you are ready to quit, you will make a plan to:   Set a quit date.   Tell your family and friends that you plan to quit.   Plan ahead for the challenges you will face, such as cigarette cravings.   Remove cigarettes from your home, car, and work.  How can my doctor or nurse help? -- Your doctor or nurse can give you advice on the best way to quit. They can also give you medicines to:   Reduce your craving for cigarettes   Reduce your \"withdrawal\" symptoms (symptoms that happen when you stop smoking)  Your doctor or nurse can also help you find a counselor to talk to. For most people who are trying to quit smoking, it works best to use both medicines and counseling.  You can also get help from a free phone line (4-502-QUITNOW, or 1-294.241.6098) or go online to www.smokefree.gov.  What are the symptoms of withdrawal? -- When you stop smoking, you might have symptoms such as:   Trouble sleeping   Feeling irritable, anxious, or restless   Getting frustrated or angry   Having trouble thinking " clearly  These symptoms can be hard to deal with, which is why it can be so hard to quit. But medicines can help.  Some people who stop smoking become temporarily depressed. Some people need treatment for depression, such as counseling or medicines or both. People with depression might:   No longer enjoy or care about doing the things they used to like to do   Feel sad, down, hopeless, nervous, or cranky most of the day, almost every day   Lose or gain weight   Sleep too much or too little   Feel tired or like they have no energy   Feel guilty or like they are worth nothing   Forget things or feel confused   Move and speak more slowly than usual   Act restless or have trouble staying still   Think about death or suicide  If you think you might be depressed, tell your doctor or nurse right away. They can talk to you about your symptoms and recommend treatment if needed.  Get help right away if you are thinking of hurting or killing yourself! -- Sometimes, people with depression think of hurting or killing themselves. If you ever feel like you might hurt yourself, help is available:   In the , contact the [employer] Suicide & Crisis Lifeline:   To speak to someone, call or text [employer].   To talk to someone online, go to www.[employer].org/chat.   Call your doctor or nurse, and tell them that it is an emergency.   Call for an ambulance (in the [place] and [place], call 9-1-1).   Go to the emergency department at your local hospital.  If you think your partner might have depression, or if you are worried that they might hurt themselves, get them help right away.  How does counseling work? -- A counselor can help you figure out:   What triggers you to want to smoke, and how to handle these situations   How to resist cravings   What you can do differently if you have tried to quit before  You can meet with a counselor in 1-on-1 sessions or as part of a group. There are other ways to get counseling, too, such as over the phone, through  "text messaging, or online.  How do medicines help you stop smoking? -- Different medicines work in different ways:   Nicotine replacement therapy - Nicotine is the main drug in cigarettes, and the reason they are addictive. These medicines reduce your body's craving for nicotine. They also help with withdrawal symptoms.  There are different forms of nicotine replacement, including skin patches, lozenges, gum, nasal sprays, and inhalers. Most can be bought without a prescription. Also, health insurance might cover some or all of the cost.  It often helps to use 2 forms of nicotine replacement. For example, you might wear a patch all the time, plus use gum or lozenges when you get a craving to smoke.   Varenicline - Varenicline (brand names: Chantix, Champix) is a prescription medicine that reduces withdrawal symptoms and cigarette cravings. Varenicline can increase the effects of alcohol in some people. It's a good idea to limit drinking while you're taking it, at least until you know how it affects you.  Even if you are not yet ready to commit to a quit date, varenicline can help reduce cravings. This can make it easier to quit when you are ready.   Bupropion - Bupropion (sample brand names: Wellbutrin, Zyban) is a prescription medicine that reduces your desire to smoke. It is also available in a generic version, which is cheaper than the brand name medicines. Doctors do not usually prescribe bupropion for people with seizures or who have had seizures in the past.  It might also be helpful to combine nicotine replacement with bupropion or varenicline. In some cases, a person might even take both bupropion and varenicline. Your doctor or nurse can help you figure out the best combination for you.  Can vaping help me quit? -- Sometimes, people wonder if vaping can help them quit smoking. Vaping is using electronic cigarettes, or \"e-cigarettes.\"  Doctors recommend using medicines and counseling to quit smoking. These " methods have been studied the most. But for people who have tried these and not been able to quit, switching to vaping might be an option. There are some things to remember:   Vaping might be less harmful than smoking regular cigarettes. But e-cigarettes still contain nicotine as well as other substances that might be harmful.   If you decide to try vaping to help you quit, it's important to switch completely from regular cigarettes to e-cigarettes. Using both will probably not be helpful, and might increase the risks of harm.   It's not clear how vaping can affect a person's health in the long term.  For these reasons, doctors recommend that if you do try vaping to help you quit smoking, you still make a plan to quit vaping eventually.  If you are interested in trying vaping to help you quit smoking, it's a good idea to talk to your doctor or nurse first.  What if I am pregnant and I smoke? -- If you are pregnant, it's really important for the health of your baby that you quit. Ask your doctor what options you have, and what is safest for your baby.  What if I have already smoked for a long time? -- The longer you have smoked, the higher your chances are of having health problems. But it is never too late to quit smoking. It helps your health even if you are older or have smoked for many years. The best thing you can do to lower your chance of having a health problem caused by smoking is to quit.  Will I gain weight if I quit? -- You might gain a few pounds. This can be frustrating for some people. But it's important to remember that you are improving your health by quitting smoking. You can help prevent gaining a lot of weight by staying active and eating a healthy diet.  What if I am not able to quit? -- If you don't quit on your first try, or if you quit but then start smoking again, don't give up hope. Lots of people have to try more than once before they are able to completely quit.  It might help to try to  understand why quitting did not work. There might be something you can do differently when you try again. It can help to figure out which situations make you want to smoke, so you can avoid them.  What else can I do to improve my chances of quitting? -- You can:   Get regular exercise. Any type of physical activity, even gentle forms of movement, is good for your health. Physical activity can also help reduce stress.   Stay away from people who smoke and places that make you want to smoke. If people close to you smoke, ask them to quit with you or avoid smoking around you.   Carry gum, hard candy, or something to put in your mouth. If you get a craving for a cigarette, try 1 of these instead.   Don't give up, even if you start smoking again. It takes most people a few tries before they succeed.  All topics are updated as new evidence becomes available and our peer review process is complete.  This topic retrieved from [employer] on: Mar 14, 2024.  Topic 08809 Version 33.0  Release: 32.2.4 - C32.72  © 2024 UpToDate, Inc. and/or its affiliates. All rights reserved.  Consumer Information Use and Disclaimer   Disclaimer: This generalized information is a limited summary of diagnosis, treatment, and/or medication information. It is not meant to be comprehensive and should be used as a tool to help the user understand and/or assess potential diagnostic and treatment options. It does NOT include all information about conditions, treatments, medications, side effects, or risks that may apply to a specific patient. It is not intended to be medical advice or a substitute for the medical advice, diagnosis, or treatment of a health care provider based on the health care provider's examination and assessment of a patient's specific and unique circumstances. Patients must speak with a health care provider for complete information about their health, medical questions, and treatment options, including any risks or benefits regarding  use of medications. This information does not endorse any treatments or medications as safe, effective, or approved for treating a specific patient. UpToDate, Inc. and its affiliates disclaim any warranty or liability relating to this information or the use thereof.The use of this information is governed by the Terms of Use, available at https://www.[employer].com/en/know/clinical-effectiveness-terms. 2024© UpToDate, Inc. and its affiliates and/or licensors. All rights reserved.  Copyright   © 2024 UpToDate, Inc. and/or its affiliates. All rights reserved.

## 2024-12-28 ENCOUNTER — RESULTS FOLLOW-UP (OUTPATIENT)
Dept: INTERNAL MEDICINE CLINIC | Facility: CLINIC | Age: 69
End: 2024-12-28

## 2024-12-31 NOTE — TELEPHONE ENCOUNTER
Patient returned call. Information relayed. She has an appt 01/17/25 and asked that we do it at that time.

## 2024-12-31 NOTE — TELEPHONE ENCOUNTER
----- Message from Estevan Booth DO sent at 12/28/2024  8:02 AM EST -----  Call pt, alpha one is abnormal. Needs to come in form  a confirmatory test.

## 2025-01-14 ENCOUNTER — APPOINTMENT (OUTPATIENT)
Dept: LAB | Facility: CLINIC | Age: 70
End: 2025-01-14
Payer: COMMERCIAL

## 2025-01-14 ENCOUNTER — RESULTS FOLLOW-UP (OUTPATIENT)
Dept: INTERNAL MEDICINE CLINIC | Facility: CLINIC | Age: 70
End: 2025-01-14

## 2025-01-14 DIAGNOSIS — E78.2 MIXED HYPERLIPIDEMIA: ICD-10-CM

## 2025-01-14 DIAGNOSIS — G47.33 SEVERE OBSTRUCTIVE SLEEP APNEA: ICD-10-CM

## 2025-01-14 DIAGNOSIS — J44.89 ASTHMA-COPD OVERLAP SYNDROME (HCC): ICD-10-CM

## 2025-01-14 DIAGNOSIS — E80.1 PORPHYRIA CUTANEA TARDA (HCC): ICD-10-CM

## 2025-01-14 DIAGNOSIS — E55.9 HYPOVITAMINOSIS D: ICD-10-CM

## 2025-01-14 DIAGNOSIS — J96.11 CHRONIC RESPIRATORY FAILURE WITH HYPOXIA (HCC): ICD-10-CM

## 2025-01-14 DIAGNOSIS — Z13.29 SCREENING FOR THYROID DISORDER: ICD-10-CM

## 2025-01-14 DIAGNOSIS — F41.9 ANXIETY AND DEPRESSION: ICD-10-CM

## 2025-01-14 DIAGNOSIS — Z13.1 SCREENING FOR DIABETES MELLITUS: ICD-10-CM

## 2025-01-14 DIAGNOSIS — F32.A ANXIETY AND DEPRESSION: ICD-10-CM

## 2025-01-14 DIAGNOSIS — I10 PRIMARY HYPERTENSION: ICD-10-CM

## 2025-01-14 LAB
25(OH)D3 SERPL-MCNC: 10.4 NG/ML (ref 30–100)
ALBUMIN SERPL BCG-MCNC: 3.8 G/DL (ref 3.5–5)
ALP SERPL-CCNC: 77 U/L (ref 34–104)
ALT SERPL W P-5'-P-CCNC: 9 U/L (ref 7–52)
ANION GAP SERPL CALCULATED.3IONS-SCNC: 7 MMOL/L (ref 4–13)
AST SERPL W P-5'-P-CCNC: 11 U/L (ref 13–39)
BASOPHILS # BLD AUTO: 0.08 THOUSANDS/ΜL (ref 0–0.1)
BASOPHILS NFR BLD AUTO: 1 % (ref 0–1)
BILIRUB SERPL-MCNC: 0.27 MG/DL (ref 0.2–1)
BUN SERPL-MCNC: 16 MG/DL (ref 5–25)
CALCIUM SERPL-MCNC: 8.6 MG/DL (ref 8.4–10.2)
CHLORIDE SERPL-SCNC: 105 MMOL/L (ref 96–108)
CHOLEST SERPL-MCNC: 145 MG/DL (ref ?–200)
CO2 SERPL-SCNC: 30 MMOL/L (ref 21–32)
CREAT SERPL-MCNC: 0.5 MG/DL (ref 0.6–1.3)
EOSINOPHIL # BLD AUTO: 0.24 THOUSAND/ΜL (ref 0–0.61)
EOSINOPHIL NFR BLD AUTO: 4 % (ref 0–6)
ERYTHROCYTE [DISTWIDTH] IN BLOOD BY AUTOMATED COUNT: 14.3 % (ref 11.6–15.1)
EST. AVERAGE GLUCOSE BLD GHB EST-MCNC: 120 MG/DL
FERRITIN SERPL-MCNC: 50 NG/ML (ref 11–307)
GFR SERPL CREATININE-BSD FRML MDRD: 99 ML/MIN/1.73SQ M
GLUCOSE P FAST SERPL-MCNC: 110 MG/DL (ref 65–99)
HBA1C MFR BLD: 5.8 %
HCT VFR BLD AUTO: 37.6 % (ref 34.8–46.1)
HDLC SERPL-MCNC: 50 MG/DL
HGB BLD-MCNC: 12.2 G/DL (ref 11.5–15.4)
IMM GRANULOCYTES # BLD AUTO: 0.03 THOUSAND/UL (ref 0–0.2)
IMM GRANULOCYTES NFR BLD AUTO: 0 % (ref 0–2)
IRON SATN MFR SERPL: 38 % (ref 15–50)
IRON SERPL-MCNC: 111 UG/DL (ref 50–212)
LDLC SERPL CALC-MCNC: 53 MG/DL (ref 0–100)
LYMPHOCYTES # BLD AUTO: 2.37 THOUSANDS/ΜL (ref 0.6–4.47)
LYMPHOCYTES NFR BLD AUTO: 35 % (ref 14–44)
MCH RBC QN AUTO: 30.6 PG (ref 26.8–34.3)
MCHC RBC AUTO-ENTMCNC: 32.4 G/DL (ref 31.4–37.4)
MCV RBC AUTO: 94 FL (ref 82–98)
MONOCYTES # BLD AUTO: 0.39 THOUSAND/ΜL (ref 0.17–1.22)
MONOCYTES NFR BLD AUTO: 6 % (ref 4–12)
NEUTROPHILS # BLD AUTO: 3.61 THOUSANDS/ΜL (ref 1.85–7.62)
NEUTS SEG NFR BLD AUTO: 54 % (ref 43–75)
NRBC BLD AUTO-RTO: 0 /100 WBCS
PLATELET # BLD AUTO: 304 THOUSANDS/UL (ref 149–390)
PMV BLD AUTO: 11.2 FL (ref 8.9–12.7)
POTASSIUM SERPL-SCNC: 3.6 MMOL/L (ref 3.5–5.3)
PROLACTIN SERPL-MCNC: 9.95 NG/ML (ref 2.74–19.64)
PROT SERPL-MCNC: 6.4 G/DL (ref 6.4–8.4)
RBC # BLD AUTO: 3.99 MILLION/UL (ref 3.81–5.12)
SODIUM SERPL-SCNC: 142 MMOL/L (ref 135–147)
TIBC SERPL-MCNC: 294 UG/DL (ref 250–450)
TRANSFERRIN SERPL-MCNC: 210 MG/DL (ref 203–362)
TRIGL SERPL-MCNC: 210 MG/DL (ref ?–150)
TSH SERPL DL<=0.05 MIU/L-ACNC: 3.93 UIU/ML (ref 0.45–4.5)
UIBC SERPL-MCNC: 183 UG/DL (ref 155–355)
WBC # BLD AUTO: 6.72 THOUSAND/UL (ref 4.31–10.16)

## 2025-01-14 PROCEDURE — 83540 ASSAY OF IRON: CPT

## 2025-01-14 PROCEDURE — 83036 HEMOGLOBIN GLYCOSYLATED A1C: CPT

## 2025-01-14 PROCEDURE — 80053 COMPREHEN METABOLIC PANEL: CPT

## 2025-01-14 PROCEDURE — 36415 COLL VENOUS BLD VENIPUNCTURE: CPT

## 2025-01-14 PROCEDURE — 82306 VITAMIN D 25 HYDROXY: CPT

## 2025-01-14 PROCEDURE — 82728 ASSAY OF FERRITIN: CPT

## 2025-01-14 PROCEDURE — 83550 IRON BINDING TEST: CPT

## 2025-01-14 PROCEDURE — 84146 ASSAY OF PROLACTIN: CPT

## 2025-01-14 PROCEDURE — 84443 ASSAY THYROID STIM HORMONE: CPT

## 2025-01-14 PROCEDURE — 85025 COMPLETE CBC W/AUTO DIFF WBC: CPT

## 2025-01-14 PROCEDURE — 80061 LIPID PANEL: CPT

## 2025-01-15 NOTE — TELEPHONE ENCOUNTER
----- Message from Estevan Booth DO sent at 1/14/2025  9:08 PM EST -----  Call pt, labs ok except for elevated TG and low vit d. See if pt wants meds.

## 2025-01-15 NOTE — TELEPHONE ENCOUNTER
Spoke with patient. She would like to discuss medication with you during her appt on Friday 01/17.

## 2025-01-17 ENCOUNTER — APPOINTMENT (OUTPATIENT)
Dept: LAB | Facility: CLINIC | Age: 70
End: 2025-01-17
Payer: COMMERCIAL

## 2025-01-17 ENCOUNTER — OFFICE VISIT (OUTPATIENT)
Dept: INTERNAL MEDICINE CLINIC | Facility: CLINIC | Age: 70
End: 2025-01-17
Payer: COMMERCIAL

## 2025-01-17 VITALS
OXYGEN SATURATION: 96 % | TEMPERATURE: 97.6 F | WEIGHT: 151 LBS | BODY MASS INDEX: 29.64 KG/M2 | DIASTOLIC BLOOD PRESSURE: 80 MMHG | HEART RATE: 94 BPM | HEIGHT: 60 IN | SYSTOLIC BLOOD PRESSURE: 118 MMHG

## 2025-01-17 DIAGNOSIS — F41.9 ANXIETY AND DEPRESSION: ICD-10-CM

## 2025-01-17 DIAGNOSIS — F17.210 CIGARETTE NICOTINE DEPENDENCE WITHOUT COMPLICATION: ICD-10-CM

## 2025-01-17 DIAGNOSIS — J44.89 ASTHMA-COPD OVERLAP SYNDROME (HCC): ICD-10-CM

## 2025-01-17 DIAGNOSIS — E55.9 VITAMIN D DEFICIENCY: ICD-10-CM

## 2025-01-17 DIAGNOSIS — Z00.00 MEDICARE ANNUAL WELLNESS VISIT, SUBSEQUENT: ICD-10-CM

## 2025-01-17 DIAGNOSIS — G47.33 SEVERE OBSTRUCTIVE SLEEP APNEA: ICD-10-CM

## 2025-01-17 DIAGNOSIS — E78.2 MIXED HYPERLIPIDEMIA: ICD-10-CM

## 2025-01-17 DIAGNOSIS — K21.9 GASTROESOPHAGEAL REFLUX DISEASE WITHOUT ESOPHAGITIS: ICD-10-CM

## 2025-01-17 DIAGNOSIS — I10 PRIMARY HYPERTENSION: Primary | ICD-10-CM

## 2025-01-17 DIAGNOSIS — F32.A ANXIETY AND DEPRESSION: ICD-10-CM

## 2025-01-17 DIAGNOSIS — R73.03 PREDIABETES: ICD-10-CM

## 2025-01-17 DIAGNOSIS — Z23 ENCOUNTER FOR IMMUNIZATION: ICD-10-CM

## 2025-01-17 DIAGNOSIS — R13.19 ESOPHAGEAL DYSPHAGIA: ICD-10-CM

## 2025-01-17 DIAGNOSIS — J96.11 CHRONIC RESPIRATORY FAILURE WITH HYPOXIA (HCC): ICD-10-CM

## 2025-01-17 LAB
CREAT UR-MCNC: 95.1 MG/DL
MICROALBUMIN UR-MCNC: 10.1 MG/L
MICROALBUMIN/CREAT 24H UR: 11 MG/G CREATININE (ref 0–30)

## 2025-01-17 PROCEDURE — G2211 COMPLEX E/M VISIT ADD ON: HCPCS | Performed by: INTERNAL MEDICINE

## 2025-01-17 PROCEDURE — 99214 OFFICE O/P EST MOD 30 MIN: CPT | Performed by: INTERNAL MEDICINE

## 2025-01-17 PROCEDURE — 82570 ASSAY OF URINE CREATININE: CPT

## 2025-01-17 PROCEDURE — 82043 UR ALBUMIN QUANTITATIVE: CPT

## 2025-01-17 PROCEDURE — G0439 PPPS, SUBSEQ VISIT: HCPCS | Performed by: INTERNAL MEDICINE

## 2025-01-17 NOTE — PROGRESS NOTES
Name: Felisha Eldridge      : 1955      MRN: [id number]  Encounter Provider: Estevan Booth DO  Encounter Date: 2025   Encounter department: [facility]  :  Assessment & Plan  Encounter for immunization         Primary hypertension         Asthma-COPD overlap syndrome (HCC)         Chronic respiratory failure with hypoxia (HCC)         Severe obstructive sleep apnea         Esophageal dysphagia         Gastroesophageal reflux disease without esophagitis         Anxiety and depression  Depression Screening Follow-up Plan: Patient's depression screening was positive with a PHQ-9 score of 6.          Cigarette nicotine dependence without complication         Mixed hyperlipidemia         Medicare annual wellness visit, subsequent          Vitamin D deficiency         Prediabetes         A/P: doing ok and labs are up to date. Discussed recent labs. To start vit d replacement and modify risks for pre diabetes. . Discussed vaccines and defers the flu. Alpha one screen positive and will perform a confirmatory test. . Wean tobacco. Continue current treatment and RTC four months for routine.       History of Present Illness     WF RTC for f/u HTN, ACOS, etc. Doing ok and no new issues. Remains active w/o difficulty and no falls. Breathing is stable and limited rescue MDI use. No reflux. Still smoking. Chronic pain and BANDAR are manageable. SAMIR/MDD are controlled. Due for l vaccines. Recent labs with vit d deficiency and pre diabetic range HgA1c. Alpha one screen positive.       Review of Systems   Constitutional:  Negative for activity change, chills, diaphoresis, fatigue and fever.   HENT: Negative.     Eyes:  Negative for visual disturbance.   Respiratory:  Negative for cough, chest tightness, shortness of breath and wheezing.    Cardiovascular:  Negative for chest pain, palpitations and leg swelling.   Gastrointestinal:  Negative for abdominal pain, constipation, diarrhea, nausea and  vomiting.   Endocrine: Negative for cold intolerance and heat intolerance.   Genitourinary:  Negative for difficulty urinating, dysuria and frequency.   Musculoskeletal:  Negative for arthralgias, gait problem and myalgias.   Neurological:  Negative for dizziness, seizures, syncope, light-headedness and headaches.   Psychiatric/Behavioral:  Negative for confusion, dysphoric mood and sleep disturbance. The patient is not nervous/anxious.        Objective   /80   Pulse 94   Temp 97.6 °F (36.4 °C)   Ht 5' (1.524 m)   Wt 68.5 kg (151 lb)   SpO2 96%   BMI 29.49 kg/m²      Physical Exam  Constitutional:       General: She is not in acute distress.     Appearance: Normal appearance. She is not ill-appearing.   HENT:      Head: Normocephalic and atraumatic.      Mouth/Throat:      Mouth: Mucous membranes are moist.   Eyes:      Extraocular Movements: Extraocular movements intact.      Conjunctiva/sclera: Conjunctivae normal.      Pupils: Pupils are equal, round, and reactive to light.   Neck:      Vascular: No carotid bruit.   Cardiovascular:      Rate and Rhythm: Normal rate and regular rhythm.      Heart sounds: Normal heart sounds. No murmur heard.  Pulmonary:      Effort: Pulmonary effort is normal. No respiratory distress.      Breath sounds: Normal breath sounds. No wheezing, rhonchi or rales.   Abdominal:      General: Bowel sounds are normal. There is no distension.      Palpations: Abdomen is soft.      Tenderness: There is no abdominal tenderness.   Musculoskeletal:      Cervical back: Neck supple.      Right lower leg: No edema.      Left lower leg: No edema.   Neurological:      General: No focal deficit present.      Mental Status: She is alert and oriented to person, place, and time. Mental status is at baseline.   Psychiatric:         Mood and Affect: Mood normal.         Behavior: Behavior normal.         Thought Content: Thought content normal.         Judgment: Judgment normal.

## 2025-01-17 NOTE — ASSESSMENT & PLAN NOTE
Depression Screening Follow-up Plan: Patient's depression screening was positive with a PHQ-9 score of 6.

## 2025-01-17 NOTE — PATIENT INSTRUCTIONS
Medicare Preventive Visit Patient Instructions  Thank you for completing your Welcome to Medicare Visit or Medicare Annual Wellness Visit today. Your next wellness visit will be due in one year (1/18/2026).  The screening/preventive services that you may require over the next 5-10 years are detailed below. Some tests may not apply to you based off risk factors and/or age. Screening tests ordered at today's visit but not completed yet may show as past due. Also, please note that scanned in results may not display below.  Preventive Screenings:  Service Recommendations Previous Testing/Comments   Colorectal Cancer Screening  * Colonoscopy    * Fecal Occult Blood Test (FOBT)/Fecal Immunochemical Test (FIT)  * Fecal DNA/Cologuard Test  * Flexible Sigmoidoscopy Age: 45-75 years old   Colonoscopy: every 10 years (may be performed more frequently if at higher risk)  OR  FOBT/FIT: every 1 year  OR  Cologuard: every 3 years  OR  Sigmoidoscopy: every 5 years  Screening may be recommended earlier than age 45 if at higher risk for colorectal cancer. Also, an individualized decision between you and your healthcare provider will decide whether screening between the ages of 76-85 would be appropriate. Colonoscopy: 09/23/2020  FOBT/FIT: Not on file  Cologuard: Not on file  Sigmoidoscopy: Not on file    Screening Current     Breast Cancer Screening Age: 40+ years old  Frequency: every 1-2 years  Not required if history of left and right mastectomy Mammogram: 02/17/2022        Cervical Cancer Screening Between the ages of 21-29, pap smear recommended once every 3 years.   Between the ages of 30-65, can perform pap smear with HPV co-testing every 5 years.   Recommendations may differ for women with a history of total hysterectomy, cervical cancer, or abnormal pap smears in past. Pap Smear: Not on file    Screening Not Indicated   Hepatitis C Screening Once for adults born between 1945 and 1965  More frequently in patients at high risk  for Hepatitis C Hep C Antibody: 02/11/2020    Screening Current   Diabetes Screening 1-2 times per year if you're at risk for diabetes or have pre-diabetes Fasting glucose: 110 mg/dL (1/14/2025)  A1C: 5.8 % (1/14/2025)  Screening Current   Cholesterol Screening Once every 5 years if you don't have a lipid disorder. May order more often based on risk factors. Lipid panel: 01/14/2025    Screening Not Indicated  History Lipid Disorder     Other Preventive Screenings Covered by Medicare:  Abdominal Aortic Aneurysm (AAA) Screening: covered once if your at risk. You're considered to be at risk if you have a family history of AAA.  Lung Cancer Screening: covers low dose CT scan once per year if you meet all of the following conditions: (1) Age 55-77; (2) No signs or symptoms of lung cancer; (3) Current smoker or have quit smoking within the last 15 years; (4) You have a tobacco smoking history of at least 20 pack years (packs per day multiplied by number of years you smoked); (5) You get a written order from a healthcare provider.  Glaucoma Screening: covered annually if you're considered high risk: (1) You have diabetes OR (2) Family history of glaucoma OR (3)  aged 50 and older OR (4)  American aged 65 and older  Osteoporosis Screening: covered every 2 years if you meet one of the following conditions: (1) You're estrogen deficient and at risk for osteoporosis based off medical history and other findings; (2) Have a vertebral abnormality; (3) On glucocorticoid therapy for more than 3 months; (4) Have primary hyperparathyroidism; (5) On osteoporosis medications and need to assess response to drug therapy.   Last bone density test (DXA Scan): 02/17/2022.  HIV Screening: covered annually if you're between the age of 15-65. Also covered annually if you are younger than 15 and older than 65 with risk factors for HIV infection. For pregnant patients, it is covered up to 3 times per  pregnancy.    Immunizations:  Immunization Recommendations   Influenza Vaccine Annual influenza vaccination during flu season is recommended for all persons aged >= 6 months who do not have contraindications   Pneumococcal Vaccine   * Pneumococcal conjugate vaccine = PCV13 (Prevnar 13), PCV15 (Vaxneuvance), PCV20 (Prevnar 20)  * Pneumococcal polysaccharide vaccine = PPSV23 (Pneumovax) Adults 19-65 yo with certain risk factors or if 65+ yo  If never received any pneumonia vaccine: recommend Prevnar 20 (PCV20)  Give PCV20 if previously received 1 dose of PCV13 or PPSV23   Hepatitis B Vaccine 3 dose series if at intermediate or high risk (ex: diabetes, end stage renal disease, liver disease)   Respiratory syncytial virus (RSV) Vaccine - COVERED BY MEDICARE PART D  * RSVPreF3 (Arexvy) CDC recommends that adults 60 years of age and older may receive a single dose of RSV vaccine using shared clinical decision-making (SCDM)   Tetanus (Td) Vaccine - COST NOT COVERED BY MEDICARE PART B Following completion of primary series, a booster dose should be given every 10 years to maintain immunity against tetanus. Td may also be given as tetanus wound prophylaxis.   Tdap Vaccine - COST NOT COVERED BY MEDICARE PART B Recommended at least once for all adults. For pregnant patients, recommended with each pregnancy.   Shingles Vaccine (Shingrix) - COST NOT COVERED BY MEDICARE PART B  2 shot series recommended in those 19 years and older who have or will have weakened immune systems or those 50 years and older     Health Maintenance Due:      Topic Date Due   • Breast Cancer Screening: Mammogram  02/17/2024   • Colorectal Cancer Screening  09/23/2025   • Hepatitis C Screening  Completed     Immunizations Due:      Topic Date Due   • Hepatitis A Vaccine (1 of 2 - Risk 2-dose series) Never done   • Influenza Vaccine (1) 09/01/2024   • COVID-19 Vaccine (3 - 2024-25 season) 09/01/2024     Advance Directives   What are advance directives?   Advance directives are legal documents that state your wishes and plans for medical care. These plans are made ahead of time in case you lose your ability to make decisions for yourself. Advance directives can apply to any medical decision, such as the treatments you want, and if you want to donate organs.   What are the types of advance directives?  There are many types of advance directives, and each state has rules about how to use them. You may choose a combination of any of the following:  Living will:  This is a written record of the treatment you want. You can also choose which treatments you do not want, which to limit, and which to stop at a certain time. This includes surgery, medicine, IV fluid, and tube feedings.   Durable power of  for healthcare (DPAHC):  This is a written record that states who you want to make healthcare choices for you when you are unable to make them for yourself. This person, called a proxy, is usually a family member or a friend. You may choose more than 1 proxy.  Do not resuscitate (DNR) order:  A DNR order is used in case your heart stops beating or you stop breathing. It is a request not to have certain forms of treatment, such as CPR. A DNR order may be included in other types of advance directives.  Medical directive:  This covers the care that you want if you are in a coma, near death, or unable to make decisions for yourself. You can list the treatments you want for each condition. Treatment may include pain medicine, surgery, blood transfusions, dialysis, IV or tube feedings, and a ventilator (breathing machine).  Values history:  This document has questions about your views, beliefs, and how you feel and think about life. This information can help others choose the care that you would choose.  Why are advance directives important?  An advance directive helps you control your care. Although spoken wishes may be used, it is better to have your wishes written down.  Spoken wishes can be misunderstood, or not followed. Treatments may be given even if you do not want them. An advance directive may make it easier for your family to make difficult choices about your care.   Urinary Incontinence   Urinary incontinence (UI)  is when you lose control of your bladder. UI develops because your bladder cannot store or empty urine properly. The 3 most common types of UI are stress incontinence, urge incontinence, or both.  Medicines:   May be given to help strengthen your bladder control. Report any side effects of medication to your healthcare provider.  Do pelvic muscle exercises often:  Your pelvic muscles help you stop urinating. Squeeze these muscles tight for 5 seconds, then relax for 5 seconds. Gradually work up to squeezing for 10 seconds. Do 3 sets of 15 repetitions a day, or as directed. This will help strengthen your pelvic muscles and improve bladder control.  Train your bladder:  Go to the bathroom at set times, such as every 2 hours, even if you do not feel the urge to go. You can also try to hold your urine when you feel the urge to go. For example, hold your urine for 5 minutes when you feel the urge to go. As that becomes easier, hold your urine for 10 minutes.   Self-care:   Keep a UI record.  Write down how often you leak urine and how much you leak. Make a note of what you were doing when you leaked urine.  Drink liquids as directed. You may need to limit the amount of liquid you drink to help control your urine leakage. Do not drink any liquid right before you go to bed. Limit or do not have drinks that contain caffeine or alcohol.   Prevent constipation.  Eat a variety of high-fiber foods. Good examples are high-fiber cereals, beans, vegetables, and whole-grain breads. Walking is the best way to trigger your intestines to have a bowel movement.  Exercise regularly and maintain a healthy weight.  Weight loss and exercise will decrease pressure on your bladder and help  you control your leakage.   Use a catheter as directed  to help empty your bladder. A catheter is a tiny, plastic tube that is put into your bladder to drain your urine.   Go to behavior therapy as directed.  Behavior therapy may be used to help you learn to control your urge to urinate.    Cigarette Smoking and Your Health   Risks to your health if you smoke:  Nicotine and other chemicals found in tobacco damage every cell in your body. Even if you are a light smoker, you have an increased risk for cancer, heart disease, and lung disease. If you are pregnant or have diabetes, smoking increases your risk for complications.   Benefits to your health if you stop smoking:   You decrease respiratory symptoms such as coughing, wheezing, and shortness of breath.   You reduce your risk for cancers of the lung, mouth, throat, kidney, bladder, pancreas, stomach, and cervix. If you already have cancer, you increase the benefits of chemotherapy. You also reduce your risk for cancer returning or a second cancer from developing.   You reduce your risk for heart disease, blood clots, heart attack, and stroke.   You reduce your risk for lung infections, and diseases such as pneumonia, asthma, chronic bronchitis, and emphysema.  Your circulation improves. More oxygen can be delivered to your body. If you have diabetes, you lower your risk for complications, such as kidney, artery, and eye diseases. You also lower your risk for nerve damage. Nerve damage can lead to amputations, poor vision, and blindness.  You improve your body's ability to heal and to fight infections.  For more information and support to stop smoking:   [place].gov  Phone: 5- 302 - 878-8289  Web Address: www.[employer]  Weight Management   Why it is important to manage your weight:  Being overweight increases your risk of health conditions such as heart disease, high blood pressure, type 2 diabetes, and certain types of cancer. It can also increase your risk  for osteoarthritis, sleep apnea, and other respiratory problems. Aim for a slow, steady weight loss. Even a small amount of weight loss can lower your risk of health problems.  How to lose weight safely:  A safe and healthy way to lose weight is to eat fewer calories and get regular exercise. You can lose up about 1 pound a week by decreasing the number of calories you eat by 500 calories each day.   Healthy meal plan for weight management:  A healthy meal plan includes a variety of foods, contains fewer calories, and helps you stay healthy. A healthy meal plan includes the following:  Eat whole-grain foods more often.  A healthy meal plan should contain fiber. Fiber is the part of grains, fruits, and vegetables that is not broken down by your body. Whole-grain foods are healthy and provide extra fiber in your diet. Some examples of whole-grain foods are whole-wheat breads and pastas, oatmeal, brown rice, and bulgur.  Eat a variety of vegetables every day.  Include dark, leafy greens such as spinach, kale, alfredo greens, and mustard greens. Eat yellow and orange vegetables such as carrots, sweet potatoes, and winter squash.   Eat a variety of fruits every day.  Choose fresh or canned fruit (canned in its own juice or light syrup) instead of juice. Fruit juice has very little or no fiber.  Eat low-fat dairy foods.  Drink fat-free (skim) milk or 1% milk. Eat fat-free yogurt and low-fat cottage cheese. Try low-fat cheeses such as mozzarella and other reduced-fat cheeses.  Choose meat and other protein foods that are low in fat.  Choose beans or other legumes such as split peas or lentils. Choose fish, skinless poultry (chicken or turkey), or lean cuts of red meat (beef or pork). Before you cook meat or poultry, cut off any visible fat.   Use less fat and oil.  Try baking foods instead of frying them. Add less fat, such as margarine, sour cream, regular salad dressing and mayonnaise to foods. Eat fewer high-fat foods.  Some examples of high-fat foods include french fries, doughnuts, ice cream, and cakes.  Eat fewer sweets.  Limit foods and drinks that are high in sugar. This includes candy, cookies, regular soda, and sweetened drinks.  Exercise:  Exercise at least 30 minutes per day on most days of the week. Some examples of exercise include walking, biking, dancing, and swimming. You can also fit in more physical activity by taking the stairs instead of the elevator or parking farther away from stores. Ask your healthcare provider about the best exercise plan for you.      © Copyright [employer] 2018 Information is for End User's use only and may not be sold, redistributed or otherwise used for commercial purposes. All illustrations and images included in CareNotes® are the copyrighted property of [employer]M., Inc. or [employer]

## 2025-01-17 NOTE — PROGRESS NOTES
Name: Felisha Eldridge      : 1955      MRN: [id number]  Encounter Provider: Estevan Booth DO  Encounter Date: 2025   Encounter department: [facility]    Assessment & Plan  Encounter for immunization         Primary hypertension         Asthma-COPD overlap syndrome (HCC)         Chronic respiratory failure with hypoxia (HCC)         Severe obstructive sleep apnea         Esophageal dysphagia         Gastroesophageal reflux disease without esophagitis         Anxiety and depression  Depression Screening Follow-up Plan: Patient's depression screening was positive with a PHQ-9 score of 6. Patient assessed for underlying major depression. They have no active suicidal ideations. Brief counseling provided and recommend additional follow-up/re-evaluation next office visit.         Cigarette nicotine dependence without complication         Mixed hyperlipidemia         Medicare annual wellness visit, subsequent         Vitamin D deficiency         Prediabetes            Preventive health issues were discussed with patient, and age appropriate screening tests were ordered as noted in patient's After Visit Summary. Personalized health advice and appropriate referrals for health education or preventive services given if needed, as noted in patient's After Visit Summary.    History of Present Illness     HPI   Patient Care Team:  Estevan Booth DO as PCP - General (Internal Medicine)  Yohannes Ludwig DO as PCP - [facility])    Review of Systems  Medical History Reviewed by provider this encounter:  Tobacco  Allergies  Meds  Problems  Med Hx  Surg Hx  Fam Hx       Annual Wellness Visit Questionnaire   Felisha is here for her Subsequent Wellness visit. Last Medicare Wellness visit information reviewed, patient interviewed and updates made to the record today.      Health Risk Assessment:   Patient rates overall health as fair. Patient feels that their physical  health rating is same. Patient is satisfied with their life. Eyesight was rated as same. Hearing was rated as same. Patient feels that their emotional and mental health rating is same. Patients states they are sometimes angry. Patient states they are always unusually tired/fatigued. Pain experienced in the last 7 days has been some. Patient's pain rating has been 6/10. Patient states that she has experienced no weight loss or gain in last 6 months.     Depression Screening:   PHQ-9 Score: 6      Fall Risk Screening:   In the past year, patient has experienced: no history of falling in past year      Urinary Incontinence Screening:   Patient has leaked urine accidently in the last six months.     Home Safety:  Patient has trouble with stairs inside or outside of their home. Patient has working smoke alarms and has working carbon monoxide detector. Home safety hazards include: none.     Nutrition:   Current diet is Regular.     Medications:   Patient is not currently taking any over-the-counter supplements. Patient is able to manage medications.     Activities of Daily Living (ADLs)/Instrumental Activities of Daily Living (IADLs):   Walk and transfer into and out of bed and chair?: Yes  Dress and groom yourself?: Yes    Bathe or shower yourself?: Yes    Feed yourself? Yes  Do your laundry/housekeeping?: Yes  Manage your money, pay your bills and track your expenses?: Yes  Make your own meals?: Yes    Do your own shopping?: Yes    Previous Hospitalizations:   Any hospitalizations or ED visits within the last 12 months?: No      Advance Care Planning:   Living will: Yes    Durable POA for healthcare: Yes    Advanced directive: Yes    Advanced directive counseling given: Yes    Five wishes given: No    Patient declined ACP directive: No    End of Life Decisions reviewed with patient: Yes    Provider agrees with end of life decisions: Yes      Cognitive Screening:   Provider or family/friend/caregiver concerned regarding  "cognition?: No    PREVENTIVE SCREENINGS      Cardiovascular Screening:    General: Screening Not Indicated, History Lipid Disorder and Screening Current      Diabetes Screening:     General: Screening Current      Colorectal Cancer Screening:     General: Screening Current      Breast Cancer Screening:     General: Screening Current      Cervical Cancer Screening:    General: Screening Not Indicated      Osteoporosis Screening:    General: Screening Current      Abdominal Aortic Aneurysm (AAA) Screening:        General: Screening Not Indicated      Lung Cancer Screening:     General: Screening Not Indicated      Hepatitis C Screening:    General: Screening Current    Screening, Brief Intervention, and Referral to Treatment (SBIRT)    Screening  Typical number of drinks in a day: 0  Typical number of drinks in a week: 0  Interpretation: Low risk drinking behavior.    Single Item Drug Screening:  How often have you used an illegal drug (including marijuana) or a prescription medication for non-medical reasons in the past year? less than monthly    Single Item Drug Screen Score: 1  Interpretation: POSITIVE screen for possible drug use disorder    Drug Abuse Screening Test (DAST-10):  1) Have you used drugs other than those required for medical reasons? Yes  2) Do you abuse more than one drug at a time? No  3) Are you always able to stop using drugs when you want to? No  4) Have you had \"blackouts\" or \"flashbacks\" as a result of drug use? No  5) Do you ever feel bad or guilty about your drug use? No  6) Does your spouse (or parents) ever complain about your involvement with drugs? No  7) Have you neglected your family because of your use of drugs? No  8) Have you engaged in illegal activities in order to obtain drugs? No  9) Have you ever experienced withdrawal symptoms (felt sick) when you stopped taking drugs? No  10) Have you had medical problems as a result of your drug use (e.g., memory loss, hepatitis, " convulsions, bleeding, etc.)? No    DAST-10 Score: 2  Interpretation: Low level problems related to drug abuse    Other Counseling Topics:   Car/seat belt/driving safety, sunscreen and regular weightbearing exercise and calcium and vitamin D intake.     Social Drivers of Health     Financial Resource Strain: Low Risk  (11/29/2023)    Overall Financial Resource Strain (CARDIA)     Difficulty of Paying Living Expenses: Not very hard   Food Insecurity: No Food Insecurity (1/17/2025)    Hunger Vital Sign     Worried About Running Out of Food in the Last Year: Never true     Ran Out of Food in the Last Year: Never true   Transportation Needs: No Transportation Needs (1/17/2025)    PRAPARE - Transportation     Lack of Transportation (Medical): No     Lack of Transportation (Non-Medical): No   Housing Stability: Low Risk  (1/17/2025)    Housing Stability Vital Sign     Unable to Pay for Housing in the Last Year: No     Number of Times Moved in the Last Year: 0     Homeless in the Last Year: No   Utilities: Not At Risk (1/17/2025)    [facility] Utilities     Threatened with loss of utilities: No     No results found.    Objective   /80   Pulse 94   Temp 97.6 °F (36.4 °C)   Ht 5' (1.524 m)   Wt 68.5 kg (151 lb)   SpO2 96%   BMI 29.49 kg/m²     Physical Exam      A/P: Doing well and no falls reported. Denies depression and feels safe at home. Diverse diet. Doesn't drive, but uses seat belts. Has a living will and POA. No DME or referrals needed today. RTC one year for medicare wellness.and four months for routine.

## 2025-01-31 ENCOUNTER — RESULTS FOLLOW-UP (OUTPATIENT)
Dept: INTERNAL MEDICINE CLINIC | Facility: CLINIC | Age: 70
End: 2025-01-31

## 2025-02-10 ENCOUNTER — TELEPHONE (OUTPATIENT)
Age: 70
End: 2025-02-10

## 2025-02-10 DIAGNOSIS — M62.838 MUSCLE SPASM: ICD-10-CM

## 2025-02-10 DIAGNOSIS — K21.9 GASTROESOPHAGEAL REFLUX DISEASE: ICD-10-CM

## 2025-02-10 DIAGNOSIS — G47.00 INSOMNIA, UNSPECIFIED TYPE: ICD-10-CM

## 2025-02-10 RX ORDER — QUETIAPINE FUMARATE 50 MG/1
TABLET, FILM COATED ORAL
Qty: 270 TABLET | Refills: 0 | Status: SHIPPED | OUTPATIENT
Start: 2025-02-10

## 2025-02-10 RX ORDER — CYCLOBENZAPRINE HCL 10 MG
10 TABLET ORAL DAILY
Qty: 90 TABLET | Refills: 0 | Status: SHIPPED | OUTPATIENT
Start: 2025-02-10

## 2025-02-10 RX ORDER — ESOMEPRAZOLE MAGNESIUM 40 MG/1
40 CAPSULE, DELAYED RELEASE ORAL DAILY
Qty: 90 CAPSULE | Refills: 1 | Status: SHIPPED | OUTPATIENT
Start: 2025-02-10

## 2025-02-10 NOTE — TELEPHONE ENCOUNTER
Patient would like a recommendation from the doctor for an ENT because her mouth is sore. Please call patient back.

## 2025-02-11 DIAGNOSIS — E78.2 MIXED HYPERLIPIDEMIA: ICD-10-CM

## 2025-02-11 DIAGNOSIS — R10.9 ABDOMINAL CRAMPING: ICD-10-CM

## 2025-02-11 DIAGNOSIS — R32 URINARY INCONTINENCE, UNSPECIFIED TYPE: ICD-10-CM

## 2025-02-11 DIAGNOSIS — I10 PRIMARY HYPERTENSION: ICD-10-CM

## 2025-02-11 RX ORDER — ATORVASTATIN CALCIUM 40 MG/1
40 TABLET, FILM COATED ORAL DAILY
Qty: 90 TABLET | Refills: 1 | Status: SHIPPED | OUTPATIENT
Start: 2025-02-11

## 2025-02-11 RX ORDER — DICYCLOMINE HCL 20 MG
20 TABLET ORAL 3 TIMES DAILY PRN
Qty: 270 TABLET | Refills: 1 | Status: SHIPPED | OUTPATIENT
Start: 2025-02-11

## 2025-02-11 RX ORDER — IRBESARTAN 75 MG/1
75 TABLET ORAL
Qty: 100 TABLET | Refills: 1 | Status: SHIPPED | OUTPATIENT
Start: 2025-02-11

## 2025-02-11 RX ORDER — OXYBUTYNIN CHLORIDE 5 MG/1
5 TABLET ORAL 3 TIMES DAILY
Qty: 90 TABLET | Refills: 5 | Status: SHIPPED | OUTPATIENT
Start: 2025-02-11

## 2025-02-11 NOTE — TELEPHONE ENCOUNTER
Pt called, requested medication refill:    oxybutynin (DITROPAN) 5 mg tablet   irbesartan (AVAPRO) 75 mg tablet   dicyclomine (BENTYL) 20 mg tablet   atorvastatin (LIPITOR) 40 mg tablet     Pt stated she don't have many left, Pharmacy confirmed.     Please advise, thank you.

## 2025-02-12 ENCOUNTER — TELEPHONE (OUTPATIENT)
Dept: INTERNAL MEDICINE CLINIC | Facility: CLINIC | Age: 70
End: 2025-02-12

## 2025-02-12 ENCOUNTER — TELEPHONE (OUTPATIENT)
Age: 70
End: 2025-02-12

## 2025-02-12 DIAGNOSIS — K13.79 MOUTH PAIN: Primary | ICD-10-CM

## 2025-02-12 NOTE — TELEPHONE ENCOUNTER
[facility]    [address] [address]    Phone:  [phone]    Fax:  [phone]       Thank You for choosing us for your health care visit. We are glad to serve you and happy to provide you with this summary of your visit. Please help us to ensure we have accurate records. If you find anything that needs to be changed, please let our staff know as soon as possible.          Your Demographic Information     Patient Name Sex Pk He Male 1961       Ethnic Group Patient Race    Not of  or  Origin White      Your Visit Details     Date & Time Provider Department    5/10/2017 3:30 PM Samuel David MD [facility]      Your Upcoming Appointment*(Max 10)     Wednesday November 15, 2017  3:30 PM CST   EXTENDED FOLLOW UP with Samuel David MD   [facility] ([facility])    [address] [facility] [address]   [phone]              Your To Do List     Future Orders Please Complete On or Around Expires    COMPREHENSIVE METABOLIC PANEL  2017 (Approximate) Dec 06, 2017    CREATINE KINASE  2017 Dec 06, 2017    LIPID PANEL WITH REFLEX  2017 (Approximate) Dec 06, 2017      Conditions Discussed Today or Order-Related Diagnoses        Comments    Pure hypercholesterolemia    -  Primary     Essential hypertension, benign           Your Vitals Were     BP Pulse Height Weight BMI Smoking Status    124/80 84 5' 9\" (1.753 m) 247 lb (112 kg) 36.48 kg/m2 Former Smoker      Medications Prescribed or Re-Ordered Today     lisinopril (ZESTRIL) 10 MG tablet    Sig - Route: Take 1 tablet by mouth daily. - Oral    Class: Eprescribe    Pharmacy: [facility] - [address] #: [phone]    rosuvastatin (CRESTOR) 40 MG tablet    Sig - Route: Take 1 tablet by mouth daily. - Oral    Class: Eprescribe    Please contact patient as she would like someone to call the pharmacy or insurance to see why she needs a prior auth after all this time, she has never needed one before an she has not changed insurances,she is very upset   Pharmacy: [facility] - [address] Ph #: [phone]    sildenafil (REVATIO) 20 MG tablet    Sig - Route: Take 1 tablet by mouth 3 times daily. - Oral    Class: Normal    Pharmacy: [facility] - [address] Ph #: [phone]      Your Current Medications Are        Disp Refills Start End    lisinopril (ZESTRIL) 10 MG tablet 90 tablet 3 5/10/2017     Sig - Route: Take 1 tablet by mouth daily. - Oral    Class: Eprescribe    rosuvastatin (CRESTOR) 40 MG tablet 90 tablet 3 5/10/2017     Sig - Route: Take 1 tablet by mouth daily. - Oral    Class: Eprescribe    CIALIS 10 MG tablet 8 tablet 11 2/7/2017     Sig: TAKE 1 TABLET BY MOUTH AS NEEDED    Class: Eprescribe    sildenafil (REVATIO) 20 MG tablet 90 tablet 0 5/10/2017     Sig - Route: Take 1 tablet by mouth 3 times daily. - Oral      Allergies     Zocor [Simvastatin] MYALGIA      Immunizations History as of 5/10/2017     Name Date    Tdap 11/4/2014  4:43 PM      Problem List as of 5/10/2017     Other and unspecified hyperlipidemia    Essential hypertension, benign            Patient Instructions     None

## 2025-02-13 NOTE — TELEPHONE ENCOUNTER
PA for  dicyclomine (BENTYL) 20 mg tablet SUBMITTED to [facility]    via    []CMM-KEY:   [x]Surescripts-Case ID # [id number]     []Availity-Auth ID # NDC #   []Faxed to plan   []Other website   []Phone call Case ID #     [x]PA sent as URGENT    All office notes, labs and other pertaining documents and studies sent. Clinical questions answered. Awaiting determination from insurance company.     Turnaround time for your insurance to make a decision on your Prior Authorization can take 7-21 business days.

## 2025-02-13 NOTE — TELEPHONE ENCOUNTER
Phone call from patient checking on the status of her prior auth request. Patient states this medication needs a prior auth with her insurance yearly.  Patient is completely out of her medication and she states this is something she needs daily. Patient asking if this can be done as an urgent request.  Please advise and call patient. Thank you.

## 2025-02-13 NOTE — TELEPHONE ENCOUNTER
Called pt, states her meds were approved and she is getting them filled. Directed her to call me back with further questions or concerns.

## 2025-02-14 NOTE — TELEPHONE ENCOUNTER
PA for     dicyclomine (BENTYL) 20 mg tablet   APPROVED     Date(s) approved 1/1/2025 - 12/31/2025    Case # [id number]    Patient advised by - patient already aware.     []MyChart Message  []Phone call   []LMOM  []L/M to call office as no active Communication consent on file  []Unable to leave detailed message as VM not approved on Communication consent       Pharmacy advised by    [x]Fax  []Phone call    Specialty Pharmacy    []     Approval letter scanned into Media Yes

## 2025-03-26 ENCOUNTER — OFFICE VISIT (OUTPATIENT)
Dept: URGENT CARE | Facility: CLINIC | Age: 70
End: 2025-03-26
Payer: COMMERCIAL

## 2025-03-26 VITALS
RESPIRATION RATE: 18 BRPM | OXYGEN SATURATION: 94 % | DIASTOLIC BLOOD PRESSURE: 70 MMHG | TEMPERATURE: 98 F | HEART RATE: 82 BPM | SYSTOLIC BLOOD PRESSURE: 138 MMHG

## 2025-03-26 DIAGNOSIS — K04.7 DENTAL INFECTION: Primary | ICD-10-CM

## 2025-03-26 PROCEDURE — 99213 OFFICE O/P EST LOW 20 MIN: CPT

## 2025-03-26 RX ORDER — PENICILLIN V POTASSIUM 500 MG/1
500 TABLET, FILM COATED ORAL EVERY 6 HOURS SCHEDULED
Qty: 40 TABLET | Refills: 0 | Status: SHIPPED | OUTPATIENT
Start: 2025-03-26 | End: 2025-04-05

## 2025-03-26 NOTE — PROGRESS NOTES
[facility]        NAME: Felisha Eldridge is a 69 y.o. female  : 1955    MRN: [id number]  DATE: 2025  TIME: 11:41 AM    Assessment and Plan   Dental infection [K04.7]  1. Dental infection  penicillin V potassium (VEETID) 500 mg tablet        Pt reports stuff accumulating around her teeth in the back. She reports the areas seem to fill up and release when she pushes on them. She was seen by her dentist who referred her to an oral surgeon. Pt reports she was not able to see the oral surgeon because they did not take her insurance. Encouraged patient to call insurance and obtain a list of providers covered by her insurance as she will need to see an oral surgeon as directed by her dentist.     Will place patient on pen VK for local inflammation of gums, tenderness of top last molars, and reports of draining areas that come and go. Advised to rinse mouth with warm salt water after eating and before bedtime. Follow up with oral surgeon asap as directed by dentist.     Patient Instructions       Follow up with PCP in 3-5 days.  Proceed to  ER if symptoms worsen.    If tests are performed, our office will contact you with results only if changes need to made to the care plan discussed with you at the visit. You can review your full results on [facility].    Chief Complaint     Chief Complaint   Patient presents with    mouth abcess     Drainage for 6 months         History of Present Illness       69 year old female patient with significant PMH presents to the clinic with complaint of upper mouth abscess and reports it has been draining for the past 6 months. Pt reports she has seen a dentist and has been to ENT and nobody wants to help her and tells her they don't see anything and nothing is wrong.         Review of Systems   Review of Systems   HENT:  Positive for dental problem.         Possible mouth abscess         Current Medications       Current Outpatient Medications:      albuterol (PROVENTIL HFA,VENTOLIN HFA) 90 mcg/act inhaler, Inhale 2 puffs every 6 (six) hours as needed for wheezing or shortness of breath, Disp: 8.5 g, Rfl: 3    atorvastatin (LIPITOR) 40 mg tablet, Take 1 tablet (40 mg total) by mouth daily, Disp: 90 tablet, Rfl: 1    Cholecalciferol (Vitamin D3) 50 MCG (2000 UT) TABS, Take 1 tablet (2,000 Units total) by mouth daily, Disp: 90 tablet, Rfl: 3    cyclobenzaprine (FLEXERIL) 10 mg tablet, Take 1 tablet (10 mg total) by mouth daily, Disp: 90 tablet, Rfl: 0    dicyclomine (BENTYL) 20 mg tablet, Take 1 tablet (20 mg total) by mouth 3 (three) times a day as needed (GI spasm), Disp: 270 tablet, Rfl: 1    DULoxetine (CYMBALTA) 30 mg delayed release capsule, Take 1 capsule (30 mg total) by mouth daily, Disp: 90 capsule, Rfl: 0    DULoxetine (CYMBALTA) 60 mg delayed release capsule, Take 1 capsule (60 mg total) by mouth daily, Disp: 90 capsule, Rfl: 0    esomeprazole (NexIUM) 40 MG capsule, Take 1 capsule (40 mg total) by mouth daily, Disp: 90 capsule, Rfl: 1    fluticasone (FLONASE) 50 mcg/act nasal spray, 2 sprays into each nostril 2 (two) times a day, Disp: 16 g, Rfl: 0    furosemide (LASIX) 40 mg tablet, Take 1 tablet (40 mg total) by mouth daily as needed (As needed for edema), Disp: 90 tablet, Rfl: 0    irbesartan (AVAPRO) 75 mg tablet, Take 1 tablet (75 mg total) by mouth daily at bedtime, Disp: 100 tablet, Rfl: 1    Naproxen Sodium (ALEVE PO), Take 2 tablets by mouth every morning, Disp: , Rfl:     ondansetron (ZOFRAN) 4 mg tablet, Take 1 tablet (4 mg total) by mouth every 6 (six) hours, Disp: 12 tablet, Rfl: 2    oxybutynin (DITROPAN) 5 mg tablet, Take 1 tablet (5 mg total) by mouth 3 (three) times a day, Disp: 90 tablet, Rfl: 5    penicillin V potassium (VEETID) 500 mg tablet, Take 1 tablet (500 mg total) by mouth every 6 (six) hours for 10 days, Disp: 40 tablet, Rfl: 0    potassium chloride (Klor-Con) 10 mEq tablet, TAKE 1 TABLET DAILY AS NEEDED (WHEN TAKE LASIX),  Disp: 90 tablet, Rfl: 1    QUEtiapine (SEROquel) 50 mg tablet, TAKE 1 TABLET IN THE MORNING AND 2 TABLETS AT BEDTIME...., Disp: 270 tablet, Rfl: 0    sodium chloride (OCEAN) 0.65 % nasal spray, 2 sprays into each nostril as needed for congestion or rhinitis, Disp: 88 mL, Rfl: 0    Fluticasone-Salmeterol (Advair Diskus) 500-50 mcg/dose inhaler, Inhale 1 puff 2 (two) times a day (Patient not taking: Reported on 3/26/2025), Disp: 60 blister, Rfl: 10    Current Allergies     Allergies as of 03/26/2025 - Reviewed 03/26/2025   Allergen Reaction Noted    Erythromycin Throat Swelling 10/25/2017    Morphine Delirium             The following portions of the patient's history were reviewed and updated as appropriate: allergies, current medications, past family history, past medical history, past social history, past surgical history and problem list.     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[facility])     Primary hypertension 2/10/2022    Psychiatric disorder     Sepsis due to Haemophilus influenzae with acute hypoxic respiratory failure without septic shock ([facility]) 2/29/2020    Shortness of breath     Sleep apnea        Past Surgical History:   Procedure Laterality Date    APPENDECTOMY      CHOLECYSTECTOMY      partial x2 per pt    HIATAL HERNIA REPAIR      INGUINAL HERNIA REPAIR Right     LAPAROSCOPIC TUBAL LIGATION Bilateral     ROTATOR CUFF REPAIR Right     UVULOPALATOPHARYNGOPLASTY         Family History   Problem  Relation Age of Onset    No Known Problems Mother         passed away from COVID on 8/8/22    No Known Problems Father     No Known Problems Sister     No Known Problems Daughter     Breast cancer Maternal Grandmother 35    No Known Problems Paternal Grandmother     No Known Problems Maternal Aunt          Medications have been verified.        Objective   /70   Pulse 82   Temp 98 °F (36.7 °C)   Resp 18   SpO2 94%        Physical Exam     Physical Exam  Vitals and nursing note reviewed.   Constitutional:       General: She is not in acute distress.     Appearance: Normal appearance. She is normal weight. She is not ill-appearing.   HENT:      Head: Normocephalic and atraumatic.      Right Ear: Tympanic membrane, ear canal and external ear normal.      Left Ear: Tympanic membrane, ear canal and external ear normal.      Nose: Nose normal.      Mouth/Throat:      Lips: Pink.      Mouth: Mucous membranes are moist.      Dentition: Abnormal dentition. Dental tenderness and gingival swelling present.        Comments: Inner and outer gums of top molar region teeth tender to palpation and red; no drainage observed; dentition poor  Eyes:      Extraocular Movements: Extraocular movements intact.      Conjunctiva/sclera: Conjunctivae normal.      Pupils: Pupils are equal, round, and reactive to light.   Cardiovascular:      Rate and Rhythm: Normal rate and regular rhythm.      Pulses: Normal pulses.      Heart sounds: Normal heart sounds.   Pulmonary:      Effort: Pulmonary effort is normal.      Breath sounds: Normal breath sounds.   Musculoskeletal:         General: Normal range of motion.      Cervical back: Normal range of motion and neck supple.   Skin:     General: Skin is warm and dry.      Capillary Refill: Capillary refill takes less than 2 seconds.   Neurological:      General: No focal deficit present.      Mental Status: She is alert and oriented to person, place, and time.

## 2025-03-26 NOTE — PATIENT INSTRUCTIONS
Follow up with oral surgeon as referred by your dentist asap for evaluation and treatment     Tylenol as directed on packaging for pain    Rinse your mouth with warm salt water each time after you eat and before bedtime

## 2025-05-05 DIAGNOSIS — G47.00 INSOMNIA, UNSPECIFIED TYPE: ICD-10-CM

## 2025-05-05 DIAGNOSIS — K52.9 ENTERITIS: ICD-10-CM

## 2025-05-05 DIAGNOSIS — M62.838 MUSCLE SPASM: ICD-10-CM

## 2025-05-05 DIAGNOSIS — F32.A DEPRESSION, UNSPECIFIED DEPRESSION TYPE: ICD-10-CM

## 2025-05-05 NOTE — TELEPHONE ENCOUNTER
Reason for call:   [x] Refill   [] Prior Auth  [] Other:     Office:   [x] PCP/Provider -   [] Specialty/Provider -     Medication:     cyclobenzaprine (FLEXERIL) 10 mg tablet     (CYMBALTA) 30 mg delayed release capsule     (CYMBALTA) 60 mg delayed release capsule     (ZOFRAN) 4 mg tablet     Pharmacy: [facility] - [place] [facility] -     Local Pharmacy   Does the patient have enough for 3 days?   [x] Yes   [] No - Send as HP to POD    Mail Away Pharmacy   Does the patient have enough for 10 days?   [] Yes   [] No - Send as HP to POD

## 2025-05-06 RX ORDER — CYCLOBENZAPRINE HCL 10 MG
10 TABLET ORAL DAILY
Qty: 90 TABLET | Refills: 0 | Status: SHIPPED | OUTPATIENT
Start: 2025-05-06

## 2025-05-06 RX ORDER — QUETIAPINE FUMARATE 50 MG/1
TABLET, FILM COATED ORAL
Qty: 270 TABLET | Refills: 0 | Status: SHIPPED | OUTPATIENT
Start: 2025-05-06

## 2025-05-06 RX ORDER — DULOXETIN HYDROCHLORIDE 60 MG/1
60 CAPSULE, DELAYED RELEASE ORAL DAILY
Qty: 90 CAPSULE | Refills: 0 | Status: SHIPPED | OUTPATIENT
Start: 2025-05-06

## 2025-05-06 RX ORDER — DULOXETIN HYDROCHLORIDE 30 MG/1
30 CAPSULE, DELAYED RELEASE ORAL DAILY
Qty: 90 CAPSULE | Refills: 0 | Status: SHIPPED | OUTPATIENT
Start: 2025-05-06

## 2025-05-06 RX ORDER — ONDANSETRON 4 MG/1
4 TABLET, FILM COATED ORAL EVERY 6 HOURS
Qty: 12 TABLET | Refills: 0 | Status: SHIPPED | OUTPATIENT
Start: 2025-05-06

## 2025-05-19 ENCOUNTER — OFFICE VISIT (OUTPATIENT)
Dept: INTERNAL MEDICINE CLINIC | Facility: CLINIC | Age: 70
End: 2025-05-19
Payer: COMMERCIAL

## 2025-05-19 VITALS
OXYGEN SATURATION: 95 % | WEIGHT: 158.4 LBS | DIASTOLIC BLOOD PRESSURE: 82 MMHG | TEMPERATURE: 98.3 F | BODY MASS INDEX: 31.1 KG/M2 | SYSTOLIC BLOOD PRESSURE: 140 MMHG | HEART RATE: 94 BPM | HEIGHT: 60 IN

## 2025-05-19 DIAGNOSIS — K03.6 TARTAR DEPOSITS ON TEETH: ICD-10-CM

## 2025-05-19 DIAGNOSIS — E55.9 VITAMIN D DEFICIENCY: ICD-10-CM

## 2025-05-19 DIAGNOSIS — K21.9 GASTROESOPHAGEAL REFLUX DISEASE WITHOUT ESOPHAGITIS: ICD-10-CM

## 2025-05-19 DIAGNOSIS — F32.A ANXIETY AND DEPRESSION: ICD-10-CM

## 2025-05-19 DIAGNOSIS — E80.1 PORPHYRIA CUTANEA TARDA (HCC): ICD-10-CM

## 2025-05-19 DIAGNOSIS — J44.89 ASTHMA-COPD OVERLAP SYNDROME (HCC): ICD-10-CM

## 2025-05-19 DIAGNOSIS — F17.210 CIGARETTE NICOTINE DEPENDENCE WITHOUT COMPLICATION: ICD-10-CM

## 2025-05-19 DIAGNOSIS — I10 PRIMARY HYPERTENSION: Primary | ICD-10-CM

## 2025-05-19 DIAGNOSIS — R73.03 PREDIABETES: ICD-10-CM

## 2025-05-19 DIAGNOSIS — E78.2 MIXED HYPERLIPIDEMIA: ICD-10-CM

## 2025-05-19 DIAGNOSIS — F41.9 ANXIETY AND DEPRESSION: ICD-10-CM

## 2025-05-19 DIAGNOSIS — R49.0 CHRONIC HOARSENESS: ICD-10-CM

## 2025-05-19 DIAGNOSIS — G47.33 SEVERE OBSTRUCTIVE SLEEP APNEA: ICD-10-CM

## 2025-05-19 DIAGNOSIS — J96.11 CHRONIC RESPIRATORY FAILURE WITH HYPOXIA (HCC): ICD-10-CM

## 2025-05-19 DIAGNOSIS — Z12.31 ENCOUNTER FOR SCREENING MAMMOGRAM FOR BREAST CANCER: ICD-10-CM

## 2025-05-19 PROCEDURE — G2211 COMPLEX E/M VISIT ADD ON: HCPCS | Performed by: INTERNAL MEDICINE

## 2025-05-19 PROCEDURE — 99214 OFFICE O/P EST MOD 30 MIN: CPT | Performed by: INTERNAL MEDICINE

## 2025-05-19 RX ORDER — FLUTICASONE PROPIONATE AND SALMETEROL 500; 50 UG/1; UG/1
1 POWDER RESPIRATORY (INHALATION) 2 TIMES DAILY
Start: 2025-05-19

## 2025-05-19 NOTE — ASSESSMENT & PLAN NOTE
Orders:    Fluticasone-Salmeterol (Advair Diskus) 500-50 mcg/dose inhaler; Inhale 1 puff 2 (two) times a day Rinse mouth after use.

## 2025-05-19 NOTE — PROGRESS NOTES
Name: Felisha Eldridge      : 1955      MRN: [id number]  Encounter Provider: Estevan Booth DO  Encounter Date: 2025   Encounter department: [facility]  :  Assessment & Plan  Encounter for screening mammogram for breast cancer         Primary hypertension         Asthma-COPD overlap syndrome (HCC)    Orders:    Fluticasone-Salmeterol (Advair Diskus) 500-50 mcg/dose inhaler; Inhale 1 puff 2 (two) times a day Rinse mouth after use.    Chronic respiratory failure with hypoxia (HCC)         Severe obstructive sleep apnea         Gastroesophageal reflux disease without esophagitis         Anxiety and depression           Cigarette nicotine dependence without complication         Mixed hyperlipidemia         Prediabetes         Vitamin D deficiency         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HCC)         Tartar deposits on teeth    Orders:    Ambulatory referral to complex care management program; Future    Chronic hoarseness         A/P: Doing ok and labs are up to date. Discussed mammo and pt defers. Using the YAKELIN more than I would like and reports UC told her to stop her ICS/LABA because it would make her mouth dry. Will restart advair. Wean tobacco. Will see if the  can get her hooked up with a DDS. Continue current treatment and RTC four months for routine.        History of Present Illness   WF RTC for f/u HTN, ACOS, etc. Doing ok and no new issues,but still unable to find a DDS. . Remains active w/o difficulty and no falls. Breathing is stable and limited rescue MDI use. No reflux and swallowing is stable. Chronic pain and BANDAR are manageable. Still smoking/vaping, Labs are up to date. Due for mammo.       Review of Systems   Constitutional:  Negative for activity change, chills, diaphoresis, fatigue and fever.   HENT:  Positive for dental problem.    Eyes:  Negative for visual disturbance.   Respiratory:  Negative for cough, chest tightness, shortness of breath and wheezing.     Cardiovascular:  Negative for chest pain, palpitations and leg swelling.   Gastrointestinal:  Negative for abdominal pain, constipation, diarrhea, nausea and vomiting.   Endocrine: Negative for cold intolerance and heat intolerance.   Genitourinary:  Negative for difficulty urinating, dysuria and frequency.   Musculoskeletal:  Negative for arthralgias, gait problem and myalgias.   Neurological:  Negative for light-headedness and headaches.   Psychiatric/Behavioral:  Negative for confusion, dysphoric mood and sleep disturbance. The patient is not nervous/anxious.        Objective   /82   Pulse 94   Temp 98.3 °F (36.8 °C)   Ht 5' (1.524 m)   Wt 71.8 kg (158 lb 6.4 oz)   SpO2 95%   BMI 30.94 kg/m²      Physical Exam  Vitals and nursing note reviewed.   Constitutional:       General: She is not in acute distress.     Appearance: Normal appearance. She is not ill-appearing.   HENT:      Head: Normocephalic and atraumatic.      Mouth/Throat:      Mouth: Mucous membranes are moist.     Eyes:      Extraocular Movements: Extraocular movements intact.      Conjunctiva/sclera: Conjunctivae normal.      Pupils: Pupils are equal, round, and reactive to light.     Neck:      Vascular: No carotid bruit.     Cardiovascular:      Rate and Rhythm: Normal rate and regular rhythm.      Heart sounds: Normal heart sounds. No murmur heard.  Pulmonary:      Effort: Pulmonary effort is normal. No respiratory distress.      Breath sounds: No wheezing, rhonchi or rales.      Comments: Coarse and decreased BS.   Abdominal:      General: There is no distension.      Palpations: Abdomen is soft.      Tenderness: There is no abdominal tenderness.     Musculoskeletal:      Cervical back: Neck supple.      Right lower leg: No edema.      Left lower leg: No edema.     Neurological:      General: No focal deficit present.      Mental Status: She is alert and oriented to person, place, and time. Mental status is at baseline.      Psychiatric:         Mood and Affect: Mood normal.         Behavior: Behavior normal.         Thought Content: Thought content normal.         Judgment: Judgment normal.

## 2025-05-19 NOTE — PATIENT INSTRUCTIONS
"Patient Education     High blood pressure in adults   The Basics   Written by the doctors and editors at [facility]   What is high blood pressure? -- High blood pressure is a condition that puts you at risk for heart attack, stroke, and kidney disease. It does not usually cause symptoms. But it can be serious.  When your doctor or nurse tells you your blood pressure, they say 2 numbers. For instance, your doctor or nurse might say that your blood pressure is \"130 over 80.\" The top number is the pressure inside your arteries when your heart is mitesh. The bottom number is the pressure inside your arteries when your heart is relaxed.  \"Elevated blood pressure\" is a term doctors or nurses use as a warning. People with elevated blood pressure do not yet have high blood pressure. But their blood pressure is not as low as it should be for good health.  Many experts define high, elevated, and normal blood pressure as follows:   High - Top number of 130 or above and/or bottom number of 80 or above.   Elevated - Top number between 120 and 129 and bottom number of 79 or below.   Normal - Top number of 119 or below and bottom number of 79 or below.  This information is also in the table (table 1).  How can I lower my blood pressure? -- If your doctor or nurse prescribed blood pressure medicine, the most important thing you can do is to take it. If it causes side effects, do not just stop taking it. Instead, talk to your doctor or nurse about the problems it causes. They might be able to lower your dose or switch you to another medicine. If cost is a problem, mention that, too. They might be able to put you on a less expensive medicine. Taking your blood pressure medicine can keep you from having a heart attack or stroke, and it can save your life!  Can I do anything on my own? -- You have a lot of control over your blood pressure. To lower it:   Lose weight (if you are overweight).   Choose a diet low in fat and rich in " "fruits, vegetables, and low-fat dairy products.   Eat less salt.   Do something active for at least 30 minutes a day on most days of the week.   Drink less alcohol (if you drink more than 2 alcoholic drinks per day).  It's also a good idea to get a home blood pressure meter. People who check their own blood pressure at home do better at keeping it low and can sometimes even reduce the amount of medicine they take.  All topics are updated as new evidence becomes available and our peer review process is complete.  This topic retrieved from [employer] on: Feb 26, 2024.  Topic 79261 Version 23.0  Release: 32.2.4 - C32.56  © 2024 UpToDate, Inc. and/or its affiliates. All rights reserved.  table 1: Definition of normal and high blood pressure  Level  Top number  Bottom number    High 130 or above 80 or above   Elevated 120 to 129 79 or below   Normal 119 or below 79 or below   These definitions are from the American College of Cardiology/American Heart Association. Other expert groups might use slightly different definitions.  \"Elevated blood pressure\" is a term doctor or nurses use as a warning. It means you do not yet have high blood pressure, but your blood pressure is not as low as it should be for good health.  Graphic 81566 Version 6.0  Consumer Information Use and Disclaimer   Disclaimer: This generalized information is a limited summary of diagnosis, treatment, and/or medication information. It is not meant to be comprehensive and should be used as a tool to help the user understand and/or assess potential diagnostic and treatment options. It does NOT include all information about conditions, treatments, medications, side effects, or risks that may apply to a specific patient. It is not intended to be medical advice or a substitute for the medical advice, diagnosis, or treatment of a health care provider based on the health care provider's examination and assessment of a patient's specific and unique circumstances. " Patients must speak with a health care provider for complete information about their health, medical questions, and treatment options, including any risks or benefits regarding use of medications. This information does not endorse any treatments or medications as safe, effective, or approved for treating a specific patient. UpToDate, Inc. and its affiliates disclaim any warranty or liability relating to this information or the use thereof.The use of this information is governed by the Terms of Use, available at https://www.woltersPixSenseuwer.com/en/know/clinical-effectiveness-terms. 2024© UpToDate, Inc. and its affiliates and/or licensors. All rights reserved.  Copyright   © 2024 UpToDate, Inc. and/or its affiliates. All rights reserved.

## 2025-05-20 ENCOUNTER — TELEPHONE (OUTPATIENT)
Age: 70
End: 2025-05-20

## 2025-05-20 ENCOUNTER — PATIENT OUTREACH (OUTPATIENT)
Dept: CASE MANAGEMENT | Facility: OTHER | Age: 70
End: 2025-05-20

## 2025-05-20 DIAGNOSIS — Z78.9 NEED FOR FOLLOW-UP BY SOCIAL WORKER: Primary | ICD-10-CM

## 2025-05-20 NOTE — TELEPHONE ENCOUNTER
Patient reports insurance gave her dental surgeon @ [facility] Oral surgery , and she needs a referral to cover her per insurance. Patient has contact number [phone], Patient appt is 5/27.

## 2025-05-20 NOTE — PROGRESS NOTES
Outpatient Care Management note- Incoming call from the patient    Patient states she called [facility] and they don't take her medical insurance.

## 2025-05-20 NOTE — PROGRESS NOTES
"Outpatient Care Management note- CM referral, direct provider referral from PCP    Chart review completed    Chart notes the patient had an appt with her PCP 5/19/25 for a dental problem and needs assist to find DDS. Further chart review revealed the patient was seen by ENT on 3/3/25 and urgent care on 3/26/25 for the same dental issue. The patient reported she was seen by her dentist and was referred to oral surgery but she was not seen due to insurance coverage. ENT recommended the patient follow up with her dentist again.     Call initiated to the patient, introduced self and RN CM role explained.     Spoke with Denies who reports she has a \"growth\" on her teeth. She states the texture is like rubber or glue. She tries to get it off but it immediately re-forms. She also says ulcers form and then burst causing her pain and making it difficult to speak and eat. She reports she has been dealing with this issue for 6 months or more. She has seen several doctors and no one can help her because \"it's not their expertise or they don't know what it is.\" She reports she called her insurance company and had a list of 4-5 oral surgeons but she finds that when she calls, they refuse to see her because they don't take both her dental and medical insurance together. She reports filing a grievance with the insurance company 2 months ago but hasn't heard anything. Encouraged the patient to follow up to see where the insurance is with that grievance process.     She reports that the dentist pulled her bottom teeth in preparation for permanent implants to be placed but still has the issue on the top teeth.     Patient also mentioned difficulty with transportation. She states she will make an appt with an oral surgeon office and need to pay someone to give her a ride and then show up to the office only to find out they can't treat her due to the insurance coverage. Offered patient a OP SW CM referral to assist with transportation " barrier.     Gave [facility] to call and check insurance coverage. Patient agreeable to call. Gave number [phone].    Patient agreeable to follow up next week to see if she has called the  [facility] or insurance company.

## 2025-05-22 ENCOUNTER — PATIENT OUTREACH (OUTPATIENT)
Dept: CASE MANAGEMENT | Facility: OTHER | Age: 70
End: 2025-05-22

## 2025-05-22 DIAGNOSIS — K08.9 POOR DENTITION: Primary | ICD-10-CM

## 2025-05-22 DIAGNOSIS — K03.6: ICD-10-CM

## 2025-05-22 NOTE — PROGRESS NOTES
KAITLIN DAS received referral for patient from RN PIPPA Burkett for need for follow-up by . Per referral comments and chart review, patient needs assistance with transportation and finding a dentist/oral surgeon. Patient stated she has called her insurance and was provided a list of 4-5 surgeons, but has found that when she calls they are not able to see her because they do not take her dental and medical insurance.    Patient does not have any prior OP KAITLIN DAS outreach.    KAITLIN DAS placed initial outreach call to patient and left a voicemail. KAITLIN DAS to place second outreach call within one week if return call is not received prior.    ADDENDUM:    KAITLIN DAS received return call from patient. KAITLIN DAS introduced self, explained role and reasoning for outreach. After explaining the referral that was received, KAITLIN DAS could not hear the patient on the other line. The call was then ended.    KAITLIN DAS received another call from patient. KAITLIN DAS reviewed the referral again and patient stated that she did have the concern with finding an oral surgeon, but stated that she found one in [place] called [facility] who is able to accept her medical and dental insurance. Patient also stated that she did not have a concern with transportation at this time. She stated that her daughter or sister takes her to and from her appointments.    Patient reported no other needs. Referral closed.

## 2025-05-27 ENCOUNTER — TELEPHONE (OUTPATIENT)
Age: 70
End: 2025-05-27

## 2025-05-27 NOTE — TELEPHONE ENCOUNTER
Diya  from Dr. Landaverde's office (Oral Surgery) calling for a meds list for this patient.  Please fax to [phone].  Thank you.

## 2025-05-30 ENCOUNTER — PATIENT OUTREACH (OUTPATIENT)
Dept: CASE MANAGEMENT | Facility: OTHER | Age: 70
End: 2025-05-30

## 2025-05-30 NOTE — PROGRESS NOTES
Outpatient Care Management note- follow up call    Chart review completed    Refer to last RN CM outreach on 5/20/25. OP SW CM referral made for follow up on reported transportation barrier. Chart notes SW CM follow up and patient denied need at this time. She also reported finding an oral surgeon in the area that accepts bother her medical and dental coverage.     Left message on voicemail today for patient to return call. Will identify further RN CM needs    Await call back from the patient.

## 2025-06-06 ENCOUNTER — PATIENT OUTREACH (OUTPATIENT)
Dept: CASE MANAGEMENT | Facility: OTHER | Age: 70
End: 2025-06-06

## 2025-06-06 NOTE — LETTER
Date: 06/06/25    Dear Felisha Eldridge,   My name is Landy; I am a registered nurse care manager working with [facility] Care Management Department within [facility].   I have not been able to reach you and would like to set a time that I can talk with you over the phone.  My work is to help patients that have complex medical conditions get the care they need. This includes patients who may have been in the hospital or emergency room.    Please call me with any questions you may have. I look forward to speaking with you.    Sincerely,  Landy STONE, RN  [phone]  Outpatient Registered Nurse Care Manager

## 2025-06-06 NOTE — PROGRESS NOTES
Outpatient Care Management note- follow up call    Chart review completed    Last outreach to the patient on 5/30/25 with no response to the voicemail. 2nd attempt today, left a voicemail message. Will follow up with an unable to reach letter in the mail.    Await a response from the patient.

## 2025-06-19 ENCOUNTER — PATIENT OUTREACH (OUTPATIENT)
Dept: CASE MANAGEMENT | Facility: OTHER | Age: 70
End: 2025-06-19

## 2025-06-19 NOTE — PROGRESS NOTES
Outpatient Care Management note- Unable to reach    Chart review completed    Outreach calls made to the patient on 5/30/25 and 6/6/25 with no response. No response to unable to reach letter.     Will close referral at this time.

## 2025-07-23 ENCOUNTER — TELEPHONE (OUTPATIENT)
Dept: INTERNAL MEDICINE CLINIC | Facility: CLINIC | Age: 70
End: 2025-07-23

## 2025-07-25 ENCOUNTER — TELEPHONE (OUTPATIENT)
Dept: GASTROENTEROLOGY | Facility: CLINIC | Age: 70
End: 2025-07-25

## 2025-07-25 ENCOUNTER — PREP FOR PROCEDURE (OUTPATIENT)
Dept: GASTROENTEROLOGY | Facility: CLINIC | Age: 70
End: 2025-07-25

## 2025-07-25 DIAGNOSIS — K52.9 CHRONIC DIARRHEA: Primary | ICD-10-CM

## 2025-07-25 RX ORDER — SODIUM CHLORIDE, SODIUM LACTATE, POTASSIUM CHLORIDE, CALCIUM CHLORIDE 600; 310; 30; 20 MG/100ML; MG/100ML; MG/100ML; MG/100ML
125 INJECTION, SOLUTION INTRAVENOUS CONTINUOUS
OUTPATIENT
Start: 2025-07-25

## 2025-07-25 NOTE — TELEPHONE ENCOUNTER
Scheduled Felisha for her 6 yr colonoscopy on 10/13 at [place] and mailed her the Miralax / Dulcolax instructions to her home.